# Patient Record
Sex: FEMALE | Race: WHITE | Employment: UNEMPLOYED | URBAN - METROPOLITAN AREA
[De-identification: names, ages, dates, MRNs, and addresses within clinical notes are randomized per-mention and may not be internally consistent; named-entity substitution may affect disease eponyms.]

---

## 2019-02-08 ENCOUNTER — HOSPITAL ENCOUNTER (EMERGENCY)
Age: 59
Discharge: HOME | End: 2019-02-08
Attending: EMERGENCY MEDICINE
Payer: COMMERCIAL

## 2019-02-08 ENCOUNTER — APPOINTMENT (OUTPATIENT)
Dept: GENERAL RADIOLOGY | Age: 59
End: 2019-02-08
Payer: COMMERCIAL

## 2019-02-08 VITALS
DIASTOLIC BLOOD PRESSURE: 75 MMHG | SYSTOLIC BLOOD PRESSURE: 130 MMHG | RESPIRATION RATE: 14 BRPM | OXYGEN SATURATION: 100 % | HEART RATE: 77 BPM | TEMPERATURE: 97.9 F

## 2019-02-08 DIAGNOSIS — S00.33XA CONTUSION OF NOSE, INITIAL ENCOUNTER: ICD-10-CM

## 2019-02-08 DIAGNOSIS — S01.81XA FACIAL LACERATION, INITIAL ENCOUNTER: Primary | ICD-10-CM

## 2019-02-08 PROCEDURE — 99283 EMERGENCY DEPT VISIT LOW MDM: CPT

## 2019-02-08 PROCEDURE — 4500000023 HC ED LEVEL 3 PROCEDURE

## 2019-02-08 PROCEDURE — 70160 X-RAY EXAM OF NASAL BONES: CPT

## 2019-02-08 ASSESSMENT — PAIN SCALES - GENERAL
PAINLEVEL_OUTOF10: 10

## 2022-03-16 ENCOUNTER — APPOINTMENT (OUTPATIENT)
Dept: GENERAL RADIOLOGY | Age: 62
DRG: 308 | End: 2022-03-16
Payer: COMMERCIAL

## 2022-03-16 ENCOUNTER — APPOINTMENT (OUTPATIENT)
Dept: CT IMAGING | Age: 62
DRG: 308 | End: 2022-03-16
Payer: COMMERCIAL

## 2022-03-16 ENCOUNTER — HOSPITAL ENCOUNTER (INPATIENT)
Age: 62
LOS: 6 days | Discharge: NURSING, REHAB OR LONG-TERM CARE | DRG: 308 | End: 2022-03-22
Attending: EMERGENCY MEDICINE | Admitting: STUDENT IN AN ORGANIZED HEALTH CARE EDUCATION/TRAINING PROGRAM
Payer: COMMERCIAL

## 2022-03-16 DIAGNOSIS — R26.2 DIFFICULTY IN WALKING: ICD-10-CM

## 2022-03-16 DIAGNOSIS — M84.452A: ICD-10-CM

## 2022-03-16 DIAGNOSIS — C79.9 METASTATIC MALIGNANT NEOPLASM, UNSPECIFIED SITE (HCC): ICD-10-CM

## 2022-03-16 DIAGNOSIS — M25.552 LEFT HIP PAIN: Primary | ICD-10-CM

## 2022-03-16 LAB
A/G RATIO: 1.1 (ref 1.1–2.2)
ALBUMIN SERPL-MCNC: 3.9 G/DL (ref 3.4–5)
ALP BLD-CCNC: 230 U/L (ref 40–129)
ALT SERPL-CCNC: 266 U/L (ref 10–40)
ANION GAP SERPL CALCULATED.3IONS-SCNC: 17 MMOL/L (ref 3–16)
ANION GAP SERPL CALCULATED.3IONS-SCNC: 20 MMOL/L (ref 3–16)
AST SERPL-CCNC: 132 U/L (ref 15–37)
BASOPHILS ABSOLUTE: 0 K/UL (ref 0–0.2)
BASOPHILS ABSOLUTE: 0 K/UL (ref 0–0.2)
BASOPHILS RELATIVE PERCENT: 0.5 %
BASOPHILS RELATIVE PERCENT: 0.7 %
BILIRUB SERPL-MCNC: 1.2 MG/DL (ref 0–1)
BUN BLDV-MCNC: 12 MG/DL (ref 7–20)
BUN BLDV-MCNC: 13 MG/DL (ref 7–20)
CALCIUM SERPL-MCNC: 11.4 MG/DL (ref 8.3–10.6)
CALCIUM SERPL-MCNC: 12.1 MG/DL (ref 8.3–10.6)
CHLORIDE BLD-SCNC: 100 MMOL/L (ref 99–110)
CHLORIDE BLD-SCNC: 101 MMOL/L (ref 99–110)
CO2: 21 MMOL/L (ref 21–32)
CO2: 22 MMOL/L (ref 21–32)
CREAT SERPL-MCNC: 0.8 MG/DL (ref 0.6–1.2)
CREAT SERPL-MCNC: 0.8 MG/DL (ref 0.6–1.2)
EOSINOPHILS ABSOLUTE: 0.1 K/UL (ref 0–0.6)
EOSINOPHILS ABSOLUTE: 0.1 K/UL (ref 0–0.6)
EOSINOPHILS RELATIVE PERCENT: 1.6 %
EOSINOPHILS RELATIVE PERCENT: 1.7 %
GAMMA GLUTAMYL TRANSFERASE: 33 U/L (ref 5–36)
GFR AFRICAN AMERICAN: >60
GFR AFRICAN AMERICAN: >60
GFR NON-AFRICAN AMERICAN: >60
GFR NON-AFRICAN AMERICAN: >60
GLUCOSE BLD-MCNC: 103 MG/DL (ref 70–99)
GLUCOSE BLD-MCNC: 125 MG/DL (ref 70–99)
GLUCOSE BLD-MCNC: 131 MG/DL (ref 70–99)
HCT VFR BLD CALC: 32.6 % (ref 36–48)
HCT VFR BLD CALC: 35.2 % (ref 36–48)
HEMOGLOBIN: 11.3 G/DL (ref 12–16)
HEMOGLOBIN: 11.8 G/DL (ref 12–16)
LYMPHOCYTES ABSOLUTE: 1.3 K/UL (ref 1–5.1)
LYMPHOCYTES ABSOLUTE: 1.3 K/UL (ref 1–5.1)
LYMPHOCYTES RELATIVE PERCENT: 30.5 %
LYMPHOCYTES RELATIVE PERCENT: 35.1 %
MAGNESIUM: 1.8 MG/DL (ref 1.8–2.4)
MCH RBC QN AUTO: 29.3 PG (ref 26–34)
MCH RBC QN AUTO: 30.1 PG (ref 26–34)
MCHC RBC AUTO-ENTMCNC: 33.5 G/DL (ref 31–36)
MCHC RBC AUTO-ENTMCNC: 34.7 G/DL (ref 31–36)
MCV RBC AUTO: 86.6 FL (ref 80–100)
MCV RBC AUTO: 87.5 FL (ref 80–100)
MONOCYTES ABSOLUTE: 0.3 K/UL (ref 0–1.3)
MONOCYTES ABSOLUTE: 0.3 K/UL (ref 0–1.3)
MONOCYTES RELATIVE PERCENT: 7.3 %
MONOCYTES RELATIVE PERCENT: 7.8 %
NEUTROPHILS ABSOLUTE: 2 K/UL (ref 1.7–7.7)
NEUTROPHILS ABSOLUTE: 2.4 K/UL (ref 1.7–7.7)
NEUTROPHILS RELATIVE PERCENT: 55.3 %
NEUTROPHILS RELATIVE PERCENT: 59.5 %
PARATHYROID HORMONE INTACT: 9.7 PG/ML (ref 14–72)
PDW BLD-RTO: 14.9 % (ref 12.4–15.4)
PDW BLD-RTO: 14.9 % (ref 12.4–15.4)
PERFORMED ON: ABNORMAL
PLATELET # BLD: 203 K/UL (ref 135–450)
PLATELET # BLD: 210 K/UL (ref 135–450)
PMV BLD AUTO: 7 FL (ref 5–10.5)
PMV BLD AUTO: 7.2 FL (ref 5–10.5)
POTASSIUM REFLEX MAGNESIUM: 3.7 MMOL/L (ref 3.5–5.1)
POTASSIUM SERPL-SCNC: 3.4 MMOL/L (ref 3.5–5.1)
RBC # BLD: 3.77 M/UL (ref 4–5.2)
RBC # BLD: 4.02 M/UL (ref 4–5.2)
REASON FOR REJECTION: NORMAL
REJECTED TEST: NORMAL
SODIUM BLD-SCNC: 139 MMOL/L (ref 136–145)
SODIUM BLD-SCNC: 142 MMOL/L (ref 136–145)
TOTAL PROTEIN: 7.5 G/DL (ref 6.4–8.2)
VITAMIN D 25-HYDROXY: 39.5 NG/ML
WBC # BLD: 3.6 K/UL (ref 4–11)
WBC # BLD: 4.1 K/UL (ref 4–11)

## 2022-03-16 PROCEDURE — 72128 CT CHEST SPINE W/O DYE: CPT

## 2022-03-16 PROCEDURE — 72131 CT LUMBAR SPINE W/O DYE: CPT

## 2022-03-16 PROCEDURE — 6360000002 HC RX W HCPCS: Performed by: STUDENT IN AN ORGANIZED HEALTH CARE EDUCATION/TRAINING PROGRAM

## 2022-03-16 PROCEDURE — 6370000000 HC RX 637 (ALT 250 FOR IP): Performed by: STUDENT IN AN ORGANIZED HEALTH CARE EDUCATION/TRAINING PROGRAM

## 2022-03-16 PROCEDURE — 83735 ASSAY OF MAGNESIUM: CPT

## 2022-03-16 PROCEDURE — 82306 VITAMIN D 25 HYDROXY: CPT

## 2022-03-16 PROCEDURE — 96374 THER/PROPH/DIAG INJ IV PUSH: CPT

## 2022-03-16 PROCEDURE — 6360000002 HC RX W HCPCS: Performed by: PHYSICIAN ASSISTANT

## 2022-03-16 PROCEDURE — 80053 COMPREHEN METABOLIC PANEL: CPT

## 2022-03-16 PROCEDURE — 1200000000 HC SEMI PRIVATE

## 2022-03-16 PROCEDURE — 85025 COMPLETE CBC W/AUTO DIFF WBC: CPT

## 2022-03-16 PROCEDURE — 73700 CT LOWER EXTREMITY W/O DYE: CPT

## 2022-03-16 PROCEDURE — 6370000000 HC RX 637 (ALT 250 FOR IP): Performed by: PHYSICIAN ASSISTANT

## 2022-03-16 PROCEDURE — 36415 COLL VENOUS BLD VENIPUNCTURE: CPT

## 2022-03-16 PROCEDURE — 74177 CT ABD & PELVIS W/CONTRAST: CPT

## 2022-03-16 PROCEDURE — 73502 X-RAY EXAM HIP UNI 2-3 VIEWS: CPT

## 2022-03-16 PROCEDURE — 73560 X-RAY EXAM OF KNEE 1 OR 2: CPT

## 2022-03-16 PROCEDURE — 6360000004 HC RX CONTRAST MEDICATION: Performed by: STUDENT IN AN ORGANIZED HEALTH CARE EDUCATION/TRAINING PROGRAM

## 2022-03-16 PROCEDURE — 96375 TX/PRO/DX INJ NEW DRUG ADDON: CPT

## 2022-03-16 PROCEDURE — 83970 ASSAY OF PARATHORMONE: CPT

## 2022-03-16 PROCEDURE — 2580000003 HC RX 258: Performed by: STUDENT IN AN ORGANIZED HEALTH CARE EDUCATION/TRAINING PROGRAM

## 2022-03-16 PROCEDURE — 82652 VIT D 1 25-DIHYDROXY: CPT

## 2022-03-16 PROCEDURE — 99284 EMERGENCY DEPT VISIT MOD MDM: CPT

## 2022-03-16 PROCEDURE — 72100 X-RAY EXAM L-S SPINE 2/3 VWS: CPT

## 2022-03-16 PROCEDURE — 82977 ASSAY OF GGT: CPT

## 2022-03-16 RX ORDER — ACETAMINOPHEN 650 MG/1
650 SUPPOSITORY RECTAL EVERY 6 HOURS PRN
Status: DISCONTINUED | OUTPATIENT
Start: 2022-03-16 | End: 2022-03-17

## 2022-03-16 RX ORDER — SODIUM CHLORIDE 9 MG/ML
25 INJECTION, SOLUTION INTRAVENOUS PRN
Status: DISCONTINUED | OUTPATIENT
Start: 2022-03-16 | End: 2022-03-19 | Stop reason: SDUPTHER

## 2022-03-16 RX ORDER — SODIUM CHLORIDE 0.9 % (FLUSH) 0.9 %
5-40 SYRINGE (ML) INJECTION EVERY 12 HOURS SCHEDULED
Status: DISCONTINUED | OUTPATIENT
Start: 2022-03-16 | End: 2022-03-19 | Stop reason: SDUPTHER

## 2022-03-16 RX ORDER — SODIUM CHLORIDE 0.9 % (FLUSH) 0.9 %
5-40 SYRINGE (ML) INJECTION PRN
Status: DISCONTINUED | OUTPATIENT
Start: 2022-03-16 | End: 2022-03-19 | Stop reason: SDUPTHER

## 2022-03-16 RX ORDER — ONDANSETRON 2 MG/ML
4 INJECTION INTRAMUSCULAR; INTRAVENOUS EVERY 6 HOURS PRN
Status: DISCONTINUED | OUTPATIENT
Start: 2022-03-16 | End: 2022-03-19 | Stop reason: SDUPTHER

## 2022-03-16 RX ORDER — MORPHINE SULFATE 4 MG/ML
4 INJECTION, SOLUTION INTRAMUSCULAR; INTRAVENOUS ONCE
Status: COMPLETED | OUTPATIENT
Start: 2022-03-16 | End: 2022-03-16

## 2022-03-16 RX ORDER — HYDROCODONE BITARTRATE AND ACETAMINOPHEN 5; 325 MG/1; MG/1
1 TABLET ORAL ONCE
Status: COMPLETED | OUTPATIENT
Start: 2022-03-16 | End: 2022-03-16

## 2022-03-16 RX ORDER — DEXTROSE MONOHYDRATE 50 MG/ML
100 INJECTION, SOLUTION INTRAVENOUS PRN
Status: DISCONTINUED | OUTPATIENT
Start: 2022-03-16 | End: 2022-03-22 | Stop reason: HOSPADM

## 2022-03-16 RX ORDER — DEXTROSE MONOHYDRATE 25 G/50ML
12.5 INJECTION, SOLUTION INTRAVENOUS PRN
Status: DISCONTINUED | OUTPATIENT
Start: 2022-03-16 | End: 2022-03-22 | Stop reason: HOSPADM

## 2022-03-16 RX ORDER — NICOTINE POLACRILEX 4 MG
15 LOZENGE BUCCAL PRN
Status: DISCONTINUED | OUTPATIENT
Start: 2022-03-16 | End: 2022-03-22 | Stop reason: HOSPADM

## 2022-03-16 RX ORDER — ONDANSETRON 2 MG/ML
4 INJECTION INTRAMUSCULAR; INTRAVENOUS ONCE
Status: COMPLETED | OUTPATIENT
Start: 2022-03-16 | End: 2022-03-16

## 2022-03-16 RX ORDER — SODIUM CHLORIDE 9 MG/ML
INJECTION, SOLUTION INTRAVENOUS CONTINUOUS
Status: ACTIVE | OUTPATIENT
Start: 2022-03-16 | End: 2022-03-17

## 2022-03-16 RX ORDER — ACETAMINOPHEN 325 MG/1
650 TABLET ORAL EVERY 6 HOURS PRN
Status: DISCONTINUED | OUTPATIENT
Start: 2022-03-16 | End: 2022-03-17

## 2022-03-16 RX ADMIN — IOPAMIDOL 75 ML: 755 INJECTION, SOLUTION INTRAVENOUS at 22:44

## 2022-03-16 RX ADMIN — MORPHINE SULFATE 4 MG: 4 INJECTION INTRAVENOUS at 19:49

## 2022-03-16 RX ADMIN — SODIUM CHLORIDE: 9 INJECTION, SOLUTION INTRAVENOUS at 23:10

## 2022-03-16 RX ADMIN — MORPHINE SULFATE 4 MG: 4 INJECTION INTRAVENOUS at 18:43

## 2022-03-16 RX ADMIN — ACETAMINOPHEN 650 MG: 325 TABLET ORAL at 21:04

## 2022-03-16 RX ADMIN — HYDROCODONE BITARTRATE AND ACETAMINOPHEN 1 TABLET: 5; 325 TABLET ORAL at 15:33

## 2022-03-16 RX ADMIN — ONDANSETRON 4 MG: 2 INJECTION INTRAMUSCULAR; INTRAVENOUS at 18:44

## 2022-03-16 RX ADMIN — SODIUM CHLORIDE, PRESERVATIVE FREE 10 ML: 5 INJECTION INTRAVENOUS at 21:15

## 2022-03-16 RX ADMIN — HYDROMORPHONE HYDROCHLORIDE 0.5 MG: 1 INJECTION, SOLUTION INTRAMUSCULAR; INTRAVENOUS; SUBCUTANEOUS at 21:57

## 2022-03-16 ASSESSMENT — PAIN SCALES - GENERAL
PAINLEVEL_OUTOF10: 0
PAINLEVEL_OUTOF10: 9
PAINLEVEL_OUTOF10: 7
PAINLEVEL_OUTOF10: 9
PAINLEVEL_OUTOF10: 5
PAINLEVEL_OUTOF10: 7
PAINLEVEL_OUTOF10: 6

## 2022-03-16 ASSESSMENT — PAIN DESCRIPTION - FREQUENCY
FREQUENCY: CONTINUOUS

## 2022-03-16 ASSESSMENT — PAIN DESCRIPTION - ONSET
ONSET: PROGRESSIVE
ONSET: PROGRESSIVE

## 2022-03-16 ASSESSMENT — PAIN DESCRIPTION - ORIENTATION
ORIENTATION: LEFT

## 2022-03-16 ASSESSMENT — ENCOUNTER SYMPTOMS
BACK PAIN: 1
COUGH: 0
NAUSEA: 0
ABDOMINAL PAIN: 0
VOMITING: 0
COLOR CHANGE: 0
SHORTNESS OF BREATH: 0

## 2022-03-16 ASSESSMENT — PAIN DESCRIPTION - PAIN TYPE
TYPE: CHRONIC PAIN

## 2022-03-16 ASSESSMENT — PAIN DESCRIPTION - LOCATION
LOCATION: HIP
LOCATION: HIP;HEAD
LOCATION: HIP

## 2022-03-16 ASSESSMENT — PAIN DESCRIPTION - PROGRESSION
CLINICAL_PROGRESSION: NOT CHANGED

## 2022-03-16 ASSESSMENT — PAIN DESCRIPTION - DESCRIPTORS
DESCRIPTORS: ACHING;CONSTANT

## 2022-03-16 ASSESSMENT — PAIN - FUNCTIONAL ASSESSMENT
PAIN_FUNCTIONAL_ASSESSMENT: PREVENTS OR INTERFERES SOME ACTIVE ACTIVITIES AND ADLS
PAIN_FUNCTIONAL_ASSESSMENT: PREVENTS OR INTERFERES SOME ACTIVE ACTIVITIES AND ADLS

## 2022-03-16 NOTE — ED PROVIDER NOTES
[address]        Pt Name: Renzo Toth  MRN: [id number]  [place] 1960  Date of evaluation: 3/16/2022  Provider: MADELINE Ayala  PCP: Chava Kelly MD  Note Started: 6:21 PM EDT        I have seen and evaluated this patient with my supervising physician Dr Palak Dewitt       Chief Complaint   Patient presents with    Leg Pain     pt woke with exacerbation of chronic L leg pain at 0700today. HISTORY OF PRESENT ILLNESS   (Location, Timing/Onset, Context/Setting, Quality, Duration, Modifying Factors, Severity, Associated Signs and Symptoms)  Note limiting factors. Chief Complaint: Left leg pain     Renzo Toth is a 64 y.o. female who presents to the emergency department today with complaints of left leg pain. The patient has had left leg pain since November. She denies any falls or injuries. She states it has steadily gotten worse. She states normally she did not require any assistive devices to walk, but in late January and early February she did transition to using a walker because the pain was so severe. She became more concerned today when she woke up this morning and could not even use the walker because the pain was so bad. 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 She has been in remission since then. She has been treated with a few rounds of steroids and muscle relaxers, which has helped her chronic low back pain, but has not helped her leg pain at all. She has no further complaints at this time. Nursing Notes were all reviewed and agreed with or any disagreements were addressed in the HPI.     REVIEW OF SYSTEMS    (2-9 systems for level 4, 10 or more for level 5)     Review of Systems   Constitutional: Negative for chills and fever.   Respiratory: Negative for cough and shortness of breath. Cardiovascular: Negative for chest pain and palpitations. Gastrointestinal: Negative for abdominal pain, nausea and vomiting. Musculoskeletal: Positive for arthralgias (left hip/leg), back pain (chronic, no change from baseline) and gait problem (due to left leg pain). Skin: Negative for color change and rash. Neurological: Negative for dizziness, syncope and weakness. Psychiatric/Behavioral: Negative for agitation and confusion. Positives and Pertinent negatives as per HPI. Except as noted above in the ROS, all other systems were reviewed and negative. PAST MEDICAL HISTORY     Past Medical History:   Diagnosis Date    Cancer ([facility] [address]     Depression     Hyperthyroidism     Nephrolithiasis          SURGICAL HISTORY     Past Surgical History:   Procedure Laterality Date    CHOLECYSTECTOMY      HYSTERECTOMY      MASTECTOMY           CURRENTMEDICATIONS       Current Discharge Medication List      CONTINUE these medications which have NOT CHANGED    Details   DULoxetine HCl (CYMBALTA PO) Take by mouth      atorvastatin (LIPITOR) 10 MG tablet Take 10 mg by mouth daily      gabapentin (NEURONTIN) 300 MG capsule TAKE 1 CAPSULE BY MOUTH DAILY. Refills: 3      metFORMIN (GLUCOPHAGE) 500 MG tablet TAKE 1 TABLET BY MOUTH 2 (TWO) TIMES DAILY WITH MEALS. Refills: 1      Breast Prosthesis MISC RIGHT MASTECTOMY BRA  RIGHT BREAST PROTHESIS  Qty: 4 each, Refills: 0      albuterol (PROVENTIL HFA;VENTOLIN HFA) 108 (90 BASE) MCG/ACT inhaler Inhale 2 puffs into the lungs every 4 hours as needed.                ALLERGIES     Cephalexin    FAMILYHISTORY       Family History   Problem Relation Age of Onset    Mental Illness Mother     High Blood Pressure Mother     High Blood Pressure Father     Diabetes Father           SOCIAL HISTORY       Social History     Tobacco Use    Smoking status: Former Smoker    Smokeless tobacco: Never Used Substance Use Topics    Alcohol use: Yes     Comment: rarely    Drug use: No       SCREENINGS    Gilda Coma Scale  Eye Opening: Spontaneous  Best Verbal Response: Oriented  Best Motor Response: Obeys commands  Gilda Coma Scale Score: 15        PHYSICAL EXAM    (up to 7 for level 4, 8 or more for level 5)     ED Triage Vitals [03/16/22 1442]   BP Temp Temp Source Pulse Resp SpO2 Height Weight   139/70 97 °F (36.1 °C) Oral 97 16 96 % 5' 5\" (1.651 m) 236 lb 15.9 oz (107.5 kg)       Physical Exam  Vitals and nursing note reviewed. Constitutional:       General: She is not in acute distress. Appearance: Normal appearance. She is well-developed. She is not ill-appearing, toxic-appearing or diaphoretic. HENT:      Head: Normocephalic and atraumatic. Eyes:      Conjunctiva/sclera: Conjunctivae normal.      Pupils: Pupils are equal, round, and reactive to light. Cardiovascular:      Rate and Rhythm: Normal rate and regular rhythm. Pulses:           Dorsalis pedis pulses are 2+ on the right side and 2+ on the left side. Pulmonary:      Effort: Pulmonary effort is normal. No respiratory distress. Breath sounds: Normal breath sounds. Musculoskeletal:      Cervical back: Normal.      Thoracic back: Normal.      Lumbar back: Normal.      Right hip: Normal.      Left hip: Tenderness present. Decreased range of motion. Right upper leg: Normal.      Left upper leg: Tenderness (no tenderness to posterior leg) present. Right lower leg: Normal. No edema. Left lower leg: Normal. No edema. Skin:     General: Skin is warm and dry. Neurological:      Mental Status: She is alert and oriented to person, place, and time. Psychiatric:         Behavior: Behavior normal. Behavior is cooperative. Thought Content:  Thought content normal.         DIAGNOSTIC RESULTS   LABS:    Labs Reviewed   CBC WITH AUTO DIFFERENTIAL - Abnormal; Notable for the following components:       Result Value metastatic   lesion do place the patient at a significant risk for pathologic fracture at   this site. Recommend orthopedic consultation. CT LUMBAR SPINE WO CONTRAST   Final Result   1. Diffuse bone metastasis. 2. Less than 50% compression fracture of L1 of uncertain chronicity. Subacute rib fractures. 3. Partially visualized questionable mass in the left kidney. CT abdomen   pelvis with contrast recommended. 4. Nonobstructive renal calculi. 5. Other findings as described. XR KNEE LEFT (1-2 VIEWS)   Final Result   Minimal degenerative changes of the left knee with no evidence of acute   osseous abnormality. XR HIP 2-3 VW W PELVIS LEFT   Preliminary Result   Mild compression deformity of the T12 vertebral body, age indeterminate. No   acute lumbar spine fracture. No comparison imaging available. No acute osseous abnormality of the pelvis or left hip. Indeterminate 2 cm   lucent lesion within the left femoral neck. If the patient has a history of   malignancy, finding is concerning for possible metastatic focus. Consider   follow-up MRI or bone scan as clinically indicated. XR LUMBAR SPINE (2-3 VIEWS)   Preliminary Result   Mild compression deformity of the T12 vertebral body, age indeterminate. No   acute lumbar spine fracture. No comparison imaging available. No acute osseous abnormality of the pelvis or left hip. Indeterminate 2 cm   lucent lesion within the left femoral neck. If the patient has a history of   malignancy, finding is concerning for possible metastatic focus. Consider   follow-up MRI or bone scan as clinically indicated.            XR LUMBAR SPINE (2-3 VIEWS)    Result Date: 3/16/2022  EXAMINATION: ONE XRAY VIEW OF THE PELVIS AND TWO XRAY VIEWS LEFT HIP; THREE XRAY VIEWS OF THE LUMBAR SPINE 3/16/2022 3:09 pm COMPARISON: None HISTORY: ORDERING SYSTEM PROVIDED HISTORY: pain TECHNOLOGIST PROVIDED HISTORY: Reason for exam:->pain Reason for acute osseous abnormality. CT THORACIC SPINE WO CONTRAST    Result Date: 3/16/2022  EXAMINATION: CT OF THE LUMBAR SPINE WITHOUT CONTRAST; CT OF THE THORACIC SPINE WITHOUT CONTRAST  3/16/2022 TECHNIQUE: CT of the lumbar spine was performed without the administration of intravenous contrast. Multiplanar reformatted images are provided for review. Adjustment of mA and/or kV according to patient size was utilized. Dose modulation, iterative reconstruction, and/or weight based adjustment of the mA/kV was utilized to reduce the radiation dose to as low as reasonably achievable.; CT of the thoracic spine was performed without the administration of intravenous contrast. Multiplanar reformatted images are provided for review. Dose modulation, iterative reconstruction, and/or weight based adjustment of the mA/kV was utilized to reduce the radiation dose to as low as reasonably achievable. COMPARISON: None HISTORY: ORDERING SYSTEM PROVIDED HISTORY: pain TECHNOLOGIST PROVIDED HISTORY: Reason for exam:->pain Decision Support Exception - unselect if not a suspected or confirmed emergency medical condition->Emergency Medical Condition (MA) Reason for Exam: Fall back in November, c/o mostly of left hip pain; ORDERING SYSTEM PROVIDED HISTORY: T12 fracture seen on XR TECHNOLOGIST PROVIDED HISTORY: Reason for exam:->T12 fracture seen on XR Reason for Exam: Fall back in November, c/o mostly of left hip pain FINDINGS: Thoracic spine: BONES/ALIGNMENT: Grade 1 retrolisthesis of T12 on L1. Chronic appearing less than 50% loss of height of T12. Vertebral body heights appear maintained aside from chronic appearing endplate changes. Mild and moderate loss of disc height. Posterior elements appear intact. Diffuse heterogeneous appearance of spine with lytic and sclerotic lesions. Subacute rib fractures noted. DEGENERATIVE CHANGES: Scattered degenerative changes noted in the visualized spine without spondylolisthesis.  SOFT TISSUES: Visualized paraspinal soft tissues appear unremarkable. Small hiatal hernia. Lumbar spine: BONES/ALIGNMENT: The inferior-most complete disc space represents L5-S1. Grade 1 retrolisthesis of L2 on L3 and L1 on L2. Diffuse heterogeneous appearance of spine with lytic and sclerotic lesions. Less than 50% compression fracture of L1 of uncertain chronicity. Vertebral body heights appear otherwise maintained aside from chronic endplate changes. Mild loss of disc height. Posterior elements appear intact. DEGENERATIVE CHANGES: Scattered degenerative changes noted in the visualized spine with spondylolistheses. SOFT TISSUES: Visualized paraspinal soft tissues appear unremarkable. Nonobstructive renal calculi. Partially visualized questionable mass in the left kidney. 1. Diffuse bone metastasis. 2. Less than 50% compression fracture of L1 of uncertain chronicity. Subacute rib fractures. 3. Partially visualized questionable mass in the left kidney. CT abdomen pelvis with contrast recommended. 4. Nonobstructive renal calculi. 5. Other findings as described. CT LUMBAR SPINE WO CONTRAST    Result Date: 3/16/2022  EXAMINATION: CT OF THE LUMBAR SPINE WITHOUT CONTRAST; CT OF THE THORACIC SPINE WITHOUT CONTRAST  3/16/2022 TECHNIQUE: CT of the lumbar spine was performed without the administration of intravenous contrast. Multiplanar reformatted images are provided for review. Adjustment of mA and/or kV according to patient size was utilized. Dose modulation, iterative reconstruction, and/or weight based adjustment of the mA/kV was utilized to reduce the radiation dose to as low as reasonably achievable.; CT of the thoracic spine was performed without the administration of intravenous contrast. Multiplanar reformatted images are provided for review. Dose modulation, iterative reconstruction, and/or weight based adjustment of the mA/kV was utilized to reduce the radiation dose to as low as reasonably achievable. COMPARISON: None HISTORY: ORDERING SYSTEM PROVIDED HISTORY: pain TECHNOLOGIST PROVIDED HISTORY: Reason for exam:->pain Decision Support Exception - unselect if not a suspected or confirmed emergency medical condition->Emergency Medical Condition (MA) Reason for Exam: Fall back in November, c/o mostly of left hip pain; ORDERING SYSTEM PROVIDED HISTORY: T12 fracture seen on XR TECHNOLOGIST PROVIDED HISTORY: Reason for exam:->T12 fracture seen on XR Reason for Exam: Fall back in November, c/o mostly of left hip pain FINDINGS: Thoracic spine: BONES/ALIGNMENT: Grade 1 retrolisthesis of T12 on L1. Chronic appearing less than 50% loss of height of T12. Vertebral body heights appear maintained aside from chronic appearing endplate changes. Mild and moderate loss of disc height. Posterior elements appear intact. Diffuse heterogeneous appearance of spine with lytic and sclerotic lesions. Subacute rib fractures noted. DEGENERATIVE CHANGES: Scattered degenerative changes noted in the visualized spine without spondylolisthesis. SOFT TISSUES: Visualized paraspinal soft tissues appear unremarkable. Small hiatal hernia. Lumbar spine: BONES/ALIGNMENT: The inferior-most complete disc space represents L5-S1. Grade 1 retrolisthesis of L2 on L3 and L1 on L2. Diffuse heterogeneous appearance of spine with lytic and sclerotic lesions. Less than 50% compression fracture of L1 of uncertain chronicity. Vertebral body heights appear otherwise maintained aside from chronic endplate changes. Mild loss of disc height. Posterior elements appear intact. DEGENERATIVE CHANGES: Scattered degenerative changes noted in the visualized spine with spondylolistheses. SOFT TISSUES: Visualized paraspinal soft tissues appear unremarkable. Nonobstructive renal calculi. Partially visualized questionable mass in the left kidney. 1. Diffuse bone metastasis. 2. Less than 50% compression fracture of L1 of uncertain chronicity. Subacute rib fractures.  3. Partially visualized questionable mass in the left kidney. CT abdomen pelvis with contrast recommended. 4. Nonobstructive renal calculi. 5. Other findings as described. CT HIP LEFT WO CONTRAST    Result Date: 3/16/2022  EXAMINATION: CT OF THE LEFT HIP WITHOUT CONTRAST 3/16/2022 4:21 pm TECHNIQUE: CT of the left hip was performed without the administration of intravenous contrast.  Multiplanar reformatted images are provided for review. Dose modulation, iterative reconstruction, and/or weight based adjustment of the mA/kV was utilized to reduce the radiation dose to as low as reasonably achievable. COMPARISON: None. HISTORY ORDERING SYSTEM PROVIDED HISTORY: abnormal finding on XR imaging, concerning for bone mets, eval for pathologic fracture TECHNOLOGIST PROVIDED HISTORY: Reason for exam:->abnormal finding on XR imaging, concerning for bone mets, eval for pathologic fracture Decision Support Exception - unselect if not a suspected or confirmed emergency medical condition->Emergency Medical Condition (MA) Reason for Exam: Fall back in November, c/o mostly of left hip pain FINDINGS: Bones: Destructive osseous lesion centered at the left metatarsal head and neck consistent with metastatic lesion. Partially imaged destructive lesion of the sacrum also present. There are several additional lytic and sclerotic foci of the left hemipelvis most compatible with metastatic disease. No definite fracture line identified at the left hip, however the location and destructive appearance does place the patient at significant risk for pathologic fracture at this site. Soft Tissue: Visualized intrapelvic structures demonstrate colonic diverticulosis. Imaged subcutaneous tissues appear grossly unremarkable. Joint: Anatomic alignment of the left hip with mild degenerative change of the left hip.      1. Osseous metastatic disease throughout the imaged left hemipelvis, left sacrum, and proximal left femur with large destructive lesions centered within the left femoral head and neck and involving the partially imaged left sacrum. 2. No definite pathologic fracture identified on the current exam, however, the location and severe destructive changes of the left femoral metastatic lesion do place the patient at a significant risk for pathologic fracture at this site. Recommend orthopedic consultation. XR HIP 2-3 VW W PELVIS LEFT    Result Date: 3/16/2022  EXAMINATION: ONE XRAY VIEW OF THE PELVIS AND TWO XRAY VIEWS LEFT HIP; THREE XRAY VIEWS OF THE LUMBAR SPINE 3/16/2022 3:09 pm COMPARISON: None HISTORY: ORDERING SYSTEM PROVIDED HISTORY: pain TECHNOLOGIST PROVIDED HISTORY: Reason for exam:->pain Reason for Exam: pt fell back in nov and pain has gotten worse over time, unable to put weight on left leg, lower back pain FINDINGS: Lumbar spine, three views: There is mild wedge-shaped deformity of the T12 vertebral body. There is no other evidence of acute spinal fracture. Vertebral alignment is maintained. Mild multilevel osteoarthritic spurring with lower lumbar facet arthropathy. Mild osteopenia. Post cholecystectomy clips. AP pelvis and left hip: No acute osseous abnormality of the pelvis or left hip. Mild osteopenia. Poorly marginated lucency in the left femoral neck measuring around 2 cm. The SI joints are maintained. Mild symmetric osteoarthritic changes of the hips. Multiple calcified pelvic phleboliths. Mild compression deformity of the T12 vertebral body, age indeterminate. No acute lumbar spine fracture. No comparison imaging available. No acute osseous abnormality of the pelvis or left hip. Indeterminate 2 cm lucent lesion within the left femoral neck. If the patient has a history of malignancy, finding is concerning for possible metastatic focus. Consider follow-up MRI or bone scan as clinically indicated.            PROCEDURES   Unless otherwise noted below, none     Procedures      CONSULTS:  IP CONSULT TO HOSPITALIST  IP CONSULT TO ORTHOPEDIC SURGERY  IP CONSULT TO ONCOLOGY      EMERGENCY DEPARTMENT COURSE and DIFFERENTIAL DIAGNOSIS/MDM:   Vitals:    Vitals:    03/16/22 1442 03/16/22 2023   BP: 139/70 107/73   Pulse: 97 78   Resp: 16 17   Temp: 97 °F (36.1 °C) 97.6 °F (36.4 °C)   TempSrc: Oral Oral   SpO2: 96% 96%   Weight: 236 lb 15.9 oz (107.5 kg)    Height: 5' 5\" (1.651 m)        Patient was given the following medications:  Medications   HYDROcodone-acetaminophen (NORCO) 5-325 MG per tablet 1 tablet (1 tablet Oral Given 3/16/22 1533)   morphine (PF) injection 4 mg (4 mg IntraVENous Given 3/16/22 1843)   ondansetron (ZOFRAN) injection 4 mg (4 mg IntraVENous Given 3/16/22 1844)   morphine (PF) injection 4 mg (4 mg IntraVENous Given 3/16/22 1949)           ED COURSE & MEDICAL DECISION MAKING    - The patient presented to the ER with complaints of left leg pain. Vital signs were reviewed. Exam as above. Peripheral IV placed. Labs, Imaging ordered. - Pertinent Labs & Imaging studies reviewed. (See chart for details)   -  Patient seen and evaluated in the emergency department. -  Triage and nursing notes reviewed and incorporated. -  Old chart records reviewed and incorporated. -   I have seen and evaluated this patient with my supervising physician Dr Jefferson Mac. -  Differential diagnosis includes: abrasion/laceration, contusion, fracture, sprain/strain, dislocation  -  Work-up included:  See above  -  ED treatment included:  Norco, morphine, zofran  - Consults: hospitalist for admission  -  Results discussed with patient and/or family. Labs show no leukocytosis, hemoglobin of 11.3, hematocrit of 32.6. Metabolic panel with calcium of 12.1. Imaging studies show no acute process on plain films of the left knee.   Plain films of the lumbar spine, left hip and pelvis show mild compression deformity of T12 vertebral body, no acute lumbar spine fracture, and indeterminate 2 cm lucent lesion within the left femoral neck, given her history of malignancy this is further evaluated with a CT scan. CT scan of the thoracic and lumbar spine show diffuse metastatic disease, less than 50% compression fracture of L1 of uncertain chronicity, subacute rib fractures, partially visualized questionable mass in the left kidney. CT scan of the left hip show osseous metastatic disease throughout the imaged left hemipelvis, left sacrum, and proximal left femur with large destructive lesion centered within the left femoral head and neck involving the partially imaged left sacrum, with no definite pathologic fracture identified on the exam, however given the location and severity of the destructive changes this does place the patient in significant risk for pathologic fracture. At this time, we recommend admission, as the patient has significant pain in her leg, she is unable to walk, even with her assistive devices that she has at home. She also has new findings of diffuse metastatic disease, would benefit from admission for further evaluation and management. The patient and/or family is agreeable with plan of care and disposition.  -  Disposition:  Admission  - Critical Care: The total critical care time I independently spent while evaluating and treating this patient was 18 minutes. This excludes time spent doing separately billable procedures. This includes time at the bedside, data interpretation, medication management, obtaining critical history from collateral sources if the patient is unable to provide it directly, and physician consultation. Specifics of interventions taken and potentially life-threatening diagnostic considerations are listed above in the medical decision making. If this was a shared visit with an physician, the time in this attestation is non-concurrent critical care time out of the total shared critical care time provided by the physician and myself. FINAL IMPRESSION      1. Left hip pain    2.  Metastatic malignant neoplasm, unspecified site ([facility] [address]    3. Difficulty in walking          DISPOSITION/PLAN   DISPOSITION Admitted 03/16/2022 07:30:59 PM      PATIENT REFERRED TO:  No follow-up provider specified.     DISCHARGE MEDICATIONS:  Current Discharge Medication List          DISCONTINUED MEDICATIONS:  Current Discharge Medication List                 (Please note that portions of this note were completed with a voice recognition program.  Efforts were made to edit the dictations but occasionally words are mis-transcribed.)    MADELINE Jesus (electronically signed)           Freddy Jesus  03/16/22 2043

## 2022-03-16 NOTE — ED PROVIDER NOTES
Attending Note:    The patient was seen and examined by the mid-level provider. I also completed a face-to-face examination. HPI: Dion Gomez is a 64 y.o. female who presents to the emergency department with a complaint of low back and hip pain. She states that she has been having low back pain since November 2021. She states that it is constant, it is there every day, all day long and never goes away. She denies any radicular pain, saddle anesthesia, focal weakness, or numbness. She denies any fall trauma or injury. She denies any associated chest pain or shortness of breath. Since January, for the last 2 months she is also been having left hip pain that radiates to the left anterior thigh and down to the left mid thigh. She denies any injury to the hip. She reports that since January she has had to use a walker because the pain is so bad. For the last couple of days she has not been able to bear any weight and today she was unable to walk at all. She denies any fever chills nausea vomiting diarrhea. No cough or cold symptoms. Medical history is significant for breast cancer. She was diagnosed in 2007. She reports that she was diagnosed with \"stage II\" and underwent mastectomy followed by 4 rounds of chemotherapy. She did not receive radiation. She has had no further problems since her original diagnosis. Physical Exam:     Constitutional: No apparent distress. Head: No visible evidence of trauma. Normocephalic. Eyes: Pupils equal and reactive. No photophobia. Conjunctiva normal.    HENT: Oral mucosa moist.  Airway patent. Neck:  Soft and supple. Nontender. Heart:  Regular rate and rhythm. No murmur. Lungs:  Clear to auscultation. No wheezes, rales, or ronchi. No conversational dyspnea or accessory muscle use. Abdomen:  Soft, non-distended. Nontender. No guarding rigidity or rebound. Musculoskeletal: Tenderness noted in the thoracic and lumbar spine throughout. Pain noted with range of motion. Pelvis stable. Tenderness in the left hip with palpation. Pain was noted with rotation and with range of motion in the left hip. Radial and dorsalis pedis pulses were equal laterally. Capillary refill less than 2 seconds in all digits. Neurological: Alert and oriented x 3. Speech clear. No acute focal motor or sensory deficits. GCS 15. No facial droop. No pronator drift. Able to slightly lift both legs off the bed without difficulty. However lifting the left leg elicits significant pain in the left hip. No radicular pain. No saddle anesthesia. Deep tendon reflexes were 2/4 in the patellar and Achilles tendon. She was able to plantarflex and dorsiflex bilaterally without difficulty against resistance. Skin: Skin is warm and dry. No rash. Psychiatric: Normal mood and affect. Behavior is normal.     DIAGNOSTIC RESULTS     EKG: All EKG's are interpreted by the Emergency Department Physician who either signs or Co-signs this chart in the absence of a cardiologist.        RADIOLOGY:   Non-plain film images such as CT, Ultrasound and MRI are read by the radiologist. Plain radiographic images are visualized and preliminarily interpreted by the emergency physician with the below findings:        Interpretation per the Radiologist below, if available at the time of this note:    CT THORACIC SPINE WO CONTRAST   Final Result   1. Diffuse bone metastasis. 2. Less than 50% compression fracture of L1 of uncertain chronicity. Subacute rib fractures. 3. Partially visualized questionable mass in the left kidney. CT abdomen   pelvis with contrast recommended. 4. Nonobstructive renal calculi. 5. Other findings as described. CT HIP LEFT WO CONTRAST   Final Result   1.  Osseous metastatic disease throughout the imaged left hemipelvis, left   sacrum, and proximal left femur with large destructive lesions centered   within the left femoral head and neck and involving the partially imaged left   sacrum. 2. No definite pathologic fracture identified on the current exam, however,   the location and severe destructive changes of the left femoral metastatic   lesion do place the patient at a significant risk for pathologic fracture at   this site. Recommend orthopedic consultation. CT LUMBAR SPINE WO CONTRAST   Final Result   1. Diffuse bone metastasis. 2. Less than 50% compression fracture of L1 of uncertain chronicity. Subacute rib fractures. 3. Partially visualized questionable mass in the left kidney. CT abdomen   pelvis with contrast recommended. 4. Nonobstructive renal calculi. 5. Other findings as described. XR KNEE LEFT (1-2 VIEWS)   Final Result   Minimal degenerative changes of the left knee with no evidence of acute   osseous abnormality. XR HIP 2-3 VW W PELVIS LEFT   Preliminary Result   Mild compression deformity of the T12 vertebral body, age indeterminate. No   acute lumbar spine fracture. No comparison imaging available. No acute osseous abnormality of the pelvis or left hip. Indeterminate 2 cm   lucent lesion within the left femoral neck. If the patient has a history of   malignancy, finding is concerning for possible metastatic focus. Consider   follow-up MRI or bone scan as clinically indicated. XR LUMBAR SPINE (2-3 VIEWS)   Preliminary Result   Mild compression deformity of the T12 vertebral body, age indeterminate. No   acute lumbar spine fracture. No comparison imaging available. No acute osseous abnormality of the pelvis or left hip. Indeterminate 2 cm   lucent lesion within the left femoral neck. If the patient has a history of   malignancy, finding is concerning for possible metastatic focus. Consider   follow-up MRI or bone scan as clinically indicated.                ED BEDSIDE ULTRASOUND:   Performed by ED Physician - none    LABS:  Labs Reviewed   CBC WITH AUTO DIFFERENTIAL   COMPREHENSIVE METABOLIC PANEL W/ REFLEX TO MG FOR LOW K       All other labs were within normal range or not returned as of this dictation. EMERGENCY DEPARTMENT COURSE and DIFFERENTIAL DIAGNOSIS/MDM:   Vitals:    Vitals:    03/16/22 1442   BP: 139/70   Pulse: 97   Resp: 16   Temp: 97 °F (36.1 °C)   TempSrc: Oral   SpO2: 96%   Weight: 236 lb 15.9 oz (107.5 kg)   Height: 5' 5\" (1.651 m)       I personally saw the patient and was involved in the medical decision making. Patient presents with back and hip pain as noted above. X-rays revealed findings suspicious for metastatic disease. CT imaging was added. No definite pathologic fracture but she is unable to bear weight on the left hip and her pain is uncontrolled. She will be admitted for pain control, orthopedic evaluation of the left hip, and further work-up and management. A call was placed to the hospitalist on-call at [facility] by the physician assistant for admission. She will be transferred by S ambulance. MDM      CRITICAL CARE TIME     I personally saw the patient and independently provided 0 minutes of non-concurrent critical care out of the total shared critical care time provided. This excludes separately reportable procedures. There was a high probability of clinically significant/life threatening deterioration in the patient's condition which required my urgent intervention. CONSULTS:  IP CONSULT TO HOSPITALIST    PROCEDURES:  Unless otherwise noted below, none     Procedures        FINAL IMPRESSION      1. Left hip pain    2. Metastatic malignant neoplasm, unspecified site ([facility] [address]    3. Difficulty in walking          DISPOSITION/PLAN   DISPOSITION Decision To Admit 03/16/2022 05:33:59 PM      PATIENT REFERRED TO:  No follow-up provider specified. DISCHARGE MEDICATIONS:  New Prescriptions    No medications on file     Controlled Substances Monitoring:     No flowsheet data found.     (Please note that portions of this note were completed with a voice recognition program.  Efforts were made to edit the dictations but occasionally words are mis-transcribed. )    1859 Reji Fitzpatrick DO (electronically signed)  Attending Emergency Physician      Jeni Guevara DO  03/16/22 0933

## 2022-03-16 NOTE — ED NOTES
Gave Pt her pain medication and she stated \"that's nothing but candy\".      Verito Thurman RN  03/16/22 2606

## 2022-03-16 NOTE — H&P
Hospital Medicine History & Physical      PCP: Johnson Schuler MD    Date of Admission: 3/16/2022    Date of Service: Pt seen/examined on 3/16/2022 admitted to    Chief Complaint: Worsening left hip pain and inability to ambulate on hip      History Of Present Illness: The patient is a 64 y.o. 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 She does also have some right leg pain but she notes that this is likely more so because she is compensating for being unable to put any weight or pressure on her left hip. She is not had any noted trauma to this left hip. She has also had some persistent occurrences of lower lumbar back pain but has never been associate with left hip pain and it had started much earlier than this sometime back in last November 2021. She notes the left hip pain has been more concerning to her at this time since it has persisted for the last 2 months and seems to be progressively getting worse. The lumbar pain seems to be stable. She only notes the previous history of breast cancer and has not had any other previous history of bone cancer.   She denies any other recent symptoms of fever, chills, dizziness, syncope, random other joint or bone never used smokeless tobacco.  ETOH:   reports current alcohol use. Family History:  Reviewed in detail and negative for DM, Early CAD, Cancer, CVA. Positive as follows:        Problem Relation Age of Onset    Mental Illness Mother     High Blood Pressure Mother     High Blood Pressure Father     Diabetes Father        REVIEW OF SYSTEMS:    as noted in the HPI. All other systems reviewed and negative. PHYSICAL EXAM:    BP (!) 146/92   Pulse 83   Temp 97.8 °F (36.6 °C) (Oral)   Resp 15   Ht 5' 5\" (1.651 m)   Wt 238 lb 8.6 oz (108.2 kg)   SpO2 97%   BMI 39.69 kg/m²     General appearance: Alert and oriented x4, no acute respiratory distress, pleasant and conversational, still having some slight left hip pain  HEENT Normal cephalic, atraumatic without obvious deformity. Pupils equal, round, and reactive to light. Extra ocular muscles intact. Conjunctivae/corneas clear. Dry mucous membrane  Neck: Supple, no JVD  Lungs: Clear to auscultation, bilaterally without Rales/Wheezes/Rhonchi with good respiratory effort. Heart: Regular rate and rhythm with Normal S1/S2 without murmurs, rubs or gallops, point of maximum impulse non-displaced  Abdomen: Soft, non-tender or non-distended without rigidity or guarding and positive bowel sounds all four quadrants. Extremities: No edema noted bilaterally to lower extremities  Musculoskeletal: Noted pain to left hip on palpation but not extreme, does have some slight pain to the lumbar region on palpation as well  Skin: No rashes  Neurologic: Grossly intact neurologically  Mental status: Alert, oriented, thought content appropriate. Capillary Refill: Acceptable  < 3 seconds  Peripheral Pulses: +3 Easily felt, not easily obliterated with pressure      CT left hip without contrast:  1.  Osseous metastatic disease throughout the imaged left hemipelvis, left   sacrum, and proximal left femur with large destructive lesions centered   within the left femoral head and neck and involving the partially imaged left   sacrum. 2. No definite pathologic fracture identified on the current exam, however,   the location and severe destructive changes of the left femoral metastatic   lesion do place the patient at a significant risk for pathologic fracture at   this site. [facility] orthopedic consultation. CT lumbar spine without contrast:  1. Diffuse bone metastasis. 2. Less than 50% compression fracture of L1 of uncertain chronicity. Subacute rib fractures. 3. Partially visualized questionable mass in the left kidney.  CT abdomen   pelvis with contrast recommended. 4. Nonobstructive renal calculi. 5. Other findings as described. CT thoracic spine without contrast:   1. Diffuse bone metastasis. 2. Less than 50% compression fracture of L1 of uncertain chronicity. Subacute rib fractures. 3. Partially visualized questionable mass in the left kidney.  CT abdomen   pelvis with contrast recommended. 4. Nonobstructive renal calculi. 5. Other findings as described. CT abdomen pelvis IV contrast:  Lytic and sclerotic lesions are seen diffusely through the visualized osseous   structures in the pelvis and lumbar spine consistent with osseous metastatic   disease.       No CT evidence of renal mass. CBC   Recent Labs     03/16/22  1830 03/16/22 2115 03/17/22  0630   WBC 4.1 3.6* 3.2*   HGB 11.3* 11.8* 11.1*   HCT 32.6* 35.2* 32.0*    203 191      RENAL  Recent Labs     03/16/22  1830 03/16/22  2225 03/17/22  0001    139 136   K 3.7 3.4* 3.7    101 98*   CO2 22 21 21   BUN 13 12 13   CREATININE 0.8 0.8 0.8     LFT'S  Recent Labs     03/16/22  1830 03/17/22  0001   * 135*   * 245*   BILITOT 1.2* 1.4*   ALKPHOS 230* 212*     COAG  No results for input(s): INR in the last 72 hours. CARDIAC ENZYMES  No results for input(s): CKTOTAL, CKMB, CKMBINDEX, TROPONINI in the last 72 hours.     U/A:    Lab Results   Component Value Date COLORU YELLOW 03/17/2022    CLARITYU Clear 03/17/2022    SPECGRAV 1.016 03/17/2022    LEUKOCYTESUR Negative 03/17/2022    BLOODU Negative 03/17/2022    GLUCOSEU Negative 03/17/2022       ABG  No results found for: LMW2JKH, BEART, D8QSWSYU, PHART, THGBART, ABE8IQT, PO2ART, ZRC5FIH        Active Hospital Problems    Diagnosis Date Noted    Malignancy ([facility] [address] [C80.1] 03/17/2022    Hypercalcemia [E83.52] 03/17/2022    Left hip pain [M25.552] 03/16/2022    Pathological fracture of left hip, initial encounter [facility]) [U39.186I] 03/16/2022   · Elevate LFTs      PHYSICIANS CERTIFICATION:    I certify that Angela Kraus is expected to be hospitalized for greater than 2 midnights based on the following assessment and plan:      ASSESSMENT/PLAN:  · Hypercalcemia  · Left hip pain  · Malignancy  · Possible pathologic fracture of left hip  · Elevate LFTs    Plan:  · Initially pending CT scan of the abdomen with IV contrast to evaluate possible renal mass, came back negative but still showing lytic and sclerotic lesions of multiple areas throughout patient's thoracic and abdomen, main concern is of the left hip that is possibly suggestive of significant sclerotic and lytic lesions.   Uncertain etiology of the above lesions but could be possible recurrence of previous breast malignancy versus possibly multiple myeloma or other cancer  · Ordered vitamin D 25 level, vitamin 1, 25, PTH, protein creatinine ratio with urinalysis, GGT to evaluate for liver etiology of alk phos  · Keeping patient on 1 to 5/h of normal saline for IV fluid hydration for hypercalcemia  · Avoid any hepatotoxic agents such as Tylenol due to elevated LFTs  · Dilaudid every 3 hours as needed on the pain scale  · Repeat CBC/CMP/magnesium daily, repeating metabolic panel overnight to monitor LFTs as well as hypercalcemia  · Oncology consult for hypercalcemia and bone lesions of uncertain etiology  · Orthopedic surgery consult for malignant lesions and left hip pain with some concern for pathologic fracture  · Patient was made n.p.o. at midnight in case procedure might be needed in the morning  · Sliding-scale insulin, and mealtime Accu-Cheks, consider restarting if patient has a meal ordered in the morning    DVT Prophylaxis: Lovenox  Diet: Diet NPO  Code Status: Full Code  PT/OT Eval Status: Ambulatory    Dispo -pending clinical course       Grayson Barrientos, DO    Thank you Roselia Thrasher MD for the opportunity to be involved in this patient's care. If you have any questions or concerns please feel free to contact me at [phone].

## 2022-03-16 NOTE — ED NOTES
Pt transferred to [facility] room [phone], report called to himanshu ROMERO.  Pt current pain 6/10     Mu Mandel RN  03/16/22 1306

## 2022-03-17 PROBLEM — C80.1 MALIGNANCY (HCC): Status: ACTIVE | Noted: 2022-03-17

## 2022-03-17 PROBLEM — E83.52 HYPERCALCEMIA: Status: ACTIVE | Noted: 2022-03-17

## 2022-03-17 LAB
A/G RATIO: 0.9 (ref 1.1–2.2)
A/G RATIO: 1.1 (ref 1.1–2.2)
ALBUMIN SERPL-MCNC: 3.5 G/DL (ref 3.4–5)
ALBUMIN SERPL-MCNC: 3.6 G/DL (ref 3.4–5)
ALP BLD-CCNC: 199 U/L (ref 40–129)
ALP BLD-CCNC: 212 U/L (ref 40–129)
ALT SERPL-CCNC: 245 U/L (ref 10–40)
ALT SERPL-CCNC: 260 U/L (ref 10–40)
ANION GAP SERPL CALCULATED.3IONS-SCNC: 12 MMOL/L (ref 3–16)
ANION GAP SERPL CALCULATED.3IONS-SCNC: 17 MMOL/L (ref 3–16)
AST SERPL-CCNC: 135 U/L (ref 15–37)
AST SERPL-CCNC: 142 U/L (ref 15–37)
BASOPHILS ABSOLUTE: 0 K/UL (ref 0–0.2)
BASOPHILS RELATIVE PERCENT: 0.9 %
BILIRUB SERPL-MCNC: 1.2 MG/DL (ref 0–1)
BILIRUB SERPL-MCNC: 1.4 MG/DL (ref 0–1)
BILIRUBIN URINE: NEGATIVE
BLOOD, URINE: NEGATIVE
BUN BLDV-MCNC: 11 MG/DL (ref 7–20)
BUN BLDV-MCNC: 13 MG/DL (ref 7–20)
CALCIUM SERPL-MCNC: 10.9 MG/DL (ref 8.3–10.6)
CALCIUM SERPL-MCNC: 11.4 MG/DL (ref 8.3–10.6)
CHLORIDE BLD-SCNC: 100 MMOL/L (ref 99–110)
CHLORIDE BLD-SCNC: 98 MMOL/L (ref 99–110)
CLARITY: CLEAR
CO2: 21 MMOL/L (ref 21–32)
CO2: 24 MMOL/L (ref 21–32)
COLOR: YELLOW
CREAT SERPL-MCNC: 0.8 MG/DL (ref 0.6–1.2)
CREAT SERPL-MCNC: 0.8 MG/DL (ref 0.6–1.2)
CREATININE URINE: 112.2 MG/DL (ref 28–259)
EOSINOPHILS ABSOLUTE: 0.1 K/UL (ref 0–0.6)
EOSINOPHILS RELATIVE PERCENT: 2.1 %
GFR AFRICAN AMERICAN: >60
GFR AFRICAN AMERICAN: >60
GFR NON-AFRICAN AMERICAN: >60
GFR NON-AFRICAN AMERICAN: >60
GLUCOSE BLD-MCNC: 104 MG/DL (ref 70–99)
GLUCOSE BLD-MCNC: 108 MG/DL (ref 70–99)
GLUCOSE BLD-MCNC: 112 MG/DL (ref 70–99)
GLUCOSE BLD-MCNC: 112 MG/DL (ref 70–99)
GLUCOSE BLD-MCNC: 113 MG/DL (ref 70–99)
GLUCOSE BLD-MCNC: 118 MG/DL (ref 70–99)
GLUCOSE URINE: NEGATIVE MG/DL
HCT VFR BLD CALC: 32 % (ref 36–48)
HEMOGLOBIN: 11.1 G/DL (ref 12–16)
KETONES, URINE: ABNORMAL MG/DL
LEUKOCYTE ESTERASE, URINE: NEGATIVE
LYMPHOCYTES ABSOLUTE: 1.3 K/UL (ref 1–5.1)
LYMPHOCYTES RELATIVE PERCENT: 40 %
MAGNESIUM: 2 MG/DL (ref 1.8–2.4)
MCH RBC QN AUTO: 29.8 PG (ref 26–34)
MCHC RBC AUTO-ENTMCNC: 34.6 G/DL (ref 31–36)
MCV RBC AUTO: 86 FL (ref 80–100)
MICROSCOPIC EXAMINATION: ABNORMAL
MONOCYTES ABSOLUTE: 0.2 K/UL (ref 0–1.3)
MONOCYTES RELATIVE PERCENT: 7.6 %
NEUTROPHILS ABSOLUTE: 1.6 K/UL (ref 1.7–7.7)
NEUTROPHILS RELATIVE PERCENT: 49.4 %
NITRITE, URINE: NEGATIVE
PDW BLD-RTO: 14.7 % (ref 12.4–15.4)
PERFORMED ON: ABNORMAL
PH UA: 6 (ref 5–8)
PLATELET # BLD: 191 K/UL (ref 135–450)
PMV BLD AUTO: 7 FL (ref 5–10.5)
POTASSIUM REFLEX MAGNESIUM: 3.7 MMOL/L (ref 3.5–5.1)
POTASSIUM SERPL-SCNC: 3.4 MMOL/L (ref 3.5–5.1)
PROTEIN PROTEIN: 15 MG/DL
PROTEIN UA: NEGATIVE MG/DL
PROTEIN/CREAT RATIO: 0.1 MG/DL
RBC # BLD: 3.72 M/UL (ref 4–5.2)
SODIUM BLD-SCNC: 136 MMOL/L (ref 136–145)
SODIUM BLD-SCNC: 136 MMOL/L (ref 136–145)
SPECIFIC GRAVITY UA: 1.02 (ref 1–1.03)
TOTAL PROTEIN: 7 G/DL (ref 6.4–8.2)
TOTAL PROTEIN: 7.2 G/DL (ref 6.4–8.2)
URINE TYPE: ABNORMAL
UROBILINOGEN, URINE: 1 E.U./DL
WBC # BLD: 3.2 K/UL (ref 4–11)

## 2022-03-17 PROCEDURE — 85025 COMPLETE CBC W/AUTO DIFF WBC: CPT

## 2022-03-17 PROCEDURE — 51701 INSERT BLADDER CATHETER: CPT

## 2022-03-17 PROCEDURE — 2580000003 HC RX 258: Performed by: INTERNAL MEDICINE

## 2022-03-17 PROCEDURE — 87635 SARS-COV-2 COVID-19 AMP PRB: CPT

## 2022-03-17 PROCEDURE — 6360000002 HC RX W HCPCS: Performed by: INTERNAL MEDICINE

## 2022-03-17 PROCEDURE — 36415 COLL VENOUS BLD VENIPUNCTURE: CPT

## 2022-03-17 PROCEDURE — 6360000002 HC RX W HCPCS: Performed by: NURSE PRACTITIONER

## 2022-03-17 PROCEDURE — 1200000000 HC SEMI PRIVATE

## 2022-03-17 PROCEDURE — 6360000002 HC RX W HCPCS: Performed by: STUDENT IN AN ORGANIZED HEALTH CARE EDUCATION/TRAINING PROGRAM

## 2022-03-17 PROCEDURE — 82570 ASSAY OF URINE CREATININE: CPT

## 2022-03-17 PROCEDURE — 2580000003 HC RX 258: Performed by: STUDENT IN AN ORGANIZED HEALTH CARE EDUCATION/TRAINING PROGRAM

## 2022-03-17 PROCEDURE — 83735 ASSAY OF MAGNESIUM: CPT

## 2022-03-17 PROCEDURE — 51798 US URINE CAPACITY MEASURE: CPT

## 2022-03-17 PROCEDURE — 80053 COMPREHEN METABOLIC PANEL: CPT

## 2022-03-17 PROCEDURE — 84156 ASSAY OF PROTEIN URINE: CPT

## 2022-03-17 PROCEDURE — 6360000002 HC RX W HCPCS: Performed by: HOSPITALIST

## 2022-03-17 PROCEDURE — 99221 1ST HOSP IP/OBS SF/LOW 40: CPT | Performed by: NURSE PRACTITIONER

## 2022-03-17 PROCEDURE — 81003 URINALYSIS AUTO W/O SCOPE: CPT

## 2022-03-17 RX ORDER — CLINDAMYCIN PHOSPHATE 600 MG/50ML
600 INJECTION INTRAVENOUS
Status: DISCONTINUED | OUTPATIENT
Start: 2022-03-18 | End: 2022-03-17

## 2022-03-17 RX ORDER — PROCHLORPERAZINE EDISYLATE 5 MG/ML
5 INJECTION INTRAMUSCULAR; INTRAVENOUS EVERY 4 HOURS PRN
Status: DISCONTINUED | OUTPATIENT
Start: 2022-03-17 | End: 2022-03-22 | Stop reason: HOSPADM

## 2022-03-17 RX ORDER — CYCLOBENZAPRINE HCL 5 MG
5-10 TABLET ORAL 3 TIMES DAILY PRN
Status: ON HOLD | COMMUNITY
End: 2022-03-22 | Stop reason: HOSPADM

## 2022-03-17 RX ADMIN — Medication 12.5 MG: at 11:56

## 2022-03-17 RX ADMIN — HYDROMORPHONE HYDROCHLORIDE 0.5 MG: 1 INJECTION, SOLUTION INTRAMUSCULAR; INTRAVENOUS; SUBCUTANEOUS at 05:47

## 2022-03-17 RX ADMIN — ONDANSETRON 4 MG: 2 INJECTION INTRAMUSCULAR; INTRAVENOUS at 08:54

## 2022-03-17 RX ADMIN — ZOLEDRONIC ACID 4 MG: 4 INJECTION, SOLUTION, CONCENTRATE INTRAVENOUS at 13:33

## 2022-03-17 RX ADMIN — SODIUM CHLORIDE, PRESERVATIVE FREE 10 ML: 5 INJECTION INTRAVENOUS at 20:51

## 2022-03-17 RX ADMIN — HYDROMORPHONE HYDROCHLORIDE 0.5 MG: 1 INJECTION, SOLUTION INTRAMUSCULAR; INTRAVENOUS; SUBCUTANEOUS at 08:54

## 2022-03-17 RX ADMIN — PROCHLORPERAZINE EDISYLATE 5 MG: 5 INJECTION INTRAMUSCULAR; INTRAVENOUS at 15:37

## 2022-03-17 RX ADMIN — ENOXAPARIN SODIUM 40 MG: 100 INJECTION SUBCUTANEOUS at 11:54

## 2022-03-17 RX ADMIN — SODIUM CHLORIDE: 9 INJECTION, SOLUTION INTRAVENOUS at 06:32

## 2022-03-17 ASSESSMENT — PAIN SCALES - GENERAL
PAINLEVEL_OUTOF10: 0
PAINLEVEL_OUTOF10: 5
PAINLEVEL_OUTOF10: 5
PAINLEVEL_OUTOF10: 3
PAINLEVEL_OUTOF10: 8

## 2022-03-17 ASSESSMENT — PAIN DESCRIPTION - DESCRIPTORS
DESCRIPTORS: ACHING;CONSTANT

## 2022-03-17 ASSESSMENT — PAIN DESCRIPTION - PROGRESSION
CLINICAL_PROGRESSION: NOT CHANGED

## 2022-03-17 ASSESSMENT — PAIN - FUNCTIONAL ASSESSMENT
PAIN_FUNCTIONAL_ASSESSMENT: PREVENTS OR INTERFERES SOME ACTIVE ACTIVITIES AND ADLS

## 2022-03-17 ASSESSMENT — PAIN DESCRIPTION - ORIENTATION
ORIENTATION: LEFT
ORIENTATION: LEFT
ORIENTATION: LOWER;LEFT
ORIENTATION: LEFT;LOWER

## 2022-03-17 ASSESSMENT — PAIN DESCRIPTION - PAIN TYPE
TYPE: CHRONIC PAIN

## 2022-03-17 ASSESSMENT — PAIN DESCRIPTION - LOCATION
LOCATION: ABDOMEN;HIP
LOCATION: HIP;ABDOMEN
LOCATION: HIP
LOCATION: HIP;ABDOMEN

## 2022-03-17 ASSESSMENT — PAIN DESCRIPTION - ONSET
ONSET: ON-GOING

## 2022-03-17 ASSESSMENT — PAIN DESCRIPTION - FREQUENCY
FREQUENCY: CONTINUOUS

## 2022-03-17 NOTE — PROGRESS NOTES
Patient was initially rating pain lower on 0-10 pain scale and would only require 0.25 mg Dilaudid based on rating. Patient was informed of CT scan ordered and then rated pain higher d/t needing to move around more. Medication administration was edited in [facility] by primary RN, Lori Ramos. Patient received 0.5 mg Dilaudid and there was no need to waste medication.      Electronically signed by Atif Berry RN on 3/16/2022 at 10:06 PM  Electronically signed by Pari Voss RN on 3/16/2022 at 10:12 PM

## 2022-03-17 NOTE — CONSULTS
[address]                                  CONSULTATION    PATIENT NAME: Yadiel Ivan                     :        1960  MED REC NO:   [id number]                          ROOM:       3124  ACCOUNT NO:   [de-identified]                           ADMIT DATE: 2022  PROVIDER:     Antoine Cartwright MD    ONCOLOGY CONSULTATION    CONSULT DATE:  2022    REASON FOR CONSULTATION:  Widespread bone metastasis. CONSULTING PROVIDER:  Raina Burciaga MD    HISTORY OF PRESENT ILLNESS:  The patient is a pleasant 58-year-old  female who had right-sided breast cancer in  treated by Dr. Caroline Olmos at  [facility], who presented to the hospital with severe left hip  pain. This has been going on but getting worse over the previous  several weeks. A CT of the left hip showed metastatic disease  throughout the left hemipelvis and proximal left femur with large  destructive lesions within the left femoral head. She had a CT of the  thoracic spine which showed diffuse bony metastasis and an L1 partial  compression fracture. A CT of the abdomen and pelvis was done that  again showed multiple lytic and sclerotic bone lesions. Oncology was  consulted for further workup and management. She was also hypercalcemic  on admission. PAST MEDICAL HISTORY:  1. Right-sided breast cancer. She underwent a right mastectomy in  , followed by four cycles of adjuvant chemotherapy followed by five  years of antiestrogen treatment. She was lost to followup with Dr. Caroline Olmos. 2.  Depression. 3.  Hyperthyroidism. 4.  Kidney stones. 5.  Diabetes. PAST SURGICAL HISTORY:  1.  Status post cholecystectomy. 2.  Status post hysterectomy. 3.  Mastectomy as above. ALLERGIES:  She is allergic to Tin Cordon which causes an unknown reaction.     HOME MEDICINES:  Flexeril 5 mg p.o. t.i.d., Cymbalta 10 mg p.o. daily,  Lipitor 10 mg p.o. daily, Neurontin 300 mg p.o. daily, Glucophage 500 mg  p.o. b.i.d. SOCIAL HISTORY:  She is . She does not smoke or drink. FAMILY HISTORY:  Her brother had prostate cancer. REVIEW OF SYSTEMS:  She denies any recent fever, chills, sweats, nausea,  vomiting, chest pain, headaches, dysphagia, odynophagia, diarrhea,  constipation, hemoptysis, hematemesis, change in  vision/hearing/smell/taste, weakness, neuropathy, skin rashes,  productive cough, urinary or bowel prolapse or incontinence, petechiae,  purpura, skin rashes, any new bone aches. She has mild to moderate  fatigue and diffuse body aches. Her 10-system review of systems is  otherwise negative. PHYSICAL EXAMINATION:  VITAL SIGNS:  She is afebrile with normal vital signs. GENERAL:  She is in no acute distress. HEENT:  Her pupils are round and reactive to light and accommodation. Extraocular muscles are intact. NECK:  She has no jugular venous distention. No thyromegaly. Oropharynx is clear. She has no carotid bruits. She has no palpable  lymphadenopathy. HEART:  Regular rate and rhythm. LUNGS:  Clear to auscultation bilaterally. ABDOMEN:  Nondistended, nontender with bowel sounds x4. No  hepatosplenomegaly. EXTREMITIES:  She has no peripheral clubbing, cyanosis, or edema. NEUROLOGIC:  Nonfocal.    LABORATORY DATA:  White blood cell count is 3.2, hemoglobin 11.1,  platelets of 714. ASSESSMENT AND PLAN:  1.  Multiple cancerous bone lesions. I had a very long discussion with  the patient. I explained that this is most likely a late recurrence of  her breast cancer but there are multiple other possibilities. I will  check labs for myeloma. Orthopedics is planning on doing a pinning  procedure to her left femur tomorrow and they are doing a bone biopsy at  the same time. Once I have this back, I can give her a better idea on  the prognosis and treatment plan.   If this is metastasis from her  original ER-positive breast cancer, I would likely do an aromatase  inhibitor plus a CDK inhibitor. 2.  Hypercalcemia. I will give her a dose of Zometa. Thank you for the consultation. I will follow closely.         Acosta Potts MD    D: 03/17/2022 12:27:38       T: 03/17/2022 16:16:53     [email or web address]  Job#: [id number]     Doc#: [id number]    CC:  Orlando Clark

## 2022-03-17 NOTE — CONSULTS
Units, 0-6 Units, SubCUTAneous, TID WC  insulin lispro (HUMALOG) injection vial 0-3 Units, 0-3 Units, SubCUTAneous, Nightly  glucose (GLUTOSE) 40 % oral gel 15 g, 15 g, Oral, PRN  dextrose 50 % IV solution, 12.5 g, IntraVENous, PRN  glucagon (rDNA) injection 1 mg, 1 mg, IntraMUSCular, PRN  dextrose 5 % solution, 100 mL/hr, IntraVENous, PRN  ondansetron (ZOFRAN) injection 4 mg, 4 mg, IntraVENous, Q6H PRN  HYDROmorphone (DILAUDID) injection 0.25 mg, 0.25 mg, IntraVENous, Q3H PRN **OR** HYDROmorphone (DILAUDID) injection 0.5 mg, 0.5 mg, IntraVENous, Q3H PRN  Allergies:  @    REVIEW OF SYSTEMS:    CONSTITUTIONAL:  negative for  fevers, chills and malaise  MUSCULOSKELETAL:  positive for  myalgias, arthralgias and pain  All other ROS reviewed in chart or with patient or family and are grossly negative. PHYSICAL EXAM:    VITALS:  BP (!) 146/92   Pulse 83   Temp 97.8 °F (36.6 °C) (Oral)   Resp 15   Ht 5' 5\" (1.651 m)   Wt 238 lb 8.6 oz (108.2 kg)   SpO2 97%   BMI 39.69 kg/m²     MUSCULOSKELETAL:  left foot NVI. Wiggles toes to command. Able to plantarflex and dorsiflex ankle Pedal pulses are palpable. No leg deformity is noted. Tender left groin. NOntender left knee or ankle.    NEUROLOGIC:   Sensory:    Touch:                                       Left Lower Extremity:  normal  Skin warm and dry  Resp deep and easy  Abdomen soft and obese  Pulse is with regular rate and rhythm    DATA:    CBC:   Lab Results   Component Value Date    WBC 3.2 03/17/2022    RBC 3.72 03/17/2022    HGB 11.1 03/17/2022    HCT 32.0 03/17/2022    MCV 86.0 03/17/2022    MCH 29.8 03/17/2022    MCHC 34.6 03/17/2022    RDW 14.7 03/17/2022     03/17/2022    MPV 7.0 03/17/2022     WBC:    Lab Results   Component Value Date    WBC 3.2 03/17/2022     PT/INR:  No results found for: PROTIME, INR  PTT:  No results found for: APTT[APTT  Radiology Review:    Narrative   EXAMINATION:   ONE XRAY VIEW OF THE PELVIS AND TWO XRAY VIEWS LEFT HIP; THREE XRAY VIEWS OF   THE LUMBAR SPINE       3/16/2022 3:09 pm       COMPARISON:   None       HISTORY:   ORDERING SYSTEM PROVIDED HISTORY: pain   TECHNOLOGIST PROVIDED HISTORY:   Reason for exam:->pain   Reason for Exam: pt fell back in nov and pain has gotten worse over time,   unable to put weight on left leg, lower back pain       FINDINGS:   Lumbar spine, three views:       There is mild wedge-shaped deformity of the T12 vertebral body.  There is no   other evidence of acute spinal fracture.  Vertebral alignment is maintained. Mild multilevel osteoarthritic spurring with lower lumbar facet arthropathy. Mild osteopenia.  Post cholecystectomy clips.       AP pelvis and left hip:       No acute osseous abnormality of the pelvis or left hip.  Mild osteopenia. Poorly marginated lucency in the left femoral neck measuring around 2 cm. The SI joints are maintained.  Mild symmetric osteoarthritic changes of the   hips.  Multiple calcified pelvic phleboliths.           Impression   Mild compression deformity of the T12 vertebral body, age indeterminate.  No   acute lumbar spine fracture.  No comparison imaging available.       No acute osseous abnormality of the pelvis or left hip.  Indeterminate 2 cm   lucent lesion within the left femoral neck.  If the patient has a history of   malignancy, finding is concerning for possible metastatic focus.  Consider   follow-up MRI or bone scan as clinically indicated.             Narrative   EXAMINATION:   CT OF THE LEFT HIP WITHOUT CONTRAST 3/16/2022 4:21 pm       TECHNIQUE:   CT of the left hip was performed without the administration of intravenous   contrast.  Multiplanar reformatted images are provided for review.  Dose   modulation, iterative reconstruction, and/or weight based adjustment of the   mA/kV was utilized to reduce the radiation dose to as low as reasonably   achievable.       COMPARISON:   None.       HISTORY   ORDERING SYSTEM PROVIDED HISTORY: abnormal finding on XR imaging, concerning   for bone mets, eval for pathologic fracture   TECHNOLOGIST PROVIDED HISTORY:   Reason for exam:->abnormal finding on XR imaging, concerning for bone mets,   eval for pathologic fracture   Decision Support Exception - unselect if not a suspected or confirmed   emergency medical condition->Emergency Medical Condition (MA)   Reason for Exam: Fall back in November, c/o mostly of left hip pain       FINDINGS:   Bones: Destructive osseous lesion centered at the left metatarsal head and   neck consistent with metastatic lesion.  Partially imaged destructive lesion   of the sacrum also present.  There are several additional lytic and sclerotic   foci of the left hemipelvis most compatible with metastatic disease.  No   definite fracture line identified at the left hip, however the location and   destructive appearance does place the patient at significant risk for   pathologic fracture at this site.       Soft Tissue: Visualized intrapelvic structures demonstrate colonic   diverticulosis.  Imaged subcutaneous tissues appear grossly unremarkable.       Joint: Anatomic alignment of the left hip with mild degenerative change of   the left hip.           Impression   1. Osseous metastatic disease throughout the imaged left hemipelvis, left   sacrum, and proximal left femur with large destructive lesions centered   within the left femoral head and neck and involving the partially imaged left   sacrum.    2. No definite pathologic fracture identified on the current exam, however,   the location and severe destructive changes of the left femoral metastatic   lesion do place the patient at a significant risk for pathologic fracture at   this site. [facility] orthopedic consultation.               IMPRESSION/RECOMMENDATIONS:    Hx breast cancer 2007, in remission  Left hip pain since NOv 2021  Unable to walk due to pain since Jan 2022  Left hip lytic lesion, likely metastatic  Discussed with  Jameson by phone  Plan NPO after MN for left hip IM kelvin tomorrow, intraop bone bx. Discussed plan with pt and she is agreeable  Pain med as ordered.    Lovenox today, hold for surgery tomorrow  50% WB left leg prior to surgery    Petr Kenny, GRACIA - CNP  3/17/2022  10:23 AM

## 2022-03-17 NOTE — PROGRESS NOTES
Comprehensive Nutrition Assessment    Type and Reason for Visit:  Positive Nutrition Screen    Nutrition Recommendations/Plan:   -Continue NPO  -Will monitor intakes once diet resumes/upgrades and monitor for need of ONS. Nutrition Assessment:  + Screen. Pt admitted for left hip pain. PMHx of T2DM and prev treated breast cancer. Current wt is 238# w/ no wt hx per EMR. Pt states she her UBW is 255# and has lost ~20# over the last few weeks d/t dec appetite and worsening hip pain. Currently NPO awaiting CT scan. Will continue to monitor intakes and will order ONS when PO resumes. .    Malnutrition Assessment:  Malnutrition Status: At risk for malnutrition (Comment)    Context:  Acute Illness     Findings of the 6 clinical characteristics of malnutrition:  Energy Intake:  Mild decrease in energy intake (Comment) (Poor intake prior to admission)  Weight Loss:  Unable to assess (no wt hx per EMR. Pt states she has lost 20# in 2 weeks)     Body Fat Loss:  No significant body fat loss     Muscle Mass Loss:  No significant muscle mass loss    Fluid Accumulation:  1 - Mild Extremities   Strength:  Not Performed    Estimated Daily Nutrient Needs:  Energy (kcal):  1161-1026 kcal (25-30 kcal/kg); Weight Used for Energy Requirements:  Ideal     Protein (g):  57-68 gm (1.0-1.2 g/kg); Weight Used for Protein Requirements:  Ideal         Method Used for Fluid Requirements:  1 ml/kcal      Nutrition Related Findings:  Reviewed labs. +BM 3/15      Wounds:  None       Current Nutrition Therapies:    ADULT DIET;  Regular  Diet NPO Exceptions are: Sips of Water with Meds    Anthropometric Measures:  · Height: 5' 5\" (165.1 cm)  · Current Body Weight: 238 lb (108 kg)   · Admission Body Weight: 236 lb (107 kg)    · Usual Body Weight: 255 lb (115.7 kg)     · Ideal Body Weight: 125 lbs; % Ideal Body Weight 190.4 %   · BMI: 39.6  · Adjusted Body Weight:  ; No Adjustment   · BMI Categories: Obese Class 2 (BMI 35.0 -39.9) Nutrition Diagnosis:   · Unintended weight loss related to inadequate protein-energy intake,pain as evidenced by weight loss,poor intake prior to admission,NPO or clear liquid status due to medical condition      Nutrition Interventions:   Food and/or Nutrient Delivery:  Continue NPO  Nutrition Education/Counseling:  No recommendation at this time   Coordination of Nutrition Care:  Continue to monitor while inpatient    Goals:  PO intake >50%       Nutrition Monitoring and Evaluation:   Behavioral-Environmental Outcomes:  None Identified   Food/Nutrient Intake Outcomes:  Food and Nutrient Intake,Diet Advancement/Tolerance,Supplement Intake  Physical Signs/Symptoms Outcomes:  Biochemical Data,Weight,Nutrition Focused Physical Findings     Discharge Planning:     Too soon to determine     Electronically signed by Elda Luke on 3/17/22 at 10:00 AM EDT    Contact: [phone]

## 2022-03-17 NOTE — PROGRESS NOTES
Patient in bed, awake, a&ox4. Patient took morning medications whole, no issues. Assessment completed. Patient having pain in abdomen and hip. Gave patient PRN pain medication. Patient also having emesis. Gave Zofran and patient had another emesis, message MD, phenergan ordered and given. Patient IV fluids infusing as ordered. Patient has not ate d/t upset stomach. Patient able to make needs known. Denies any needs at this time. Call light and bedside table within reach. Will continue to monitor and reassess.

## 2022-03-17 NOTE — PLAN OF CARE
Problem: Pain:  Goal: Pain level will decrease  Description: Pain level will decrease  3/17/2022 0750 by Claude Randy, RN  Outcome: Ongoing     Problem: Pain:  Goal: Control of acute pain  Description: Control of acute pain  3/17/2022 0750 by Claude Randy, RN  Outcome: Ongoing     Problem: Pain:  Goal: Control of chronic pain  Description: Control of chronic pain  3/17/2022 0750 by Claude Randy, RN  Outcome: Ongoing     Problem: Skin Integrity:  Goal: Will show no infection signs and symptoms  Description: Will show no infection signs and symptoms  3/17/2022 0750 by Claude Randy, RN  Outcome: Ongoing     Problem: Skin Integrity:  Goal: Absence of new skin breakdown  Description: Absence of new skin breakdown  3/17/2022 0750 by Claude Randy, RN  Outcome: Ongoing     Problem: Falls - Risk of:  Goal: Will remain free from falls  Description: Will remain free from falls  3/17/2022 0750 by Claude Randy, RN  Outcome: Ongoing     Problem: Falls - Risk of:  Goal: Absence of physical injury  Description: Absence of physical injury  3/17/2022 0750 by Claude Randy, RN  Outcome: Ongoing

## 2022-03-17 NOTE — PROGRESS NOTES
Patient admitted to room 3124. Patient oriented to room mechanics. Siderails up x 3. Call light in reach .  Fall precautions in place Electronically signed by Bri Grossman RN on 3/17/2022 at 3:04 AM

## 2022-03-17 NOTE — PLAN OF CARE
Problem: Pain:  Goal: Pain level will decrease  Outcome: Ongoing  Goal: Control of acute pain  Outcome: Ongoing  Pain will be managed with pharmacological and non-pharmacological interventions . Nursing will continue to monitor.   Goal: Control of chronic pain  Outcome: Ongoing     Problem: Skin Integrity:  Goal: Will show no infection signs and symptoms  Outcome: Ongoing  Goal: Absence of new skin breakdown  Outcome: Ongoing     Problem: Falls - Risk of:  Goal: Will remain free from falls  Outcome: Ongoing  Goal: Absence of physical injury  Outcome: Ongoing

## 2022-03-17 NOTE — PROGRESS NOTES
[facility]    Consultation request received  Chart Review/Care Everywhere indicates that the patient  has seen Dr. Kaylee Herrera previously, now with [facility]  Please notify Dr. Mary Mendosa office of the consultation request  Thank you

## 2022-03-17 NOTE — PROGRESS NOTES
4 Eyes Admission Assessment     I agree as the admission nurse that 2 RN's have performed a thorough Head to Toe Skin Assessment on the patient. ALL assessment sites listed below have been assessed on admission. Areas assessed by both nurses: Yes  [x]   Head, Face, and Ears   [x]   Shoulders, Back, and Chest  [x]   Arms, Elbows, and Hands   [x]   Coccyx, Sacrum, and Ischum  [x]   Legs, Feet, and Heels        Does the Patient have Skin Breakdown?   No         Cristhian Prevention initiated:  Yes   Wound Care Orders initiated:  NA      [facility] nurse consulted for Pressure Injury (Stage 3,4, Unstageable, DTI, NWPT, and Complex wounds):  NA      Nurse 1 eSignature: Electronically signed by Grabiel Dumont RN on 3/17/22 at 3:29 AM EDT    **SHARE this note so that the co-signing nurse is able to place an eSignature**    Nurse 2 eSignature: Electronically signed by Kelley Quick RN on 3/17/22 at 6:59 AM EDT

## 2022-03-17 NOTE — CARE COORDINATION
INITIAL CASE MANAGEMENT ASSESSMENT    Reviewed chart, met with patient and daughter, Uma Feliz, present in room to assess possible discharge needs. Explained Case Management role/services. Living Situation: Patient lives in an one in a half story home with two grandsons; patient reported they are being cared for while she is in the hospital.    ADLs: managed her own personal care, light cooking. Grandson assists with housework and as needed     DME: [employer], cane    PT/OT Recs: pending     Active Services: none     Transportation: has a license is not driving at the present; daughter takes to appointments     Medications: takes on her own    PCP: Brian Wells MD    PLAN/COMMENTS: Patient is aware she will need ongoing therapy at discharge. Provided patient with snf list. She may be interested in acute rehab at [facility] if she qualifies. Patient to have surgery 3/18/22. The Plan for Transition of Care is related to the following treatment goals: skilled    The Patient and/or patient representative was provided with a choice of provider and agrees   with the discharge plan. [x] Yes [] No    Freedom of choice list was provided with basic dialogue that supports the patient's individualized plan of care/goals, treatment preferences and shares the quality data associated with the providers. [x] Yes [] No    SW/CM provided contact information for patient or family to call with any questions. SW/CM will follow and assist as needed.     Electronically signed by LINCOLN Yi, DUNG, Case Management on 3/17/2022 at 3:34 PM  [place] [phone]

## 2022-03-17 NOTE — PROGRESS NOTES
Pharmacy Medication Reconciliation Note     List of medications patient is currently taking is complete. Source of information:   1. Rx disp history  2. Patient interview    Notes regarding home medications:   1. Removed Metformin & Atorvastatin-- patient stopped taking on her own  2. Removed Gabapentin -- out of refills  3. Removed Duloxetine  4.  Added Cyclobenzaprine    Denies taking any other OTC or herbal medications    Juan Cartwright, [facility], [facility] 3/17/2022 11:30 AM

## 2022-03-17 NOTE — PROGRESS NOTES
Hospitalist Progress Note  3/17/2022 9:12 AM    PCP: Nhung Asher MD    [id number]     Date of Admission: 3/16/2022                                                                                                                     HOSPITAL COURSE    Patient demographics:  The patient  Gaby Salmon is a 64 y.o. female     Significant past medical history:   Patient Active Problem List   Diagnosis    Chronic abdominal pain    Personal history of malignant neoplasm of breast    Asthma    Migraine headache    Personal history of breast cancer    Encounter for follow-up surveillance of breast cancer    History of mastectomy    Left hip pain    Pathological fracture of left hip, initial encounter ([facility]    [facility]    Hypercalcemia         Presenting symptoms:  Worsening left hip pain and inability to ambulate on hip    Diagnostic workup:      CONSULTS DURING ADMISSION :   IP CONSULT TO HOSPITALIST  IP CONSULT TO [address]  IP CONSULT TO ONCOLOGY      Patient was diagnosed with:        Treatment while inpatient:                                                                                         ----------------------------------------------------------      SUBJECTIVE COMPLAINTS- follow up for left hip pain    Diet: Diet NPO      OBJECTIVE:   Patient Active Problem List   Diagnosis    Chronic abdominal pain    Personal history of malignant neoplasm of breast    Asthma    Migraine headache    Personal history of breast cancer    Encounter for follow-up surveillance of breast cancer    History of mastectomy    Left hip pain    Pathological fracture of left hip, initial encounter ([facility]    [facility]    Hypercalcemia       Allergies  Cephalexin    Medications    Scheduled Meds:   sodium chloride flush  5-40 mL IntraVENous 2 times per day    [Held by provider] enoxaparin  40 mg SubCUTAneous Daily    insulin lispro  0-6 Units SubCUTAneous TID WC    insulin lispro 0-3 Units SubCUTAneous Nightly     Continuous Infusions:   sodium chloride 125 mL/hr at 03/17/22 4923    sodium chloride      dextrose       PRN Meds:  sodium chloride flush, sodium chloride, glucose, dextrose, glucagon (rDNA), dextrose, ondansetron, HYDROmorphone **OR** HYDROmorphone    Vitals   Vitals /wt   Patient Vitals for the past 8 hrs:   BP Temp Temp src Pulse Resp SpO2 Weight   03/17/22 0737 (!) 146/92 97.8 °F (36.6 °C) Oral 83 15 97 % --   03/17/22 0344 135/79 97.8 °F (36.6 °C) Oral 79 17 96 % 238 lb 8.6 oz (108.2 kg)        72HR INTAKE/OUTPUT:      Intake/Output Summary (Last 24 hours) at 3/17/2022 0912  Last data filed at 3/17/2022 0630  Gross per 24 hour   Intake 1845 ml   Output 465 ml   Net 1380 ml       Exam:    Gen:   Alert and oriented ×3   Eyes: PERRL. No sclera icterus. No conjunctival injection. ENT: No discharge. Pharynx clear. External appearance of ears and nose normal.  Neck: Trachea midline. No obvious mass. Resp: No accessory muscle use. No crackles. No wheezes. No rhonchi. CV: Regular rate. Regular rhythm. No murmur or rub. No edema. GI: Non-tender. Non-distended. No hernia. Skin: Warm, dry, normal texture and turgor. Lymph: No cervical LAD. No supraclavicular LAD. M/S: / Ext. No cyanosis. No clubbing. No joint deformity. Neuro: CN 2-12 are intact,  no neurologic deficits noted. PT/INR: No results for input(s): PROTIME, INR in the last 72 hours. APTT: No results for input(s): APTT in the last 72 hours.     CBC:   Recent Labs     03/16/22  1830 03/16/22  2115 03/17/22  0630   WBC 4.1 3.6* 3.2*   HGB 11.3* 11.8* 11.1*   HCT 32.6* 35.2* 32.0*   MCV 86.6 87.5 86.0    203 191       BMP:   Recent Labs     03/16/22  1830 03/16/22  2225 03/17/22  0001 03/17/22  0630    139 136 136   K 3.7 3.4* 3.7 3.4*    101 98* 100   CO2 22 21 21 24   BUN 13 12 13 11   CREATININE 0.8 0.8 0.8 0.8       LIVER PROFILE:   Recent Labs     03/16/22  1830 03/17/22  0001 03/17/22  0630   ALKPHOS 230* 212* 199*   * 135* 142*   * 245* 260*   BILITOT 1.2* 1.4* 1.2*     No results for input(s): AMYLASE in the last 72 hours. No results for input(s): LIPASE in the last 72 hours. UA:No results for input(s): NITRITE, LABCAST, WBCUA, RBCUA, MUCUS in the last 72 hours. TROPONIN: No results for input(s): [place] in the last 72 hours. No results found for: URRFLXCULT    No results for input(s): TSHREFLEX in the last 72 hours. No components found for: OPE2750  POC GLUCOSE:    Recent Labs     03/16/22 2221 03/17/22  0630   POCGLU 125* 118*     No results for input(s): LABA1C in the last 72 hours. No results found for: LABA1C      ASSESSMENT AND PLAN    Hypercalcemia  ivf    Left hip pain  Possible pathologic fracture of left hip  Orthopedic surgery is consulted. Malignancy   lytic and sclerotic lesions of multiple areas throughout patient's thoracic and abdomen, main concern is of the left hip that is possibly suggestive of significant sclerotic and lytic lesions. Uncertain etiology of the above lesions but could be possible recurrence of previous breast malignancy versus possibly multiple myeloma or other cancer  Hematology oncology is following        Elevate LFTs  GI consult          Code Status: Full Code        Dispo -cc        The patient and / or the family were informed of the results of any tests, a time was given to answer questions, a plan was proposed and they agreed with plan. Krishna Womack MD    This note was transcribed using [id number] [employer]. Please disregard any translational errors.

## 2022-03-17 NOTE — PLAN OF CARE
Problem: Nutrition  Goal: Optimal nutrition therapy  Outcome: Ongoing   Nutrition Problem #1: Unintended weight loss  Intervention: Food and/or Nutrient Delivery: Continue NPO  Nutritional Goals: PO intake >50%

## 2022-03-18 ENCOUNTER — APPOINTMENT (OUTPATIENT)
Dept: CT IMAGING | Age: 62
DRG: 308 | End: 2022-03-18
Payer: COMMERCIAL

## 2022-03-18 LAB
A/G RATIO: 1 (ref 1.1–2.2)
ALBUMIN SERPL-MCNC: 3.2 G/DL (ref 3.1–4.9)
ALBUMIN SERPL-MCNC: 3.6 G/DL (ref 3.4–5)
ALP BLD-CCNC: 225 U/L (ref 40–129)
ALPHA-1-GLOBULIN: 0.4 G/DL (ref 0.2–0.4)
ALPHA-2-GLOBULIN: 0.9 G/DL (ref 0.4–1.1)
ALT SERPL-CCNC: 283 U/L (ref 10–40)
ANION GAP SERPL CALCULATED.3IONS-SCNC: 16 MMOL/L (ref 3–16)
AST SERPL-CCNC: 139 U/L (ref 15–37)
BASOPHILS ABSOLUTE: 0 K/UL (ref 0–0.2)
BASOPHILS RELATIVE PERCENT: 0.5 %
BETA GLOBULIN: 1.2 G/DL (ref 0.9–1.6)
BILIRUB SERPL-MCNC: 1.1 MG/DL (ref 0–1)
BUN BLDV-MCNC: 8 MG/DL (ref 7–20)
CALCIUM SERPL-MCNC: 9.7 MG/DL (ref 8.3–10.6)
CHLORIDE BLD-SCNC: 100 MMOL/L (ref 99–110)
CO2: 24 MMOL/L (ref 21–32)
CREAT SERPL-MCNC: 0.8 MG/DL (ref 0.6–1.2)
EOSINOPHILS ABSOLUTE: 0 K/UL (ref 0–0.6)
EOSINOPHILS RELATIVE PERCENT: 1.4 %
GAMMA GLOBULIN: 1.5 G/DL (ref 0.6–1.8)
GFR AFRICAN AMERICAN: >60
GFR NON-AFRICAN AMERICAN: >60
GLUCOSE BLD-MCNC: 106 MG/DL (ref 70–99)
GLUCOSE BLD-MCNC: 117 MG/DL (ref 70–99)
GLUCOSE BLD-MCNC: 92 MG/DL (ref 70–99)
GLUCOSE BLD-MCNC: 98 MG/DL (ref 70–99)
GLUCOSE BLD-MCNC: 99 MG/DL (ref 70–99)
HCT VFR BLD CALC: 33.9 % (ref 36–48)
HEMOGLOBIN: 11.5 G/DL (ref 12–16)
IGA: 113 MG/DL (ref 70–400)
IGG: 1438 MG/DL (ref 700–1600)
IGM: 125 MG/DL (ref 40–230)
KAPPA, FREE LIGHT CHAINS, SERUM: 12.04 MG/L (ref 3.3–19.4)
KAPPA/LAMBDA RATIO: 0.75 (ref 0.26–1.65)
KAPPA/LAMBDA TEST COMMENT: NORMAL
LAMBDA, FREE LIGHT CHAINS, SERUM: 16 MG/L (ref 5.71–26.3)
LYMPHOCYTES ABSOLUTE: 0.4 K/UL (ref 1–5.1)
LYMPHOCYTES RELATIVE PERCENT: 13.6 %
M SPIKE 1 SERUM PROTEIN ELEC: 1 G/DL
MAGNESIUM: 1.7 MG/DL (ref 1.8–2.4)
MCH RBC QN AUTO: 29.5 PG (ref 26–34)
MCHC RBC AUTO-ENTMCNC: 34.1 G/DL (ref 31–36)
MCV RBC AUTO: 86.6 FL (ref 80–100)
MONOCYTES ABSOLUTE: 0.1 K/UL (ref 0–1.3)
MONOCYTES RELATIVE PERCENT: 2.7 %
NEUTROPHILS ABSOLUTE: 2.6 K/UL (ref 1.7–7.7)
NEUTROPHILS RELATIVE PERCENT: 81.8 %
PDW BLD-RTO: 15.2 % (ref 12.4–15.4)
PERFORMED ON: ABNORMAL
PERFORMED ON: ABNORMAL
PERFORMED ON: NORMAL
PERFORMED ON: NORMAL
PLATELET # BLD: 189 K/UL (ref 135–450)
PMV BLD AUTO: 7 FL (ref 5–10.5)
POTASSIUM SERPL-SCNC: 3.4 MMOL/L (ref 3.5–5.1)
RBC # BLD: 3.91 M/UL (ref 4–5.2)
SARS-COV-2, NAAT: NOT DETECTED
SODIUM BLD-SCNC: 140 MMOL/L (ref 136–145)
SPE/IFE INTERPRETATION: NORMAL
TOTAL PROTEIN: 7.2 G/DL (ref 6.4–8.2)
VITAMIN D 1,25-DIHYDROXY: 8.9 PG/ML (ref 19.9–79.3)
WBC # BLD: 3.1 K/UL (ref 4–11)

## 2022-03-18 PROCEDURE — 83735 ASSAY OF MAGNESIUM: CPT

## 2022-03-18 PROCEDURE — 1200000000 HC SEMI PRIVATE

## 2022-03-18 PROCEDURE — 85025 COMPLETE CBC W/AUTO DIFF WBC: CPT

## 2022-03-18 PROCEDURE — 86334 IMMUNOFIX E-PHORESIS SERUM: CPT

## 2022-03-18 PROCEDURE — 2580000003 HC RX 258: Performed by: ANESTHESIOLOGY

## 2022-03-18 PROCEDURE — 84165 PROTEIN E-PHORESIS SERUM: CPT

## 2022-03-18 PROCEDURE — 71250 CT THORAX DX C-: CPT

## 2022-03-18 PROCEDURE — 83883 ASSAY NEPHELOMETRY NOT SPEC: CPT

## 2022-03-18 PROCEDURE — 80053 COMPREHEN METABOLIC PANEL: CPT

## 2022-03-18 PROCEDURE — 82784 ASSAY IGA/IGD/IGG/IGM EACH: CPT

## 2022-03-18 PROCEDURE — 6360000002 HC RX W HCPCS: Performed by: STUDENT IN AN ORGANIZED HEALTH CARE EDUCATION/TRAINING PROGRAM

## 2022-03-18 PROCEDURE — 84155 ASSAY OF PROTEIN SERUM: CPT

## 2022-03-18 PROCEDURE — 2580000003 HC RX 258: Performed by: STUDENT IN AN ORGANIZED HEALTH CARE EDUCATION/TRAINING PROGRAM

## 2022-03-18 RX ORDER — SODIUM CHLORIDE 0.9 % (FLUSH) 0.9 %
10 SYRINGE (ML) INJECTION EVERY 12 HOURS SCHEDULED
Status: DISCONTINUED | OUTPATIENT
Start: 2022-03-18 | End: 2022-03-18 | Stop reason: HOSPADM

## 2022-03-18 RX ORDER — SODIUM CHLORIDE 0.9 % (FLUSH) 0.9 %
10 SYRINGE (ML) INJECTION PRN
Status: DISCONTINUED | OUTPATIENT
Start: 2022-03-18 | End: 2022-03-18 | Stop reason: HOSPADM

## 2022-03-18 RX ORDER — SODIUM CHLORIDE 9 MG/ML
INJECTION, SOLUTION INTRAVENOUS CONTINUOUS
Status: DISCONTINUED | OUTPATIENT
Start: 2022-03-18 | End: 2022-03-19

## 2022-03-18 RX ORDER — CLINDAMYCIN PHOSPHATE 900 MG/50ML
900 INJECTION INTRAVENOUS
Status: ACTIVE | OUTPATIENT
Start: 2022-03-18 | End: 2022-03-19

## 2022-03-18 RX ORDER — SODIUM CHLORIDE 9 MG/ML
25 INJECTION, SOLUTION INTRAVENOUS PRN
Status: DISCONTINUED | OUTPATIENT
Start: 2022-03-18 | End: 2022-03-18 | Stop reason: HOSPADM

## 2022-03-18 RX ADMIN — SODIUM CHLORIDE, PRESERVATIVE FREE 10 ML: 5 INJECTION INTRAVENOUS at 09:24

## 2022-03-18 RX ADMIN — HYDROMORPHONE HYDROCHLORIDE 0.5 MG: 1 INJECTION, SOLUTION INTRAMUSCULAR; INTRAVENOUS; SUBCUTANEOUS at 21:28

## 2022-03-18 RX ADMIN — HYDROMORPHONE HYDROCHLORIDE 0.5 MG: 1 INJECTION, SOLUTION INTRAMUSCULAR; INTRAVENOUS; SUBCUTANEOUS at 11:22

## 2022-03-18 RX ADMIN — SODIUM CHLORIDE: 9 INJECTION, SOLUTION INTRAVENOUS at 17:47

## 2022-03-18 RX ADMIN — SODIUM CHLORIDE, PRESERVATIVE FREE 10 ML: 5 INJECTION INTRAVENOUS at 21:30

## 2022-03-18 RX ADMIN — HYDROMORPHONE HYDROCHLORIDE 0.5 MG: 1 INJECTION, SOLUTION INTRAMUSCULAR; INTRAVENOUS; SUBCUTANEOUS at 02:57

## 2022-03-18 ASSESSMENT — PAIN DESCRIPTION - PROGRESSION
CLINICAL_PROGRESSION: NOT CHANGED
CLINICAL_PROGRESSION: GRADUALLY WORSENING
CLINICAL_PROGRESSION: NOT CHANGED
CLINICAL_PROGRESSION: GRADUALLY IMPROVING

## 2022-03-18 ASSESSMENT — PAIN DESCRIPTION - PAIN TYPE
TYPE: CHRONIC PAIN
TYPE: ACUTE PAIN
TYPE: CHRONIC PAIN
TYPE: CHRONIC PAIN

## 2022-03-18 ASSESSMENT — PAIN DESCRIPTION - DESCRIPTORS
DESCRIPTORS: ACHING;DISCOMFORT
DESCRIPTORS: ACHING;CONSTANT
DESCRIPTORS: ACHING;CONSTANT
DESCRIPTORS: ACHING
DESCRIPTORS: ACHING;CONSTANT

## 2022-03-18 ASSESSMENT — PAIN DESCRIPTION - ONSET
ONSET: ON-GOING

## 2022-03-18 ASSESSMENT — PAIN DESCRIPTION - FREQUENCY
FREQUENCY: CONTINUOUS

## 2022-03-18 ASSESSMENT — PAIN SCALES - GENERAL
PAINLEVEL_OUTOF10: 8
PAINLEVEL_OUTOF10: 7
PAINLEVEL_OUTOF10: 8
PAINLEVEL_OUTOF10: 10
PAINLEVEL_OUTOF10: 9

## 2022-03-18 ASSESSMENT — PAIN DESCRIPTION - ORIENTATION
ORIENTATION: LEFT;LOWER

## 2022-03-18 ASSESSMENT — PAIN - FUNCTIONAL ASSESSMENT
PAIN_FUNCTIONAL_ASSESSMENT: PREVENTS OR INTERFERES SOME ACTIVE ACTIVITIES AND ADLS
PAIN_FUNCTIONAL_ASSESSMENT: 0-10

## 2022-03-18 ASSESSMENT — PAIN DESCRIPTION - LOCATION
LOCATION: BACK;HIP

## 2022-03-18 NOTE — PROGRESS NOTES
Hematology Oncology Daily Progress Note    Admit Date: 3/16/2022  Hospital day several    Subjective:     Patient has complaints of ongoing left hip pain--denies sob/cp. Medication side effects: none    Scheduled Meds:   enoxaparin  40 mg SubCUTAneous Daily    sodium chloride flush  5-40 mL IntraVENous 2 times per day    insulin lispro  0-6 Units SubCUTAneous TID WC    insulin lispro  0-3 Units SubCUTAneous Nightly     Continuous Infusions:   sodium chloride      dextrose       PRN Meds:prochlorperazine, sodium chloride flush, sodium chloride, glucose, dextrose, glucagon (rDNA), dextrose, ondansetron, HYDROmorphone **OR** HYDROmorphone    Review of Systems  Pertinent items are noted in HPI. REVIEW OF SYSTEMS:         · Constitutional: Denies fever, sweats, weight loss     · Eyes: No visual changes or diplopia. No scleral icterus. · ENT: No Headaches, hearing loss or vertigo. No mouth sores or sore throat. · Cardiovascular: No chest pain, dyspnea on exertion, palpitations or loss of consciousness. · Respiratory: No cough or wheezing, no sputum production. No hemoptysis. .    · Gastrointestinal: No abdominal pain, appetite loss, blood in stools. No change in bowel habits. · Genitourinary: No dysuria, trouble voiding, or hematuria. · Musculoskeletal:  Generalized weakness. No joint complaints. · Integumentary: No rash or pruritis. · Neurological: No headache, diplopia. No change in gait, balance, or coordination. No paresthesias. · Endocrine: No temperature intolerance. No excessive thirst, fluid intake, or urination. · Hematologic/Lymphatic: No abnormal bruising or ecchymoses, blood clots or swollen lymph nodes. · Allergic/Immunologic: No nasal congestion or hives.    ·     Objective:     Patient Vitals for the past 8 hrs:   BP Temp Temp src Pulse Resp SpO2 Weight   03/18/22 0854 134/79 99.7 °F (37.6 °C) Oral 109 16 96 % --   03/18/22 0539 -- -- -- -- -- -- 239 lb 3.2 oz (108.5 kg)   03/18/22 7900 (!) 128/90 99.4 °F (37.4 °C) Oral 96 17 92 % --     I/O last 3 completed shifts: In: 2325 [P.O.:1200; I.V.:1125]  Out: 1580 [Urine:1580]  No intake/output data recorded. /79   Pulse 109   Temp 99.7 °F (37.6 °C) (Oral)   Resp 16   Ht 5' 5\" (1.651 m)   Wt 239 lb 3.2 oz (108.5 kg)   SpO2 96%   BMI 39.80 kg/m²     General Appearance:    Alert, cooperative, no distress, appears stated age   Head:    Normocephalic, without obvious abnormality, atraumatic   Eyes:    PERRL, conjunctiva/corneas clear, EOM's intact, fundi     benign, both eyes        Ears:    Normal TM's and external ear canals, both ears   Nose:   Nares normal, septum midline, mucosa normal, no drainage    or sinus tenderness   Throat:   Lips, mucosa, and tongue normal; teeth and gums normal   Neck:   Supple, symmetrical, trachea midline, no adenopathy;        thyroid:  No enlargement/tenderness/nodules; no carotid    bruit or JVD   Back:     Symmetric, no curvature, ROM normal, no CVA tenderness   Lungs:     Clear to auscultation bilaterally, respirations unlabored   Chest wall:    No tenderness or deformity   Heart:    Regular rate and rhythm, S1 and S2 normal, no murmur, rub   or gallop   Abdomen:     Soft, non-tender, bowel sounds active all four quadrants,     no masses, no organomegaly           Extremities:   Extremities normal, atraumatic, no cyanosis or edema   Pulses:   2+ and symmetric all extremities   Skin:   Skin color, texture, turgor normal, no rashes or lesions   Lymph nodes:   Cervical, supraclavicular, and axillary nodes normal   Neurologic:   Stable       Data Review  CBC:   Lab Results   Component Value Date    WBC 3.1 03/18/2022    RBC 3.91 03/18/2022       Assessment:     Principal Problem:    Left hip pain  Active Problems:    Pathological fracture of left hip, initial encounter (HCC)    Malignancy (HCC)    Hypercalcemia  Resolved Problems:    * No resolved hospital problems. *      Plan:     1.  Mets cancer/unknown primary--she is going to OR for pinning of left femur. Bone biopsy will be done at same time. Will get chest CT for staging. 2. Increased LFTs--unclear etiology. Consult GI.    3. Hypercalcemia--resolved with zometa.         Electronically signed by Azucena Suazo MD on 3/18/2022 at 11:09 AM

## 2022-03-18 NOTE — PROGRESS NOTES
Hospitalist Progress Note  3/18/2022 11:12 AM    PCP: Chava Kelly MD    [phone]     Date of Admission: 3/16/2022                                                                                                                     HOSPITAL COURSE    Patient demographics:  The patient  Renzo Toth is a 64 y.o. female     Significant past medical history:   Patient Active Problem List   Diagnosis    Chronic abdominal pain    Personal history of malignant neoplasm of breast    Asthma    Migraine headache    Personal history of breast cancer    Encounter for follow-up surveillance of breast cancer    History of mastectomy    Left hip pain    [facility] fracture of left hip, initial encounter ([facility]    Malignancy ([facility]    Hypercalcemia         Presenting symptoms:  Worsening left hip pain and inability to ambulate on hip    Diagnostic workup:      CONSULTS DURING ADMISSION :   IP CONSULT TO HOSPITALIST  IP CONSULT TO ONCOLOGY  IP CONSULT TO GI      Patient was diagnosed with:        Treatment while inpatient:                                                                                         ----------------------------------------------------------      SUBJECTIVE COMPLAINTS- follow up for left hip pain    Diet: Diet NPO Exceptions are: Sips of Water with Meds      OBJECTIVE:   Patient Active Problem List   Diagnosis    Chronic abdominal pain    Personal history of malignant neoplasm of breast    Asthma    Migraine headache    Personal history of breast cancer    Encounter for follow-up surveillance of breast cancer    History of mastectomy    Left hip pain    Pathological fracture of left hip, initial encounter ([facility]    Malignancy ([facility]    Hypercalcemia       Allergies  Cephalexin    Medications    Scheduled Meds:   enoxaparin  40 mg SubCUTAneous Daily    sodium chloride flush  5-40 mL IntraVENous 2 times per day    insulin lispro  0-6 Units SubCUTAneous TID WC    insulin lispro  0-3 Units SubCUTAneous Nightly     Continuous Infusions:   sodium chloride      dextrose       PRN Meds:  prochlorperazine, sodium chloride flush, sodium chloride, glucose, dextrose, glucagon (rDNA), dextrose, ondansetron, HYDROmorphone **OR** HYDROmorphone    Vitals   Vitals /wt   Patient Vitals for the past 8 hrs:   BP Temp Temp src Pulse Resp SpO2 Weight   03/18/22 0854 134/79 99.7 °F (37.6 °C) Oral 109 16 96 % --   03/18/22 0539 -- -- -- -- -- -- 239 lb 3.2 oz (108.5 kg)   03/18/22 0336 (!) 128/90 99.4 °F (37.4 °C) Oral 96 17 92 % --        72HR INTAKE/OUTPUT:      Intake/Output Summary (Last 24 hours) at 3/18/2022 1112  Last data filed at 3/18/2022 0908  Gross per 24 hour   Intake 360 ml   Output 1115 ml   Net -755 ml       Exam:    Gen:   Alert and oriented ×3   Eyes: PERRL. No sclera icterus. No conjunctival injection. ENT: No discharge. Pharynx clear. External appearance of ears and nose normal.  Neck: Trachea midline. No obvious mass. Resp: No accessory muscle use. No crackles. No wheezes. No rhonchi. CV: Regular rate. Regular rhythm. No murmur or rub. No edema. GI: Non-tender. Non-distended. No hernia. Skin: Warm, dry, normal texture and turgor. Lymph: No cervical LAD. No supraclavicular LAD. M/S: / Ext. No cyanosis. No clubbing. No joint deformity. Neuro: CN 2-12 are intact,  no neurologic deficits noted. PT/INR: No results for input(s): PROTIME, INR in the last 72 hours. APTT: No results for input(s): APTT in the last 72 hours.     CBC:   Recent Labs     03/16/22  1830 03/16/22  2115 03/17/22  0630 03/18/22  0650   WBC 4.1 3.6* 3.2* 3.1*   HGB 11.3* 11.8* 11.1* 11.5*   HCT 32.6* 35.2* 32.0* 33.9*   MCV 86.6 87.5 86.0 86.6    203 191 189       BMP:   Recent Labs     03/16/22  2225 03/17/22  0001 03/17/22  0630 03/18/22  0650    136 136 140   K 3.4* 3.7 3.4* 3.4*    98* 100 100   CO2 21 21 24 24   BUN 12 13 11 8   CREATININE 0.8 0.8 0.8 0.8       LIVER PROFILE:   Recent Labs     03/16/22  1830 03/17/22  0001 03/17/22  0630 03/18/22  0650   ALKPHOS 230* 212* 199* 225*   * 135* 142* 139*   * 245* 260* 283*   BILITOT 1.2* 1.4* 1.2* 1.1*     No results for input(s): AMYLASE in the last 72 hours. No results for input(s): LIPASE in the last 72 hours. UA:No results for input(s): NITRITE, LABCAST, WBCUA, RBCUA, MUCUS in the last 72 hours. TROPONIN: No results for input(s): Neal Breeze in the last 72 hours. No results found for: URRFLXCULT    No results for input(s): TSHREFLEX in the last 72 hours. No components found for: [id number]  POC GLUCOSE:    Recent Labs     03/17/22  0630 03/17/22  1210 03/17/22  1750 03/17/22  2047 03/18/22  0628   POCGLU 118* 112* 112* 104* 99     No results for input(s): LABA1C in the last 72 hours. No results found for: LABA1C      ASSESSMENT AND PLAN        Left hip pain  Left hip lytic lesion is likely metastatic  Possible pathologic fracture of left hip  Plan for intramedullary nail placement on 3/19      History of breast cancer   lytic and sclerotic lesions of multiple areas throughout patient's thoracic and abdomen,   Primary is unknown  Hematology oncology is following     Hypercalcemia  Resolved with Zometa        Elevate LFTs  GI consult          Code Status: Full Code        Dispo -cc        The patient and / or the family were informed of the results of any tests, a time was given to answer questions, a plan was proposed and they agreed with plan. Satya Fritz MD    This note was transcribed using [id number] [employer]. Please disregard any translational errors.

## 2022-03-18 NOTE — PROGRESS NOTES
Pt is alert and oriented x4, resting quietly in bed. Nightly medications given - pt will be NPO at midnight. No complaints of nausea, vomiting, or pain at this time. Tavares remains in place. Call light within reach. No other needs made known at this time. Fall precautions in place. Will continue to monitor.     Electronically signed by Royce Celaya RN on 3/18/2022 at 1:31 AM

## 2022-03-18 NOTE — PROGRESS NOTES
[facility] Orthopedic Surgery   Progress Note      S/P :  SUBJECTIVE  In bed. Alert and oriented. . Pain is   described in left hip and with the intensity of moderate. Pain is described as aching. OBJECTIVE              Physical                      VITALS:  /79   Pulse 109   Temp 99.7 °F (37.6 °C) (Oral)   Resp 16   Ht 5' 5\" (1.651 m)   Wt 239 lb 3.2 oz (108.5 kg)   SpO2 96%   BMI 39.80 kg/m²                     MUSCULOSKELETAL:  left foot NVI. Wiggles toes to command. Able to plantarflex and dorsiflex ankle Pedal pulses are palpable. No left leg deformity noted.                     NEUROLOGIC:                                  Sensory:  Touch:  Left Lower Extremity:  normal                                                     Data       CBC:   Lab Results   Component Value Date    WBC 3.2 03/17/2022    RBC 3.72 03/17/2022    HGB 11.1 03/17/2022    HCT 32.0 03/17/2022    MCV 86.0 03/17/2022    MCH 29.8 03/17/2022    MCHC 34.6 03/17/2022    RDW 14.7 03/17/2022     03/17/2022    MPV 7.0 03/17/2022        WBC:    Lab Results   Component Value Date    WBC 3.2 03/17/2022        Hemoglobin/Hematocrit:    Lab Results   Component Value Date    HGB 11.1 03/17/2022    HCT 32.0 03/17/2022        PT/INR:  No results found for: PROTIME, INR           Current Inpatient Medications             Current Facility-Administered Medications: enoxaparin (LOVENOX) injection 40 mg, 40 mg, SubCUTAneous, Daily  prochlorperazine (COMPAZINE) injection 5 mg, 5 mg, IntraVENous, Q4H PRN  sodium chloride flush 0.9 % injection 5-40 mL, 5-40 mL, IntraVENous, 2 times per day  sodium chloride flush 0.9 % injection 5-40 mL, 5-40 mL, IntraVENous, PRN  0.9 % sodium chloride infusion, 25 mL, IntraVENous, PRN  insulin lispro (HUMALOG) injection vial 0-6 Units, 0-6 Units, SubCUTAneous, TID WC  insulin lispro (HUMALOG) injection vial 0-3 Units, 0-3 Units, SubCUTAneous, Nightly  glucose (GLUTOSE) 40 % oral gel 15 g, 15 g, Oral, PRN  dextrose 50 % IV solution, 12.5 g, IntraVENous, PRN  glucagon (rDNA) injection 1 mg, 1 mg, IntraMUSCular, PRN  dextrose 5 % solution, 100 mL/hr, IntraVENous, PRN  ondansetron (ZOFRAN) injection 4 mg, 4 mg, IntraVENous, Q6H PRN  HYDROmorphone (DILAUDID) injection 0.25 mg, 0.25 mg, IntraVENous, Q3H PRN **OR** HYDROmorphone (DILAUDID) injection 0.5 mg, 0.5 mg, IntraVENous, Q3H PRN    ASSESSMENT AND PLAN    Hx breast cancer 2007, in remission  Left hip pain since NOv 2021  Unable to walk due to pain since Jan 2022  Left hip lytic lesion, likely metastatic  Discussed with Dr Miroslava Lynch by phone  Plan NPO after MN for left hip IM kelvin today 330pm, intraop bone bx. Discussed plan with pt and she is agreeable  Pain med as ordered.    Lovenox today, hold for surgery today, resume tomorrow  50% WB left leg prior to surgery      Nguyen Pozo, APRN - CNP  3/18/2022  9:25 AM

## 2022-03-18 NOTE — PLAN OF CARE
Problem: Pain:  Goal: Pain level will decrease  Description: Pain level will decrease  Outcome: Ongoing  Note: Assessing and monitoring pt's pain. PRN pain medication being given if ordered. Educating pt on nonpharmacologic interventions for pain control. Will continue to monitor and reassess. Goal: Control of acute pain  Description: Control of acute pain  Outcome: Ongoing  Goal: Control of chronic pain  Description: Control of chronic pain  Outcome: Ongoing     Problem: Skin Integrity:  Goal: Will show no infection signs and symptoms  Description: Will show no infection signs and symptoms  Outcome: Ongoing  Note: Pt assessed for skin break down. Skin was warm and dry to touch. Will continue to monitor and assess. Goal: Absence of new skin breakdown  Description: Absence of new skin breakdown  Outcome: Ongoing     Problem: Falls - Risk of:  Goal: Will remain free from falls  Description: Will remain free from falls  Outcome: Ongoing  Note: Fall risk assessment completed. Fall precautions in place. Bed in lowest position, wheels locked, bed/chair exit alarm in place, call light within reach, and non skid footwear on. Walkway free of clutter. Pt alert and oriented and able to make needs known. Pt educated to use call light when needing to get up, and pt utilizes call light to make needs known. Will continue to monitor. Goal: Absence of physical injury  Description: Absence of physical injury  Outcome: Ongoing     Problem: Nutrition  Goal: Optimal nutrition therapy  Outcome: Ongoing  Note: Pt encouraged to drink more prior to midnight - pt is NPO at 0000.

## 2022-03-18 NOTE — PROGRESS NOTES
Patient returned back to floor, pre op nurse called to state patient surgery had to be cancel, Dr. Pineda Jang had something come up, and he'll try again tomorrow. Patient VSS. IV fluids infusing. Diet order changed. Patient having back pain, rate 9/10, PRN pain mediation given as ordered. Patient able to make needs known, no needs voiced at this time. Will continue to monitor and reassess.

## 2022-03-19 ENCOUNTER — APPOINTMENT (OUTPATIENT)
Dept: GENERAL RADIOLOGY | Age: 62
DRG: 308 | End: 2022-03-19
Payer: COMMERCIAL

## 2022-03-19 ENCOUNTER — ANESTHESIA EVENT (OUTPATIENT)
Dept: OPERATING ROOM | Age: 62
DRG: 308 | End: 2022-03-19
Payer: COMMERCIAL

## 2022-03-19 ENCOUNTER — ANESTHESIA (OUTPATIENT)
Dept: OPERATING ROOM | Age: 62
DRG: 308 | End: 2022-03-19
Payer: COMMERCIAL

## 2022-03-19 VITALS
OXYGEN SATURATION: 99 % | DIASTOLIC BLOOD PRESSURE: 50 MMHG | TEMPERATURE: 95.9 F | SYSTOLIC BLOOD PRESSURE: 93 MMHG | RESPIRATION RATE: 10 BRPM

## 2022-03-19 LAB
A/G RATIO: 1.1 (ref 1.1–2.2)
ALBUMIN SERPL-MCNC: 3.3 G/DL (ref 3.4–5)
ALP BLD-CCNC: 206 U/L (ref 40–129)
ALT SERPL-CCNC: 302 U/L (ref 10–40)
ANION GAP SERPL CALCULATED.3IONS-SCNC: 14 MMOL/L (ref 3–16)
AST SERPL-CCNC: 144 U/L (ref 15–37)
BASOPHILS ABSOLUTE: 0 K/UL (ref 0–0.2)
BASOPHILS RELATIVE PERCENT: 1.1 %
BILIRUB SERPL-MCNC: 1.2 MG/DL (ref 0–1)
BUN BLDV-MCNC: 9 MG/DL (ref 7–20)
CALCIUM SERPL-MCNC: 8.1 MG/DL (ref 8.3–10.6)
CHLORIDE BLD-SCNC: 96 MMOL/L (ref 99–110)
CO2: 24 MMOL/L (ref 21–32)
CREAT SERPL-MCNC: 0.6 MG/DL (ref 0.6–1.2)
EOSINOPHILS ABSOLUTE: 0 K/UL (ref 0–0.6)
EOSINOPHILS RELATIVE PERCENT: 1.6 %
GFR AFRICAN AMERICAN: >60
GFR NON-AFRICAN AMERICAN: >60
GLUCOSE BLD-MCNC: 104 MG/DL (ref 70–99)
GLUCOSE BLD-MCNC: 108 MG/DL (ref 70–99)
GLUCOSE BLD-MCNC: 174 MG/DL (ref 70–99)
GLUCOSE BLD-MCNC: 94 MG/DL (ref 70–99)
HCT VFR BLD CALC: 33.1 % (ref 36–48)
HEMOGLOBIN: 11.3 G/DL (ref 12–16)
IRON SATURATION: 19 % (ref 15–50)
IRON: 49 UG/DL (ref 37–145)
LYMPHOCYTES ABSOLUTE: 0.7 K/UL (ref 1–5.1)
LYMPHOCYTES RELATIVE PERCENT: 23.3 %
MAGNESIUM: 1.8 MG/DL (ref 1.8–2.4)
MCH RBC QN AUTO: 29.3 PG (ref 26–34)
MCHC RBC AUTO-ENTMCNC: 34.2 G/DL (ref 31–36)
MCV RBC AUTO: 85.7 FL (ref 80–100)
MONOCYTES ABSOLUTE: 0.1 K/UL (ref 0–1.3)
MONOCYTES RELATIVE PERCENT: 4.9 %
NEUTROPHILS ABSOLUTE: 2 K/UL (ref 1.7–7.7)
NEUTROPHILS RELATIVE PERCENT: 69.1 %
PDW BLD-RTO: 15 % (ref 12.4–15.4)
PERFORMED ON: ABNORMAL
PLATELET # BLD: 147 K/UL (ref 135–450)
PMV BLD AUTO: 6.8 FL (ref 5–10.5)
POTASSIUM SERPL-SCNC: 3.1 MMOL/L (ref 3.5–5.1)
RBC # BLD: 3.86 M/UL (ref 4–5.2)
SODIUM BLD-SCNC: 134 MMOL/L (ref 136–145)
TOTAL IRON BINDING CAPACITY: 259 UG/DL (ref 260–445)
TOTAL PROTEIN: 6.2 G/DL (ref 6.4–8.2)
WBC # BLD: 2.9 K/UL (ref 4–11)

## 2022-03-19 PROCEDURE — 2500000003 HC RX 250 WO HCPCS: Performed by: ORTHOPAEDIC SURGERY

## 2022-03-19 PROCEDURE — 85025 COMPLETE CBC W/AUTO DIFF WBC: CPT

## 2022-03-19 PROCEDURE — C1713 ANCHOR/SCREW BN/BN,TIS/BN: HCPCS | Performed by: ORTHOPAEDIC SURGERY

## 2022-03-19 PROCEDURE — 7100000001 HC PACU RECOVERY - ADDTL 15 MIN: Performed by: ORTHOPAEDIC SURGERY

## 2022-03-19 PROCEDURE — 3600000014 HC SURGERY LEVEL 4 ADDTL 15MIN: Performed by: ORTHOPAEDIC SURGERY

## 2022-03-19 PROCEDURE — 2580000003 HC RX 258: Performed by: ANESTHESIOLOGY

## 2022-03-19 PROCEDURE — 2580000003 HC RX 258: Performed by: ORTHOPAEDIC SURGERY

## 2022-03-19 PROCEDURE — 2709999900 HC NON-CHARGEABLE SUPPLY: Performed by: ORTHOPAEDIC SURGERY

## 2022-03-19 PROCEDURE — 3700000000 HC ANESTHESIA ATTENDED CARE: Performed by: ORTHOPAEDIC SURGERY

## 2022-03-19 PROCEDURE — 0QS706Z REPOSITION LEFT UPPER FEMUR WITH INTRAMEDULLARY INTERNAL FIXATION DEVICE, OPEN APPROACH: ICD-10-PCS | Performed by: ORTHOPAEDIC SURGERY

## 2022-03-19 PROCEDURE — 3209999900 FLUORO FOR SURGICAL PROCEDURES

## 2022-03-19 PROCEDURE — 2060000000 HC ICU INTERMEDIATE R&B

## 2022-03-19 PROCEDURE — 2580000003 HC RX 258: Performed by: STUDENT IN AN ORGANIZED HEALTH CARE EDUCATION/TRAINING PROGRAM

## 2022-03-19 PROCEDURE — 2720000010 HC SURG SUPPLY STERILE: Performed by: ORTHOPAEDIC SURGERY

## 2022-03-19 PROCEDURE — 80053 COMPREHEN METABOLIC PANEL: CPT

## 2022-03-19 PROCEDURE — 2500000003 HC RX 250 WO HCPCS: Performed by: ANESTHESIOLOGY

## 2022-03-19 PROCEDURE — 88342 IMHCHEM/IMCYTCHM 1ST ANTB: CPT

## 2022-03-19 PROCEDURE — 3600000004 HC SURGERY LEVEL 4 BASE: Performed by: ORTHOPAEDIC SURGERY

## 2022-03-19 PROCEDURE — C1769 GUIDE WIRE: HCPCS | Performed by: ORTHOPAEDIC SURGERY

## 2022-03-19 PROCEDURE — 83550 IRON BINDING TEST: CPT

## 2022-03-19 PROCEDURE — 6360000002 HC RX W HCPCS: Performed by: ANESTHESIOLOGY

## 2022-03-19 PROCEDURE — A4217 STERILE WATER/SALINE, 500 ML: HCPCS | Performed by: ORTHOPAEDIC SURGERY

## 2022-03-19 PROCEDURE — 6370000000 HC RX 637 (ALT 250 FOR IP): Performed by: STUDENT IN AN ORGANIZED HEALTH CARE EDUCATION/TRAINING PROGRAM

## 2022-03-19 PROCEDURE — 6370000000 HC RX 637 (ALT 250 FOR IP): Performed by: ORTHOPAEDIC SURGERY

## 2022-03-19 PROCEDURE — 3700000001 HC ADD 15 MINUTES (ANESTHESIA): Performed by: ORTHOPAEDIC SURGERY

## 2022-03-19 PROCEDURE — 6360000002 HC RX W HCPCS: Performed by: STUDENT IN AN ORGANIZED HEALTH CARE EDUCATION/TRAINING PROGRAM

## 2022-03-19 PROCEDURE — 88360 TUMOR IMMUNOHISTOCHEM/MANUAL: CPT

## 2022-03-19 PROCEDURE — 7100000000 HC PACU RECOVERY - FIRST 15 MIN: Performed by: ORTHOPAEDIC SURGERY

## 2022-03-19 PROCEDURE — 6360000002 HC RX W HCPCS: Performed by: ORTHOPAEDIC SURGERY

## 2022-03-19 PROCEDURE — 83540 ASSAY OF IRON: CPT

## 2022-03-19 PROCEDURE — 83735 ASSAY OF MAGNESIUM: CPT

## 2022-03-19 PROCEDURE — 73502 X-RAY EXAM HIP UNI 2-3 VIEWS: CPT

## 2022-03-19 PROCEDURE — 36415 COLL VENOUS BLD VENIPUNCTURE: CPT

## 2022-03-19 PROCEDURE — 88307 TISSUE EXAM BY PATHOLOGIST: CPT

## 2022-03-19 DEVICE — NAIL IM L400MM DIA10MM 130DEG LNG L PROX FEM GRN TI CANN: Type: IMPLANTABLE DEVICE | Site: HIP | Status: FUNCTIONAL

## 2022-03-19 DEVICE — SCREW BNE L90MM DIA10.35MM PROX FEM G TI CANN FOR TFN ADV: Type: IMPLANTABLE DEVICE | Site: HIP | Status: FUNCTIONAL

## 2022-03-19 RX ORDER — OXYCODONE HYDROCHLORIDE 10 MG/1
10 TABLET ORAL EVERY 4 HOURS PRN
Status: DISCONTINUED | OUTPATIENT
Start: 2022-03-19 | End: 2022-03-22 | Stop reason: HOSPADM

## 2022-03-19 RX ORDER — FENTANYL CITRATE 50 UG/ML
50 INJECTION, SOLUTION INTRAMUSCULAR; INTRAVENOUS EVERY 5 MIN PRN
Status: DISCONTINUED | OUTPATIENT
Start: 2022-03-19 | End: 2022-03-19 | Stop reason: HOSPADM

## 2022-03-19 RX ORDER — GLYCOPYRROLATE 0.2 MG/ML
INJECTION INTRAMUSCULAR; INTRAVENOUS PRN
Status: DISCONTINUED | OUTPATIENT
Start: 2022-03-19 | End: 2022-03-19 | Stop reason: SDUPTHER

## 2022-03-19 RX ORDER — SODIUM CHLORIDE 9 MG/ML
25 INJECTION, SOLUTION INTRAVENOUS PRN
Status: DISCONTINUED | OUTPATIENT
Start: 2022-03-19 | End: 2022-03-22 | Stop reason: HOSPADM

## 2022-03-19 RX ORDER — FENTANYL CITRATE 50 UG/ML
25 INJECTION, SOLUTION INTRAMUSCULAR; INTRAVENOUS EVERY 5 MIN PRN
Status: DISCONTINUED | OUTPATIENT
Start: 2022-03-19 | End: 2022-03-19 | Stop reason: HOSPADM

## 2022-03-19 RX ORDER — SODIUM CHLORIDE 9 MG/ML
25 INJECTION, SOLUTION INTRAVENOUS PRN
Status: DISCONTINUED | OUTPATIENT
Start: 2022-03-19 | End: 2022-03-19 | Stop reason: HOSPADM

## 2022-03-19 RX ORDER — SODIUM CHLORIDE 0.9 % (FLUSH) 0.9 %
5-40 SYRINGE (ML) INJECTION EVERY 12 HOURS SCHEDULED
Status: DISCONTINUED | OUTPATIENT
Start: 2022-03-19 | End: 2022-03-19 | Stop reason: HOSPADM

## 2022-03-19 RX ORDER — ONDANSETRON 2 MG/ML
INJECTION INTRAMUSCULAR; INTRAVENOUS PRN
Status: DISCONTINUED | OUTPATIENT
Start: 2022-03-19 | End: 2022-03-19 | Stop reason: SDUPTHER

## 2022-03-19 RX ORDER — SENNA AND DOCUSATE SODIUM 50; 8.6 MG/1; MG/1
1 TABLET, FILM COATED ORAL 2 TIMES DAILY
Status: DISCONTINUED | OUTPATIENT
Start: 2022-03-19 | End: 2022-03-22 | Stop reason: HOSPADM

## 2022-03-19 RX ORDER — ONDANSETRON 2 MG/ML
4 INJECTION INTRAMUSCULAR; INTRAVENOUS EVERY 6 HOURS PRN
Status: DISCONTINUED | OUTPATIENT
Start: 2022-03-19 | End: 2022-03-22 | Stop reason: HOSPADM

## 2022-03-19 RX ORDER — ROCURONIUM BROMIDE 10 MG/ML
INJECTION, SOLUTION INTRAVENOUS PRN
Status: DISCONTINUED | OUTPATIENT
Start: 2022-03-19 | End: 2022-03-19 | Stop reason: SDUPTHER

## 2022-03-19 RX ORDER — NEOSTIGMINE METHYLSULFATE 1 MG/ML
INJECTION, SOLUTION INTRAVENOUS PRN
Status: DISCONTINUED | OUTPATIENT
Start: 2022-03-19 | End: 2022-03-19 | Stop reason: SDUPTHER

## 2022-03-19 RX ORDER — MAGNESIUM HYDROXIDE 1200 MG/15ML
LIQUID ORAL CONTINUOUS PRN
Status: COMPLETED | OUTPATIENT
Start: 2022-03-19 | End: 2022-03-19

## 2022-03-19 RX ORDER — ONDANSETRON 2 MG/ML
4 INJECTION INTRAMUSCULAR; INTRAVENOUS
Status: DISCONTINUED | OUTPATIENT
Start: 2022-03-19 | End: 2022-03-19 | Stop reason: HOSPADM

## 2022-03-19 RX ORDER — SODIUM CHLORIDE 9 MG/ML
INJECTION, SOLUTION INTRAVENOUS CONTINUOUS PRN
Status: DISCONTINUED | OUTPATIENT
Start: 2022-03-19 | End: 2022-03-19 | Stop reason: SDUPTHER

## 2022-03-19 RX ORDER — MEPERIDINE HYDROCHLORIDE 25 MG/ML
12.5 INJECTION INTRAMUSCULAR; INTRAVENOUS; SUBCUTANEOUS
Status: DISCONTINUED | OUTPATIENT
Start: 2022-03-19 | End: 2022-03-19 | Stop reason: HOSPADM

## 2022-03-19 RX ORDER — CLINDAMYCIN PHOSPHATE 900 MG/50ML
900 INJECTION INTRAVENOUS ONCE
Status: DISCONTINUED | OUTPATIENT
Start: 2022-03-19 | End: 2022-03-19 | Stop reason: HOSPADM

## 2022-03-19 RX ORDER — SODIUM CHLORIDE 0.9 % (FLUSH) 0.9 %
5-40 SYRINGE (ML) INJECTION PRN
Status: DISCONTINUED | OUTPATIENT
Start: 2022-03-19 | End: 2022-03-22 | Stop reason: HOSPADM

## 2022-03-19 RX ORDER — SODIUM CHLORIDE 0.9 % (FLUSH) 0.9 %
5-40 SYRINGE (ML) INJECTION EVERY 12 HOURS SCHEDULED
Status: DISCONTINUED | OUTPATIENT
Start: 2022-03-19 | End: 2022-03-22 | Stop reason: HOSPADM

## 2022-03-19 RX ORDER — PROPOFOL 10 MG/ML
INJECTION, EMULSION INTRAVENOUS PRN
Status: DISCONTINUED | OUTPATIENT
Start: 2022-03-19 | End: 2022-03-19 | Stop reason: SDUPTHER

## 2022-03-19 RX ORDER — SODIUM CHLORIDE 0.9 % (FLUSH) 0.9 %
5-40 SYRINGE (ML) INJECTION PRN
Status: DISCONTINUED | OUTPATIENT
Start: 2022-03-19 | End: 2022-03-19 | Stop reason: HOSPADM

## 2022-03-19 RX ORDER — FENTANYL CITRATE 50 UG/ML
INJECTION, SOLUTION INTRAMUSCULAR; INTRAVENOUS PRN
Status: DISCONTINUED | OUTPATIENT
Start: 2022-03-19 | End: 2022-03-19 | Stop reason: SDUPTHER

## 2022-03-19 RX ORDER — DEXAMETHASONE SODIUM PHOSPHATE 4 MG/ML
INJECTION, SOLUTION INTRA-ARTICULAR; INTRALESIONAL; INTRAMUSCULAR; INTRAVENOUS; SOFT TISSUE PRN
Status: DISCONTINUED | OUTPATIENT
Start: 2022-03-19 | End: 2022-03-19 | Stop reason: SDUPTHER

## 2022-03-19 RX ADMIN — ONDANSETRON 4 MG: 2 INJECTION INTRAMUSCULAR; INTRAVENOUS at 18:07

## 2022-03-19 RX ADMIN — HYDROMORPHONE HYDROCHLORIDE 0.5 MG: 1 INJECTION, SOLUTION INTRAMUSCULAR; INTRAVENOUS; SUBCUTANEOUS at 08:48

## 2022-03-19 RX ADMIN — VANCOMYCIN HYDROCHLORIDE 1500 MG: 5 INJECTION, POWDER, LYOPHILIZED, FOR SOLUTION INTRAVENOUS at 18:06

## 2022-03-19 RX ADMIN — HYDROMORPHONE HYDROCHLORIDE 0.5 MG: 1 INJECTION, SOLUTION INTRAMUSCULAR; INTRAVENOUS; SUBCUTANEOUS at 04:35

## 2022-03-19 RX ADMIN — INSULIN LISPRO 1 UNITS: 100 INJECTION, SOLUTION INTRAVENOUS; SUBCUTANEOUS at 22:50

## 2022-03-19 RX ADMIN — SODIUM CHLORIDE, PRESERVATIVE FREE 10 ML: 5 INJECTION INTRAVENOUS at 22:51

## 2022-03-19 RX ADMIN — CLINDAMYCIN PHOSPHATE 900 MG: 18 INJECTION, SOLUTION INTRAMUSCULAR; INTRAVENOUS at 12:15

## 2022-03-19 RX ADMIN — ONDANSETRON 4 MG: 2 INJECTION INTRAMUSCULAR; INTRAVENOUS at 14:46

## 2022-03-19 RX ADMIN — ONDANSETRON 4 MG: 2 INJECTION INTRAMUSCULAR; INTRAVENOUS at 04:34

## 2022-03-19 RX ADMIN — SENNOSIDES AND DOCUSATE SODIUM 1 TABLET: 50; 8.6 TABLET ORAL at 22:49

## 2022-03-19 RX ADMIN — SODIUM CHLORIDE: 9 INJECTION, SOLUTION INTRAVENOUS at 13:20

## 2022-03-19 RX ADMIN — DEXAMETHASONE SODIUM PHOSPHATE 12 MG: 4 INJECTION, SOLUTION INTRAMUSCULAR; INTRAVENOUS at 14:19

## 2022-03-19 RX ADMIN — GLYCOPYRROLATE 0.8 MG: 0.2 INJECTION, SOLUTION INTRAMUSCULAR; INTRAVENOUS at 14:46

## 2022-03-19 RX ADMIN — SODIUM CHLORIDE, PRESERVATIVE FREE 10 ML: 5 INJECTION INTRAVENOUS at 08:48

## 2022-03-19 RX ADMIN — ROCURONIUM BROMIDE 50 MG: 10 SOLUTION INTRAVENOUS at 13:26

## 2022-03-19 RX ADMIN — FENTANYL CITRATE 50 MCG: 50 INJECTION, SOLUTION INTRAMUSCULAR; INTRAVENOUS at 15:49

## 2022-03-19 RX ADMIN — FENTANYL CITRATE 50 MCG: 50 INJECTION, SOLUTION INTRAMUSCULAR; INTRAVENOUS at 15:25

## 2022-03-19 RX ADMIN — HYDROMORPHONE HYDROCHLORIDE 0.5 MG: 1 INJECTION, SOLUTION INTRAMUSCULAR; INTRAVENOUS; SUBCUTANEOUS at 18:00

## 2022-03-19 RX ADMIN — SODIUM CHLORIDE: 9 INJECTION, SOLUTION INTRAVENOUS at 08:54

## 2022-03-19 RX ADMIN — FENTANYL CITRATE 50 MCG: 50 INJECTION, SOLUTION INTRAMUSCULAR; INTRAVENOUS at 15:39

## 2022-03-19 RX ADMIN — OXYCODONE HYDROCHLORIDE 10 MG: 10 TABLET ORAL at 22:49

## 2022-03-19 RX ADMIN — PROPOFOL 200 MG: 10 INJECTION, EMULSION INTRAVENOUS at 13:25

## 2022-03-19 RX ADMIN — FENTANYL CITRATE 100 MCG: 50 INJECTION INTRAMUSCULAR; INTRAVENOUS at 13:25

## 2022-03-19 RX ADMIN — SODIUM CHLORIDE 25 ML: 9 INJECTION, SOLUTION INTRAVENOUS at 18:04

## 2022-03-19 RX ADMIN — OXYCODONE HYDROCHLORIDE 10 MG: 10 TABLET ORAL at 16:38

## 2022-03-19 RX ADMIN — NEOSTIGMINE METHYLSULFATE 4 MG: 1 INJECTION, SOLUTION INTRAVENOUS at 14:46

## 2022-03-19 ASSESSMENT — PAIN DESCRIPTION - FREQUENCY
FREQUENCY: CONTINUOUS

## 2022-03-19 ASSESSMENT — PULMONARY FUNCTION TESTS
PIF_VALUE: 22
PIF_VALUE: 2
PIF_VALUE: 19
PIF_VALUE: 22
PIF_VALUE: 22
PIF_VALUE: 18
PIF_VALUE: 22
PIF_VALUE: 22
PIF_VALUE: 3
PIF_VALUE: 20
PIF_VALUE: 2
PIF_VALUE: 19
PIF_VALUE: 15
PIF_VALUE: 22
PIF_VALUE: 22
PIF_VALUE: 3
PIF_VALUE: 22
PIF_VALUE: 19
PIF_VALUE: 35
PIF_VALUE: 22
PIF_VALUE: 22
PIF_VALUE: 17
PIF_VALUE: 22
PIF_VALUE: 23
PIF_VALUE: 22
PIF_VALUE: 0
PIF_VALUE: 22
PIF_VALUE: 34
PIF_VALUE: 2
PIF_VALUE: 2
PIF_VALUE: 3
PIF_VALUE: 1
PIF_VALUE: 13
PIF_VALUE: 22
PIF_VALUE: 0
PIF_VALUE: 22
PIF_VALUE: 4
PIF_VALUE: 22
PIF_VALUE: 22
PIF_VALUE: 4
PIF_VALUE: 22
PIF_VALUE: 22
PIF_VALUE: 6
PIF_VALUE: 3
PIF_VALUE: 22
PIF_VALUE: 4
PIF_VALUE: 22
PIF_VALUE: 22
PIF_VALUE: 4
PIF_VALUE: 22
PIF_VALUE: 4
PIF_VALUE: 22
PIF_VALUE: 2
PIF_VALUE: 23
PIF_VALUE: 17
PIF_VALUE: 22
PIF_VALUE: 2
PIF_VALUE: 22
PIF_VALUE: 23
PIF_VALUE: 2
PIF_VALUE: 5
PIF_VALUE: 22
PIF_VALUE: 5
PIF_VALUE: 2
PIF_VALUE: 22
PIF_VALUE: 3
PIF_VALUE: 4
PIF_VALUE: 22
PIF_VALUE: 2
PIF_VALUE: 22
PIF_VALUE: 17

## 2022-03-19 ASSESSMENT — PAIN DESCRIPTION - ORIENTATION
ORIENTATION: LEFT
ORIENTATION: LEFT;LOWER
ORIENTATION: LEFT

## 2022-03-19 ASSESSMENT — PAIN DESCRIPTION - ONSET
ONSET: GRADUAL
ONSET: ON-GOING

## 2022-03-19 ASSESSMENT — PAIN DESCRIPTION - LOCATION
LOCATION: HIP;KNEE
LOCATION: HIP;KNEE
LOCATION: HIP;LEG
LOCATION: HIP;BACK
LOCATION: HIP;KNEE
LOCATION: HIP;KNEE
LOCATION: HIP
LOCATION: HIP;KNEE
LOCATION: HIP

## 2022-03-19 ASSESSMENT — PAIN DESCRIPTION - PROGRESSION
CLINICAL_PROGRESSION: NOT CHANGED
CLINICAL_PROGRESSION: GRADUALLY WORSENING
CLINICAL_PROGRESSION: GRADUALLY IMPROVING
CLINICAL_PROGRESSION: GRADUALLY WORSENING
CLINICAL_PROGRESSION: GRADUALLY WORSENING
CLINICAL_PROGRESSION: GRADUALLY IMPROVING
CLINICAL_PROGRESSION: GRADUALLY WORSENING
CLINICAL_PROGRESSION: GRADUALLY IMPROVING
CLINICAL_PROGRESSION: GRADUALLY IMPROVING

## 2022-03-19 ASSESSMENT — PAIN DESCRIPTION - DESCRIPTORS
DESCRIPTORS: ACHING
DESCRIPTORS: ACHING;DULL
DESCRIPTORS: ACHING;DULL
DESCRIPTORS: ACHING;SORE
DESCRIPTORS: ACHING;CONSTANT
DESCRIPTORS: ACHING;SORE
DESCRIPTORS: ACHING

## 2022-03-19 ASSESSMENT — LIFESTYLE VARIABLES: SMOKING_STATUS: 0

## 2022-03-19 ASSESSMENT — PAIN - FUNCTIONAL ASSESSMENT
PAIN_FUNCTIONAL_ASSESSMENT: PREVENTS OR INTERFERES SOME ACTIVE ACTIVITIES AND ADLS
PAIN_FUNCTIONAL_ASSESSMENT: 0-10
PAIN_FUNCTIONAL_ASSESSMENT: PREVENTS OR INTERFERES SOME ACTIVE ACTIVITIES AND ADLS

## 2022-03-19 ASSESSMENT — PAIN SCALES - GENERAL
PAINLEVEL_OUTOF10: 8
PAINLEVEL_OUTOF10: 4
PAINLEVEL_OUTOF10: 5
PAINLEVEL_OUTOF10: 7
PAINLEVEL_OUTOF10: 8
PAINLEVEL_OUTOF10: 9
PAINLEVEL_OUTOF10: 10
PAINLEVEL_OUTOF10: 9
PAINLEVEL_OUTOF10: 9
PAINLEVEL_OUTOF10: 10
PAINLEVEL_OUTOF10: 8
PAINLEVEL_OUTOF10: 0

## 2022-03-19 ASSESSMENT — PAIN DESCRIPTION - PAIN TYPE
TYPE: SURGICAL PAIN
TYPE: ACUTE PAIN
TYPE: SURGICAL PAIN
TYPE: SURGICAL PAIN
TYPE: ACUTE PAIN
TYPE: SURGICAL PAIN
TYPE: SURGICAL PAIN
TYPE: ACUTE PAIN
TYPE: SURGICAL PAIN

## 2022-03-19 NOTE — PROGRESS NOTES
Pt resting in bed in afternoon. She c/o L hip and knee pain rated 9/10. PRN Oxy and Dilaudid administered. She also c/o nausea, PRN Zofran administered. Pulses palpable, skin warm to touch. Dressing remains CDI. She is SR on telemetry. Pt stated she is not able to go to rehab at discharge as she needs to go home where she cares for her 55-year-old grandson who is in a wheelchair. Pt informed that PT/OT will work with her tomorrow to see what her abilities are and will make recommendations for her continued therapy. Fall precautions in place, belongings within reach. Will continue to monitor and assess.

## 2022-03-19 NOTE — PROGRESS NOTES
Hematology Oncology Daily Progress Note    Admit Date: 3/16/2022      Subjective:     Patient has complaints of modest ongoing left hip pain--denies sob/cp. Had anticipated surgery yesterday but it was pushed off till today. She plans to go to the operating room somewhere near 10:30 AM.      Scheduled Meds:   sodium chloride flush  5-40 mL IntraVENous 2 times per day    clindamycin (CLEOCIN) IV  900 mg IntraVENous Once    enoxaparin  40 mg SubCUTAneous Daily    sodium chloride flush  5-40 mL IntraVENous 2 times per day    insulin lispro  0-6 Units SubCUTAneous TID WC    insulin lispro  0-3 Units SubCUTAneous Nightly     Continuous Infusions:   sodium chloride      sodium chloride 125 mL/hr at 03/19/22 0854    sodium chloride      dextrose       PRN Meds:sodium chloride flush, sodium chloride, meperidine, fentanNYL, fentanNYL, ondansetron, prochlorperazine, sodium chloride flush, sodium chloride, glucose, dextrose, glucagon (rDNA), dextrose, ondansetron, HYDROmorphone **OR** HYDROmorphone    REVIEW OF SYSTEMS:         · Constitutional: Denies fever, sweats, weight loss     · Eyes: No visual changes or diplopia. No scleral icterus. · ENT: No Headaches, hearing loss or vertigo. No mouth sores or sore throat. · Cardiovascular: No chest pain, dyspnea on exertion, palpitations or loss of consciousness. · Respiratory: No cough or wheezing, no sputum production. No hemoptysis. .    · Gastrointestinal: No abdominal pain, appetite loss, blood in stools. No change in bowel habits. · Genitourinary: No dysuria, trouble voiding, or hematuria. · Musculoskeletal:  Generalized weakness. Modest hip pain  · Integumentary: No rash or pruritis. · Neurological: No headache, diplopia. No change in gait, balance, or coordination. No paresthesias. · Endocrine: No temperature intolerance. No excessive thirst, fluid intake, or urination.    · Hematologic/Lymphatic: No abnormal bruising or ecchymoses, blood clots or swollen lymph nodes. · Allergic/Immunologic: No nasal congestion or hives. ·     Objective:     Patient Vitals for the past 8 hrs:   BP Temp Temp src Pulse Resp SpO2 Weight   03/19/22 1101 137/75 97.3 °F (36.3 °C) Temporal 76 18 96 % --   03/19/22 0726 (!) 144/78 98.7 °F (37.1 °C) Oral 81 17 94 % --   03/19/22 0658 -- -- -- -- -- -- 233 lb 4 oz (105.8 kg)   03/19/22 0449 (!) 142/83 98.3 °F (36.8 °C) Oral 81 16 90 % --     I/O last 3 completed shifts: In: 855 [P.O.:480; I.V.:375]  Out: 1800 [Urine:1800]  No intake/output data recorded. /75   Pulse 76   Temp 97.3 °F (36.3 °C) (Temporal)   Resp 18   Ht 5' 5\" (1.651 m)   Wt 233 lb 4 oz (105.8 kg)   SpO2 96%   BMI 38.81 kg/m²     CONSTITUTIONAL: Well-appearing, no acute distress. Pale. EYES: Sclera clear, conjunctiva normal  ENT: Oral pharynx unremarkable with moist mucus membranes  LUNGS: Clear to auscultation. No increased labor with breathing. CARDIOVASCULAR: Regular rate and rhythm, normal S1 and S2, no S3 or S4, and no murmur noted. ABDOMEN: Soft, non-distended, non-tender  MUSCULOSKELETAL: There is no redness, warmth, or swelling of the joints. NEUROLOGIC: Awake, alert. Grossly non-focal.  SKIN: No bruising or bleeding. PSYCH: Normal affect. Data Review  CBC:   Lab Results   Component Value Date    WBC 2.9 03/19/2022    RBC 3.86 03/19/2022       Assessment:     Principal Problem:    Left hip pain  Active Problems:    Pathological fracture of left hip, initial encounter ([facility]    Malignancy ([facility]    Hypercalcemia  Resolved Problems:    * No resolved hospital problems. *      Plan:     1. Mets cancer/unknown primary--she is going to OR for pinning of left femur. Bone biopsy will be done at same time. Will likely require radiation therapy following. Chest CT for staging pend. 2. Increased LFTs--unclear etiology. GI to see patient later today    3. Hypercalcemia--resolved with zometa.         Electronically signed by Andrea Goel MD on 3/19/2022 at 12:22 PM

## 2022-03-19 NOTE — OP NOTE
Operative Note      Patient: Patric Jimenez  YOB: 1960  MRN: [id number]    Date of Procedure: 3/19/2022    Pre-Op Diagnosis: METASTIC BONE LESION OF LEFT HIP    Post-Op Diagnosis: Same       Procedure(s):  OPEN REDUCTION INTERNAL FIXATION LEFT HIP WITH INTRAMEDULLARY JAMISON; LEFT FEMORAL HEAD BONE BIOPSY INTRAOPERATIVELY    Surgeon(s):  Telma Guo MD    Assistant:   Surgical Assistant: Feliz Berger    Anesthesia: General    Estimated Blood Loss (mL): Minimal    Complications: None    Specimens:   ID Type Source Tests Collected by Time Destination   A : a) left femur metastatic lesion #1 Bone Hip SURGICAL PATHOLOGY Telma Guo MD 3/19/2022 1424    B : b) left femur metastatic lesion #2 Bone Hip SURGICAL PATHOLOGY Telma Guo MD 3/19/2022 1424        Implants:  Implant Name Type Inv. Item Serial No.  Lot No. LRB No. Used Action   NAIL IM L400MM [id number] 130DEG LNG L PROX FEM GRN TI INOCENCIO - [id number]  NAIL IM L400MM [id number] 130DEG LNG L PROX FEM GRN TI INOCENCIO  DEPU[employer] [id number] Left 1 Implanted   SCREW BNE L90MM DIA10.35MM PROX FEM G TI INOCENCIO FOR TFN ADV - [id number]  SCREW BNE L90MM DIA10.35MM PROX FEM G TI INOCENCIO FOR TFN ADV  DEPU[employer] [id number] Left 1 Implanted   SCREW BNE L48MM DIA5MM NONSTERILE TIB LT GRN TI ST INOCENCIO CHASE - [id number]  SCREW BNE L48MM DIA5MM NONSTERILE TIB LT GRN TI ST INOCENCIO CHASE  DEPUY [employer] [id number] Left 1 Implanted         Drains:   Urethral Catheter Non-latex 16 fr (Active)   $ Urethral catheter insertion Inserted for procedure 03/17/22 2054   Catheter Indications Perioperative use for selected surgical procedures 03/19/22 0850   Securement Device Date Changed 03/17/22 03/19/22 0850   Site Assessment No urethral drainage 03/19/22 0850   Urine Color Yellow 03/19/22 0850   Urine Appearance Clear 03/19/22 0850   Output (mL) 450 mL 03/19/22 0449       Findings: per dictation    Detailed Description of Procedure:    The patient is a 51-year-old female with a history of breast cancer diagnosed back in 2007 who presented to the hospital with left hip pain. X-rays of the left hip demonstrated a lytic mass within the femoral neck region suggestive of metastatic disease. Further imaging of the abdomen pelvis did demonstrate additional metastatic lesions throughout the pelvis and lumbar spine. Given the patient has a large lytic lesion within the femoral neck and pain I would recommend prophylactic intramedullary nailing to provide protection of the femoral neck, head, and remainder of the femur against possible pathologic fracture. I would also recommend sending the reamings shavings of the neck region to pathology for further analysis in order to hopefully determine the exact primary to the metastatic disease. After discussion of risks and benefits, the patient agreed to proceed with surgery.     The patient was seen and evaluated in the preoperative area.  Informed consent was obtained.  The operative site was marked.  She was taken back to the operative room. Maggie Gordon was performed. She was transferred to the [facility] table.  The Leftt lower extremity was placed in the traction boot and was but well padded.  The well leg was placed in the hemilithotomy position and was well padded.  SCDs were placed in the bilateral lower extremities.       Fluoroscopic x-rays were performed in AP lateral view demonstrating the lytic lesion of the neck and no fracture.   At that point the operative site was prepped and draped.  A formal timeout was performed was correct patient surgical site and procedure was confirmed. Initial incision was made 3 to 5 cm proximal to the tip the greater trochanter.    We placed a guidewire in the desired position within the greater trochanter based on fluoroscopic evaluation.  It was placed within the metaphyseal region.  This was followed by the entry reamer.  We then called for implant.  This was the [employer]  degree angle short nail which was 400 mm x 10 mm.  This was successfully placed.     We elected to proceed with a lag screw measuring 10 mm x 90 mm.  Drilling was performed.  Following the drilling we did obtain the bone shavings and sent this to pathology for analysis. We also sent the bone shavings from the initial entry reamer as well. The screw was successfully placed.  Proximal set screw was locked.  We will place her guide handle for our distal interlock screw.  Drilling was performed.  We then focused to the distal interlock screw via perfect [place] technique.   Distal interlock screws measured 5 x 48 mm.  Fluoroscopic views of the knee as well as the hip and the shaft demonstrated appropriate placement of the hardware.  Tip apex distance was less than 25 mm.  Following this the wounds were thoroughly irrigated with sterile saline solution.  Fascia was closed using #1 Vicryl.  Subtenons layer was closed using 2-0 Vicryl in buried fashion.  Skin was closed using staples.  Dressing was applied in the form of Xeroform, 4 x 4's, ABD, and foam tape.  She was successfully extubated taken recovery room in excellent condition.  In the procedure all counts were correct and I was present for the entire case.  Postoperatively the patient will be weight-bear as tolerated.      Electronically signed by Melodie Haskins MD on 3/19/2022 at 3:15 PM

## 2022-03-19 NOTE — CONSULTS
GASTROENTEROLOGY INPATIENT CONSULTATION:        IDENTIFYING DATA/REASON FOR CONSULTATION   PATIENT:  Zuleima Vogel  MRN:  [id number]  ADMIT DATE: 3/16/2022  TIME OF EVALUATION: 3/19/2022 11:20 AM  HOSPITAL STAY:   LOS: 3 days     REASON FOR CONSULTATION: Acute liver injury      HISTORY OF PRESENT ILLNESS   Zuleima Vogel is a 64 y.o. female breast cancer in 2007 which is in remission who presented with left hip pain since November 2021. She is being seen by multiple other specialties, and has been found to have a left hip lytic lesion which is possibly metastatic. There is an unknown primary. CT of the abdomen pelvis was also done showing multiple lytic and sclerotic bone lesions. She is currently in the OR, so I am not able to complete a full history and physical at this time. GI is consulted for elevated liver chemistries. On CT of the abdomen, liver is normal in size. There is no splenomegaly or hepatomegaly. There is no suggestion of portal hypertension.     PAST MEDICAL, SURGICAL, FAMILY, and SOCIAL HISTORY     Past Medical History:   Diagnosis Date    Cancer ([address]     Depression     Hyperthyroidism     Nephrolithiasis      Past Surgical History:   Procedure Laterality Date    CHOLECYSTECTOMY      HYSTERECTOMY      MASTECTOMY       Family History   Problem Relation Age of Onset    Mental Illness Mother     High Blood Pressure Mother     High Blood Pressure Father     Diabetes Father      Social History     Socioeconomic History    Marital status: Single     Spouse name: None    Number of children: None    Years of education: None    Highest education level: None   Occupational History    None   Tobacco Use    Smoking status: Former Smoker    Smokeless tobacco: Never Used   Substance and Sexual Activity    Alcohol use: Yes     Comment: rarely    Drug use: No    Sexual activity: None   Other Topics Concern    None   Social History Narrative    None     Social Determinants of Health     Financial Resource Strain:     Difficulty of Paying Living Expenses: Not on file   Food Insecurity:     Worried About Running Out of Food in the Last Year: Not on file    [place] of Food in the Last Year: Not on file   Transportation Needs:     Lack of Transportation (Medical): Not on file    Lack of Transportation (Non-Medical):  Not on file   Physical Activity:     Days of Exercise per Week: Not on file    Minutes of Exercise per Session: Not on file   Stress:     Feeling of Stress : Not on file   Social Connections:     Frequency of Communication with Friends and Family: Not on file    Frequency of Social Gatherings with Friends and Family: Not on file    Attends [employer] Services: Not on file    Active Member of [address] [place] [address] [employer] or Organizations: Not on file    Attends Club or Organization Meetings: Not on file    Marital Status: Not on file   Intimate Partner Violence:     Fear of Current or Ex-Partner: Not on file    Emotionally Abused: Not on file    Physically Abused: Not on file    Sexually Abused: Not on file   Housing Stability:     Unable to Pay for Housing in the Last Year: Not on file    Number of [place] in the Last Year: Not on file    Unstable Housing in the Last Year: Not on file       MEDICATIONS   SCHEDULED:  enoxaparin, 40 mg, Daily  sodium chloride flush, 5-40 mL, 2 times per day  insulin lispro, 0-6 Units, TID WC  insulin lispro, 0-3 Units, Nightly      FLUIDS/DRIPS:     sodium chloride 125 mL/hr at 03/19/22 0854    sodium chloride      dextrose       PRNs: prochlorperazine, 5 mg, Q4H PRN  sodium chloride flush, 5-40 mL, PRN  sodium chloride, 25 mL, PRN  glucose, 15 g, PRN  dextrose, 12.5 g, PRN  glucagon (rDNA), 1 mg, PRN  dextrose, 100 mL/hr, PRN  ondansetron, 4 mg, Q6H PRN  HYDROmorphone, 0.25 mg, Q3H PRN   Or  HYDROmorphone, 0.5 mg, Q3H PRN      ALLERGIES:    Allergies   Allergen Reactions    Cephalexin          REVIEW OF SYSTEMS   Unable to complete as the patient is not in her room. PHYSICAL EXAM     Wt Readings from Last 3 Encounters:   03/19/22 233 lb 4 oz (105.8 kg)   09/02/16 233 lb (105.7 kg)   09/23/14 239 lb (108.4 kg)     Temp Readings from Last 3 Encounters:   03/19/22 97.3 °F (36.3 °C) (Temporal)   02/08/19 97.9 °F (36.6 °C) (Oral)     BP Readings from Last 3 Encounters:   03/19/22 137/75   02/08/19 130/75   09/02/16 117/65     Pulse Readings from Last 3 Encounters:   03/19/22 76   02/08/19 77   09/02/16 63      I/O last 3 completed shifts: In: 0 [P.O.:480; I.V.:375]  Out: 1800 [Urine:1800]    Physical Exam:  Unable to complete physical exam as the patient is currently in the OR. That being said, all relevant records and diagnostic tests were reviewed, including laboratory results and imaging. Please reference any relevant documentation elsewhere. Care will be coordinated with the primary service.      LABS AND IMAGING     Recent Results (from the past 24 hour(s))   POCT Glucose    Collection Time: 03/18/22  5:19 PM   Result Value Ref Range    POC Glucose 92 70 - 99 mg/dl    Performed on ACCU-CHEK    POCT Glucose    Collection Time: 03/18/22  8:16 PM   Result Value Ref Range    POC Glucose 117 (H) 70 - 99 mg/dl    Performed on ACCU-CHEK    CBC with Auto Differential    Collection Time: 03/19/22  6:05 AM   Result Value Ref Range    WBC 2.9 (L) 4.0 - 11.0 K/uL    RBC 3.86 (L) 4.00 - 5.20 M/uL    Hemoglobin 11.3 (L) 12.0 - 16.0 g/dL    Hematocrit 33.1 (L) 36.0 - 48.0 %    MCV 85.7 80.0 - 100.0 fL    MCH 29.3 26.0 - 34.0 pg    MCHC 34.2 31.0 - 36.0 g/dL    RDW 15.0 12.4 - 15.4 %    Platelets 273 236 - 445 K/uL    MPV 6.8 5.0 - 10.5 fL    Neutrophils % 69.1 %    Lymphocytes % 23.3 %    Monocytes % 4.9 %    Eosinophils % 1.6 %    Basophils % 1.1 %    Neutrophils Absolute 2.0 1.7 - 7.7 K/uL    Lymphocytes Absolute 0.7 (L) 1.0 - 5.1 K/uL    Monocytes Absolute 0.1 0.0 - 1.3 K/uL    Eosinophils Absolute 0.0 0.0 - 0.6 K/uL    Basophils Absolute 0.0 0.0 - 0.2 K/uL   Comprehensive Metabolic Panel    Collection Time: 03/19/22  6:05 AM   Result Value Ref Range    Sodium 134 (L) 136 - 145 mmol/L    Potassium 3.1 (L) 3.5 - 5.1 mmol/L    Chloride 96 (L) 99 - 110 mmol/L    CO2 24 21 - 32 mmol/L    Anion Gap 14 3 - 16    Glucose 94 70 - 99 mg/dL    BUN 9 7 - 20 mg/dL    CREATININE 0.6 0.6 - 1.2 mg/dL    GFR Non-African American >60 >60    GFR African American >60 >60    Calcium 8.1 (L) 8.3 - 10.6 mg/dL    Total Protein 6.2 (L) 6.4 - 8.2 g/dL    Albumin 3.3 (L) 3.4 - 5.0 g/dL    Albumin/Globulin Ratio 1.1 1.1 - 2.2    Total Bilirubin 1.2 (H) 0.0 - 1.0 mg/dL    Alkaline Phosphatase 206 (H) 40 - 129 U/L     (H) 10 - 40 U/L     (H) 15 - 37 U/L   Magnesium    Collection Time: 03/19/22  6:05 AM   Result Value Ref Range    Magnesium 1.80 1.80 - 2.40 mg/dL   POCT Glucose    Collection Time: 03/19/22  6:06 AM   Result Value Ref Range    POC Glucose 108 (H) 70 - 99 mg/dl    Performed on ACCU-CHEK      Other Labs      Imaging  CT CHEST WO CONTRAST   Final Result   1. Moderate subcutaneous inflammatory stranding within the right axilla   likely due to edema. 2. Right axillary adenopathy either due to sandeep metastasis or reactive   disease. 3. Diffuse osseous metastatic disease likely causing multiple   age-indeterminate pathologic fractures. If there is point tenderness   throughout the thoracolumbar spine, recommend nonemergent MRI of the   thoracolumbar spine with without contrast.         CT ABDOMEN PELVIS W IV CONTRAST Additional Contrast? None   Final Result   Lytic and sclerotic lesions are seen diffusely through the visualized osseous   structures in the pelvis and lumbar spine consistent with osseous metastatic   disease. No CT evidence of renal mass. CT THORACIC SPINE WO CONTRAST   Final Result   1. Diffuse bone metastasis. 2. Less than 50% compression fracture of L1 of uncertain chronicity. Subacute rib fractures.    3. Partially visualized questionable mass in the left kidney. CT abdomen   pelvis with contrast recommended. 4. Nonobstructive renal calculi. 5. Other findings as described. CT HIP LEFT WO CONTRAST   Final Result   1. Osseous metastatic disease throughout the imaged left hemipelvis, left   sacrum, and proximal left femur with large destructive lesions centered   within the left femoral head and neck and involving the partially imaged left   sacrum. 2. No definite pathologic fracture identified on the current exam, however,   the location and severe destructive changes of the left femoral metastatic   lesion do place the patient at a significant risk for pathologic fracture at   this site. Recommend orthopedic consultation. CT LUMBAR SPINE WO CONTRAST   Final Result   1. Diffuse bone metastasis. 2. Less than 50% compression fracture of L1 of uncertain chronicity. Subacute rib fractures. 3. Partially visualized questionable mass in the left kidney. CT abdomen   pelvis with contrast recommended. 4. Nonobstructive renal calculi. 5. Other findings as described. XR KNEE LEFT (1-2 VIEWS)   Final Result   Minimal degenerative changes of the left knee with no evidence of acute   osseous abnormality. XR HIP 2-3 VW W PELVIS LEFT   Final Result   Mild compression deformity of the T12 vertebral body, age indeterminate. No   acute lumbar spine fracture. No comparison imaging available. No acute osseous abnormality of the pelvis or left hip. Indeterminate 2 cm   lucent lesion within the left femoral neck. If the patient has a history of   malignancy, finding is concerning for possible metastatic focus. Consider   follow-up MRI or bone scan as clinically indicated. XR LUMBAR SPINE (2-3 VIEWS)   Final Result   Mild compression deformity of the T12 vertebral body, age indeterminate. No   acute lumbar spine fracture. No comparison imaging available.       No acute osseous abnormality of the pelvis or left hip. Indeterminate 2 cm   lucent lesion within the left femoral neck. If the patient has a history of   malignancy, finding is concerning for possible metastatic focus. Consider   follow-up MRI or bone scan as clinically indicated. ASSESSMENT AND RECOMMENDATIONS   Denys Francois is a 64 y.o. female breast cancer in 2007 which is in remission who presented with left hip pain since November 2021. She is being seen by multiple other specialties, and has been found to have a left hip lytic lesion which is possibly metastatic. There is an unknown primary. CT of the abdomen pelvis was also done showing multiple lytic and sclerotic bone lesions. She is currently in the OR, so I am not able to complete a full history and physical at this time. GI is consulted for elevated liver chemistries. IMPRESSION:    1. Acute liver injury. Primarily hepatocellular pattern. Admission , , alk phos 230, total bilirubin 1.2. Liver chemistries have stayed about stable. On CT of the abdomen, liver is normal in size. There is no splenomegaly or hepatomegaly. There is no suggestion of portal hypertension. There is no evidence of portal vein, hepatic vein, hepatic artery thrombosis. Immunoglobulins have been checked and within normal limits. This could be any number of things at this time and differential is very broad. She is not currently on any culprit medications. Prior to admission medication list notes duloxetine, which can cause transaminase elevation up to 3 times the upper limit of normal and about 1% of patients, Metformin which can rarely cause mild elevation in liver chemistries, and atorvastatin which may cause an idiosyncratic liver injury but typically should not result in liver chemistry elevation to this degree. Cyclobenzaprine and gabapentin are less likely causes of liver injury.   Would note that inhaled anesthetic agents as she may receive today can be a cause of clinically apparent liver injury. 2. Metastatic malignancy, unknown primary  3. History of breast cancer 2007    RECOMMENDATIONS:    -We will check serologic work-up for liver disease, including viral hepatitis serologies, autoimmune serologies, iron studies and ferritin.   -Continue to monitor liver chemistries, avoiding hepatotoxic agents.  -Check INR  -Monitor mental status    If you have any questions or need any further information, please feel free to contact our consult team.  Thank you for allowing us to participate in the care of Juliane Tsang.     Jason Clark MD  [id number] [facility]

## 2022-03-19 NOTE — PROGRESS NOTES
Pt resting in bed at beginning of shift. She c/o ongoing pain in L hip, but denied having any nausea, numbness, or tingling. Pulses palpable, skin warm to touch. She has weak flexion in LLE and moderate flexion in RLE. Pt is SR on telemetry. Tavares in place draining clear yellow urine. She has been NPO in preparation for surgery this morning. Fall precautions in place, belongings within reach. Will continue to monitor and assess.

## 2022-03-19 NOTE — PLAN OF CARE
Problem: Pain:  Goal: Pain level will decrease  Outcome: Ongoing  Goal: Control of acute pain  Outcome: Ongoing  Goal: Control of chronic pain  Outcome: Ongoing     Problem: Skin Integrity:  Goal: Will show no infection signs and symptoms  Outcome: Ongoing  Goal: Absence of new skin breakdown  Outcome: Ongoing     Problem: Falls - Risk of:  Goal: Will remain free from falls  Outcome: Ongoing  Goal: Absence of physical injury  Outcome: Ongoing     Problem: Nutrition  Goal: Optimal nutrition therapy  Outcome: Ongoing  Patient will maintain adequate nutrition . Patient will be encouraged to consume adequate calorie intake . Nursing will continue to monitor .

## 2022-03-19 NOTE — PROGRESS NOTES
Patient resting and sleeping at intervals. Siderails up x 3. Call light in reach . Fall precautions in place.  Electronically signed by Vero Amador RN on 3/19/2022 at 7:53 AM

## 2022-03-19 NOTE — PLAN OF CARE
Problem: Pain:  Goal: Pain level will decrease  Description: Pain level will decrease  3/19/2022 1126 by Eileen Treadwell RN  Outcome: Ongoing  Note: Pt c/o L hip pain. PRN pain medication administered with good results. Will monitor and medicate as needed. 3/19/2022 0749 by Alonso Arce RN  Outcome: Ongoing  Goal: Control of acute pain  Description: Control of acute pain  3/19/2022 1126 by Eileen Treadwell RN  Outcome: Ongoing  Note: Pt c/o L hip pain. PRN pain medication administered with good results. Will monitor and medicate as needed. 3/19/2022 0749 by Alonso Arce RN  Outcome: Ongoing  Goal: Control of chronic pain  Description: Control of chronic pain  3/19/2022 1126 by Eileen Treadwell RN  Outcome: Ongoing  Note: Pt has had no c/o chronic pain issues. 3/19/2022 0749 by Alonso Arce RN  Outcome: Ongoing     Problem: Skin Integrity:  Goal: Will show no infection signs and symptoms  Description: Will show no infection signs and symptoms  3/19/2022 1126 by Eileen Treadwell RN  Outcome: Ongoing  Note: Pt has been afebrile with no s/s of infection noted. Will monitor for changes. 3/19/2022 0749 by Alonso Arce RN  Outcome: Ongoing  Goal: Absence of new skin breakdown  Description: Absence of new skin breakdown  3/19/2022 1126 by Eileen Treadwell RN  Outcome: Ongoing  Note: No new areas of skin breakdown noted. Will monitor for changes. 3/19/2022 0749 by Alonso Arce RN  Outcome: Ongoing     Problem: Falls - Risk of:  Goal: Will remain free from falls  Description: Will remain free from falls  3/19/2022 1126 by Eileen Treadwell RN  Outcome: Ongoing  Note: Pt free from injury or falls at this time, fall precautions in place, bed in low position, side rail up x2, [place] Fall Risk: High (45 and higher), bed alarm on, reoriented to room and call light, reminded not to get up without assistance, call light in reach, will continue to monitor. Pt verbalizes understanding of fall risk procedures.     3/19/2022 0749 by Elham Coates Radha Sanches RN  Outcome: Ongoing  Goal: Absence of physical injury  Description: Absence of physical injury  3/19/2022 1126 by Alyssa Tenorio RN  Outcome: Ongoing  Note: Pt has not incurred any type of physical injury this shift. 3/19/2022 0749 by Selina Ashford RN  Outcome: Ongoing     Problem: Nutrition  Goal: Optimal nutrition therapy  3/19/2022 1126 by Alyssa Tenorio RN  Outcome: Ongoing  Note: Pt has been NPO in preparation for surgery.   3/19/2022 0749 by Selina Ashford RN  Outcome: Ongoing

## 2022-03-19 NOTE — PROGRESS NOTES
Hospitalist Progress Note      PCP: Sara Callejas MD    Date of Admission: 3/16/2022        Subjective:     Having hip surgery today. Pain is controlled at the moment. No fever chills      Medications:  Reviewed    Infusion Medications    sodium chloride 125 mL/hr at 03/19/22 0854    sodium chloride      dextrose       Scheduled Medications    enoxaparin  40 mg SubCUTAneous Daily    sodium chloride flush  5-40 mL IntraVENous 2 times per day    insulin lispro  0-6 Units SubCUTAneous TID WC    insulin lispro  0-3 Units SubCUTAneous Nightly     PRN Meds: prochlorperazine, sodium chloride flush, sodium chloride, glucose, dextrose, glucagon (rDNA), dextrose, ondansetron, HYDROmorphone **OR** HYDROmorphone      Intake/Output Summary (Last 24 hours) at 3/19/2022 1022  Last data filed at 3/19/2022 0606  Gross per 24 hour   Intake 735 ml   Output 1250 ml   Net -515 ml       Physical Exam Performed:    BP (!) 144/78   Pulse 81   Temp 98.7 °F (37.1 °C) (Oral)   Resp 17   Ht 5' 5\" (1.651 m)   Wt 233 lb 4 oz (105.8 kg)   SpO2 94%   BMI 38.81 kg/m²     General appearance: No apparent distress, appears stated age and cooperative. HEENT: Pupils equal, round, and reactive to light. Conjunctivae/corneas clear. Neck: Supple, with full range of motion. No jugular venous distention. Trachea midline. Respiratory:  Normal respiratory effort. Clear to auscultation, bilaterally without Rales/Wheezes/Rhonchi. Cardiovascular: Regular rate and rhythm with normal S1/S2 without murmurs, rubs or gallops. Abdomen: Soft, non-tender, non-distended with normal bowel sounds. Musculoskeletal: No clubbing, cyanosis or edema bilaterally. Full range of motion without deformity. Skin: Skin color, texture, turgor normal.  No rashes or lesions. Neurologic:  Neurovascularly intact without any focal sensory/motor deficits.  Cranial nerves: II-XII intact, grossly non-focal.  Psychiatric: Alert and oriented, thought content appropriate, normal insight  Capillary Refill: Brisk,3 seconds, normal   Peripheral Pulses: +2 palpable, equal bilaterally       Labs:   Recent Labs     03/17/22  0630 03/18/22  0650 03/19/22  0605   WBC 3.2* 3.1* 2.9*   HGB 11.1* 11.5* 11.3*   HCT 32.0* 33.9* 33.1*    189 147     Recent Labs     03/17/22  0630 03/18/22  0650 03/19/22  0605    140 134*   K 3.4* 3.4* 3.1*    100 96*   CO2 24 24 24   BUN 11 8 9   CREATININE 0.8 0.8 0.6   CALCIUM 10.9* 9.7 8.1*     Recent Labs     03/17/22 0630 03/18/22  0650 03/19/22  0605   * 139* 144*   * 283* 302*   BILITOT 1.2* 1.1* 1.2*   ALKPHOS 199* 225* 206*     No results for input(s): INR in the last 72 hours. No results for input(s): Joyice [place] in the last 72 hours. Urinalysis:      Lab Results   Component Value Date    NITRU Negative 03/17/2022    BLOODU Negative 03/17/2022    SPECGRAV 1.016 03/17/2022    GLUCOSEU Negative 03/17/2022       Radiology:  CT CHEST WO CONTRAST   Final Result   1. Moderate subcutaneous inflammatory stranding within the right axilla   likely due to edema. 2. Right axillary adenopathy either due to sandeep metastasis or reactive   disease. 3. Diffuse osseous metastatic disease likely causing multiple   age-indeterminate pathologic fractures. If there is point tenderness   throughout the thoracolumbar spine, recommend nonemergent MRI of the   thoracolumbar spine with without contrast.         CT ABDOMEN PELVIS W IV CONTRAST Additional Contrast? None   Final Result   Lytic and sclerotic lesions are seen diffusely through the visualized osseous   structures in the pelvis and lumbar spine consistent with osseous metastatic   disease. No CT evidence of renal mass. CT THORACIC SPINE WO CONTRAST   Final Result   1. Diffuse bone metastasis. 2. Less than 50% compression fracture of L1 of uncertain chronicity. Subacute rib fractures.    3. Partially visualized questionable mass in the left kidney. CT abdomen   pelvis with contrast recommended. 4. Nonobstructive renal calculi. 5. Other findings as described. CT HIP LEFT WO CONTRAST   Final Result   1. Osseous metastatic disease throughout the imaged left hemipelvis, left   sacrum, and proximal left femur with large destructive lesions centered   within the left femoral head and neck and involving the partially imaged left   sacrum. 2. No definite pathologic fracture identified on the current exam, however,   the location and severe destructive changes of the left femoral metastatic   lesion do place the patient at a significant risk for pathologic fracture at   this site. Recommend orthopedic consultation. CT LUMBAR SPINE WO CONTRAST   Final Result   1. Diffuse bone metastasis. 2. Less than 50% compression fracture of L1 of uncertain chronicity. Subacute rib fractures. 3. Partially visualized questionable mass in the left kidney. CT abdomen   pelvis with contrast recommended. 4. Nonobstructive renal calculi. 5. Other findings as described. XR KNEE LEFT (1-2 VIEWS)   Final Result   Minimal degenerative changes of the left knee with no evidence of acute   osseous abnormality. XR HIP 2-3 VW W PELVIS LEFT   Final Result   Mild compression deformity of the T12 vertebral body, age indeterminate. No   acute lumbar spine fracture. No comparison imaging available. No acute osseous abnormality of the pelvis or left hip. Indeterminate 2 cm   lucent lesion within the left femoral neck. If the patient has a history of   malignancy, finding is concerning for possible metastatic focus. Consider   follow-up MRI or bone scan as clinically indicated. XR LUMBAR SPINE (2-3 VIEWS)   Final Result   Mild compression deformity of the T12 vertebral body, age indeterminate. No   acute lumbar spine fracture. No comparison imaging available. No acute osseous abnormality of the pelvis or left hip. Indeterminate 2 cm   lucent lesion within the left femoral neck. If the patient has a history of   malignancy, finding is concerning for possible metastatic focus. Consider   follow-up MRI or bone scan as clinically indicated. Assessment/Plan:    -Left hip pain due to lytic lesion with femoral neck, likely metastatic--scheduled for IM nail today to prevent a potential pathological fracture. . Continue current analgesics    -Diffuse osseous metastatic disease, age-indeterminate pathological fractures involving T8-L1, right axillary adenopathy--- metastatic disease of unknown primary--will have a bone biopsy at surgery today--oncology planning on radiation next week    -Hypercalcemia-due to malignancy--resolved with Zometa and IV fluids    -Transaminitis--liver appears normal on imaging--GI consulted    -History of right breast cancer status post mastectomy and chemotherapy in 2014 and had been in remission for several years    -DM type II--diet controlled-continue sliding scale insulin    -Hypokalemia-replete potassium    -Anemia and leukopenia likely due to malignancy--continue to monitor    DVT Prophylaxis: Lovenox  Diet: ADULT DIET;  Regular; 4 carb choices (60 gm/meal)  Code Status: Full Code        Soraya Weber MD

## 2022-03-19 NOTE — PROGRESS NOTES
[facility] Orthopedic Surgery  Consult Note    This patient is seen in consultation at the request of Dr Carla Cameron    Reason for Consult:  Left hip pain    CHIEF COMPLAINT:  Left hip pain    History Obtained From:  patient, electronic medical record    HISTORY OF PRESENT ILLNESS:    The patient is a 64 y.o. female who presents with left hip pain since Nov 2021. She denies fall or injury. She states she is in remission from breast cancer in 2007 and is doing well. In Jan 2022 her left hip pain worsened and she bought a walker, but not moving in her home unless she had to I.e.kitchen or bathroom. Pain is described in left hip and groin and radiates to her thigh at times and with the intensity of moderate to severe. Pain is described as aching, shooting. Discomfort is intermittent. She says for now she prefers to stay in bed due to pain. She is alert and oriented. No other complaints,     REVIEW OF SYSTEMS:    CONSTITUTIONAL:  negative for  fevers, chills and malaise  MUSCULOSKELETAL:  positive for  myalgias, arthralgias and pain  All other ROS reviewed in chart or with patient or family and are grossly negative. PHYSICAL EXAM:    VITALS:  /75   Pulse 76   Temp 97.3 °F (36.3 °C) (Temporal)   Resp 18   Ht 5' 5\" (1.651 m)   Wt 233 lb 4 oz (105.8 kg)   SpO2 96%   BMI 38.81 kg/m²     MUSCULOSKELETAL:  left foot NVI. Wiggles toes to command. Able to plantarflex and dorsiflex ankle Pedal pulses are palpable. No leg deformity is noted. Tender left groin. NOntender left knee or ankle.    NEUROLOGIC:   Sensory:    Touch:                                       Left Lower Extremity:  normal  Skin warm and dry  Resp deep and easy  Abdomen soft and obese  Pulse is with regular rate and rhythm    DATA:    CBC:   Lab Results   Component Value Date    WBC 2.9 03/19/2022    RBC 3.86 03/19/2022    HGB 11.3 03/19/2022    HCT 33.1 03/19/2022    MCV 85.7 03/19/2022    MCH 29.3 03/19/2022    MCHC 34.2 03/19/2022    RDW 15.0 03/19/2022     03/19/2022    MPV 6.8 03/19/2022     WBC:    Lab Results   Component Value Date    WBC 2.9 03/19/2022     PT/INR:  No results found for: PROTIME, INR  PTT:  No results found for: APTT[APTT  Radiology Review:    Narrative   EXAMINATION:   ONE XRAY VIEW OF THE PELVIS AND TWO XRAY VIEWS LEFT HIP; THREE XRAY VIEWS OF   THE LUMBAR SPINE       3/16/2022 3:09 pm       COMPARISON:   None       HISTORY:   ORDERING SYSTEM PROVIDED HISTORY: pain   TECHNOLOGIST PROVIDED HISTORY:   Reason for exam:->pain   Reason for Exam: pt fell back in nov and pain has gotten worse over time,   unable to put weight on left leg, lower back pain       FINDINGS:   Lumbar spine, three views:       There is mild wedge-shaped deformity of the T12 vertebral body.  There is no   other evidence of acute spinal fracture.  Vertebral alignment is maintained. Mild multilevel osteoarthritic spurring with lower lumbar facet arthropathy. Mild osteopenia.  Post cholecystectomy clips.       AP pelvis and left hip:       No acute osseous abnormality of the pelvis or left hip.  Mild osteopenia. Poorly marginated lucency in the left femoral neck measuring around 2 cm.    The SI joints are maintained.  Mild symmetric osteoarthritic changes of the   hips.  Multiple calcified pelvic phleboliths.           Impression   Mild compression deformity of the T12 vertebral body, age indeterminate.  No   acute lumbar spine fracture.  No comparison imaging available.       No acute osseous abnormality of the pelvis or left hip.  Indeterminate 2 cm   lucent lesion within the left femoral neck.  If the patient has a history of   malignancy, finding is concerning for possible metastatic focus.  Consider   follow-up MRI or bone scan as clinically indicated.             Narrative   EXAMINATION:   CT OF THE LEFT HIP WITHOUT CONTRAST 3/16/2022 4:21 pm       TECHNIQUE:   CT of the left hip was performed without the administration of intravenous   contrast.  Multiplanar reformatted images are provided for review. Dose   modulation, iterative reconstruction, and/or weight based adjustment of the   mA/kV was utilized to reduce the radiation dose to as low as reasonably   achievable.       COMPARISON:   None.       HISTORY   ORDERING SYSTEM PROVIDED HISTORY: abnormal finding on XR imaging, concerning   for bone mets, eval for pathologic fracture   TECHNOLOGIST PROVIDED HISTORY:   Reason for exam:->abnormal finding on XR imaging, concerning for bone mets,   eval for pathologic fracture   Decision Support Exception - unselect if not a suspected or confirmed   emergency medical condition->Emergency Medical Condition (MA)   Reason for Exam: Fall back in November, c/o mostly of left hip pain       FINDINGS:   Bones: Destructive osseous lesion centered at the left metatarsal head and   neck consistent with metastatic lesion.  Partially imaged destructive lesion   of the sacrum also present.  There are several additional lytic and sclerotic   foci of the left hemipelvis most compatible with metastatic disease.  No   definite fracture line identified at the left hip, however the location and   destructive appearance does place the patient at significant risk for   pathologic fracture at this site.       Soft Tissue: Visualized intrapelvic structures demonstrate colonic   diverticulosis.  Imaged subcutaneous tissues appear grossly unremarkable.       Joint: Anatomic alignment of the left hip with mild degenerative change of   the left hip.           Impression   1. Osseous metastatic disease throughout the imaged left hemipelvis, left   sacrum, and proximal left femur with large destructive lesions centered   within the left femoral head and neck and involving the partially imaged left   sacrum.    2. No definite pathologic fracture identified on the current exam, however,   the location and severe destructive changes of the left femoral metastatic   lesion do place the patient at a significant risk for pathologic fracture at   this site. [facility] orthopedic consultation.               IMPRESSION/RECOMMENDATIONS:    Hx breast cancer 2007, in remission  Left hip pain since NOv 2021  Unable to walk due to pain since Jan 2022  Left hip lytic lesion, likely metastatic      I had a long discussion the patient. I discussed her the nature of the condition. She does have suspected metastatic disease based on her recent imaging of her hip as well as her abdomen pelvis. She does have a large lytic lesion of the left femoral neck and does have pain with ambulation. As result she is at risk for pathologic fracture. Therefore would recommend surgical intervention in the form of prophylactic intramedullary nailing in order to protect the femoral head, neck, and remainder of the femur from potential pathologic fracture. We would also obtain bone shavings at the time of surgery to be sent for pathology. The risks were defined as well limited to infection, bleeding, damage to blood vessels or nerves, need for additional surgery, medical complications. She does understand elects proceed.     Efe Yao MD

## 2022-03-19 NOTE — ANESTHESIA PRE PROCEDURE
[facility] Department of Anesthesiology  Pre-Anesthesia Evaluation/Consultation       Name:  Hawa Santillan  : 1960  Age:  64 y.o. MRN:  [id number]  Date: 3/19/2022           Surgeon: Surgeon(s):  Jaron Casiano MD    Procedure: Procedure(s):  LEFT HIP INTRAMEDULLARY NAILING     Allergies   Allergen Reactions    Cephalexin      Patient Active Problem List   Diagnosis    Chronic abdominal pain    Personal history of malignant neoplasm of breast    Asthma    Migraine headache    Personal history of breast cancer    Encounter for follow-up surveillance of breast cancer    History of mastectomy    Left hip pain    Pathological fracture of left hip, initial encounter ([facility] [address]    Malignancy ([facility] [address]    Hypercalcemia     Past Medical History:   Diagnosis Date    [facility]     Depression     Hyperthyroidism     Nephrolithiasis      Past Surgical History:   Procedure Laterality Date    CHOLECYSTECTOMY      HYSTERECTOMY      MASTECTOMY       Social History     Tobacco Use    Smoking status: Former Smoker    Smokeless tobacco: Never Used   Substance Use Topics    Alcohol use: Yes     Comment: rarely    Drug use: No     Medications  No current facility-administered medications on file prior to encounter. Current Outpatient Medications on File Prior to Encounter   Medication Sig Dispense Refill    cyclobenzaprine (FLEXERIL) 5 MG tablet Take 5-10 mg by mouth 3 times daily as needed for Muscle spasms       Breast Prosthesis MISC RIGHT MASTECTOMY BRA  RIGHT BREAST PROTHESIS 4 each 0    albuterol (PROVENTIL HFA;VENTOLIN HFA) 108 (90 BASE) MCG/ACT inhaler Inhale 2 puffs into the lungs every 4 hours as needed.        Current Facility-Administered Medications   Medication Dose Route Frequency Provider Last Rate Last Admin    0.9 % sodium chloride infusion   IntraVENous Continuous Lydia Angel  mL/hr at 22 0854 New Bag at 22 0852    enoxaparin (LOVENOX) injection 40 mg  40 mg SubCUTAneous Daily Inga MotaMAHSA salazarN - CNP   40 mg at 22 1154    prochlorperazine (COMPAZINE) injection 5 mg  5 mg IntraVENous Q4H PRN Jun Remy MD   5 mg at 22 1537    sodium chloride flush 0.9 % injection 5-40 mL  5-40 mL IntraVENous 2 times per day [facility], DO   10 mL at 22 0848    sodium chloride flush 0.9 % injection 5-40 mL  5-40 mL IntraVENous PRN [facility], DO   10 mL at 22 2130    0.9 % sodium chloride infusion  25 mL IntraVENous PRN [facility], DO        insulin lispro (HUMALOG) injection vial 0-6 Units  0-6 Units SubCUTAneous TID WC [facility], DO        insulin lispro (HUMALOG) injection vial 0-3 Units  0-3 Units SubCUTAneous Nightly [facility], DO        glucose (GLUTOSE) 40 % oral gel 15 g  15 g Oral PRN [facility], DO        dextrose 50 % IV solution  12.5 g IntraVENous PRN [facility], DO        glucagon (rDNA) injection 1 mg  1 mg IntraMUSCular PRN [facility], DO        dextrose 5 % solution  100 mL/hr IntraVENous PRN [facility], DO        ondansetron [facility]) injection 4 mg  4 mg IntraVENous Q6H PRN [facility], DO   4 mg at 22 0434    HYDROmorphone (DILAUDID) injection 0.25 mg  0.25 mg IntraVENous Q3H PRN [facility], DO        Or    HYDROmorphone (DILAUDID) injection 0.5 mg  0.5 mg IntraVENous Q3H PRN [facility], DO   0.5 mg at 22 0848     Vital Signs (Current)   Vitals:    22 0449 22 0658 22 0726 22 1101   BP: (!) 142/83  (!) 144/78 137/75   Pulse: 81  81 76   Resp: 16  17 18   Temp: 98.3 °F (36.8 °C)  98.7 °F (37.1 °C) 97.3 °F (36.3 °C)   TempSrc: Oral  Oral Temporal   SpO2: 90%  94% 96%   Weight:  233 lb 4 oz (105.8 kg)     Height:                                                Vital Signs Statistics (for past 48 hrs)     Temp  Av.6 °F (37 °C)  Min: 97.3 °F (36.3 °C)   Min taken time: 22 1101  Max: 99.7 °F (37.6 °C)   Max taken time: 22 0854  Pulse  Av.5  Min: 68   Min taken time: 22 1101  Max: 109   Max taken time: 22 0854  Resp  Av.9  Min: 12   Min taken time: 22 0449  Max: 18   Max taken time: 22 1101  BP  Min: 128/61   Min taken time: 22  Max: 159/89   Max taken time: 22 1711  MAP (mmHg)  Av.1  Min: 80   Min taken time: 22  Max: 112   Max taken time: 22 0024  SpO2  Av.9 %  Min: 90 %   Min taken time: 22  Max: 99 %   Max taken time: 22 2313  BP Readings from Last 3 Encounters:   22 137/75   19 130/75   16 117/65       BMI  Body mass index is 38.81 kg/m². Estimated body mass index is 38.81 kg/m² as calculated from the following:    Height as of this encounter: 5' 5\" (1.651 m). Weight as of this encounter: 233 lb 4 oz (105.8 kg).     CBC   Lab Results   Component Value Date    WBC 2.9 2022    RBC 3.86 2022    HGB 11.3 2022    HCT 33.1 2022    MCV 85.7 2022    RDW 15.0 2022     2022     CMP    Lab Results   Component Value Date     2022    K 3.1 2022    K 3.7 2022    CL 96 2022    CO2 24 2022    BUN 9 2022    CREATININE 0.6 2022    GFRAA >60 2022    AGRATIO 1.1 2022    LABGLOM >60 2022    GLUCOSE 94 2022    PROT 6.2 2022    CALCIUM 8.1 2022    BILITOT 1.2 2022    ALKPHOS 206 2022     2022     2022     BMP    Lab Results   Component Value Date     2022    K 3.1 2022    K 3.7 2022    CL 96 2022    CO2 24 2022    BUN 9 2022    CREATININE 0.6 2022    CALCIUM 8.1 2022    GFRAA >60 2022    LABGLOM >60 2022    GLUCOSE 94 2022     POCGlucose  Recent Labs     22  0630 22  0650 22  0605   GLUCOSE 108* 98 94      Coags No results found for: PROTIME, INR, APTT  HCG (If Applicable) No results found for: PREGTESTUR, PREGSERUM, HCG, HCGQUANT   ABGs No results found for: PHART, PO2ART, FNB0GMI, VWH9GFJ, BEART, W9FYMWAA   Type & Screen (If Applicable)  No results found for: LABABO, LABRH                         BMI: Wt Readings from Last 3 Encounters:       NPO Status:   Date of last liquid consumption: 03/17/22   Time of last liquid consumption: 0000   Date of last solid food consumption: 03/17/22      Time of last solid consumption: 0000       Anesthesia Evaluation  Patient summary reviewed no history of anesthetic complications:   Airway: Mallampati: II  TM distance: >3 FB     Mouth opening: > = 3 FB Dental: normal exam         Pulmonary: breath sounds clear to auscultation  (+) asthma:     (-) COPD, sleep apnea and not a current smoker                           Cardiovascular:        (-) hypertension, past MI, CABG/stent and no hyperlipidemia        Rate: normal                    Neuro/Psych:   (+) headaches:, psychiatric history:depression/anxiety    (-) seizures, TIA and CVA           GI/Hepatic/Renal:        (-) GERD and PUD       Endo/Other:    (+) hyperthyroidism::., malignancy/cancer. Abdominal:             Vascular:     - DVT and PE. Other Findings:             Anesthesia Plan      general     ASA 3       Induction: intravenous. MIPS: Postoperative opioids intended and Prophylactic antiemetics administered. Anesthetic plan and risks discussed with patient. This pre-anesthesia assessment may be used as a history and physical.    DOS STAFF ADDENDUM:    Pt seen and examined, chart reviewed (including anesthesia, drug and allergy history). No interval changes to history and physical examination. Anesthetic plan, risks, benefits, alternatives, and personnel involved discussed with patient. Patient verbalized an understanding and agrees to proceed.       Oleg Pisano MD  March 19, 2022  11:38 AM

## 2022-03-20 LAB
A/G RATIO: 0.9 (ref 1.1–2.2)
ALBUMIN SERPL-MCNC: 3.1 G/DL (ref 3.4–5)
ALP BLD-CCNC: 201 U/L (ref 40–129)
ALT SERPL-CCNC: 333 U/L (ref 10–40)
ANION GAP SERPL CALCULATED.3IONS-SCNC: 13 MMOL/L (ref 3–16)
ANTI-NUCLEAR ANTIBODY (ANA): NEGATIVE
AST SERPL-CCNC: 146 U/L (ref 15–37)
BASOPHILS ABSOLUTE: 0 K/UL (ref 0–0.2)
BASOPHILS RELATIVE PERCENT: 0.4 %
BILIRUB SERPL-MCNC: 0.9 MG/DL (ref 0–1)
BUN BLDV-MCNC: 11 MG/DL (ref 7–20)
CALCIUM SERPL-MCNC: 7.4 MG/DL (ref 8.3–10.6)
CHLORIDE BLD-SCNC: 95 MMOL/L (ref 99–110)
CO2: 24 MMOL/L (ref 21–32)
CREAT SERPL-MCNC: 0.5 MG/DL (ref 0.6–1.2)
EOSINOPHILS ABSOLUTE: 0 K/UL (ref 0–0.6)
EOSINOPHILS RELATIVE PERCENT: 0.3 %
FERRITIN: 3310 NG/ML (ref 15–150)
GFR AFRICAN AMERICAN: >60
GFR NON-AFRICAN AMERICAN: >60
GLUCOSE BLD-MCNC: 130 MG/DL (ref 70–99)
GLUCOSE BLD-MCNC: 133 MG/DL (ref 70–99)
GLUCOSE BLD-MCNC: 139 MG/DL (ref 70–99)
GLUCOSE BLD-MCNC: 141 MG/DL (ref 70–99)
GLUCOSE BLD-MCNC: 146 MG/DL (ref 70–99)
HAV IGM SER IA-ACNC: NORMAL
HBV SURFACE AB TITR SER: <3.5 MIU/ML
HCT VFR BLD CALC: 30.6 % (ref 36–48)
HEMOGLOBIN: 10.7 G/DL (ref 12–16)
HEPATITIS B CORE IGM ANTIBODY: NORMAL
HEPATITIS B SURFACE ANTIGEN INTERPRETATION: NORMAL
HEPATITIS C ANTIBODY INTERPRETATION: NORMAL
INR BLD: 1.19 (ref 0.88–1.12)
LYMPHOCYTES ABSOLUTE: 0.7 K/UL (ref 1–5.1)
LYMPHOCYTES RELATIVE PERCENT: 13.4 %
MAGNESIUM: 1.9 MG/DL (ref 1.8–2.4)
MCH RBC QN AUTO: 29.4 PG (ref 26–34)
MCHC RBC AUTO-ENTMCNC: 35 G/DL (ref 31–36)
MCV RBC AUTO: 84.1 FL (ref 80–100)
MONOCYTES ABSOLUTE: 0.3 K/UL (ref 0–1.3)
MONOCYTES RELATIVE PERCENT: 5.2 %
NEUTROPHILS ABSOLUTE: 4.1 K/UL (ref 1.7–7.7)
NEUTROPHILS RELATIVE PERCENT: 80.7 %
PDW BLD-RTO: 14.7 % (ref 12.4–15.4)
PERFORMED ON: ABNORMAL
PLATELET # BLD: 130 K/UL (ref 135–450)
PMV BLD AUTO: 7 FL (ref 5–10.5)
POTASSIUM SERPL-SCNC: 3.6 MMOL/L (ref 3.5–5.1)
PROTHROMBIN TIME: 13.5 SEC (ref 9.9–12.7)
RBC # BLD: 3.64 M/UL (ref 4–5.2)
SODIUM BLD-SCNC: 132 MMOL/L (ref 136–145)
TOTAL PROTEIN: 6.7 G/DL (ref 6.4–8.2)
WBC # BLD: 5.1 K/UL (ref 4–11)

## 2022-03-20 PROCEDURE — 86708 HEPATITIS A ANTIBODY: CPT

## 2022-03-20 PROCEDURE — 97162 PT EVAL MOD COMPLEX 30 MIN: CPT

## 2022-03-20 PROCEDURE — 86705 HEP B CORE ANTIBODY IGM: CPT

## 2022-03-20 PROCEDURE — 6370000000 HC RX 637 (ALT 250 FOR IP): Performed by: ORTHOPAEDIC SURGERY

## 2022-03-20 PROCEDURE — 86706 HEP B SURFACE ANTIBODY: CPT

## 2022-03-20 PROCEDURE — 83516 IMMUNOASSAY NONANTIBODY: CPT

## 2022-03-20 PROCEDURE — 97530 THERAPEUTIC ACTIVITIES: CPT

## 2022-03-20 PROCEDURE — 6370000000 HC RX 637 (ALT 250 FOR IP): Performed by: HOSPITALIST

## 2022-03-20 PROCEDURE — 6370000000 HC RX 637 (ALT 250 FOR IP): Performed by: STUDENT IN AN ORGANIZED HEALTH CARE EDUCATION/TRAINING PROGRAM

## 2022-03-20 PROCEDURE — 86709 HEPATITIS A IGM ANTIBODY: CPT

## 2022-03-20 PROCEDURE — 2060000000 HC ICU INTERMEDIATE R&B

## 2022-03-20 PROCEDURE — 87340 HEPATITIS B SURFACE AG IA: CPT

## 2022-03-20 PROCEDURE — 2580000003 HC RX 258: Performed by: ORTHOPAEDIC SURGERY

## 2022-03-20 PROCEDURE — 36415 COLL VENOUS BLD VENIPUNCTURE: CPT

## 2022-03-20 PROCEDURE — 82728 ASSAY OF FERRITIN: CPT

## 2022-03-20 PROCEDURE — 80053 COMPREHEN METABOLIC PANEL: CPT

## 2022-03-20 PROCEDURE — 6360000002 HC RX W HCPCS: Performed by: NURSE PRACTITIONER

## 2022-03-20 PROCEDURE — 86803 HEPATITIS C AB TEST: CPT

## 2022-03-20 PROCEDURE — 97110 THERAPEUTIC EXERCISES: CPT

## 2022-03-20 PROCEDURE — 86704 HEP B CORE ANTIBODY TOTAL: CPT

## 2022-03-20 PROCEDURE — 85610 PROTHROMBIN TIME: CPT

## 2022-03-20 PROCEDURE — 83735 ASSAY OF MAGNESIUM: CPT

## 2022-03-20 PROCEDURE — 85025 COMPLETE CBC W/AUTO DIFF WBC: CPT

## 2022-03-20 PROCEDURE — 86038 ANTINUCLEAR ANTIBODIES: CPT

## 2022-03-20 PROCEDURE — 6360000002 HC RX W HCPCS: Performed by: ORTHOPAEDIC SURGERY

## 2022-03-20 RX ORDER — OXYCODONE HYDROCHLORIDE 10 MG/1
10 TABLET ORAL EVERY 4 HOURS PRN
Qty: 30 TABLET | Refills: 0 | Status: ON HOLD | OUTPATIENT
Start: 2022-03-20 | End: 2022-03-30 | Stop reason: SDUPTHER

## 2022-03-20 RX ORDER — CALCIUM CARBONATE 200(500)MG
500 TABLET,CHEWABLE ORAL 3 TIMES DAILY PRN
Status: DISCONTINUED | OUTPATIENT
Start: 2022-03-20 | End: 2022-03-22 | Stop reason: HOSPADM

## 2022-03-20 RX ADMIN — INSULIN LISPRO 1 UNITS: 100 INJECTION, SOLUTION INTRAVENOUS; SUBCUTANEOUS at 12:40

## 2022-03-20 RX ADMIN — HYDROMORPHONE HYDROCHLORIDE 0.5 MG: 1 INJECTION, SOLUTION INTRAMUSCULAR; INTRAVENOUS; SUBCUTANEOUS at 23:22

## 2022-03-20 RX ADMIN — OXYCODONE HYDROCHLORIDE 10 MG: 10 TABLET ORAL at 16:22

## 2022-03-20 RX ADMIN — ANTACID TABLETS 500 MG: 500 TABLET, CHEWABLE ORAL at 15:30

## 2022-03-20 RX ADMIN — INSULIN LISPRO 1 UNITS: 100 INJECTION, SOLUTION INTRAVENOUS; SUBCUTANEOUS at 17:40

## 2022-03-20 RX ADMIN — SENNOSIDES AND DOCUSATE SODIUM 1 TABLET: 50; 8.6 TABLET ORAL at 08:55

## 2022-03-20 RX ADMIN — HYDROMORPHONE HYDROCHLORIDE 0.5 MG: 1 INJECTION, SOLUTION INTRAMUSCULAR; INTRAVENOUS; SUBCUTANEOUS at 02:28

## 2022-03-20 RX ADMIN — SENNOSIDES AND DOCUSATE SODIUM 1 TABLET: 50; 8.6 TABLET ORAL at 21:55

## 2022-03-20 RX ADMIN — HYDROMORPHONE HYDROCHLORIDE 0.5 MG: 1 INJECTION, SOLUTION INTRAMUSCULAR; INTRAVENOUS; SUBCUTANEOUS at 12:41

## 2022-03-20 RX ADMIN — OXYCODONE HYDROCHLORIDE 10 MG: 10 TABLET ORAL at 04:50

## 2022-03-20 RX ADMIN — OXYCODONE HYDROCHLORIDE 10 MG: 10 TABLET ORAL at 08:55

## 2022-03-20 RX ADMIN — OXYCODONE HYDROCHLORIDE 10 MG: 10 TABLET ORAL at 21:55

## 2022-03-20 RX ADMIN — ENOXAPARIN SODIUM 40 MG: 100 INJECTION SUBCUTANEOUS at 08:56

## 2022-03-20 RX ADMIN — SODIUM CHLORIDE, PRESERVATIVE FREE 10 ML: 5 INJECTION INTRAVENOUS at 08:56

## 2022-03-20 RX ADMIN — ONDANSETRON 4 MG: 2 INJECTION INTRAMUSCULAR; INTRAVENOUS at 04:49

## 2022-03-20 RX ADMIN — SODIUM CHLORIDE, PRESERVATIVE FREE 10 ML: 5 INJECTION INTRAVENOUS at 21:56

## 2022-03-20 ASSESSMENT — PAIN DESCRIPTION - PAIN TYPE
TYPE: SURGICAL PAIN

## 2022-03-20 ASSESSMENT — PAIN - FUNCTIONAL ASSESSMENT
PAIN_FUNCTIONAL_ASSESSMENT: PREVENTS OR INTERFERES SOME ACTIVE ACTIVITIES AND ADLS

## 2022-03-20 ASSESSMENT — PAIN DESCRIPTION - DESCRIPTORS
DESCRIPTORS: ACHING

## 2022-03-20 ASSESSMENT — PAIN DESCRIPTION - ONSET
ONSET: ON-GOING
ONSET: GRADUAL
ONSET: ON-GOING
ONSET: GRADUAL
ONSET: ON-GOING

## 2022-03-20 ASSESSMENT — PAIN DESCRIPTION - ORIENTATION
ORIENTATION: LEFT

## 2022-03-20 ASSESSMENT — PAIN DESCRIPTION - FREQUENCY
FREQUENCY: CONTINUOUS

## 2022-03-20 ASSESSMENT — PAIN SCALES - GENERAL
PAINLEVEL_OUTOF10: 4
PAINLEVEL_OUTOF10: 3
PAINLEVEL_OUTOF10: 7
PAINLEVEL_OUTOF10: 8
PAINLEVEL_OUTOF10: 4
PAINLEVEL_OUTOF10: 9
PAINLEVEL_OUTOF10: 7
PAINLEVEL_OUTOF10: 5
PAINLEVEL_OUTOF10: 0
PAINLEVEL_OUTOF10: 8
PAINLEVEL_OUTOF10: 0

## 2022-03-20 ASSESSMENT — PAIN DESCRIPTION - LOCATION
LOCATION: HIP;KNEE
LOCATION: HIP;KNEE;LEG
LOCATION: HIP;KNEE;LEG
LOCATION: HIP;KNEE
LOCATION: HIP;KNEE;LEG

## 2022-03-20 ASSESSMENT — PAIN DESCRIPTION - PROGRESSION
CLINICAL_PROGRESSION: GRADUALLY WORSENING
CLINICAL_PROGRESSION: GRADUALLY IMPROVING
CLINICAL_PROGRESSION: GRADUALLY WORSENING
CLINICAL_PROGRESSION: GRADUALLY IMPROVING
CLINICAL_PROGRESSION: GRADUALLY IMPROVING
CLINICAL_PROGRESSION: GRADUALLY WORSENING
CLINICAL_PROGRESSION: GRADUALLY IMPROVING

## 2022-03-20 NOTE — PROGRESS NOTES
Hospitalist Progress Note      PCP: Leora Nice MD    Date of Admission: 3/16/2022        Subjective:       Feels better today. Pain is controlled. No acute events reported overnight. Medications:  Reviewed    Infusion Medications    sodium chloride 25 mL (03/19/22 1804)    dextrose       Scheduled Medications    sodium chloride flush  5-40 mL IntraVENous 2 times per day    sennosides-docusate sodium  1 tablet Oral BID    enoxaparin  40 mg SubCUTAneous Daily    insulin lispro  0-6 Units SubCUTAneous TID WC    insulin lispro  0-3 Units SubCUTAneous Nightly     PRN Meds: sodium chloride flush, sodium chloride, oxyCODONE, HYDROmorphone, ondansetron, prochlorperazine, glucose, dextrose, glucagon (rDNA), dextrose      Intake/Output Summary (Last 24 hours) at 3/20/2022 1015  Last data filed at 3/20/2022 0607  Gross per 24 hour   Intake 810.2 ml   Output 1700 ml   Net -889.8 ml       Physical Exam Performed:    /75   Pulse 85   Temp 97.8 °F (36.6 °C)   Resp 12   Ht 5' 5\" (1.651 m)   Wt 235 lb 7.2 oz (106.8 kg)   SpO2 94%   BMI 39.18 kg/m²     General appearance: No apparent distress, appears stated age and cooperative. HEENT: Pupils equal, round, and reactive to light. Conjunctivae/corneas clear. Neck: Supple, with full range of motion. No jugular venous distention. Trachea midline. Respiratory:  Normal respiratory effort. Clear to auscultation, bilaterally without Rales/Wheezes/Rhonchi. Cardiovascular: Regular rate and rhythm with normal S1/S2 without murmurs, rubs or gallops. Abdomen: Soft, non-tender, non-distended with normal bowel sounds. Musculoskeletal: No clubbing, cyanosis or edema bilaterally. Full range of motion without deformity. Skin: Skin color, texture, turgor normal.  No rashes or lesions. Neurologic:  Neurovascularly intact without any focal sensory/motor deficits.  Cranial nerves: II-XII intact, grossly non-focal.  Psychiatric: Alert and oriented, thought content appropriate, normal insight  Capillary Refill: Brisk,3 seconds, normal   Peripheral Pulses: +2 palpable, equal bilaterally       Labs:   Recent Labs     03/18/22  0650 03/19/22  0605 03/20/22  0617   WBC 3.1* 2.9* 5.1   HGB 11.5* 11.3* 10.7*   HCT 33.9* 33.1* 30.6*    147 130*     Recent Labs     03/18/22  0650 03/19/22  0605 03/20/22  0617    134* 132*   K 3.4* 3.1* 3.6    96* 95*   CO2 24 24 24   BUN 8 9 11   CREATININE 0.8 0.6 0.5*   CALCIUM 9.7 8.1* 7.4*     Recent Labs     03/18/22  0650 03/19/22  0605 03/20/22  0617   * 144* 146*   * 302* 333*   BILITOT 1.1* 1.2* 0.9   ALKPHOS 225* 206* 201*     Recent Labs     03/20/22  0855   INR 1.19*     No results for input(s): CKTOTAL, TROPONINI in the last 72 hours. Urinalysis:      Lab Results   Component Value Date    NITRU Negative 03/17/2022    BLOODU Negative 03/17/2022    SPECGRAV 1.016 03/17/2022    GLUCOSEU Negative 03/17/2022       Radiology:  XR HIP LEFT (2-3 VIEWS)   Final Result      CT CHEST WO CONTRAST   Final Result   1. Moderate subcutaneous inflammatory stranding within the right axilla   likely due to edema. 2. Right axillary adenopathy either due to sandeep metastasis or reactive   disease. 3. Diffuse osseous metastatic disease likely causing multiple   age-indeterminate pathologic fractures. If there is point tenderness   throughout the thoracolumbar spine, recommend nonemergent MRI of the   thoracolumbar spine with without contrast.         CT ABDOMEN PELVIS W IV CONTRAST Additional Contrast? None   Final Result   Lytic and sclerotic lesions are seen diffusely through the visualized osseous   structures in the pelvis and lumbar spine consistent with osseous metastatic   disease. No CT evidence of renal mass. CT THORACIC SPINE WO CONTRAST   Final Result   1. Diffuse bone metastasis. 2. Less than 50% compression fracture of L1 of uncertain chronicity. Subacute rib fractures.    3. Partially visualized questionable mass in the left kidney. CT abdomen   pelvis with contrast recommended. 4. Nonobstructive renal calculi. 5. Other findings as described. CT HIP LEFT WO CONTRAST   Final Result   1. Osseous metastatic disease throughout the imaged left hemipelvis, left   sacrum, and proximal left femur with large destructive lesions centered   within the left femoral head and neck and involving the partially imaged left   sacrum. 2. No definite pathologic fracture identified on the current exam, however,   the location and severe destructive changes of the left femoral metastatic   lesion do place the patient at a significant risk for pathologic fracture at   this site. Recommend orthopedic consultation. CT LUMBAR SPINE WO CONTRAST   Final Result   1. Diffuse bone metastasis. 2. Less than 50% compression fracture of L1 of uncertain chronicity. Subacute rib fractures. 3. Partially visualized questionable mass in the left kidney. CT abdomen   pelvis with contrast recommended. 4. Nonobstructive renal calculi. 5. Other findings as described. XR KNEE LEFT (1-2 VIEWS)   Final Result   Minimal degenerative changes of the left knee with no evidence of acute   osseous abnormality. XR HIP 2-3 VW W PELVIS LEFT   Final Result   Mild compression deformity of the T12 vertebral body, age indeterminate. No   acute lumbar spine fracture. No comparison imaging available. No acute osseous abnormality of the pelvis or left hip. Indeterminate 2 cm   lucent lesion within the left femoral neck. If the patient has a history of   malignancy, finding is concerning for possible metastatic focus. Consider   follow-up MRI or bone scan as clinically indicated. XR LUMBAR SPINE (2-3 VIEWS)   Final Result   Mild compression deformity of the T12 vertebral body, age indeterminate. No   acute lumbar spine fracture. No comparison imaging available.       No acute osseous abnormality of the pelvis or left hip. Indeterminate 2 cm   lucent lesion within the left femoral neck. If the patient has a history of   malignancy, finding is concerning for possible metastatic focus. Consider   follow-up MRI or bone scan as clinically indicated. FLUORO FOR SURGICAL PROCEDURES    (Results Pending)           Assessment/Plan:    -Left hip pain due to lytic lesion with femoral neck, likely metastatic--s/p prophylactic ORIF with intramedullary kelvin placement, femoral head biopsy 3/19--continue postop care, pain control, DVT prophylaxis    -Diffuse osseous metastatic disease, age-indeterminate pathological fractures involving T8-L1,old ribs #s right axillary adenopathy--- metastatic disease of unknown primary-suspect breast ca recurrence-s/p bone biospy 3/19--follow-up with oncology    -Hypercalcemia-due to malignancy--resolved with Zometa and IV fluids    -Transaminitis/acute liver injury--liver appears normal on imaging, serology work-up in progress--GI consult appreciated    -History of right breast cancer status post mastectomy and chemotherapy in 2014 and had been in remission for several years    -DM type II--diet controlled-continue sliding scale insulin    -Hypokalemia-replete potassium    -Anemia and leukopenia likely due to malignancy--continue to monitor    DVT Prophylaxis: Lovenox  Diet: ADULT DIET; Regular  Code Status: Full Code    Disposition--- PT/OT recommending ARU--patient/family would like to go home instead. ..  Await oncology plans prior to discharge    Mili Garcia MD

## 2022-03-20 NOTE — PROGRESS NOTES
INPATIENT CONSULTATION:    IDENTIFYING DATA/REASON FOR CONSULTATION   PATIENT:  Gaby Salmon  MRN:  [id number]  ADMIT DATE: 3/16/2022  TIME OF EVALUATION: 3/20/2022 9:44 AM  HOSPITAL STAY:   LOS: 4 days   CONSULTING PHYSICIAN: Cuauhtemoc Brody MD   REASON FOR CONSULTATION: Acute liver injury    Subjective:    No acute events overnight   Had surgery yesterday with bone biopsy. No complaints this morning  Notes no significant acetaminophen or alcohol use. No prior known knowledge of liver disease. Denies any known family history of liver disease or autoimmune disorders. Only taking Meloxicam and cyclobenzaprine prior to admission, and meloxicam has since been stopped. MEDICATIONS   SCHEDULED:  sodium chloride flush, 5-40 mL, 2 times per day  sennosides-docusate sodium, 1 tablet, BID  enoxaparin, 40 mg, Daily  insulin lispro, 0-6 Units, TID WC  insulin lispro, 0-3 Units, Nightly      FLUIDS/DRIPS:     sodium chloride 25 mL (03/19/22 1804)    dextrose       PRNs: sodium chloride flush, 5-40 mL, PRN  sodium chloride, 25 mL, PRN  oxyCODONE, 10 mg, Q4H PRN  HYDROmorphone, 0.5 mg, Q3H PRN  ondansetron, 4 mg, Q6H PRN  prochlorperazine, 5 mg, Q4H PRN  glucose, 15 g, PRN  dextrose, 12.5 g, PRN  glucagon (rDNA), 1 mg, PRN  dextrose, 100 mL/hr, PRN      ALLERGIES:  Allergies   Allergen Reactions    Cephalexin           PHYSICAL EXAM   Weight: 235 lb 7.2 oz (106.8 kg), BP: 135/75     I/O last 3 completed shifts: In: 1545.2 [P.O.:920;  I.V.:625.2]  Out: 2550 [Urine:2550]    Physical Exam:  Gen: Resting in bed, NAD   CV: RRR no MRG   Pul: CTAB   Abd: Soft, NT/ND, no masses, no HSM   Ext: No edema   Neuro: No asterixis   Skin: No jaundice, spider angiomas, owens erythema     LABS AND IMAGING     Recent Results (from the past 24 hour(s))   POCT Glucose    Collection Time: 03/19/22  4:25 PM   Result Value Ref Range    POC Glucose 104 (H) 70 - 99 mg/dl    Performed on ACCU-CHEK    POCT Glucose    Collection Time: 03/19/22  8:02 PM   Result Value Ref Range    POC Glucose 174 (H) 70 - 99 mg/dl    Performed on ACCU-CHEK    POCT Glucose    Collection Time: 03/20/22  6:07 AM   Result Value Ref Range    POC Glucose 130 (H) 70 - 99 mg/dl    Performed on ACCU-CHEK    CBC with Auto Differential    Collection Time: 03/20/22  6:17 AM   Result Value Ref Range    WBC 5.1 4.0 - 11.0 K/uL    RBC 3.64 (L) 4.00 - 5.20 M/uL    Hemoglobin 10.7 (L) 12.0 - 16.0 g/dL    Hematocrit 30.6 (L) 36.0 - 48.0 %    MCV 84.1 80.0 - 100.0 fL    MCH 29.4 26.0 - 34.0 pg    MCHC 35.0 31.0 - 36.0 g/dL    RDW 14.7 12.4 - 15.4 %    Platelets 337 (L) 467 - 450 K/uL    MPV 7.0 5.0 - 10.5 fL    Neutrophils % 80.7 %    Lymphocytes % 13.4 %    Monocytes % 5.2 %    Eosinophils % 0.3 %    Basophils % 0.4 %    Neutrophils Absolute 4.1 1.7 - 7.7 K/uL    Lymphocytes Absolute 0.7 (L) 1.0 - 5.1 K/uL    Monocytes Absolute 0.3 0.0 - 1.3 K/uL    Eosinophils Absolute 0.0 0.0 - 0.6 K/uL    Basophils Absolute 0.0 0.0 - 0.2 K/uL   Comprehensive Metabolic Panel    Collection Time: 03/20/22  6:17 AM   Result Value Ref Range    Sodium 132 (L) 136 - 145 mmol/L    Potassium 3.6 3.5 - 5.1 mmol/L    Chloride 95 (L) 99 - 110 mmol/L    CO2 24 21 - 32 mmol/L    Anion Gap 13 3 - 16    Glucose 133 (H) 70 - 99 mg/dL    BUN 11 7 - 20 mg/dL    CREATININE 0.5 (L) 0.6 - 1.2 mg/dL    GFR Non-African American >60 >60    GFR African American >60 >60    Calcium 7.4 (L) 8.3 - 10.6 mg/dL    Total Protein 6.7 6.4 - 8.2 g/dL    Albumin 3.1 (L) 3.4 - 5.0 g/dL    Albumin/Globulin Ratio 0.9 (L) 1.1 - 2.2    Total Bilirubin 0.9 0.0 - 1.0 mg/dL    Alkaline Phosphatase 201 (H) 40 - 129 U/L     (H) 10 - 40 U/L     (H) 15 - 37 U/L   Magnesium    Collection Time: 03/20/22  6:17 AM   Result Value Ref Range    Magnesium 1.90 1.80 - 2.40 mg/dL     Other Labs    Imaging  XR HIP LEFT (2-3 VIEWS)   Final Result      CT CHEST WO CONTRAST   Final Result   1.  Moderate subcutaneous inflammatory stranding within the right axilla   likely due to edema. 2. Right axillary adenopathy either due to sandeep metastasis or reactive   disease. 3. Diffuse osseous metastatic disease likely causing multiple   age-indeterminate pathologic fractures. If there is point tenderness   throughout the thoracolumbar spine, recommend nonemergent MRI of the   thoracolumbar spine with without contrast.         CT ABDOMEN PELVIS W IV CONTRAST Additional Contrast? None   Final Result   Lytic and sclerotic lesions are seen diffusely through the visualized osseous   structures in the pelvis and lumbar spine consistent with osseous metastatic   disease. No CT evidence of renal mass. CT THORACIC SPINE WO CONTRAST   Final Result   1. Diffuse bone metastasis. 2. Less than 50% compression fracture of L1 of uncertain chronicity. Subacute rib fractures. 3. Partially visualized questionable mass in the left kidney. CT abdomen   pelvis with contrast recommended. 4. Nonobstructive renal calculi. 5. Other findings as described. CT HIP LEFT WO CONTRAST   Final Result   1. Osseous metastatic disease throughout the imaged left hemipelvis, left   sacrum, and proximal left femur with large destructive lesions centered   within the left femoral head and neck and involving the partially imaged left   sacrum. 2. No definite pathologic fracture identified on the current exam, however,   the location and severe destructive changes of the left femoral metastatic   lesion do place the patient at a significant risk for pathologic fracture at   this site. Recommend orthopedic consultation. CT LUMBAR SPINE WO CONTRAST   Final Result   1. Diffuse bone metastasis. 2. Less than 50% compression fracture of L1 of uncertain chronicity. Subacute rib fractures. 3. Partially visualized questionable mass in the left kidney. CT abdomen   pelvis with contrast recommended. 4. Nonobstructive renal calculi.    5. Other findings as described. XR KNEE LEFT (1-2 VIEWS)   Final Result   Minimal degenerative changes of the left knee with no evidence of acute   osseous abnormality. XR HIP 2-3 VW W PELVIS LEFT   Final Result   Mild compression deformity of the T12 vertebral body, age indeterminate. No   acute lumbar spine fracture. No comparison imaging available. No acute osseous abnormality of the pelvis or left hip. Indeterminate 2 cm   lucent lesion within the left femoral neck. If the patient has a history of   malignancy, finding is concerning for possible metastatic focus. Consider   follow-up MRI or bone scan as clinically indicated. XR LUMBAR SPINE (2-3 VIEWS)   Final Result   Mild compression deformity of the T12 vertebral body, age indeterminate. No   acute lumbar spine fracture. No comparison imaging available. No acute osseous abnormality of the pelvis or left hip. Indeterminate 2 cm   lucent lesion within the left femoral neck. If the patient has a history of   malignancy, finding is concerning for possible metastatic focus. Consider   follow-up MRI or bone scan as clinically indicated. FLUORO FOR SURGICAL PROCEDURES    (Results Pending)      ASSESSMENT AND RECOMMENDATIONS   Tiffany Arteaga is a 64 y.o. female breast cancer in 2007 which is in remission who presented with left hip pain since November 2021. She is being seen by multiple other specialties, and has been found to have a left hip lytic lesion which is possibly metastatic. There is an unknown primary. CT of the abdomen pelvis was also done showing multiple lytic and sclerotic bone lesions. She is currently in the OR, so I am not able to complete a full history and physical at this time.     GI is consulted for elevated liver chemistries.     IMPRESSION:     1. Acute liver injury. Primarily hepatocellular pattern. Admission , , alk phos 230, total bilirubin 1.2. Liver chemistries have stayed about stable. On CT of the abdomen, liver is normal in size. There is no splenomegaly or hepatomegaly. INR is preserved. There is no suggestion of portal hypertension. There is no evidence of portal vein, hepatic vein, hepatic artery thrombosis. Immunoglobulins have been checked and within normal limits. This could be any number of things at this time and differential is very broad. She is not currently on any culprit medications. Prior to admission was ONLY taking cyclobenzaprine and meloxicam. Cyclobenzaprine and gabapentin are less likely causes of liver injury. Would note that inhaled anesthetic agents as she may receive today can be a cause of clinically apparent liver injury. 2. Metastatic malignancy, unknown primary  3. History of breast cancer 2007     RECOMMENDATIONS:    -Awaiting serologic liver disease workup.  -Continue to monitor liver chemistries, avoiding hepatotoxic agents. -Bone biopsy may help provide some clues about liver disease, although the liver chemistries are not elevated in an infiltrative pattern so there is low concern for malignancy within the liver.   -If liver chemistries continue to worsen, may need to consider a liver biopsy. If you have any questions or need any further information, please feel free to contact anyone on our consult team.  Thank you for allowing us to participate in the care of Lucretia Ni.     Paulino Garcia MD   [id number] [facility]

## 2022-03-20 NOTE — PROGRESS NOTES
Orthopedic surgery progress note     Subjective  No events overnight. Sitting in chair.  Appears comfortable.     Objective  Vitals:    03/20/22 1131   BP: 114/64   Pulse: 79   Resp: 14   Temp: 97.8 °F (36.6 °C)   SpO2: 99%     Physical examination  Left lower extremity dressing is clean and intact, intact plantarflexion/dorsiflexion/EHL function, sensation is intact to light touch throughout all distributions, palpable DP pulse, brisk cap refill of the foot     Hemoglobin 10.7  Pathology report pending     Assessment  Status post  prophylactic intramedullary nailing of left hip and femur     Plan  Pain control  PT/OT, weightbearing as tolerated  Lovenox for DVT prophylaxis while in hospital followed by a total of 4 weeks postoperatively

## 2022-03-20 NOTE — DISCHARGE INSTR - COC
[facility]) Continuity of Care Form    Patient Name:  Isabella Guillory  : 1960    MRN:  [id number]    Admit date:  3/16/2022  Discharge date:  ***    Code Status Order: Full Code  Advance Directives: {YES OR NO:}    Admitting Physician: Jenny Miller DO  PCP: Sarai Ivey MD    Discharging Nurse: [facility] Unit/Room#: T1G-7978/3124-01  Discharging Unit Phone Number: ***    Emergency Contact:        Past Surgical History:  Past Surgical History:   Procedure Laterality Date    CHOLECYSTECTOMY      HYSTERECTOMY      MASTECTOMY         Immunization History: There is no immunization history on file for this patient. Active Problems:  Principal Problem:    Left hip pain  Active Problems:    Pathological fracture of left hip, initial encounter ([facility] [address]    Malignancy ([facility] [address]    Hypercalcemia  Resolved Problems:    * No resolved hospital problems. *      Isolation/Infection:       Nurse Assessment:  Last Vital Signs:/64   Pulse 79   Temp 97.8 °F (36.6 °C)   Resp 14   Ht 5' 5\" (1.651 m)   Wt 235 lb 7.2 oz (106.8 kg)   SpO2 99%   BMI 39.18 kg/m²   Last documented pain score (0-10 scale): Pain Level: 8  Last Weight:   Wt Readings from Last 1 Encounters:   22 235 lb 7.2 oz (106.8 kg)     Mental Status:  {IP PT MENTAL STATUS:}     IV Access:  { GILBERT IV ACCESS:606926829}    Nursing Mobility/ADLs:  Walking   {P DME VNBF:818996905}  Transfer  {P DME VMGP:757133972}  Bathing  {P DME YYC}  Dressing  {CHP DME QEV}  Toileting  {P DME UOHX:829551214}  Feeding  {P DME SHXX:501332903}  Med Admin  {P DME DNMW:735136578}  Med Delivery   { GILBERT MED Delivery:169774538}    Wound Care Documentation and Therapy:  Keep Mepilex AG dressing on operative hip until POD #7 then wound to air.  If still draining then apply clean new Mepilex AG     Elimination:  Urinary Catheter: {Urinary Catheter:853917416}   Colostomy/Ileostomy: {YES / ZB:49910}  Continence: Bowel: {YES / HA:14855}  Bladder: {YES / AI:73555}  Date of Last BM: ***    Intake/Output Summary (Last 24 hours)     Intake/Output Summary (Last 24 hours) at 3/20/2022 1249  Last data filed at 3/20/2022 0607  Gross per 24 hour   Intake 810.2 ml   Output 1700 ml   Net -889.8 ml     Safety Concerns:     [address] GILBERT Safety Concerns:492071969}    Impairments/Disabilities:      [address] GILBERT Impairments/Disabilities:710478209}    Nutrition Therapy:  Current Nutrition Therapy: ADULT DIET; Regular  Routes of Feeding: {CHP DME Other Feedings:721209143}  Liquids: {Slp liquid thickness:02543}  Daily Fluid Restriction: {CHP DME Yes amt example:897118500}  Last Modified Barium Swallow with Video (Video Swallowing Test): {Done Not Done VBHA:730619789}    Treatments at the Time of Hospital Discharge:   Respiratory Treatments: ***  Oxygen Therapy:  {Therapy; copd oxygen:36316}  Ventilator:    {MH CC Vent CODC:283512757}    Lab orders for discharge:        Rehab Therapies: Physical Therapy, Occupational Therapy and nursing care  Weight Bearing Status/Restrictions: No weight bearing restrictions,   Other Medical Equipment (for information only, NOT a DME order):  Rolling walker  Other Treatments: Lovenox or Heparin as ordered for 28 days post hip surgery, bilateral knee high ALVA hose for 2 weeks after surgery    Patient's personal belongings (please select all that are sent with patient):      RN SIGNATURE:  {Esignature:252223825}    PHYSICIAN SECTION    Prognosis: Good    Condition at Discharge: Stable    Rehab Potential (if transferring to Rehab): Good    Physician Certification: I certify the above orders, information, and transfer of Esteban Black is necessary for the continuing treatment of the diagnosis listed and that he requires Acute Rehab unit for less 30 days.      Update Admission H&P: No change in H&P    PHYSICIAN SIGNATURE:  Electronically signed by GRACIA Cobos CNP on 3/21/22 at 12:49 PM EDT/ Dr Paul Mahmood in office 2 weeks after surgery   Zachery Maguire, [facility] and Sports Medicine  [address] [facility]   [phone]     CASE MANAGEMENT/SOCIAL WORK SECTION    Inpatient Status Date: ***    [facility] Readmission Risk Assessment Score:    Discharging to Facility/ Agency   Name:   Address:  Phone:  Fax:    Dialysis Facility (if applicable)   Name:  Address:  Dialysis Schedule:  Phone:  Fax:    / signature: {Esignature:[id number]}  Activity:  Elevate your leg if swelling occurs in your ankle. Use elastic wraps/hose until swelling decreases. Continue the exercise program as prescribed by physical therapists. Take frequent walks. Use walker, crutches, or cane with weight bearing instructions as indicated by the physical therapists. Take rest periods often. Elevate leg during rest period. Avoid sitting in low chairs or toilets without raised seats. Keep knees apart. Sleep with a pillow between your legs. Do not cross your legs, especially when putting on shoes and socks. Wound Care:  Cover the wound with a sterile gauze dressing and change daily as long as there is drainage. Do not scrub wound. Pat it dry with a soft towel. Dont apply any lotions or creams to your wound. Check the incision every day for redness, swelling, or increase in drainage. Diet:  You can resume your normal diet. There are no limits on your diet due to your surgery. Pain pills and activity changes may lead to constipation. To prevent this, use prune juice or bran cereals liberally. You may need to use a laxative such as Dulcolax, Senokot, or Milk of Magnesia. Drink plenty of fluids. Medications: Take pain pills if needed to maintain comfort. Never drive while taking pain medicine. Avoid over the counter medications until checking with your doctor.   Resume previous medications as instructed by your doctor. You will be onAspirin or Eliquis for 30 days only. Stay off other anti-inflammatory medications (except Celebrex) while on blood thinners. Call Your Doctor If:  You have increased pain not controlled by medications. Excessive swelling in your ankle. You develop numbness, tingling, or decreased movement. You have a fever greater than 100 degrees for a day or over 101 degrees at any one time. Your wound becomes more reddened, starts draining, or opens. If you fall. You have any questions about your recovery. Inform your family doctor/dentist or any other doctor who cares for you in the future that you have a joint replacement. You may need antibiotics for dental or surgical procedures if there is any evidence of infection present. If you have required the use of insulin to control your blood sugar after surgery, follow up with your family doctor. Call your surgeons office to schedule your appointment to be seen after surgery. Make your appointment to continue physical therapy per doctors orders.   Smoking cessation assistance can be obtained from your family doctor or by calling [place] @ [phone]    _______________________________   _____   _______________________  ____                Patient Signature              Date      Witness                               Date

## 2022-03-20 NOTE — PROGRESS NOTES
Patient resting in bed. Patient medicated with oxycodone and ice pack for left hip pain. Call light and belongings in reach, bed alarm activated, patient calls appropriately. Assessment done and charted. Will continue to monitor.

## 2022-03-20 NOTE — PLAN OF CARE
Problem: Pain:  Goal: Pain level will decrease  Description: Pain level will decrease  3/20/2022 0003 by Jeovany Jurado RN  Outcome: Ongoing  Note: Pt educated to attempt non-phagological method of pain control, but it it becomes too strong use PRN analgesics. Pain and discomfort being managed PRN analgesics per MD orders. Pt able to express presence of pain. Problem: Pain:  Goal: Control of acute pain  Description: Control of acute pain  3/20/2022 0003 by Jeovany Jurado RN  Outcome: Ongoing  Note: Patient educated on acute pain. Taught patient to use call light to ask for pain medication. PRN pain medication given for acute pain. Will continue to monitor pain per unit protocol. Problem: Pain:  Goal: Control of chronic pain  Description: Control of chronic pain  3/20/2022 0003 by Jeovany Jurado RN  Outcome: Ongoing  Note: Patient educated on chronic pain. Taught patient to use call light to ask for pain medication. PRN pain medication given for chronic pain. Will continue to monitor pain per unit protocol. Problem: Skin Integrity:  Goal: Will show no infection signs and symptoms  Description: Will show no infection signs and symptoms  3/20/2022 0003 by Jeovany Jurado RN  Outcome: Ongoing  Note: Assessment of surgical site complete. No signs of redness, warmth or swelling noted. Afebrile. Education provided on signs and symptoms of surgical site infections. Problem: Skin Integrity:  Goal: Absence of new skin breakdown  Description: Absence of new skin breakdown  3/20/2022 0003 by Jeovany Jurado RN  Outcome: Ongoing  Note: Skin assessment complete. No new signs of skin breakdown noted. Repositioning patient with pillows at two hour intervals. Heels elevated off bed.       Problem: Falls - Risk of:  Goal: Will remain free from falls  Description: Will remain free from falls  3/20/2022 0003 by Jeovany Jurado RN  Outcome: Ongoing  Note: Fall risk assessment completed .Fall precautions in place, bed/ chair alarm on, side rails 2/4 up, call light in reach, educated pt on calling for assistance when needed, room clear of clutter. Pt verbalized understanding. Problem: Falls - Risk of:  Goal: Absence of physical injury  Description: Absence of physical injury  3/20/2022 0003 by Lamont Figueroa RN  Outcome: Ongoing  Note: Patient remains free from physical injury. Patient educated on safety precautions. Will continue to monitor to ensure patient remains free from physical injury throughout remainder of shift. Problem: Nutrition  Goal: Optimal nutrition therapy  3/20/2022 0003 by Lamont Figueroa RN  Outcome: Ongoing  Note: Patient educated on proper nutrition. Patients intake monitored and patient assessed to make sure no assistance with eating was required. Patient encouraged to eat a well balanced diet.

## 2022-03-20 NOTE — PROGRESS NOTES
Physical Therapy    Facility/Department: [address] ORTHOPEDICS  Initial Assessment    NAME: Carol Bynum  : 1960  MRN: [id number]    Date of Service: 3/20/2022    Discharge Recommendations:  Continue to assess pending progress,5-7 sessions per week   PT Equipment Recommendations  Other: Defer to next level of care. Carol Bynum scored a 9/24 on the AM-PAC short mobility form. Current research shows that an AM-PAC score of 17 or less is typically not associated with a discharge to the patient's home setting. Based on the patient's AM-PAC score and their current functional mobility deficits, it is recommended that the patient have 5-7 sessions per week of Physical Therapy at d/c to increase the patient's independence. At this time, this patient demonstrates the endurance, and/or tolerance for 3 hours of therapy each day, with a treatment frequency of 5-7x/wk. Please see assessment section for further patient specific details. If patient discharges prior to next session this note will serve as a discharge summary. Please see below for the latest assessment towards goals. Assessment   Body structures, Functions, Activity limitations: Decreased functional mobility ; Decreased strength;Decreased endurance; Increased pain  Assessment: 65 y/o female admit 3/16/2022 with L Hip Pain, Met Malignant Neoplasm. CT Hip : Osseous Met Disease throughout L Hemipelvis, L Sacrum and Prox L Femur. CT T/L Spine L Compress Fx L1 (uncertain chronicity), Subacute Rib Fxs, Diffuse Bony Mets. 3/19/2022 S/P L Hip ORIF with IM Supa, L Fem Head Biopsy. PMH as noted including Breast Ca/Mastectomy. 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 Currently pt requiring assist x 2 to eob/oob via Stedy; caterina relatively well. Pt/dtr are hopeful for cont PT (5-7) upon d/c enabling return home in short period of time. Prognosis: Fair  Decision Making: Medium Complexity  History: 65 y/o female admit 3/16/2022 with L Hip Pain, Met Malignant Neoplasm. CT Hip : Osseous Met Disease throughout L Hemipelvis, L Sacrum and Prox L Femur. CT T/L Spine L Compress Fx L1 (uncertain chronicity), Subacute Rib Fxs, Diffuse Bony Mets. 3/19/2022 S/P L Hip ORIF with IM Supa, L Fem Head Biopsy. PMH as noted including Breast Ca/Mastectomy. Exam: See above. Clinical Presentation: See above. Patient Education: Role of PT, POC, Need to call for assist, LE Exs, OOB via Stedy. Barriers to Learning: None. REQUIRES PT FOLLOW UP: Yes  Activity Tolerance  Activity Tolerance: Patient limited by pain  Activity Tolerance: Pt requiring assist x 2 to eob and oob via Stedy. Unable to attempt Transfer/Amb with Walker at this time. Patient Diagnosis(es): The primary encounter diagnosis was Left hip pain. Diagnoses of Metastatic malignant neoplasm, unspecified site [facility]) and Difficulty in walking were also pertinent to this visit. has a past medical history of Cancer ([address] Depression, Hyperthyroidism, and Nephrolithiasis. has a past surgical history that includes Cholecystectomy; Hysterectomy; and Mastectomy. Restrictions  Restrictions/Precautions  Restrictions/Precautions: Weight Bearing,Fall Risk  Lower Extremity Weight Bearing Restrictions  Left Lower Extremity Weight Bearing: Weight Bearing As Tolerated  Vision/Hearing  Vision: Within Functional Limits  Hearing: Within functional limits     Subjective  General  Chart Reviewed: Yes  Patient assessed for rehabilitation services?: Yes  Additional Pertinent Hx: 65 y/o female admit 3/16/2022 with L Hip Pain, Met Malignant Neoplasm. CT Hip : Osseous Met Disease throughout L Hemipelvis, L Sacrum and Prox L Femur.   CT T/L Spine L Compress Fx L1 (uncertain chronicity), Subacute Rib Fxs, Diffuse Bony Mets.  3/19/2022 S/P L Hip ORIF with IM Supa, L Fem Head Biopsy. PMH as noted including Breast Ca/Mastectomy. Family / Caregiver Present: Yes (Dtr.)  Referring Practitioner: Dr. Barcenas Bracket: Within Functional Limits  Subjective  Subjective: Pt/dtr agreeable to PT Eval/Rx. Pain Screening  Patient Currently in Pain: Yes          Orientation  Orientation  Overall Orientation Status: Within Functional Limits  Social/Functional History  Social/Functional History  Lives With: Family (Dtr (temp staying), 3 Grandsons (19 (total care), 12, 8 yr). )  Type of Home: House  Home Layout: Multi-level,Laundry in basement,Able to Live on Main level with bedroom/bathroom  Home Access: Stairs to enter with rails,Ramped entrance (1 step to enter.)  Bathroom Shower/Tub: Tub/Shower unit (Overhead lift system for grandlenora (bed to/from w/c, w/c to/from tub/shower). )  Bathroom Toilet: Standard  Bathroom Accessibility: Accessible  Home Equipment: 4 wheeled walker  ADL Assistance: Independent (Increasing difficulty. 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 Additional Comments: Dtr (works fulltime), staying within home to assist while pt in hospital.  Cognition   Cognition  Overall Cognitive Status: WFL    Objective          AROM LLE (degrees)  LLE General AROM: Adequate for eob/oob. AROM RUE (degrees)  RUE AROM : WFL  AROM LUE (degrees)  LUE AROM : WFL  Strength RLE  Strength RLE: WFL  Strength LLE  Comment: Hip 3-/5; Knee 3/5; Ankle 4/5.   Strength RUE  Strength RUE: WFL  Strength LUE  Strength LUE: WFL     Sensation  Overall Sensation Status: WFL  Bed mobility  Supine to Sit: Moderate assistance;Maximum assistance;2 Person assistance (HOB elevated.)  Transfers  Sit to Stand: Moderate Assistance;2 Person Assistance ([place] [place]; WBAT.)  Stand to sit: Moderate Assistance;2 Person Assistance ([place] [place]; WBAT.)  Bed to Chair: Dependent/Total ([address].)  Comment: Additional Transfer via [place] Mod/Max assist x 2 from chair (lower surface). Balance  Comments: EOB static sitting SBA. Static Sitting during use of Stedy CGA. Pt able to briefly maintain Stand during use of Stedy Min assist.  Exercises  Quad Sets: 10x  Gluteal Sets: 10x  Hip Flexion: 10x  Knee Long Arc Quad: 10x  Ankle Pumps: 10x     Plan   Plan  Times per week: 3-5x week while in acute care setting. Current Treatment Recommendations: Strengthening,Functional Mobility Training,Transfer Training,Gait Training,Safety Education & Training,Patient/Caregiver Education & Training  Safety Devices  Type of devices: Call light within reach,Left in chair,Nurse notified      AM-PAC Score  AM-PAC Inpatient Mobility Raw Score : 9 (03/20/22 1218)  AM-PAC Inpatient T-Scale Score : 30.55 (03/20/22 1218)  Mobility Inpatient CMS 0-100% Score: 81.38 (03/20/22 1218)  Mobility Inpatient CMS G-Code Modifier : CM (03/20/22 1218)          Goals  Short term goals  Time Frame for Short term goals: Upon d/c acute care setting. Short term goal 1: Bed Mob Mod assist.  Short term goal 2: Transfer via [employer] assist x 2. Short term goal 3: Attempt Transfer/Amb with Walker as approp. Short term goal 4: Pt participating in approp ROM/Strength Exs. Patient Goals   Patient goals : Return home.        Therapy Time   Individual Concurrent Group Co-treatment   Time In 5080         Time Out [facility]         Minutes 10[facility]

## 2022-03-20 NOTE — PROGRESS NOTES
Pt resting in bed in afternoon. She was able to sit up in chair for a while after working with therapy this shift. Utilizing stedy x 2 for transfers, pt tolerating well. She remains SR on telemetry. Reinforced dressing to L hip as bandage becoming loose. Fall precautions in place, belongings within reach. Will continue to monitor and assess.

## 2022-03-21 LAB
A/G RATIO: 1.2 (ref 1.1–2.2)
ALBUMIN SERPL-MCNC: 3.6 G/DL (ref 3.4–5)
ALP BLD-CCNC: 244 U/L (ref 40–129)
ALT SERPL-CCNC: 381 U/L (ref 10–40)
ANION GAP SERPL CALCULATED.3IONS-SCNC: 18 MMOL/L (ref 3–16)
AST SERPL-CCNC: 170 U/L (ref 15–37)
BASOPHILS ABSOLUTE: 0 K/UL (ref 0–0.2)
BASOPHILS RELATIVE PERCENT: 0.6 %
BILIRUB SERPL-MCNC: 0.9 MG/DL (ref 0–1)
BUN BLDV-MCNC: 20 MG/DL (ref 7–20)
CALCIUM SERPL-MCNC: 7.8 MG/DL (ref 8.3–10.6)
CHLORIDE BLD-SCNC: 95 MMOL/L (ref 99–110)
CO2: 20 MMOL/L (ref 21–32)
CREAT SERPL-MCNC: 0.8 MG/DL (ref 0.6–1.2)
EOSINOPHILS ABSOLUTE: 0.1 K/UL (ref 0–0.6)
EOSINOPHILS RELATIVE PERCENT: 1.3 %
GFR AFRICAN AMERICAN: >60
GFR NON-AFRICAN AMERICAN: >60
GLUCOSE BLD-MCNC: 116 MG/DL (ref 70–99)
GLUCOSE BLD-MCNC: 126 MG/DL (ref 70–99)
GLUCOSE BLD-MCNC: 129 MG/DL (ref 70–99)
GLUCOSE BLD-MCNC: 130 MG/DL (ref 70–99)
GLUCOSE BLD-MCNC: 144 MG/DL (ref 70–99)
HAV AB SERPL IA-ACNC: NEGATIVE
HCT VFR BLD CALC: 29.9 % (ref 36–48)
HEMOGLOBIN: 10.5 G/DL (ref 12–16)
HEPATITIS B CORE TOTAL ANTIBODY: NEGATIVE
INR BLD: 1.1 (ref 0.88–1.12)
LYMPHOCYTES ABSOLUTE: 1.1 K/UL (ref 1–5.1)
LYMPHOCYTES RELATIVE PERCENT: 25.3 %
MAGNESIUM: 2.1 MG/DL (ref 1.8–2.4)
MCH RBC QN AUTO: 29.6 PG (ref 26–34)
MCHC RBC AUTO-ENTMCNC: 35.1 G/DL (ref 31–36)
MCV RBC AUTO: 84.3 FL (ref 80–100)
MONOCYTES ABSOLUTE: 0.2 K/UL (ref 0–1.3)
MONOCYTES RELATIVE PERCENT: 5.6 %
NEUTROPHILS ABSOLUTE: 3 K/UL (ref 1.7–7.7)
NEUTROPHILS RELATIVE PERCENT: 67.2 %
PDW BLD-RTO: 14.9 % (ref 12.4–15.4)
PERFORMED ON: ABNORMAL
PLATELET # BLD: 139 K/UL (ref 135–450)
PMV BLD AUTO: 7.3 FL (ref 5–10.5)
POTASSIUM SERPL-SCNC: 3.2 MMOL/L (ref 3.5–5.1)
PROTHROMBIN TIME: 12.5 SEC (ref 9.9–12.7)
RBC # BLD: 3.55 M/UL (ref 4–5.2)
SODIUM BLD-SCNC: 133 MMOL/L (ref 136–145)
TOTAL PROTEIN: 6.6 G/DL (ref 6.4–8.2)
WBC # BLD: 4.4 K/UL (ref 4–11)

## 2022-03-21 PROCEDURE — 83735 ASSAY OF MAGNESIUM: CPT

## 2022-03-21 PROCEDURE — 97166 OT EVAL MOD COMPLEX 45 MIN: CPT

## 2022-03-21 PROCEDURE — 6370000000 HC RX 637 (ALT 250 FOR IP): Performed by: STUDENT IN AN ORGANIZED HEALTH CARE EDUCATION/TRAINING PROGRAM

## 2022-03-21 PROCEDURE — 2580000003 HC RX 258: Performed by: ORTHOPAEDIC SURGERY

## 2022-03-21 PROCEDURE — 80053 COMPREHEN METABOLIC PANEL: CPT

## 2022-03-21 PROCEDURE — 85610 PROTHROMBIN TIME: CPT

## 2022-03-21 PROCEDURE — 6370000000 HC RX 637 (ALT 250 FOR IP): Performed by: PHYSICAL MEDICINE & REHABILITATION

## 2022-03-21 PROCEDURE — 97116 GAIT TRAINING THERAPY: CPT

## 2022-03-21 PROCEDURE — 36415 COLL VENOUS BLD VENIPUNCTURE: CPT

## 2022-03-21 PROCEDURE — 97535 SELF CARE MNGMENT TRAINING: CPT

## 2022-03-21 PROCEDURE — 6360000002 HC RX W HCPCS: Performed by: HOSPITALIST

## 2022-03-21 PROCEDURE — 6360000002 HC RX W HCPCS: Performed by: NURSE PRACTITIONER

## 2022-03-21 PROCEDURE — 97530 THERAPEUTIC ACTIVITIES: CPT

## 2022-03-21 PROCEDURE — 97110 THERAPEUTIC EXERCISES: CPT

## 2022-03-21 PROCEDURE — 2060000000 HC ICU INTERMEDIATE R&B

## 2022-03-21 PROCEDURE — 6370000000 HC RX 637 (ALT 250 FOR IP): Performed by: ORTHOPAEDIC SURGERY

## 2022-03-21 PROCEDURE — 85025 COMPLETE CBC W/AUTO DIFF WBC: CPT

## 2022-03-21 PROCEDURE — 6370000000 HC RX 637 (ALT 250 FOR IP): Performed by: HOSPITALIST

## 2022-03-21 RX ORDER — FAMOTIDINE 20 MG/1
20 TABLET, FILM COATED ORAL 2 TIMES DAILY
Status: DISCONTINUED | OUTPATIENT
Start: 2022-03-21 | End: 2022-03-22 | Stop reason: HOSPADM

## 2022-03-21 RX ADMIN — OXYCODONE HYDROCHLORIDE 10 MG: 10 TABLET ORAL at 04:16

## 2022-03-21 RX ADMIN — ENOXAPARIN SODIUM 40 MG: 100 INJECTION SUBCUTANEOUS at 08:37

## 2022-03-21 RX ADMIN — OXYCODONE HYDROCHLORIDE 10 MG: 10 TABLET ORAL at 08:43

## 2022-03-21 RX ADMIN — SODIUM CHLORIDE, PRESERVATIVE FREE 10 ML: 5 INJECTION INTRAVENOUS at 08:38

## 2022-03-21 RX ADMIN — PROCHLORPERAZINE EDISYLATE 5 MG: 5 INJECTION INTRAMUSCULAR; INTRAVENOUS at 04:38

## 2022-03-21 RX ADMIN — ANTACID TABLETS 500 MG: 500 TABLET, CHEWABLE ORAL at 19:02

## 2022-03-21 RX ADMIN — SENNOSIDES AND DOCUSATE SODIUM 1 TABLET: 50; 8.6 TABLET ORAL at 21:05

## 2022-03-21 RX ADMIN — OXYCODONE HYDROCHLORIDE 10 MG: 10 TABLET ORAL at 17:42

## 2022-03-21 RX ADMIN — FAMOTIDINE 20 MG: 20 TABLET ORAL at 21:05

## 2022-03-21 RX ADMIN — INSULIN LISPRO 1 UNITS: 100 INJECTION, SOLUTION INTRAVENOUS; SUBCUTANEOUS at 12:00

## 2022-03-21 RX ADMIN — METHYLNALTREXONE BROMIDE 12 MG: 12 INJECTION, SOLUTION SUBCUTANEOUS at 12:16

## 2022-03-21 RX ADMIN — OXYCODONE HYDROCHLORIDE 10 MG: 10 TABLET ORAL at 21:49

## 2022-03-21 RX ADMIN — SENNOSIDES AND DOCUSATE SODIUM 1 TABLET: 50; 8.6 TABLET ORAL at 08:37

## 2022-03-21 ASSESSMENT — PAIN DESCRIPTION - PROGRESSION
CLINICAL_PROGRESSION: GRADUALLY WORSENING
CLINICAL_PROGRESSION: GRADUALLY IMPROVING
CLINICAL_PROGRESSION: GRADUALLY WORSENING
CLINICAL_PROGRESSION: GRADUALLY WORSENING
CLINICAL_PROGRESSION: GRADUALLY IMPROVING
CLINICAL_PROGRESSION: GRADUALLY IMPROVING

## 2022-03-21 ASSESSMENT — PAIN SCALES - GENERAL
PAINLEVEL_OUTOF10: 8
PAINLEVEL_OUTOF10: 0
PAINLEVEL_OUTOF10: 9
PAINLEVEL_OUTOF10: 9
PAINLEVEL_OUTOF10: 6
PAINLEVEL_OUTOF10: 8
PAINLEVEL_OUTOF10: 7
PAINLEVEL_OUTOF10: 9
PAINLEVEL_OUTOF10: 6

## 2022-03-21 ASSESSMENT — PAIN DESCRIPTION - FREQUENCY
FREQUENCY: CONTINUOUS

## 2022-03-21 ASSESSMENT — PAIN DESCRIPTION - ORIENTATION
ORIENTATION: LEFT

## 2022-03-21 ASSESSMENT — PAIN DESCRIPTION - LOCATION
LOCATION: HIP
LOCATION: BACK;HIP;KNEE
LOCATION: HIP;KNEE
LOCATION: HIP
LOCATION: HIP
LOCATION: HIP;KNEE;BACK
LOCATION: HIP;KNEE
LOCATION: HIP;KNEE;BACK

## 2022-03-21 ASSESSMENT — PAIN DESCRIPTION - PAIN TYPE
TYPE: SURGICAL PAIN
TYPE: SURGICAL PAIN;ACUTE PAIN
TYPE: ACUTE PAIN;SURGICAL PAIN
TYPE: SURGICAL PAIN

## 2022-03-21 ASSESSMENT — PAIN DESCRIPTION - DESCRIPTORS
DESCRIPTORS: ACHING;CONSTANT
DESCRIPTORS: ACHING;CONSTANT
DESCRIPTORS: ACHING
DESCRIPTORS: ACHING;CONSTANT
DESCRIPTORS: SORE;CONSTANT
DESCRIPTORS: SORE;CONSTANT

## 2022-03-21 ASSESSMENT — PAIN - FUNCTIONAL ASSESSMENT
PAIN_FUNCTIONAL_ASSESSMENT: PREVENTS OR INTERFERES SOME ACTIVE ACTIVITIES AND ADLS

## 2022-03-21 ASSESSMENT — PAIN DESCRIPTION - ONSET
ONSET: ON-GOING
ONSET: GRADUAL
ONSET: ON-GOING

## 2022-03-21 NOTE — PROGRESS NOTES
Comprehensive Nutrition Assessment    Type and Reason for Visit:  Reassess    Nutrition Recommendations/Plan:   Adding cottage cheese bid as pt declined ONS  Encourage protein foods each meal  Continue to monitor intake    Nutrition Assessment:  Pt now on regular diet with one meal recorded as %. Pt had not touched lunch tray. Pt states she is constipated and does not feel like eating at this time. No protein selected on lunch tray. Pt declined ONS. Agreeable to adding cottage cheese to each tray and encouraged possible selection of yogurt daily. Will continue to comitor. Malnutrition Assessment:  Malnutrition Status: At risk for malnutrition (Comment)    Context:  Acute Illness     Findings of the 6 clinical characteristics of malnutrition:  Energy Intake:  Mild decrease in energy intake (Comment) (Poor intake prior to admission)  Weight Loss:  Unable to assess (no wt hx per EMR. Pt states she has lost 20# in 2 weeks)     Body Fat Loss:  No significant body fat loss     Muscle Mass Loss:  No significant muscle mass loss    Fluid Accumulation:  1 - Mild Extremities   Strength:  Not Performed    Estimated Daily Nutrient Needs:  Energy (kcal):  0981-0394 kcal (25-30 kcal/kg); Weight Used for Energy Requirements:  Ideal     Protein (g):  57-68 gm (1.0-1.2 g/kg); Weight Used for Protein Requirements:  Ideal        Fluid (ml/day):   ; Method Used for Fluid Requirements:  1 ml/kcal      Nutrition Related Findings:  BM 3/15, LLE +1 non pitting, , K+ 3.2      Wounds:  Surgical Incision       Current Nutrition Therapies:    ADULT DIET;  Regular    Anthropometric Measures:  · Height: 5' 5\" (165.1 cm)  · Current Body Weight: 243 lb 6.2 oz (110.4 kg)   · Admission Body Weight: 236 lb (107 kg)    · Usual Body Weight: 255 lb (115.7 kg)     · Ideal Body Weight: 125 lbs; % Ideal Body Weight 190.4 %   · BMI: 40.5  · Adjusted Body Weight:  ; No Adjustment   · BMI Categories: Obese Class 2 (BMI 35.0 -39.9) Nutrition Diagnosis:   · Unintended weight loss related to inadequate protein-energy intake,pain as evidenced by weight loss,poor intake prior to admission,NPO or clear liquid status due to medical condition      Nutrition Interventions:   Food and/or Nutrient Delivery:  Continue Current Diet  Nutrition Education/Counseling:  No recommendation at this time   Coordination of Nutrition Care:  Continue to monitor while inpatient    Goals:  PO intake >50%       Nutrition Monitoring and Evaluation:   Behavioral-Environmental Outcomes:  None Identified   Food/Nutrient Intake Outcomes:  Food and Nutrient Intake  Physical Signs/Symptoms Outcomes:  Biochemical Data,Constipation,Fluid Status or Edema,Nutrition Focused Physical Findings,Skin,Weight     Discharge Planning:    No discharge needs at this time     Electronically signed by Simona Pitts RD, LD on 3/21/22 at 2:43 PM EDT    Contact: [phone]

## 2022-03-21 NOTE — PROGRESS NOTES
Physical Therapy    Facility/Department: [address] ORTHOPEDICS  Treatment note    NAME: Denys Francois  : 1960  MRN: [id number]    Date of Service: 3/21/2022    Assessment / Discharge Recommendations:  -progressing well   -will need continued PT OT and nursing care in a skilled setting prior to home  -anticipate will tolerate and benefit from High frequency of sessions     Denys Francois scored a 10/24 on the AM-PAC short mobility form. Current research shows that an AM-PAC score of 17 or less is typically not associated with a discharge to the patient's home setting. Based on the patient's AM-PAC score and their current functional mobility deficits, it is recommended that the patient have 5-7 sessions per week of Physical Therapy at d/c to increase the patient's independence. Body structures, Functions, Activity limitations: Decreased functional mobility ; Decreased ADL status; Decreased strength; Increased pain  Prognosis: Good  Activity Tolerance  Activity Tolerance: Patient Tolerated treatment well       Patient Diagnosis(es): The primary encounter diagnosis was Left hip pain. Diagnoses of Metastatic malignant neoplasm, unspecified site [facility]), Difficulty in walking, and Pathological fracture of left hip, initial encounter [facility]) were also pertinent to this visit. has a past medical history of Cancer ([address] Depression, Hyperthyroidism, and Nephrolithiasis. has a past surgical history that includes Cholecystectomy; Hysterectomy; Mastectomy; and Femur fracture surgery (Left, 3/19/2022).     Restrictions  Restrictions/Precautions  Restrictions/Precautions: Weight Bearing,Fall Risk  Lower Extremity Weight Bearing Restrictions  Left Lower Extremity Weight Bearing: Weight Bearing As Tolerated  Vision/Hearing  Vision: Within Functional Limits  Hearing: Within functional limits     Subjective  General  Chart Reviewed: Yes  Patient assessed for rehabilitation services?: Yes  Additional Pertinent Hx: 64 y/o female admit 3/16/2022 with L Hip Pain, Met Malignant Neoplasm. CT Hip : Osseous Met Disease throughout L Hemipelvis, L Sacrum and Prox L Femur. CT T/L Spine L Compress Fx L1 (uncertain chronicity), Subacute Rib Fxs, Diffuse Bony Mets. 3/19/2022 S/P L Hip ORIF with IM Supa, L Fem Head Biopsy. PMH as noted including Breast Ca/Mastectomy. Response To Previous Treatment: Patient with no complaints from previous session. Family / Caregiver Present: No  Follows Commands: Within Functional Limits  Subjective  Subjective: arrived to room along with OT to patient sitting OOB in recliner - states she was able to get up in stedy with staff - agreeable to PTOT session and to assess up to walker   -states pain medications are helpful  Pain Screening  Patient Currently in Pain: Yes  Orientation  Orientation  Overall Orientation Status: Within Functional Limits  Social/Functional History  Social/Functional History  Lives With: Family (Dtr (temp staying), 3 Grandsons (19 (total care), 16, 8 yr). )  Type of Home: House  Home Layout: Multi-level,Laundry in basement,Able to Live on Main level with bedroom/bathroom  Home Access: Stairs to enter with rails,Ramped entrance (1 step to enter.)  Bathroom Shower/Tub: Tub/Shower unit (Overhead lift system for grandlen[employer] (bed to/from w/c, w/c to/from tub/shower). )  Bathroom Toilet: Standard  Bathroom Accessibility: Accessible  Home Equipment: 4 wheeled walker  ADL Assistance: Independent (Increasing difficulty. 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   Additional Comments: Dtr (works fulltime), staying within home to assist while pt in hospital.  Cognition   Cognition  Overall Cognitive Status: WFL  Objective  Sensation  Overall Sensation Status: WFL  Bed mobility  Comment: not observed at this session  Transfers  Sit to Stand: Moderate Assistance (in stedy and up to walker)  Stand to sit: Moderate Assistance  Ambulation  Ambulation?: Yes  Ambulation 1  Surface: level tile  Device: Rolling Walker  Assistance: Minimal assistance  Quality of Gait: able to take a step or two forward and backward  Distance: na  Comments: stable  Stairs/Curb  Stairs?: No  Balance  Comments: midline in sitting in the recliner   - midline in static stance on the walker and in the stedy   -dynamic is fair on rolling walker (limited observation)  Exercises  Ankle Pumps: 30 per hour in easy pace  Comments: encouraged practice with the spirometer as instructed by RT   Plan   Plan  Times per week: qdx1-2 then 3-5 while on acute setting  Current Treatment Recommendations: Strengthening,Functional Mobility Training,Transfer [employer] Training,Safety Education & Training,Patient/Caregiver Education & Training,ADL/Self-care Training,Positioning,Modalities  Safety Devices  Type of devices: All fall risk precautions in place,Call light within reach,Left in chair,Nurse notified Ellen David)  AM-PAC Score  AM-PAC Inpatient Mobility Raw Score : 10 (03/21/22 1128)  AM-PAC Inpatient T-Scale Score : 32.29 (03/21/22 1128)  Mobility Inpatient CMS 0-100% Score: 76.75 (03/21/22 1128)  Mobility Inpatient CMS G-Code Modifier : CL (03/21/22 1128)  Goals  Short term goals  Time Frame for Short term goals: 1-2 days  Short term goal 1: Bed Mob Mod assist.  Short term goal 2: Transfer via [employer] assist x 2. Short term goal 3: Attempt Transfer/Amb with Walker as approp. Short term goal 4: Pt participating in approp ROM/Strength Exs. Patient Goals   Patient goals : Return home.        Therapy Time   Individual Concurrent Group Co-treatment   Time In 1100         Time Out 1140         Minutes [address] [facility] Dali Joey, PT

## 2022-03-21 NOTE — PROGRESS NOTES
Hospitalist Progress Note      PCP: Pranav Gomes MD    Date of Admission: 3/16/2022    Chief Complaint: leg pain    Hospital Course: 61f with leg pain x 4 months, found to have pathologic fx L hip with hypercalcemia, widespread skeletal metastasis. Underwent L hip ORIF 3/19,     Subjective: Denies uncontrolled pain, has had some nausea, no vomiting, last BM a week ago      Medications:  Reviewed    Infusion Medications    sodium chloride 25 mL (03/19/22 1804)    dextrose       Scheduled Medications    sodium chloride flush  5-40 mL IntraVENous 2 times per day    sennosides-docusate sodium  1 tablet Oral BID    enoxaparin  40 mg SubCUTAneous Daily    insulin lispro  0-6 Units SubCUTAneous TID WC    insulin lispro  0-3 Units SubCUTAneous Nightly     PRN Meds: calcium carbonate, sodium chloride flush, sodium chloride, oxyCODONE, HYDROmorphone, ondansetron, prochlorperazine, glucose, dextrose, glucagon (rDNA), dextrose      Intake/Output Summary (Last 24 hours) at 3/21/2022 0986  Last data filed at 3/21/2022 4592  Gross per 24 hour   Intake 920 ml   Output 800 ml   Net 120 ml       Physical Exam Performed:    BP (!) 182/72   Pulse 94   Temp 98.5 °F (36.9 °C) (Oral)   Resp 18   Ht 5' 5\" (1.651 m)   Wt 243 lb 6.2 oz (110.4 kg)   SpO2 94%   BMI 40.50 kg/m²     General appearance: No apparent distress, appears stated age and cooperative. HEENT: Pupils equal, round,  Conjunctivae/corneas clear. Neck: Supple, with full range of motion. No jugular venous distention. Trachea midline. Respiratory:  Normal respiratory effort. No use of accessory muscles  Cardiovascular: Regular rate and rhythm    Abdomen: Soft, non-tender, non-distended  . Musculoskeletal: No clubbing, cyanosis or edema bilaterally. Skin: Skin color, texture, turgor normal.     Neurologic:  Neurovascularly intact without any focal sensory/motor deficits.  Cranial nerves: II-XII intact, grossly non-focal.  Psychiatric: Alert and oriented, thought content appropriate        Labs:   Recent Labs     03/19/22  0605 03/20/22  0617 03/21/22  0455   WBC 2.9* 5.1 4.4   HGB 11.3* 10.7* 10.5*   HCT 33.1* 30.6* 29.9*    130* 139     Recent Labs     03/19/22  0605 03/20/22  0617 03/21/22  0455   * 132* 133*   K 3.1* 3.6 3.2*   CL 96* 95* 95*   CO2 24 24 20*   BUN 9 11 20   CREATININE 0.6 0.5* 0.8   CALCIUM 8.1* 7.4* 7.8*     Recent Labs     03/19/22  0605 03/20/22  0617 03/21/22  0455   * 146* 170*   * 333* 381*   BILITOT 1.2* 0.9 0.9   ALKPHOS 206* 201* 244*     Recent Labs     03/20/22  0855 03/21/22  0455   INR 1.19* 1.10     No results for input(s): CKTOTAL, TROPONINI in the last 72 hours. Urinalysis:      Lab Results   Component Value Date    NITRU Negative 03/17/2022    BLOODU Negative 03/17/2022    SPECGRAV 1.016 03/17/2022    GLUCOSEU Negative 03/17/2022       Radiology:  XR HIP LEFT (2-3 VIEWS)   Final Result      CT CHEST WO CONTRAST   Final Result   1. Moderate subcutaneous inflammatory stranding within the right axilla   likely due to edema. 2. Right axillary adenopathy either due to sandeep metastasis or reactive   disease. 3. Diffuse osseous metastatic disease likely causing multiple   age-indeterminate pathologic fractures. If there is point tenderness   throughout the thoracolumbar spine, recommend nonemergent MRI of the   thoracolumbar spine with without contrast.         CT ABDOMEN PELVIS W IV CONTRAST Additional Contrast? None   Final Result   Lytic and sclerotic lesions are seen diffusely through the visualized osseous   structures in the pelvis and lumbar spine consistent with osseous metastatic   disease. No CT evidence of renal mass. CT THORACIC SPINE WO CONTRAST   Final Result   1. Diffuse bone metastasis. 2. Less than 50% compression fracture of L1 of uncertain chronicity. Subacute rib fractures. 3. Partially visualized questionable mass in the left kidney.   CT abdomen pelvis with contrast recommended. 4. Nonobstructive renal calculi. 5. Other findings as described. CT HIP LEFT WO CONTRAST   Final Result   1. Osseous metastatic disease throughout the imaged left hemipelvis, left   sacrum, and proximal left femur with large destructive lesions centered   within the left femoral head and neck and involving the partially imaged left   sacrum. 2. No definite pathologic fracture identified on the current exam, however,   the location and severe destructive changes of the left femoral metastatic   lesion do place the patient at a significant risk for pathologic fracture at   this site. Recommend orthopedic consultation. CT LUMBAR SPINE WO CONTRAST   Final Result   1. Diffuse bone metastasis. 2. Less than 50% compression fracture of L1 of uncertain chronicity. Subacute rib fractures. 3. Partially visualized questionable mass in the left kidney. CT abdomen   pelvis with contrast recommended. 4. Nonobstructive renal calculi. 5. Other findings as described. XR KNEE LEFT (1-2 VIEWS)   Final Result   Minimal degenerative changes of the left knee with no evidence of acute   osseous abnormality. XR HIP 2-3 VW W PELVIS LEFT   Final Result   Mild compression deformity of the T12 vertebral body, age indeterminate. No   acute lumbar spine fracture. No comparison imaging available. No acute osseous abnormality of the pelvis or left hip. Indeterminate 2 cm   lucent lesion within the left femoral neck. If the patient has a history of   malignancy, finding is concerning for possible metastatic focus. Consider   follow-up MRI or bone scan as clinically indicated. XR LUMBAR SPINE (2-3 VIEWS)   Final Result   Mild compression deformity of the T12 vertebral body, age indeterminate. No   acute lumbar spine fracture. No comparison imaging available. No acute osseous abnormality of the pelvis or left hip.   Indeterminate 2 cm   lucent lesion within the left femoral neck. If the patient has a history of   malignancy, finding is concerning for possible metastatic focus. Consider   follow-up MRI or bone scan as clinically indicated. FLUORO FOR SURGICAL PROCEDURES    (Results Pending)           Assessment/Plan:    Active Hospital Problems    Diagnosis Date Noted    Malignancy ([facility] [address] [C80.1] 03/17/2022    Hypercalcemia [E83.52] 03/17/2022    Left hip pain [M25.552] 03/16/2022    Pathological fracture of left hip, initial encounter [facility]) [W57.571Z] 03/16/2022     1) Hip fracture  - PT/OT  - Rehab consult    2) constipation  - relistor     3) DM  - no hypoglycemia, controlled    DVT Prophylaxis: lovenox  Diet: ADULT DIET;  Regular  Code Status: Full Code         Roseanna Ponce MD

## 2022-03-21 NOTE — CARE COORDINATION
Spoke with Basilio Done on Acute Rehab--they are reviewing patient for admit.     Electronically signed by LINCOLN Juarez, DUNG, Case Management on 3/21/2022 at 4:21 PM  [address] [facility] [phone]

## 2022-03-21 NOTE — CONSULTS
Consult Note  Physical Medicine and Rehabilitation    Patient: Mo Shaver  [id number]  Date: 3/21/2022      Chief Complaint: left hip pain    History of Present Illness/Hospital Course:  Patient is a 63 yo F with pmh R breast cancer (s/p R mastectomy 2007, chemo, 5 years anti-estrogen treatment, then lost follow-up with Dr. Donn Sun), DM2, hyperthyroidism, and depression who initially presented  3/16/2022 with left hip pain. Had been progressing since 11/2021. Imaging revealed diffuse metastatic disease/lytic bone lesions involving the chest, thoracic spine, lumbar spine, left hemipelvis and proximal left femur. Ortho consulted and patient deemed high risk for pathologic fracture given large destructive lesions centered around the left femoral head. Therefore underwent ORIF with IM kelvin and bone biopsy (3/19 with Dr. Joe Shields). Now WBAT. Biopsy is pending and primary malignancy remains unclear. Per Oncology, plan will be for radiation in few weeks. Course complicated by hypercalcemia, acute liver injury, constipation. Currently, patient reports moderate pain in the left groin and lateral hip with radiation into the anterolateral thigh. She also has mild pain in the right hip. She denies tingling/numbness or focal weakness. She would like to come to ARU to improve her function prior to returning home. 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    Current Level of Function:   Mod A for mobility  Up to maxA for ADLs    Pertinent Social History:  Support: Lives with 3 grandsons: 24 yo (special needs, requires total care, uses lift equipment), 11 yo, 7 yo (autism); daughter temporarily staying  Home set-up: multi-level home with FFSU, ramped entry    Past Medical History:   Diagnosis Date    Cancer ([facility] [address]     Depression     Hyperthyroidism     Nephrolithiasis        Past Surgical History:   Procedure Laterality Date    CHOLECYSTECTOMY      FEMUR FRACTURE SURGERY Left 3/19/2022    OPEN REDUCTION INTERNAL FIXATION LEFT HIP WITH INTRAMEDULLARY JAMISON; LEFT FEMORAL HEAD BONE BIOPSY INTRAOPERATIVELY performed by Kym Day MD at [address] [facility]         Family History   Problem Relation Age of Onset    Mental Illness Mother     High Blood Pressure Mother     High Blood Pressure Father     Diabetes Father        Social History     Socioeconomic History    Marital status: Single     Spouse name: None    Number of children: None    Years of education: None    Highest education level: None   Occupational History    None   Tobacco Use    Smoking status: Former Smoker    Smokeless tobacco: Never Used   Substance and Sexual Activity    Alcohol use: Yes     Comment: rarely    Drug use: No    Sexual activity: None   Other Topics Concern    None   Social History Narrative    None     Social Determinants of Health     Financial Resource Strain:     Difficulty of Paying Living Expenses: Not on file   Food Insecurity:     Worried About Running Out of Food in the Last Year: Not on file    [place] of Food in the Last Year: Not on file   Transportation Needs:     Lack of Transportation (Medical): Not on file    Lack of Transportation (Non-Medical):  Not on file   Physical Activity:     Days of Exercise per Week: Not on file    Minutes of Exercise per Session: Not on file   Stress:     Feeling of Stress : Not on file   Social Connections:     Frequency of Communication with Friends and Family: Not on file    Frequency of Social Gatherings with Friends and Family: Not on file    Attends [employer] Services: Not on file    Active Member of Clubs or Organizations: Not on file    Attends Club or Organization Meetings: Not on file    Marital Status: Not on file   Intimate Partner Violence:     Fear of Current or Ex-Partner: Not on file    Emotionally Abused: Not on file    Physically Abused: Not on file    Sexually Abused: Not on file   Housing Stability:     Unable to Pay for Housing in the Last Year: Not on file    Number of Places Lived in the Last Year: Not on file    Unstable Housing in the Last Year: Not on file           REVIEW OF SYSTEMS:   CONSTITUTIONAL: negative for fevers, chills, diaphoresis, appetite change, night sweats, unexpected weight change, fatigue  EYES: negative for blurred vision, eye discharge, visual disturbance and icterus  HEENT: negative for hearing loss, tinnitus, ear drainage, sinus pressure, nasal congestion, epistaxis and snoring  RESPIRATORY: Negative for hemoptysis, cough, sputum production  CARDIOVASCULAR: negative for chest pain, palpitations, exertional chest pressure/discomfort, syncope, edema  GASTROINTESTINAL: negative for nausea, vomiting, diarrhea, blood in stool, abdominal pain, + constipation, heart burn  GENITOURINARY: negative for frequency, dysuria, urinary incontinence, decreased urine volume, and hematuria  HEMATOLOGIC/LYMPHATIC: negative for easy bruising, bleeding and lymphadenopathy  ALLERGIC/IMMUNOLOGIC: negative for recurrent infections, angioedema, anaphylaxis and drug reactions  ENDOCRINE: negative for weight changes and diabetic symptoms including polyuria, polydipsia and polyphagia  MUSCULOSKELETAL: refer to HPI  NEUROLOGICAL: negative for headaches, slurred speech, unilateral weakness  PSYCHIATRIC/BEHAVIORAL: negative for hallucinations, behavioral problems, confusion and agitation.      Physical Examination:  Vitals:   Patient Vitals for the past 24 hrs:   BP Temp Temp src Pulse Resp SpO2 Weight   03/21/22 1612 127/80 98.4 °F (36.9 °C) Oral 97 16 95 % --   03/21/22 1116 130/66 97.7 °F (36.5 °C) Oral 103 16 94 % --   03/21/22 1054 139/80 98 °F (36.7 °C) Oral 96 16 95 % --   03/21/22 0736 (!) 182/72 98.5 °F (36.9 °C) Oral 94 18 94 % --   03/21/22 0530 -- -- -- -- -- -- 243 lb 6.2 oz (110.4 kg)   03/21/22 0307 134/78 98 °F (36.7 °C) Oral 75 16 94 % --   03/21/22 0053 124/74 98.5 °F (36.9 °C) Oral 86 16 95 % --   03/20/22 1920 (!) 146/76 98.1 °F (36.7 °C) Oral 87 17 99 % --     Const: Alert. WDWN. No distress  Eyes: Conjunctiva noninjected, no icterus noted; pupils equal, round, and reactive to light. HENT: Atraumatic, normocephalic; Oral mucosa moist  Neck: Trachea midline, neck supple. No thyromegaly noted. CV: Regular rate and rhythm, no murmur rub or gallop noted  Resp: Lungs clear to auscultation bilaterally, no rales wheezes or rhonchi, no retractions. Respirations unlabored. GI: Soft, nontender, nondistended. Normal bowel sounds. No palpable masses. Skin: Normal temperature and turgor. No rashes or breakdown noted. Ext: trace LE edema. No varicosities. MSK: Decreased left>right hip ROM, post op swelling left thigh. Otherwise no joint tenderness, erythema, warmth noted. Neuro: Alert, oriented, appropriate. No cranial nerve deficits appreciated. Sensation intact to light touch. Motor examination reveals strength 5/5 BUE, 5/5 RLE (except 4/5 right hip flexion, 5-/5 right knee extension), 2-3/5 left hip and knee (pain limited), 5/5 left ankle DF and PF. No abnormalities with finger/nose noted. Reflexes diminished, symmetric.   Psych: Stable mood, normal judgement, normal affect     Lab Results   Component Value Date    WBC 4.4 03/21/2022    HGB 10.5 (L) 03/21/2022    HCT 29.9 (L) 03/21/2022    MCV 84.3 03/21/2022     03/21/2022     Lab Results   Component Value Date    INR 1.10 03/21/2022    INR 1.19 (H) 03/20/2022    PROTIME 12.5 03/21/2022    PROTIME 13.5 (H) 03/20/2022     Lab Results   Component Value Date    CREATININE 0.8 03/21/2022    BUN 20 03/21/2022     (L) 03/21/2022    K 3.2 (L) 03/21/2022    CL 95 (L) 03/21/2022    CO2 20 (L) 03/21/2022     Lab Results   Component Value Date     (H) 03/21/2022     (H) 03/21/2022    GGT 33 03/16/2022    ALKPHOS 244 (H) 03/21/2022    BILITOT 0.9 03/21/2022       Most recent EKG revealed:  Sinus rhythm    Most recent imaging studies revealed:    CT chest  1. Moderate subcutaneous inflammatory stranding within the right axilla   likely due to edema. 2. Right axillary adenopathy either due to sandeep metastasis or reactive   disease. 3. Diffuse osseous metastatic disease likely causing multiple   age-indeterminate pathologic fractures.  If there is point tenderness   throughout the thoracolumbar spine, recommend nonemergent MRI of the   thoracolumbar spine with without contrast.       CT abdomen/pelvis  Lytic and sclerotic lesions are seen diffusely through the visualized osseous   structures in the pelvis and lumbar spine consistent with osseous metastatic   disease.       No CT evidence of renal mass. CT thoracic/lumbar spine  1. Diffuse bone metastasis. 2. Less than 50% compression fracture of L1 of uncertain chronicity. Subacute rib fractures. 3. Partially visualized questionable mass in the left kidney.  CT abdomen   pelvis with contrast recommended. 4. Nonobstructive renal calculi. 5. Other findings as described. CT left hip  1. Osseous metastatic disease throughout the imaged left hemipelvis, left   sacrum, and proximal left femur with large destructive lesions centered   within the left femoral head and neck and involving the partially imaged left   sacrum. 2. No definite pathologic fracture identified on the current exam, however,   the location and severe destructive changes of the left femoral metastatic   lesion do place the patient at a significant risk for pathologic fracture at   this site. [facility] \[facility]"" orthopedic consultation.         Assessment:  Left hip metastatic lytic bone lesion -s/p ORIF (3/19 with Dr. Chance Stephens)  Metastatic malignancy with diffuse bony lesions -unknown primary, possibly recurrent breast cancer (previously treated 2007)  Hypercalcemia  Acute liver injury  DM2  Hyperthyroidism  Depression       Impairments: decreased L>R hip ROM, decreased balance, endurance    Recommendations:    Patient with new functional deficits and ongoing medical complexity. Demonstrates ability to tolerate 3 hours therapy/day. She is a good candidate for acute inpatient rehab when medically appropriate. Thank you for this consult. Please contact me with any questions or concerns. Nuha Mustafa.  Jaycob Campos MD 3/21/2022, 6:36 PM

## 2022-03-21 NOTE — PLAN OF CARE
Problem: Pain:  Goal: Pain level will decrease  Description: Pain level will decrease  3/21/2022 9433 by Sunitha Garcia RN  Outcome: Ongoing  Note: Pt educated to attempt non-phagological method of pain control, but it it becomes too strong use PRN analgesics. Pain and discomfort being managed PRN analgesics per MD orders. Pt able to express presence of pain. Problem: Pain:  Goal: Control of acute pain  Description: Control of acute pain  3/21/2022 0832 by Sunitha Garcia RN  Outcome: Ongoing  Note: Patient educated on acute pain. Taught patient to use call light to ask for pain medication. PRN pain medication given for acute pain. Will continue to monitor pain per unit protocol. Problem: Pain:  Goal: Control of chronic pain  Description: Control of chronic pain  3/21/2022 0832 by Sunitha Garcia RN  Outcome: Ongoing  Note: Patient educated on chronic pain. Taught patient to use call light to ask for pain medication. PRN pain medication given for chronic pain. Will continue to monitor pain per unit protocol. Problem: Skin Integrity:  Goal: Will show no infection signs and symptoms  Description: Will show no infection signs and symptoms  3/21/2022 9251 by Sunitha Garcia RN  Outcome: Ongoing  Note: Assessment of surgical site complete. No signs of redness, warmth or swelling noted. Afebrile. Education provided on signs and symptoms of surgical site infections. Problem: Skin Integrity:  Goal: Absence of new skin breakdown  Description: Absence of new skin breakdown  3/21/2022 0832 by Sunitha Garcia RN  Outcome: Ongoing  Note: Skin assessment complete. No new signs of skin breakdown noted. Repositioning patient with pillows at two hour intervals. Heels elevated off bed.       Problem: Falls - Risk of:  Goal: Will remain free from falls  Description: Will remain free from falls  3/21/2022 3920 by Sunitha Garcia RN  Outcome: Ongoing  Note: Fall risk assessment completed .Fall precautions in place, bed/ chair alarm on, side rails 2/4 up, call light in reach, educated pt on calling for assistance when needed, room clear of clutter. Pt verbalized understanding. Problem: Falls - Risk of:  Goal: Absence of physical injury  Description: Absence of physical injury  3/21/2022 6323 by Ciara Del Angel, NICK  Outcome: Ongoing  Note: Patient remains free from physical injury. Patient educated on safety precautions. Will continue to monitor to ensure patient remains free from pprecautions. Will continue to monitor to ensure patient remains free from physical injury throughout remainder of shift.        Problem: Nutrition  Goal: Optimal nutrition therapy  3/21/2022 0755 by Lexi James RN  Outcome: Ongoing

## 2022-03-21 NOTE — PROGRESS NOTES
Hematology Oncology Daily Progress Note    Admit Date: 3/16/2022  Hospital day several    Subjective:     Patient has complaints of improved left hip pain--denies sob/cp. Medication side effects: none    Scheduled Meds:   sodium chloride flush  5-40 mL IntraVENous 2 times per day    sennosides-docusate sodium  1 tablet Oral BID    enoxaparin  40 mg SubCUTAneous Daily    insulin lispro  0-6 Units SubCUTAneous TID WC    insulin lispro  0-3 Units SubCUTAneous Nightly     Continuous Infusions:   sodium chloride 25 mL (03/19/22 1804)    dextrose       PRN Meds:calcium carbonate, sodium chloride flush, sodium chloride, oxyCODONE, HYDROmorphone, ondansetron, prochlorperazine, glucose, dextrose, glucagon (rDNA), dextrose    Review of Systems  Pertinent items are noted in HPI. REVIEW OF SYSTEMS:         · Constitutional: Denies fever, sweats, weight loss     · Eyes: No visual changes or diplopia. No scleral icterus. · ENT: No Headaches, hearing loss or vertigo. No mouth sores or sore throat. · Cardiovascular: No chest pain, dyspnea on exertion, palpitations or loss of consciousness. · Respiratory: No cough or wheezing, no sputum production. No hemoptysis. .    · Gastrointestinal: No abdominal pain, appetite loss, blood in stools. No change in bowel habits. · Genitourinary: No dysuria, trouble voiding, or hematuria. · Musculoskeletal:  Generalized weakness. No joint complaints. · Integumentary: No rash or pruritis. · Neurological: No headache, diplopia. No change in gait, balance, or coordination. No paresthesias. · Endocrine: No temperature intolerance. No excessive thirst, fluid intake, or urination. · Hematologic/Lymphatic: No abnormal bruising or ecchymoses, blood clots or swollen lymph nodes. · Allergic/Immunologic: No nasal congestion or hives.    ·     Objective:     Patient Vitals for the past 8 hrs:   BP Temp Temp src Pulse Resp SpO2 Weight   03/21/22 0736 (!) 182/72 98.5 °F (36.9 °C) Oral 94 18 94 % --   03/21/22 0530 -- -- -- -- -- -- 243 lb 6.2 oz (110.4 kg)   03/21/22 0307 134/78 98 °F (36.7 °C) Oral 75 16 94 % --   03/21/22 0053 124/74 98.5 °F (36.9 °C) Oral 86 16 95 % --     I/O last 3 completed shifts: In: 1290.2 [P.O.:1040; I.V.:250.2]  Out: 2500 [Urine:2500]  No intake/output data recorded.     BP (!) 182/72   Pulse 94   Temp 98.5 °F (36.9 °C) (Oral)   Resp 18   Ht 5' 5\" (1.651 m)   Wt 243 lb 6.2 oz (110.4 kg)   SpO2 94%   BMI 40.50 kg/m²     General Appearance:    Alert, cooperative, no distress, appears stated age   Head:    Normocephalic, without obvious abnormality, atraumatic   Eyes:    PERRL, conjunctiva/corneas clear, EOM's intact, fundi     benign, both eyes        Ears:    Normal TM's and external ear canals, both ears   Nose:   Nares normal, septum midline, mucosa normal, no drainage    or sinus tenderness   Throat:   Lips, mucosa, and tongue normal; teeth and gums normal   Neck:   Supple, symmetrical, trachea midline, no adenopathy;        thyroid:  No enlargement/tenderness/nodules; no carotid    bruit or JVD   Back:     Symmetric, no curvature, ROM normal, no CVA tenderness   Lungs:     Clear to auscultation bilaterally, respirations unlabored   Chest wall:    No tenderness or deformity   Heart:    Regular rate and rhythm, S1 and S2 normal, no murmur, rub   or gallop   Abdomen:     Soft, non-tender, bowel sounds active all four quadrants,     no masses, no organomegaly           Extremities:   Extremities normal, atraumatic, no cyanosis or edema   Pulses:   2+ and symmetric all extremities   Skin:   Skin color, texture, turgor normal, no rashes or lesions   Lymph nodes:   Cervical, supraclavicular, and axillary nodes normal   Neurologic:   Stable       Data Review  CBC:   Lab Results   Component Value Date    WBC 4.4 03/21/2022    RBC 3.55 03/21/2022       Assessment:     Principal Problem:    Left hip pain  Active Problems:    Pathological fracture of left hip, initial encounter ([facility] [address]    Malignancy ([facility] [address]    Hypercalcemia  Resolved Problems:    * No resolved hospital problems. *      Plan:     1. Cancer of unknown primary. Her biopsy from her left hip pending is pending. She will need radiation therapy to that area starting in a few weeks. 2.  Hypercalcemia. This has resolved.         Electronically signed by Jamaica Anguiano MD on 3/21/2022 at 7:52 AM

## 2022-03-21 NOTE — PROGRESS NOTES
Patient in bed, awake, a&ox4. Patient took morning medications whole, thin liquids, no complications. Patient assessment completed. Patient complaining of pain in left hip, back and left knee. PRN pain medication given as ordered. Patient tolerating PO intake at this time. No n/v voiced at the moment. Patient IV flushed and locked. Patient SCD's on and legs elevated. Patient have not had BM since last Tuesday, MD put in order, see MAR. Patient able to make needs known. No needs voiced at this time. Call light and bedside table within reach. Will continue to monitor and reassess.   Electronically signed by Laury Bach RN on 3/21/2022 at 11:35 AM

## 2022-03-21 NOTE — PROGRESS NOTES
INPATIENT PROGRESS NOTE        IDENTIFYING DATA/REASON FOR CONSULTATION   PATIENT:  Ender Mary  MRN:  [id number]  ADMIT DATE: 3/16/2022  TIME OF EVALUATION: 3/21/2022 7:19 AM  HOSPITAL STAY:   LOS: 5 days   CONSULTING PHYSICIAN: Isha Monteiro MD   REASON FOR CONSULTATION:elevated liver chemistries    Subjective:    Patient seen in follow up. She has no complaints. Denies abdominal pain. Denies ETOH use. Denies known history of liver disease    MEDICATIONS   SCHEDULED:  sodium chloride flush, 5-40 mL, 2 times per day  sennosides-docusate sodium, 1 tablet, BID  enoxaparin, 40 mg, Daily  insulin lispro, 0-6 Units, TID WC  insulin lispro, 0-3 Units, Nightly      FLUIDS/DRIPS:     sodium chloride 25 mL (22 1804)    dextrose       PRNs: calcium carbonate, 500 mg, TID PRN  sodium chloride flush, 5-40 mL, PRN  sodium chloride, 25 mL, PRN  oxyCODONE, 10 mg, Q4H PRN  HYDROmorphone, 0.5 mg, Q3H PRN  ondansetron, 4 mg, Q6H PRN  prochlorperazine, 5 mg, Q4H PRN  glucose, 15 g, PRN  dextrose, 12.5 g, PRN  glucagon (rDNA), 1 mg, PRN  dextrose, 100 mL/hr, PRN      ALLERGIES:    Allergies   Allergen Reactions    Cephalexin          PHYSICAL EXAM   VITALS:  /78   Pulse 75   Temp 98 °F (36.7 °C) (Oral)   Resp 16   Ht 5' 5\" (1.651 m)   Wt 243 lb 6.2 oz (110.4 kg)   SpO2 94%   BMI 40.50 kg/m²   TEMPERATURE:  Current - Temp: 98 °F (36.7 °C); Max - Temp  Av.2 °F (36.8 °C)  Min: 97.8 °F (36.6 °C)  Max: 98.8 °F (37.1 °C)    Physical Exam:  General appearance: alert, cooperative, no distress, appears stated age  Eyes: Anicteric  Head: Normocephalic, without obvious abnormality  Lungs: clear to auscultation bilaterally, Normal Effort  Heart: regular rate and rhythm, normal S1 and S2, no murmurs or rubs  Abdomen: soft, non-distended, non-tender.  Bowel sounds normal.   Extremities: atraumatic, no cyanosis or edema  Skin: warm and dry, no jaundice  Neuro: Grossly intact, A&OX3    LABS AND IMAGING   Laboratory Recent Labs     03/19/22  0605 03/20/22  0617 03/21/22  0455   WBC 2.9* 5.1 4.4   HGB 11.3* 10.7* 10.5*   HCT 33.1* 30.6* 29.9*   MCV 85.7 84.1 84.3    130* 139     Recent Labs     03/19/22  0605 03/20/22  0617 03/21/22  0455   * 132* 133*   K 3.1* 3.6 3.2*   CL 96* 95* 95*   CO2 24 24 20*   BUN 9 11 20   CREATININE 0.6 0.5* 0.8     Recent Labs     03/19/22  0605 03/20/22  0617 03/21/22  0455   * 146* 170*   * 333* 381*   BILITOT 1.2* 0.9 0.9   ALKPHOS 206* 201* 244*     No results for input(s): LIPASE, AMYLASE in the last 72 hours. Recent Labs     03/20/22  0855 03/21/22  0455   PROTIME 13.5* 12.5   INR 1.19* 1.10         Imaging  XR HIP LEFT (2-3 VIEWS)   Final Result      CT CHEST WO CONTRAST   Final Result   1. Moderate subcutaneous inflammatory stranding within the right axilla   likely due to edema. 2. Right axillary adenopathy either due to sandeep metastasis or reactive   disease. 3. Diffuse osseous metastatic disease likely causing multiple   age-indeterminate pathologic fractures. If there is point tenderness   throughout the thoracolumbar spine, recommend nonemergent MRI of the   thoracolumbar spine with without contrast.         CT ABDOMEN PELVIS W IV CONTRAST Additional Contrast? None   Final Result   Lytic and sclerotic lesions are seen diffusely through the visualized osseous   structures in the pelvis and lumbar spine consistent with osseous metastatic   disease. No CT evidence of renal mass. CT THORACIC SPINE WO CONTRAST   Final Result   1. Diffuse bone metastasis. 2. Less than 50% compression fracture of L1 of uncertain chronicity. Subacute rib fractures. 3. Partially visualized questionable mass in the left kidney. CT abdomen   pelvis with contrast recommended. 4. Nonobstructive renal calculi. 5. Other findings as described. CT HIP LEFT WO CONTRAST   Final Result   1.  Osseous metastatic disease throughout the imaged left hemipelvis, left   sacrum, and proximal left femur with large destructive lesions centered   within the left femoral head and neck and involving the partially imaged left   sacrum. 2. No definite pathologic fracture identified on the current exam, however,   the location and severe destructive changes of the left femoral metastatic   lesion do place the patient at a significant risk for pathologic fracture at   this site. Recommend orthopedic consultation. CT LUMBAR SPINE WO CONTRAST   Final Result   1. Diffuse bone metastasis. 2. Less than 50% compression fracture of L1 of uncertain chronicity. Subacute rib fractures. 3. Partially visualized questionable mass in the left kidney. CT abdomen   pelvis with contrast recommended. 4. Nonobstructive renal calculi. 5. Other findings as described. XR KNEE LEFT (1-2 VIEWS)   Final Result   Minimal degenerative changes of the left knee with no evidence of acute   osseous abnormality. XR HIP 2-3 VW W PELVIS LEFT   Final Result   Mild compression deformity of the T12 vertebral body, age indeterminate. No   acute lumbar spine fracture. No comparison imaging available. No acute osseous abnormality of the pelvis or left hip. Indeterminate 2 cm   lucent lesion within the left femoral neck. If the patient has a history of   malignancy, finding is concerning for possible metastatic focus. Consider   follow-up MRI or bone scan as clinically indicated. XR LUMBAR SPINE (2-3 VIEWS)   Final Result   Mild compression deformity of the T12 vertebral body, age indeterminate. No   acute lumbar spine fracture. No comparison imaging available. No acute osseous abnormality of the pelvis or left hip. Indeterminate 2 cm   lucent lesion within the left femoral neck. If the patient has a history of   malignancy, finding is concerning for possible metastatic focus. Consider   follow-up MRI or bone scan as clinically indicated.          FLUORO FOR SURGICAL PROCEDURES    (Results Pending)       Endoscopy      ASSESSMENT AND RECOMMENDATIONS   Socorro Naqvi is a 64 y.o. female with PMH of breast cancer in 2007 which is in remission who presented with left hip pain. CT of the left hip showed metastatic disease throughout the left hemipelvis and proximal left femur with large destructive lesions within the left femoral head. She had a CT of the thoracic spine which showed diffuse bony metastasis and an L1 partial compression fracture. A CT of the abdomen and pelvis showed multiple lytic and sclerotic bone lesions. GI consulted regarding elevated liver chemistries    1.  Primarily hepatocellular pattern.  Admission , , alk phos 230, total bilirubin 1.2.  Liver chemistries have stayed about stable.  On CT of the abdomen, liver is normal in size.  There is no splenomegaly or hepatomegaly. INR is preserved. There is no suggestion of portal hypertension. Jia Knuckles is no evidence of portal vein, hepatic vein, hepatic artery thrombosis.  Immunoglobulins have been checked and within normal limits.  This could be any number of things at this time and differential is very broad.  She is not currently on any culprit medications. Prior to admission was ONLY taking cyclobenzaprine and meloxicam. Cyclobenzaprine and gabapentin are less likely causes of liver injury.  Would note that inhaled anesthetic agents as she may receive today can be a cause of clinically apparent liver injury. 2. Lf hip lytic lesion s/p ORIF left hip with bone biospy  3. Metastatic malignancy, unknown primary  4. History of breast cancer 2007    RECOMMENDATIONS:    Follow up bone bx  Follow up on liver serologies. If you have any questions or need any further information, please feel free to contact us [phone]. Thank you for allowing us to participate in the care of Socorro Naqvi. The note was completed using Dragon voice recognition transcription.  Every effort was made to ensure accuracy; however, inadvertent transcription errors may be present despite my best efforts to edit errors. Margoth CORREA    Attending physician addendum:    I have personally seen and examined the patient, reviewed the patient's medical record and pertinent labs and clinical imaging. I have personally staffed the case with Margoth CORREA. I agree with her consultation note, exam findings, assessment and plans  as written above. I have made appropriate modifications and edited her assessment and plan where needed to reflect my impression and plans for this patient. Kenna Young is a 64 y.o. female breast cancer in 2007 which is in remission who presented with left hip pain since November 2021. She is being seen by multiple other specialties, and has been found to have a left hip lytic lesion which is possibly metastatic. We have been consulted regarding abnormal LFT. CT with normal appearing liver no tumors. No history of heavy alcohol use. No medications on list could be contributing. Serologic work-up ordered and will await results. We will also await results of bone biopsy performed on 3/19/     Thank you for allowing me to participate in this patient's care. If there are any questions or concerns regarding this patient, or the plan we have set in place, please feel free to contact me at [phone].      Cindy Parsons MD

## 2022-03-21 NOTE — PLAN OF CARE
Problem: Nutrition  Goal: Optimal nutrition therapy  3/21/2022 1444 by Carol Silverman RD, LD  Outcome: Ongoing  3/21/2022 0755 by Lexi James RN  Outcome: Ongoing

## 2022-03-21 NOTE — PROGRESS NOTES
[facility] Orthopedic Surgery   Progress Note      S/P :  SUBJECTIVE  In bed. Alert and oriented. States no BM since last Tues. . Pain is   described in left hip and with the intensity of moderate. Pain is described as aching. Pain is worse with activity and better at rest.       OBJECTIVE              Physical                      VITALS:  BP (!) 182/72   Pulse 94   Temp 98.5 °F (36.9 °C) (Oral)   Resp 18   Ht 5' 5\" (1.651 m)   Wt 243 lb 6.2 oz (110.4 kg)   SpO2 94%   BMI 40.50 kg/m²                     MUSCULOSKELETAL:  left foot NVI. Wiggles toes to command. Able to plantarflex and dorsiflex ankle Pedal pulses are palpable. NEUROLOGIC:                                  Sensory:  Touch:  Left Lower Extremity:  normal                                                 Surgical wound appears clean and dry left hip with Mepilex dressings x3 sites, changed by me today. ALVA hose applied.      Data       CBC:   Lab Results   Component Value Date    WBC 4.4 03/21/2022    RBC 3.55 03/21/2022    HGB 10.5 03/21/2022    HCT 29.9 03/21/2022    MCV 84.3 03/21/2022    MCH 29.6 03/21/2022    MCHC 35.1 03/21/2022    RDW 14.9 03/21/2022     03/21/2022    MPV 7.3 03/21/2022        WBC:    Lab Results   Component Value Date    WBC 4.4 03/21/2022        Hemoglobin/Hematocrit:    Lab Results   Component Value Date    HGB 10.5 03/21/2022    HCT 29.9 03/21/2022        PT/INR:    Lab Results   Component Value Date    PROTIME 12.5 03/21/2022    INR 1.10 03/21/2022              Current Inpatient Medications             Current Facility-Administered Medications: methylnaltrexone (RELISTOR) injection 12 mg, 12 mg, SubCUTAneous, Once  calcium carbonate (TUMS) chewable tablet 500 mg, 500 mg, Oral, TID PRN  sodium chloride flush 0.9 % injection 5-40 mL, 5-40 mL, IntraVENous, 2 times per day  sodium chloride flush 0.9 % injection 5-40 mL, 5-40 mL, IntraVENous, PRN  0.9 % sodium chloride infusion, 25 mL, IntraVENous, PRN  oxyCODONE HCl (OXY-IR) immediate release tablet 10 mg, 10 mg, Oral, Q4H PRN  HYDROmorphone (DILAUDID) injection 0.5 mg, 0.5 mg, IntraVENous, Q3H PRN  sennosides-docusate sodium (SENOKOT-S) 8.6-50 MG tablet 1 tablet, 1 tablet, Oral, BID  ondansetron (ZOFRAN) injection 4 mg, 4 mg, IntraVENous, Q6H PRN  enoxaparin (LOVENOX) injection 40 mg, 40 mg, SubCUTAneous, Daily  prochlorperazine (COMPAZINE) injection 5 mg, 5 mg, IntraVENous, Q4H PRN  insulin lispro (HUMALOG) injection vial 0-6 Units, 0-6 Units, SubCUTAneous, TID WC  insulin lispro (HUMALOG) injection vial 0-3 Units, 0-3 Units, SubCUTAneous, Nightly  glucose (GLUTOSE) 40 % oral gel 15 g, 15 g, Oral, PRN  dextrose 50 % IV solution, 12.5 g, IntraVENous, PRN  glucagon (rDNA) injection 1 mg, 1 mg, IntraMUSCular, PRN  dextrose 5 % solution, 100 mL/hr, IntraVENous, PRN    ASSESSMENT AND PLAN      Post left hip IM kelvin for lytic lesion, Bx pending  Hx Breast CA 2007  Left hip pain since NOv 2021  Unable to walk due to pain since Jan 2022  Stable postop exam  DVT prophylaxis ordered, Lovenox as ordered, ALVA hose. PT OT for ADL's and ambulation as tolerated, WBAT  SS for DC planning, home vs ARU depending on PT OT recommendations.    IV or PO pain med as ordered  Constipation, added Relistor today    GRACIA Mcnally - CNP  3/21/2022  10:05 AM

## 2022-03-21 NOTE — PROGRESS NOTES
care. Anticipate Mod/Max A LB and set up seated UB ADL based on balance, endurance, cognition, pain and PLOF. Cont acute OT to address above deficits. Rec OT 5-7x/week prior to safe return home as pt will need to be Mod I with ADL and fxl mobility. Pt highly motivated  Treatment Diagnosis: impaired ADL/fxl mobility  Prognosis: Good  Decision Making: Medium Complexity  OT Education: OT Role;Transfer Training;Plan of Care;Precautions  REQUIRES OT FOLLOW UP: Yes  Activity Tolerance  Activity Tolerance: Patient limited by pain  Safety Devices  Safety Devices in place: Yes  Type of devices: Nurse notified;Gait belt;Call light within reach; Chair alarm in place; Left in chair;Patient at risk for falls         Patient Diagnosis(es): The primary encounter diagnosis was Left hip pain. Diagnoses of Metastatic malignant neoplasm, unspecified site [facility]), Difficulty in walking, and Pathological fracture of left hip, initial encounter [facility]) were also pertinent to this visit. has a past medical history of Cancer ([address] Depression, Hyperthyroidism, and Nephrolithiasis. has a past surgical history that includes Cholecystectomy; Hysterectomy; Mastectomy; and Femur fracture surgery (Left, 3/19/2022). Treatment Diagnosis: impaired ADL/fxl mobility      Restrictions  Restrictions/Precautions  Restrictions/Precautions: Weight Bearing,Fall Risk  Lower Extremity Weight Bearing Restrictions  Left Lower Extremity Weight Bearing: Weight Bearing As Tolerated    Subjective   General  Chart Reviewed: Yes  Patient assessed for rehabilitation services?: Yes  Additional Pertinent Hx: 65 yo female admit 3/16 with L Hip Pain, Met Malignant Neoplasm throughout L Hemipelvis, L Sacrum, Prox L Femur, Compress Fx L1, Subacute Rib Fxs, Diffuse Bony Mets. 3/19 S/P L Hip ORIF with IM Supa, L Fem Head Biopsy.  PMH: Breast Ca/Mastectomy, Depression  Family / Caregiver Present: No  Referring Practitioner: Martha Preston MD  Subjective  Subjective: Pt resting in recliner upon arrival and agreeable to OT/PT eval/tx. Pt reporting 4/10 L hip pain at rest. Pt motivated  General Comment  Comments: RN ok to see  Patient Currently in Pain: Yes  Vital Signs  Temp: 97.7 °F (36.5 °C)  Temp Source: Oral  Pulse: 103  Heart Rate Source: Monitor  Resp: 16  BP: 130/66  BP Location: Left Arm  MAP (mmHg): 87  Level of Consciousness: Alert (0)  MEWS Score: 2  Patient Currently in Pain: Yes  Oxygen Therapy  SpO2: 94 %  O2 Device: None (Room air)    Social/Functional History  Social/Functional History  Lives With: Family (Dtr (temp staying), 3 Grandsons (19 (total care), 16, 8 yr). )  Type of Home: House  Home Layout: Multi-level,Laundry in basement,Able to Live on Main level with bedroom/bathroom  Home Access: Stairs to enter with rails,Ramped entrance (1 step to enter.)  Bathroom Shower/Tub: Tub/Shower unit (Overhead lift system for jax (bed to/from w/c, w/c to/from tub/shower). )  Bathroom Toilet: Standard  Bathroom Accessibility: Accessible  Home Equipment: 4 wheeled walker  ADL Assistance: Independent (Increasing difficulty. 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 Additional Comments: Dtr (works fulltime), staying within home to assist while pt in hospital.       Objective   Vision: Within Functional Limits  Hearing: Within functional limits    Orientation  Overall Orientation Status: Within Functional Limits     Balance  Sitting Balance: Stand by assistance (in prep for tx within recliner)  Standing Balance: Contact guard assistance (static stand and weight shifting BUE on RW with CGA)  Functional Mobility  Functional Mobility Comments: 2 steps forward, 2 steps back with Min A, increased time d/t pain.  Very little to no unsteadiness  Toilet Transfers  Toilet - Technique:  (stedy)  Toilet Transfer: Maximum assistance  Toilet Transfers Comments: within stedy  ADL  Toileting:  (attempts, does not need to)  Additional Comments: Anticipate Mod/Max A LB and set up seated UB ADL based on balance, endurance, cognition, pain and PLOF  Tone RUE  RUE Tone: Normotonic  Tone LUE  LUE Tone: Normotonic  Coordination  Movements Are Fluid And Coordinated: Yes        Transfers  Sit to stand: Moderate assistance  Stand to sit: Moderate assistance  Transfer Comments: to/from stedy/RW, recliner and toilet     Cognition  Overall Cognitive Status: WFL        Sensation  Overall Sensation Status: WFL        LUE AROM (degrees)  LUE AROM : WFL  RUE AROM (degrees)  RUE AROM : WFL  LUE Strength  Gross LUE Strength: WFL  RUE Strength  Gross RUE Strength: WFL                Plan   Plan  Times per week: 3-5  Current Treatment Recommendations: Strengthening,Endurance Training,Patient/Caregiver Education & Training,Balance Training,Pain Management,Functional Mobility Training,Safety Education & Training,Self-Care / ADL    AM-[facility] Score        -[facility] Inpatient Daily Activity Raw Score: 16 (03/21/22 1142)  -PAC Inpatient ADL T-Scale Score : 35.96 (03/21/22 1142)  ADL Inpatient CMS 0-100% Score: 53.32 (03/21/22 1142)  ADL Inpatient CMS G-Code Modifier : CK (03/21/22 1142)    Goals  Short term goals  Time Frame for Short term goals: prior to d/c  Short term goal 1: toileting Min A  Short term goal 2: LB dressing Mod A  Short term goal 3: UB bathing/dressing seated set up  Short term goal 4: tx and short fxl mobility with RW SBA  Short term goal 5: tolerate 2 min static standing in prep for ADL tx  Patient Goals   Patient goals : get back home       Therapy Time   Individual Concurrent Group Co-treatment   Time In 1100         Time Out 1140         Minutes 40         Timed Code Treatment Minutes: 25 Minutes (15 eval. 10 ADL.  15 TA)       Maddie Olivarez, OTJOSE ANGEL, OTR/L

## 2022-03-22 ENCOUNTER — HOSPITAL ENCOUNTER (INPATIENT)
Age: 62
LOS: 9 days | Discharge: HOME HEALTH | DRG: 308 | End: 2022-03-31
Attending: PHYSICAL MEDICINE & REHABILITATION | Admitting: PHYSICAL MEDICINE & REHABILITATION
Payer: COMMERCIAL

## 2022-03-22 VITALS
HEIGHT: 65 IN | DIASTOLIC BLOOD PRESSURE: 75 MMHG | WEIGHT: 242.95 LBS | SYSTOLIC BLOOD PRESSURE: 110 MMHG | HEART RATE: 94 BPM | TEMPERATURE: 98.4 F | BODY MASS INDEX: 40.48 KG/M2 | RESPIRATION RATE: 18 BRPM | OXYGEN SATURATION: 95 %

## 2022-03-22 DIAGNOSIS — M84.452A: ICD-10-CM

## 2022-03-22 DIAGNOSIS — C50.919 CARCINOMA OF BREAST METASTATIC TO BONE, UNSPECIFIED LATERALITY (HCC): Primary | ICD-10-CM

## 2022-03-22 DIAGNOSIS — C79.51 CARCINOMA OF BREAST METASTATIC TO BONE, UNSPECIFIED LATERALITY (HCC): Primary | ICD-10-CM

## 2022-03-22 DIAGNOSIS — C79.51 MALIGNANT NEOPLASM METASTATIC TO FEMUR WITH UNKNOWN PRIMARY SITE (HCC): ICD-10-CM

## 2022-03-22 DIAGNOSIS — C80.1 MALIGNANT NEOPLASM METASTATIC TO FEMUR WITH UNKNOWN PRIMARY SITE (HCC): ICD-10-CM

## 2022-03-22 LAB
A/G RATIO: 1.3 (ref 1.1–2.2)
ALBUMIN SERPL-MCNC: 3.5 G/DL (ref 3.4–5)
ALP BLD-CCNC: 410 U/L (ref 40–129)
ALT SERPL-CCNC: 872 U/L (ref 10–40)
ANION GAP SERPL CALCULATED.3IONS-SCNC: 15 MMOL/L (ref 3–16)
AST SERPL-CCNC: 352 U/L (ref 15–37)
BASOPHILS ABSOLUTE: 0 K/UL (ref 0–0.2)
BASOPHILS RELATIVE PERCENT: 0.8 %
BILIRUB SERPL-MCNC: 0.8 MG/DL (ref 0–1)
BUN BLDV-MCNC: 12 MG/DL (ref 7–20)
CALCIUM SERPL-MCNC: 7.7 MG/DL (ref 8.3–10.6)
CHLORIDE BLD-SCNC: 98 MMOL/L (ref 99–110)
CO2: 21 MMOL/L (ref 21–32)
CREAT SERPL-MCNC: <0.5 MG/DL (ref 0.6–1.2)
EOSINOPHILS ABSOLUTE: 0.1 K/UL (ref 0–0.6)
EOSINOPHILS RELATIVE PERCENT: 1.6 %
F-ACTIN AB IGG: 4 UNITS (ref 0–19)
GFR AFRICAN AMERICAN: >60
GFR NON-AFRICAN AMERICAN: >60
GLUCOSE BLD-MCNC: 116 MG/DL (ref 70–99)
GLUCOSE BLD-MCNC: 117 MG/DL (ref 70–99)
GLUCOSE BLD-MCNC: 122 MG/DL (ref 70–99)
GLUCOSE BLD-MCNC: 125 MG/DL (ref 70–99)
GLUCOSE BLD-MCNC: 136 MG/DL (ref 70–99)
HCT VFR BLD CALC: 29.6 % (ref 36–48)
HEMOGLOBIN: 10.4 G/DL (ref 12–16)
INR BLD: 1.05 (ref 0.88–1.12)
LYMPHOCYTES ABSOLUTE: 1.2 K/UL (ref 1–5.1)
LYMPHOCYTES RELATIVE PERCENT: 27.7 %
MAGNESIUM: 2.2 MG/DL (ref 1.8–2.4)
MCH RBC QN AUTO: 29.6 PG (ref 26–34)
MCHC RBC AUTO-ENTMCNC: 35 G/DL (ref 31–36)
MCV RBC AUTO: 84.4 FL (ref 80–100)
MONOCYTES ABSOLUTE: 0.4 K/UL (ref 0–1.3)
MONOCYTES RELATIVE PERCENT: 8.2 %
NEUTROPHILS ABSOLUTE: 2.7 K/UL (ref 1.7–7.7)
NEUTROPHILS RELATIVE PERCENT: 61.7 %
PDW BLD-RTO: 14.8 % (ref 12.4–15.4)
PERFORMED ON: ABNORMAL
PLATELET # BLD: 133 K/UL (ref 135–450)
PMV BLD AUTO: 7.3 FL (ref 5–10.5)
POTASSIUM SERPL-SCNC: 3.3 MMOL/L (ref 3.5–5.1)
PROTHROMBIN TIME: 11.9 SEC (ref 9.9–12.7)
RBC # BLD: 3.51 M/UL (ref 4–5.2)
SODIUM BLD-SCNC: 134 MMOL/L (ref 136–145)
TOTAL PROTEIN: 6.1 G/DL (ref 6.4–8.2)
WBC # BLD: 4.4 K/UL (ref 4–11)

## 2022-03-22 PROCEDURE — 97530 THERAPEUTIC ACTIVITIES: CPT

## 2022-03-22 PROCEDURE — 97110 THERAPEUTIC EXERCISES: CPT

## 2022-03-22 PROCEDURE — 6360000002 HC RX W HCPCS: Performed by: NURSE PRACTITIONER

## 2022-03-22 PROCEDURE — 6370000000 HC RX 637 (ALT 250 FOR IP): Performed by: PHYSICAL MEDICINE & REHABILITATION

## 2022-03-22 PROCEDURE — 1280000000 HC REHAB R&B

## 2022-03-22 PROCEDURE — 97535 SELF CARE MNGMENT TRAINING: CPT

## 2022-03-22 PROCEDURE — 80053 COMPREHEN METABOLIC PANEL: CPT

## 2022-03-22 PROCEDURE — 85610 PROTHROMBIN TIME: CPT

## 2022-03-22 PROCEDURE — 85025 COMPLETE CBC W/AUTO DIFF WBC: CPT

## 2022-03-22 PROCEDURE — 6370000000 HC RX 637 (ALT 250 FOR IP): Performed by: HOSPITALIST

## 2022-03-22 PROCEDURE — 83735 ASSAY OF MAGNESIUM: CPT

## 2022-03-22 PROCEDURE — 6370000000 HC RX 637 (ALT 250 FOR IP): Performed by: ORTHOPAEDIC SURGERY

## 2022-03-22 PROCEDURE — 36415 COLL VENOUS BLD VENIPUNCTURE: CPT

## 2022-03-22 PROCEDURE — 97116 GAIT TRAINING THERAPY: CPT

## 2022-03-22 RX ORDER — SODIUM CHLORIDE 9 MG/ML
25 INJECTION, SOLUTION INTRAVENOUS PRN
Status: CANCELLED | OUTPATIENT
Start: 2022-03-22

## 2022-03-22 RX ORDER — ACETAMINOPHEN 325 MG/1
650 TABLET ORAL EVERY 6 HOURS PRN
Status: DISCONTINUED | OUTPATIENT
Start: 2022-03-22 | End: 2022-03-25 | Stop reason: SDUPTHER

## 2022-03-22 RX ORDER — ONDANSETRON 2 MG/ML
4 INJECTION INTRAMUSCULAR; INTRAVENOUS EVERY 6 HOURS PRN
Status: CANCELLED | OUTPATIENT
Start: 2022-03-22

## 2022-03-22 RX ORDER — SENNA AND DOCUSATE SODIUM 50; 8.6 MG/1; MG/1
1 TABLET, FILM COATED ORAL DAILY
COMMUNITY
Start: 2022-03-23

## 2022-03-22 RX ORDER — DEXTROSE MONOHYDRATE 25 G/50ML
12.5 INJECTION, SOLUTION INTRAVENOUS PRN
Status: CANCELLED | OUTPATIENT
Start: 2022-03-22

## 2022-03-22 RX ORDER — POLYETHYLENE GLYCOL 3350 17 G/17G
17 POWDER, FOR SOLUTION ORAL DAILY PRN
Status: DISCONTINUED | OUTPATIENT
Start: 2022-03-22 | End: 2022-03-31 | Stop reason: HOSPADM

## 2022-03-22 RX ORDER — ONDANSETRON 4 MG/1
4 TABLET, FILM COATED ORAL EVERY 8 HOURS PRN
Status: CANCELLED | OUTPATIENT
Start: 2022-03-22

## 2022-03-22 RX ORDER — OXYCODONE HYDROCHLORIDE 10 MG/1
10 TABLET ORAL EVERY 4 HOURS PRN
Status: DISCONTINUED | OUTPATIENT
Start: 2022-03-22 | End: 2022-03-31 | Stop reason: HOSPADM

## 2022-03-22 RX ORDER — DEXTROSE MONOHYDRATE 50 MG/ML
100 INJECTION, SOLUTION INTRAVENOUS PRN
Status: DISCONTINUED | OUTPATIENT
Start: 2022-03-22 | End: 2022-03-31 | Stop reason: HOSPADM

## 2022-03-22 RX ORDER — POTASSIUM CHLORIDE 20 MEQ/1
40 TABLET, EXTENDED RELEASE ORAL ONCE
Status: COMPLETED | OUTPATIENT
Start: 2022-03-22 | End: 2022-03-22

## 2022-03-22 RX ORDER — FAMOTIDINE 20 MG/1
20 TABLET, FILM COATED ORAL 2 TIMES DAILY
Qty: 60 TABLET | Refills: 3 | Status: ON HOLD | OUTPATIENT
Start: 2022-03-22 | End: 2022-03-30 | Stop reason: SDUPTHER

## 2022-03-22 RX ORDER — POLYETHYLENE GLYCOL 3350 17 G/17G
17 POWDER, FOR SOLUTION ORAL DAILY PRN
Status: CANCELLED | OUTPATIENT
Start: 2022-03-22

## 2022-03-22 RX ORDER — FAMOTIDINE 20 MG/1
20 TABLET, FILM COATED ORAL 2 TIMES DAILY
Status: CANCELLED | OUTPATIENT
Start: 2022-03-22

## 2022-03-22 RX ORDER — FAMOTIDINE 20 MG/1
20 TABLET, FILM COATED ORAL 2 TIMES DAILY
Status: DISCONTINUED | OUTPATIENT
Start: 2022-03-22 | End: 2022-03-31 | Stop reason: HOSPADM

## 2022-03-22 RX ORDER — SODIUM CHLORIDE 9 MG/ML
25 INJECTION, SOLUTION INTRAVENOUS PRN
Status: DISCONTINUED | OUTPATIENT
Start: 2022-03-22 | End: 2022-03-31 | Stop reason: HOSPADM

## 2022-03-22 RX ORDER — SODIUM CHLORIDE 0.9 % (FLUSH) 0.9 %
5-40 SYRINGE (ML) INJECTION PRN
Status: CANCELLED | OUTPATIENT
Start: 2022-03-22

## 2022-03-22 RX ORDER — NICOTINE POLACRILEX 4 MG
15 LOZENGE BUCCAL PRN
Status: CANCELLED | OUTPATIENT
Start: 2022-03-22

## 2022-03-22 RX ORDER — SENNA AND DOCUSATE SODIUM 50; 8.6 MG/1; MG/1
1 TABLET, FILM COATED ORAL 2 TIMES DAILY
Status: CANCELLED | OUTPATIENT
Start: 2022-03-22

## 2022-03-22 RX ORDER — SODIUM CHLORIDE 0.9 % (FLUSH) 0.9 %
5-40 SYRINGE (ML) INJECTION EVERY 12 HOURS SCHEDULED
Status: DISCONTINUED | OUTPATIENT
Start: 2022-03-22 | End: 2022-03-29

## 2022-03-22 RX ORDER — BISACODYL 10 MG
10 SUPPOSITORY, RECTAL RECTAL DAILY PRN
Status: DISCONTINUED | OUTPATIENT
Start: 2022-03-22 | End: 2022-03-31 | Stop reason: HOSPADM

## 2022-03-22 RX ORDER — DEXTROSE MONOHYDRATE 50 MG/ML
100 INJECTION, SOLUTION INTRAVENOUS PRN
Status: CANCELLED | OUTPATIENT
Start: 2022-03-22

## 2022-03-22 RX ORDER — SODIUM CHLORIDE 0.9 % (FLUSH) 0.9 %
5-40 SYRINGE (ML) INJECTION EVERY 12 HOURS SCHEDULED
Status: CANCELLED | OUTPATIENT
Start: 2022-03-22

## 2022-03-22 RX ORDER — ACETAMINOPHEN 325 MG/1
650 TABLET ORAL EVERY 6 HOURS PRN
Status: CANCELLED | OUTPATIENT
Start: 2022-03-22

## 2022-03-22 RX ORDER — DEXTROSE MONOHYDRATE 25 G/50ML
12.5 INJECTION, SOLUTION INTRAVENOUS PRN
Status: DISCONTINUED | OUTPATIENT
Start: 2022-03-22 | End: 2022-03-31 | Stop reason: HOSPADM

## 2022-03-22 RX ORDER — NICOTINE POLACRILEX 4 MG
15 LOZENGE BUCCAL PRN
Status: DISCONTINUED | OUTPATIENT
Start: 2022-03-22 | End: 2022-03-31 | Stop reason: HOSPADM

## 2022-03-22 RX ORDER — OXYCODONE HYDROCHLORIDE 10 MG/1
10 TABLET ORAL EVERY 4 HOURS PRN
Status: CANCELLED | OUTPATIENT
Start: 2022-03-22

## 2022-03-22 RX ORDER — CALCIUM CARBONATE 200(500)MG
500 TABLET,CHEWABLE ORAL 3 TIMES DAILY PRN
Status: CANCELLED | OUTPATIENT
Start: 2022-03-22

## 2022-03-22 RX ORDER — ONDANSETRON 4 MG/1
4 TABLET, FILM COATED ORAL EVERY 8 HOURS PRN
Status: DISCONTINUED | OUTPATIENT
Start: 2022-03-22 | End: 2022-03-31 | Stop reason: HOSPADM

## 2022-03-22 RX ORDER — CALCIUM CARBONATE 200(500)MG
500 TABLET,CHEWABLE ORAL 3 TIMES DAILY PRN
Qty: 30 TABLET | Refills: 0 | Status: ON HOLD | OUTPATIENT
Start: 2022-03-22 | End: 2022-03-30 | Stop reason: HOSPADM

## 2022-03-22 RX ORDER — SODIUM CHLORIDE 0.9 % (FLUSH) 0.9 %
5-40 SYRINGE (ML) INJECTION PRN
Status: DISCONTINUED | OUTPATIENT
Start: 2022-03-22 | End: 2022-03-31 | Stop reason: HOSPADM

## 2022-03-22 RX ORDER — CALCIUM CARBONATE 200(500)MG
500 TABLET,CHEWABLE ORAL 3 TIMES DAILY PRN
Status: DISCONTINUED | OUTPATIENT
Start: 2022-03-22 | End: 2022-03-31 | Stop reason: HOSPADM

## 2022-03-22 RX ORDER — ONDANSETRON 2 MG/ML
4 INJECTION INTRAMUSCULAR; INTRAVENOUS EVERY 6 HOURS PRN
Status: DISCONTINUED | OUTPATIENT
Start: 2022-03-22 | End: 2022-03-31 | Stop reason: HOSPADM

## 2022-03-22 RX ORDER — SENNA AND DOCUSATE SODIUM 50; 8.6 MG/1; MG/1
1 TABLET, FILM COATED ORAL 2 TIMES DAILY
Status: DISCONTINUED | OUTPATIENT
Start: 2022-03-22 | End: 2022-03-31 | Stop reason: HOSPADM

## 2022-03-22 RX ADMIN — METHYLNALTREXONE BROMIDE 12 MG: 12 INJECTION, SOLUTION SUBCUTANEOUS at 12:38

## 2022-03-22 RX ADMIN — OXYCODONE HYDROCHLORIDE 10 MG: 10 TABLET ORAL at 03:38

## 2022-03-22 RX ADMIN — BISACODYL 10 MG: 10 SUPPOSITORY RECTAL at 21:26

## 2022-03-22 RX ADMIN — FAMOTIDINE 20 MG: 20 TABLET ORAL at 08:49

## 2022-03-22 RX ADMIN — FAMOTIDINE 20 MG: 20 TABLET ORAL at 20:41

## 2022-03-22 RX ADMIN — ENOXAPARIN SODIUM 40 MG: 100 INJECTION SUBCUTANEOUS at 08:49

## 2022-03-22 RX ADMIN — SENNOSIDES AND DOCUSATE SODIUM 1 TABLET: 50; 8.6 TABLET ORAL at 20:41

## 2022-03-22 RX ADMIN — OXYCODONE HYDROCHLORIDE 10 MG: 10 TABLET ORAL at 20:54

## 2022-03-22 RX ADMIN — POTASSIUM CHLORIDE 40 MEQ: 20 TABLET, EXTENDED RELEASE ORAL at 12:21

## 2022-03-22 RX ADMIN — SENNOSIDES AND DOCUSATE SODIUM 1 TABLET: 50; 8.6 TABLET ORAL at 08:49

## 2022-03-22 ASSESSMENT — PAIN DESCRIPTION - DESCRIPTORS
DESCRIPTORS: DISCOMFORT
DESCRIPTORS: ACHING
DESCRIPTORS: DISCOMFORT;DULL;ACHING;SHARP

## 2022-03-22 ASSESSMENT — PAIN - FUNCTIONAL ASSESSMENT
PAIN_FUNCTIONAL_ASSESSMENT: PREVENTS OR INTERFERES SOME ACTIVE ACTIVITIES AND ADLS

## 2022-03-22 ASSESSMENT — PAIN SCALES - GENERAL
PAINLEVEL_OUTOF10: 0
PAINLEVEL_OUTOF10: 8
PAINLEVEL_OUTOF10: 5
PAINLEVEL_OUTOF10: 7
PAINLEVEL_OUTOF10: 8

## 2022-03-22 ASSESSMENT — PAIN DESCRIPTION - LOCATION
LOCATION: HIP

## 2022-03-22 ASSESSMENT — PAIN DESCRIPTION - ONSET
ONSET: ON-GOING

## 2022-03-22 ASSESSMENT — PAIN DESCRIPTION - ORIENTATION
ORIENTATION: LEFT

## 2022-03-22 ASSESSMENT — PAIN DESCRIPTION - PROGRESSION
CLINICAL_PROGRESSION: NOT CHANGED

## 2022-03-22 ASSESSMENT — PAIN DESCRIPTION - PAIN TYPE
TYPE: ACUTE PAIN
TYPE: ACUTE PAIN
TYPE: ACUTE PAIN;SURGICAL PAIN

## 2022-03-22 ASSESSMENT — PAIN SCALES - WONG BAKER: WONGBAKER_NUMERICALRESPONSE: 2

## 2022-03-22 ASSESSMENT — PAIN DESCRIPTION - FREQUENCY
FREQUENCY: CONTINUOUS

## 2022-03-22 NOTE — CARE COORDINATION
3/22 spoke w/ shima w/ admissions w/ [facility] acute Rehab- they can accept today-  Informed patient who is agreeable to plan.     DISCHARGE SUMMARY     DATE OF DISCHARGE: 3/22/22    DISCHARGE DESTINATION:  [facility]    FACILITY    Level of Care: acute Rehab    Electronically signed by Brenda Arechiga on 3/22/2022 at 9:04 AM  #[phone]

## 2022-03-22 NOTE — PROGRESS NOTES
[facility]   Progress Note      S/P :  SUBJECTIVE  In recliner. Alert and oriented. States still needs to have BM, none for 7 days. . Pain is   described in left hip and with the intensity of moderate at rest. Pain is described as aching. OBJECTIVE              Physical                      VITALS:  /84   Pulse 89   Temp 98 °F (36.7 °C) (Oral)   Resp 16   Ht 5' 5\" (1.651 m)   Wt 242 lb 15.2 oz (110.2 kg)   SpO2 97%   BMI 40.43 kg/m²                     MUSCULOSKELETAL:  right foot NVI. Wiggles toes to command. Able to plantarflex and dorsiflex ankle Pedal pulses are palpable. NEUROLOGIC:                                  Sensory:  Touch:  Right Lower Extremity:  normal                                                 Surgical wound appears clean and dry left hip with Mepilex dressings x3.      Data       CBC:   Lab Results   Component Value Date    WBC 4.4 03/22/2022    RBC 3.51 03/22/2022    HGB 10.4 03/22/2022    HCT 29.6 03/22/2022    MCV 84.4 03/22/2022    MCH 29.6 03/22/2022    MCHC 35.0 03/22/2022    RDW 14.8 03/22/2022     03/22/2022    MPV 7.3 03/22/2022        WBC:    Lab Results   Component Value Date    WBC 4.4 03/22/2022        Hemoglobin/Hematocrit:    Lab Results   Component Value Date    HGB 10.4 03/22/2022    HCT 29.6 03/22/2022        PT/INR:    Lab Results   Component Value Date    PROTIME 11.9 03/22/2022    INR 1.05 03/22/2022              Current Inpatient Medications             Current Facility-Administered Medications: potassium chloride (KLOR-CON M) extended release tablet 40 mEq, 40 mEq, Oral, Once  famotidine (PEPCID) tablet 20 mg, 20 mg, Oral, BID  calcium carbonate (TUMS) chewable tablet 500 mg, 500 mg, Oral, TID PRN  sodium chloride flush 0.9 % injection 5-40 mL, 5-40 mL, IntraVENous, 2 times per day  sodium chloride flush 0.9 % injection 5-40 mL, 5-40 mL, IntraVENous, PRN  0.9 % sodium chloride infusion, 25 mL, IntraVENous,

## 2022-03-22 NOTE — PROGRESS NOTES
Pt AAO x4. States pain in left hip tolerable at this time. Assessment completed and charted. Left hip with Mepilex's in place x3. Small amount of sanguinous drainage noted to proximal dressing. Pedal pulses palpable. Pt still has not had a BM since Relistor given. Passing flatus. Hypoactive BS noted. No c/o nausea or vomiting. Denies any needs. Call light in reach. Will monitor.

## 2022-03-22 NOTE — PROGRESS NOTES
Occupational Therapy  Facility/Department: [address] ORTHOPEDICS  Daily Treatment Note  NAME: Gaby Salmon  : 1960  MRN: [id number]    Date of Service: 3/22/2022    Discharge Recommendations:  5-7 sessions per week,Patient would benefit from continued therapy after discharge     Gaby Salmon scored a 17/24 on the AM-PAC ADL Inpatient form. Current research shows that an AM-PAC score of 17 or less is typically not associated with a discharge to the patient's home setting. Based on the patient's AM-PAC score and their current ADL deficits, it is recommended that the patient have 5-7 sessions per week of Occupational Therapy at d/c to increase the patient's independence. At this time, this patient demonstrates the endurance, and/or tolerance for 3 hours of therapy each day, with a treatment frequency of 5-7x/wk. Please see assessment section for further patient specific details. If patient discharges prior to next session this note will serve as a discharge summary. Please see below for the latest assessment towards goals. Assessment   Discussed am pac score of 17 with OTR which indicates need for continued OT services to decrease lift assistance,  increase independence, decrease caregiver burden, improve fxl mobility to household distances, and increase ADL independence. Pt. performed sit<>stand SBA/CGA from recliner>stedy>toilet>recliner>RW>recliner. Pt. performed toileting with SUP. Pt. performed ADL grooming tasks at the sink while sitting in the stedy with setup. Pt. completed fxl mobility CGA with RW from recliner>curtain with slow steady gait, no LOB, and was highly motivated. S/TRACEY followed pt. with recliner chair following during fxl mobility. Pt. sat in recliner once reaching the curtain during fxl mobility and was left in recliner at the end of the session back in it's typical location. Continue POC.         Patient Diagnosis(es): The primary encounter diagnosis was Left hip pain. Diagnoses of Metastatic malignant neoplasm, unspecified site [facility]), Difficulty in walking, and Pathological fracture of left hip, initial encounter [facility]) were also pertinent to this visit. has a past medical history of Cancer ([address] Depression, Hyperthyroidism, and Nephrolithiasis. has a past surgical history that includes Cholecystectomy; Hysterectomy; Mastectomy; and Femur fracture surgery (Left, 3/19/2022). Restrictions  Restrictions/Precautions  Restrictions/Precautions: Weight Bearing,Fall Risk  Lower Extremity Weight Bearing Restrictions  Left Lower Extremity Weight Bearing: Weight Bearing As Tolerated  Subjective   General  Chart Reviewed: Yes  Patient assessed for rehabilitation services?: Yes  Additional Pertinent Hx: 63 yo female admit 3/16 with L Hip Pain, Met Malignant Neoplasm throughout L Hemipelvis, L Sacrum, Prox L Femur, Compress Fx L1, Subacute Rib Fxs, Diffuse Bony Mets. 3/19 S/P L Hip ORIF with IM Supa, L Fem Head Biopsy. PMH: Breast Ca/Mastectomy, Depression  Family / Caregiver Present: No  Referring Practitioner: John Gomez MD  Subjective  Subjective: Pt. resting in recliner upon arrival and agreeable to OT. Pt. reporting mild pain \"it's there, but not a lot. \"  Pt. motivated. General Comment  Comments: RN ok to see      Orientation  Orientation  Overall Orientation Status: Within Functional Limits  Objective    ADL  Grooming: Setup (seated at stedy at sink.)  Toileting: Supervision        Balance  Sitting Balance: Stand by assistance  Standing Balance: Contact guard assistance  Standing Balance  Time: ~3 minutes  Activity: stood getting into stedy, stood before and after ambulation with RW  Functional Mobility  Functional - Mobility Device: Rolling Walker  Activity: Other  Assist Level: Contact guard assistance  Functional Mobility Comments: Pt. walked with RW from recliner>curtain with recliner chair following. Slow steady pace with no LOB.   Toilet Transfers  Toilet Transfer: Contact guard assistance  Toilet Transfers Comments: within stedy     Transfers  Sit to stand: Stand by assistance;Contact guard assistance  Stand to sit: Stand by assistance;Contact guard assistance  Transfer Comments: to/from stedy/RW, recliner and toilet      Cognition  Overall Cognitive Status: [facility]  Times per week: 3-5  Current Treatment Recommendations: Strengthening,Endurance Training,Patient/Caregiver Education & Training,Balance Training,Pain Management,Functional Mobility Training,Safety Education & Training,Self-Care / ADL    AM-PAC Score        AM-PAC Inpatient Daily Activity Raw Score: 17 (03/22/22 0955)  AM-PAC Inpatient ADL T-Scale Score : 37.26 (03/22/22 0955)  ADL Inpatient CMS 0-100% Score: 50.11 (03/22/22 0955)  ADL Inpatient CMS G-Code Modifier : CK (03/22/22 0955)    Goals  Short term goals  Time Frame for Short term goals: prior to d/c  Short term goal 1: toileting Min A - MET 9/22  Short term goal 2: LB dressing Mod A  Short term goal 3: UB bathing/dressing seated set up  Short term goal 4: tx and short fxl mobility with RW SBA  Short term goal 5: tolerate 2 min static standing in prep for ADL tx  Patient Goals   Patient goals : get back home       Therapy Time   Individual Concurrent Group Co-treatment   Time In 0905         Time Out 0945         Minutes 40                  Susy OSCAR/TRACEY  I attest that I was present for and made a skilled and mindful clinical judgement during the treatment of this patient 3/22/2022     Electronically signed by Nikunj Knig, [id number] on 3/22/2022 at 10:29 AM

## 2022-03-22 NOTE — PROGRESS NOTES
Pt discharged to our acute rehab in room 3269. Pts belongings taken to new room. Report given to NICK Miranda. No further questions at this time.   Electronically signed by Aditi Mock RN on 3/22/2022 at 3:21 PM

## 2022-03-22 NOTE — PROGRESS NOTES
Physical Therapy    Facility/Department: [address] ORTHOPEDICS      Treatment note    NAME: Brenda Neves  : 1960  MRN: [id number]    Date of Service: 3/22/2022    Assessment / Discharge Recommendations:  -progressing well  -plans discharge today to  Rehab   -anticipate will do well for eventual return to home  -able to initiate ambulation with rolling walker room distances today      Body structures, Functions, Activity limitations: Decreased functional mobility ; Decreased ADL status; Decreased strength; Increased pain;Decreased balance          Patient Diagnosis(es): The primary encounter diagnosis was Left hip pain. Diagnoses of Metastatic malignant neoplasm, unspecified site [facility]), Difficulty in walking, and Pathological fracture of left hip, initial encounter [facility]) were also pertinent to this visit. has a past medical history of Cancer ([address] Depression, Hyperthyroidism, and Nephrolithiasis. has a past surgical history that includes Cholecystectomy; Hysterectomy; Mastectomy; and Femur fracture surgery (Left, 3/19/2022). Restrictions  Restrictions/Precautions  Restrictions/Precautions: Weight Bearing,Fall Risk  Lower Extremity Weight Bearing Restrictions  Left Lower Extremity Weight Bearing: Weight Bearing As Tolerated  Subjective  General  Chart Reviewed: Yes  Patient assessed for rehabilitation services?: Yes  Additional Pertinent Hx: 63 y/o female admit 3/16/2022 with L Hip Pain, Met Malignant Neoplasm. CT Hip : Osseous Met Disease throughout L Hemipelvis, L Sacrum and Prox L Femur. CT T/L Spine L Compress Fx L1 (uncertain chronicity), Subacute Rib Fxs, Diffuse Bony Mets. 3/19/2022 S/P L Hip ORIF with IM Supa, L Fem Head Biopsy. PMH as noted including Breast Ca/Mastectomy. Response To Previous Treatment: Patient with no complaints from previous session.   Family / Caregiver Present: No  Follows Commands: Within Functional Limits  Subjective  Subjective: to room along with OT to patient resting in recliner - she is alert oriented and agreeable to PTOT session - states pain at rest is minimal  Pain Screening  Patient Currently in Pain: Yes  Social/Functional History  Social/Functional History  Lives With: Family (Dtr (temp staying), 3 Grandsons (19 (total care), 16, 8 yr). )  Type of Home: House  Home Layout: Multi-level,Laundry in basement,Able to Live on Main level with bedroom/bathroom  Home Access: Stairs to enter with rails,Ramped entrance (1 step to enter.)  Bathroom Shower/Tub: Tub/Shower unit (Overhead lift system for grandlenora (bed to/from w/c, w/c to/from tub/shower). )  Bathroom Toilet: Standard  Bathroom Accessibility: Accessible  Home Equipment: 4 wheeled walker  ADL Assistance: Independent (Increasing difficulty. 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  Objective  Bed mobility  Comment: not observed at this session - OOB in [address] [place] [address] at start and conclusion  Transfers  Sit to Stand: Stand by assistance;Contact guard assistance  Stand to sit: Stand by assistance;Contact guard assistance  Ambulation  Ambulation?: Yes  Ambulation 1  Surface: level tile  Device: Rolling Walker  Assistance: Contact guard assistance  Quality of Gait: step through pattern with good heel contact and good weight bearing at quiet pace  Distance: ~15 feet  Comments: steady on the walker  Stairs/Curb  Stairs?: No  Balance  Comments: steady with transfers and ambulation on rolling walker with cga for safety  Exercises  Ankle Pumps: 30 per hour in easy pace  Comments: practiced spirometer and demonstrates good effort   Plan   Plan  Times per week: qdx1-2 then 3-5 while on acute setting  Current Treatment Recommendations: Strengthening,Functional Mobility Training,Transfer [employer] Training,Safety Education & Training,Patient/Caregiver Education & Training,ADL/Self-care Training,Positioning,Modalities  Safety Devices  Type of devices: All fall risk precautions in place,Call light within reach,Chair alarm in place,Left in chair,Nurse notified Mansoor Xuan)  AM-PAC Score  AM-PAC Inpatient Mobility Raw Score : 13 (03/22/22 0948)  AM-PAC Inpatient T-Scale Score : 36.74 (03/22/22 0948)  Mobility Inpatient CMS 0-100% Score: 64.91 (03/22/22 0948)  Mobility Inpatient CMS G-Code Modifier : CL (03/22/22 3398)  Goals  Short term goals  Time Frame for Short term goals: 1-2 days  Short term goal 1: Bed Mob Mod assist.  Short term goal 2: Transfer via [employer] assist x 2. Short term goal 3: Attempt Transfer/Amb with Walker as approp. Short term goal 4: Pt participating in approp ROM/Strength Exs. Patient Goals   Patient goals : Return home.        Therapy Time   Individual Concurrent Group Co-treatment   Time In [address]         Time Out 0945         Minutes [facility] Guzman Morrell, PT

## 2022-03-22 NOTE — H&P
Department of Physical Medicine & Rehabilitation  History & Physical      Patient Identification:  Marnie Pearce  : 1960  Admit date: 3/22/2022   Attending provider: Karli Kahn MD        Primary care provider: Melinda Lewis MD     Chief Complaint: left hip pain     History of Present Illness/Hospital Course:  Patient is a 63 yo F with pmh R breast cancer (s/p R mastectomy 2007, chemo, 5 years anti-estrogen treatment, then lost follow-up with Dr. Ramirez Dowling), DM2, hyperthyroidism, and depression who initially presented  3/16/2022 with left hip pain. Had been progressing since 2021. Imaging revealed diffuse metastatic disease/lytic bone lesions involving the chest, thoracic spine, lumbar spine, left hemipelvis and proximal left femur. Ortho consulted and patient deemed high risk for pathologic fracture given large destructive lesions centered around the left femoral head. Therefore underwent ORIF with IM kelvin and bone biopsy (3/19 with Dr. Josselin Mahmood). Now WBAT. Biopsy is pending and primary malignancy remains unclear. Per Oncology, plan will be for radiation in few weeks. Course complicated by hypercalcemia, acute liver injury, constipation. Now presents to ARU with impaired mobility and self-care below her baseline. Currently, patient reports moderate pain in the left groin and lateral hip with radiation into the anterolateral thigh. She also has mild-moderate pain in the low back and right hip. She denies tingling/numbness or focal weakness. She is motivated to start inpatient program.      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     Current Level of Function:   Mod A for mobility  Up to maxA for ADLs     Pertinent Social History:  Support: Lives with 3 grandsons: 24 yo (special needs, requires total care, uses lift equipment), 13 yo, 5 yo (autism); daughter temporarily staying  Home set-up: multi-level home with [facility], ramped entry    Past Medical History:   Diagnosis Date    Cancer ([address]     Depression     Hyperthyroidism     Nephrolithiasis        Past Surgical History:   Procedure Laterality Date    CHOLECYSTECTOMY      FEMUR FRACTURE SURGERY Left 3/19/2022    OPEN REDUCTION INTERNAL FIXATION LEFT HIP WITH INTRAMEDULLARY JAMISON; LEFT FEMORAL HEAD BONE BIOPSY INTRAOPERATIVELY performed by Telma Guo MD at [facility]         Family History   Problem Relation Age of Onset    Mental Illness Mother     High Blood Pressure Mother     High Blood Pressure Father     Diabetes Father        Social History     Socioeconomic History    Marital status: Single     Spouse name: Not on file    Number of children: Not on file    Years of education: Not on file    Highest education level: Not on file   Occupational History    Not on file   Tobacco Use    Smoking status: Former Smoker    Smokeless tobacco: Never Used   Substance and Sexual Activity    Alcohol use: Yes     Comment: rarely    Drug use: No    Sexual activity: Not on file   Other Topics Concern    Not on file   Social History Narrative    Not on file     Social Determinants of Health     Financial Resource Strain:     Difficulty of Paying Living Expenses: Not on file   Food Insecurity:     Worried About [address] in the Last Year: Not on file    [address] in the Last Year: Not on file   Transportation Needs:     Lack of Transportation (Medical): Not on file    Lack of Transportation (Non-Medical):  Not on file   Physical Activity:     Days of Exercise per Week: Not on file    Minutes of Exercise per Session: Not on file   Stress:     Feeling of Stress : Not on file   Social Connections:     Frequency of Communication with Friends and Family: Not on file    Frequency of Social Gatherings with Friends and Family: Not on file    Attends Anabaptist Services: Not on file   [employer]s or Organizations: Not on file    Attends Club or Organization Meetings: Not on file    Marital Status: Not on file   Intimate Partner Violence:     Fear of Current or Ex-Partner: Not on file    Emotionally Abused: Not on file    Physically Abused: Not on file    Sexually Abused: Not on file   Housing Stability:     Unable to Pay for Housing in the Last Year: Not on file    Number of Places Lived in the Last Year: Not on file    Unstable Housing in the Last Year: Not on file       Allergies   Allergen Reactions    Cephalexin          Current Facility-Administered Medications   Medication Dose Route Frequency Provider Last Rate Last Admin    0.9 % sodium chloride infusion  25 mL IntraVENous PRN Joshua Johnson MD        ondansetron [facility]) injection 4 mg  4 mg IntraVENous Q6H PRN Joshua Johnson MD        oxyCODONE HCl (OXY-IR) immediate release tablet 10 mg  10 mg Oral Q4H PRN Joshua Johnson MD        sodium chloride flush 0.9 % injection 5-40 mL  5-40 mL IntraVENous 2 times per day Joshua Johnson MD        sodium chloride flush 0.9 % injection 5-40 mL  5-40 mL IntraVENous PRN Joshua Johnson MD        acetaminophen (TYLENOL) tablet 650 mg  650 mg Oral Q6H PRN Joshua Johnson MD        [START ON 3/23/2022] enoxaparin (LOVENOX) injection 40 mg  40 mg SubCUTAneous Daily Joshua Johnson MD        magnesium hydroxide (MILK OF MAGNESIA) 400 MG/5ML suspension 30 mL  30 mL Oral Daily PRN Joshua Johnson MD        polyethylene glycol [facility]) packet 17 g  17 g Oral Daily PRN Joshua Johnson MD        sennosides-docusate sodium (SENOKOT-S) 8.6-50 MG tablet 1 tablet  1 tablet Oral BID Joshua Johnson MD        calcium carbonate (TUMS) chewable tablet 500 mg  500 mg Oral TID PRN Joshua Johnson MD        dextrose 5 % solution  100 mL/hr IntraVENous PRN Joshua Johnson MD        dextrose 50 % IV solution  12.5 g IntraVENous PRN Joshua Johnson MD       [facility] famotidine (PEPCID) tablet 20 mg  20 mg Oral BID Christopher Koch MD        glucagon (rDNA) injection 1 mg  1 mg IntraMUSCular PRN Christopher Koch MD        glucose (GLUTOSE) 40 % oral gel 15 g  15 g Oral PRN Christopher Koch MD        insulin lispro (HUMALOG) injection vial 0-3 Units  0-3 Units SubCUTAneous Nightly Christopher Koch MD        insulin lispro (HUMALOG) injection vial 0-6 Units  0-6 Units SubCUTAneous TID [facility] Christopher Koch MD        ondansetron [facility] tablet 4 mg  4 mg Oral Q8H PRN Christopher Koch MD             REVIEW OF SYSTEMS:   CONSTITUTIONAL: negative for fevers, chills, diaphoresis, appetite change, night sweats, unexpected weight change, fatigue  EYES: negative for blurred vision, eye discharge, visual disturbance and icterus  HEENT: negative for hearing loss, tinnitus, ear drainage, sinus pressure, nasal congestion, epistaxis and snoring  RESPIRATORY: Negative for hemoptysis, cough, sputum production  CARDIOVASCULAR: negative for chest pain, palpitations, exertional chest pressure/discomfort, syncope, edema  GASTROINTESTINAL: negative for nausea, vomiting, diarrhea, blood in stool, abdominal pain, + constipation, heart burn  GENITOURINARY: negative for frequency, dysuria, urinary incontinence, decreased urine volume, and hematuria  HEMATOLOGIC/LYMPHATIC: negative for easy bruising, bleeding and lymphadenopathy  ALLERGIC/IMMUNOLOGIC: negative for recurrent infections, angioedema, anaphylaxis and drug reactions  ENDOCRINE: negative for weight changes and diabetic symptoms including polyuria, polydipsia and polyphagia  MUSCULOSKELETAL: refer to HPI  NEUROLOGICAL: negative for headaches, slurred speech, unilateral weakness  PSYCHIATRIC/BEHAVIORAL: negative for hallucinations, behavioral problems, confusion and agitation.        All pertinent positives are noted in the HPI.     Physical Examination:  Vitals:   Patient Vitals for the past 24 hrs:   BP Temp Temp src Pulse Resp Height   03/22/22 1644 -- -- -- -- -- 5' 5\" (1.651 m)   03/22/22 1535 136/86 97.9 °F (36.6 °C) Oral 88 18 --       Const: Alert. WDWN. No distress  Eyes: Conjunctiva noninjected, no icterus noted; pupils equal, round, and reactive to light. HENT: Atraumatic, normocephalic; Oral mucosa moist  Neck: Trachea midline, neck supple. No thyromegaly noted. CV: Regular rate and rhythm, no murmur rub or gallop noted  Resp: Lungs clear to auscultation bilaterally, no rales wheezes or rhonchi, no retractions. Respirations unlabored. GI: Soft, nontender, nondistended. Normal bowel sounds. No palpable masses. Skin: Normal temperature and turgor. No rashes or breakdown noted. Ext: trace LE edema. No varicosities. MSK: Decreased left>right hip ROM, post op swelling left thigh. Otherwise no joint tenderness, erythema, warmth noted. Neuro: Alert, oriented, appropriate. No cranial nerve deficits appreciated. Sensation intact to light touch. Motor examination reveals strength 5/5 BUE, 5/5 RLE (except 4/5 right hip flexion, 5-/5 right knee extension), 2-3/5 left hip and knee (pain limited), 5/5 left ankle DF and PF. No abnormalities with finger/nose noted. Reflexes diminished, symmetric.   Psych: Stable mood, normal judgement, normal affect      Lab Results   Component Value Date    WBC 4.4 03/22/2022    HGB 10.4 (L) 03/22/2022    HCT 29.6 (L) 03/22/2022    MCV 84.4 03/22/2022     (L) 03/22/2022     Lab Results   Component Value Date    INR 1.05 03/22/2022    INR 1.10 03/21/2022    INR 1.19 (H) 03/20/2022    PROTIME 11.9 03/22/2022    PROTIME 12.5 03/21/2022    PROTIME 13.5 (H) 03/20/2022     Lab Results   Component Value Date    CREATININE <0.5 (L) 03/22/2022    BUN 12 03/22/2022     (L) 03/22/2022    K 3.3 (L) 03/22/2022    CL 98 (L) 03/22/2022    CO2 21 03/22/2022     Lab Results   Component Value Date     (H) 03/22/2022     (H) 03/22/2022    GGT 33 03/16/2022    ALKPHOS 410 (H) 03/22/2022 BILITOT 0.8 03/22/2022       Most recent EKG revealed:  Sinus rhythm     Most recent imaging studies revealed:     CT chest  1. Moderate subcutaneous inflammatory stranding within the right axilla   likely due to edema. 2. Right axillary adenopathy either due to sandeep metastasis or reactive   disease. 3. Diffuse osseous metastatic disease likely causing multiple   age-indeterminate pathologic fractures.  If there is point tenderness   throughout the thoracolumbar spine, recommend nonemergent MRI of the   thoracolumbar spine with without contrast.         CT abdomen/pelvis  Lytic and sclerotic lesions are seen diffusely through the visualized osseous   structures in the pelvis and lumbar spine consistent with osseous metastatic   disease.       No CT evidence of renal mass.      CT thoracic/lumbar spine  1. Diffuse bone metastasis. 2. Less than 50% compression fracture of L1 of uncertain chronicity. Subacute rib fractures. 3. Partially visualized questionable mass in the left kidney.  CT abdomen   pelvis with contrast recommended. 4. Nonobstructive renal calculi. 5. Other findings as described.         CT left hip  1. Osseous metastatic disease throughout the imaged left hemipelvis, left   sacrum, and proximal left femur with large destructive lesions centered   within the left femoral head and neck and involving the partially imaged left   sacrum. 2. No definite pathologic fracture identified on the current exam, however,   the location and severe destructive changes of the left femoral metastatic   lesion do place the patient at a significant risk for pathologic fracture at   this site. [facility] orthopedic consultation. The above laboratory data have been reviewed. The above imaging data have been reviewed. The above medical testing have been reviewed. Body mass index is 40.43 kg/m².     POST ADMISSION PHYSICIAN EVALUATION  The patient has agreed to being admitted to our comprehensive inpatient rehabilitation facility and can tolerate the intensity of service consisting of at least:  --180 minutes of therapy a day, 5 out of 7 days a week. OR  --15 hours of intensive therapy within a 7 consecutive day period. The patient/family has a good understanding of our discharge process and will benefit from an interdisciplinary inpatient rehabilitation program. The patient has potential to make improvement and is in need of at least two of the following multidisciplinary therapies including but not limited to physical, occupational, respiratory, and speech, nutritional services, wound care, and prosthetics and orthotics. Given the patients complex condition and risk of further medical complications, rehabilitation services cannot be safely provided at a lower level of care such as a skilled nursing facility. All of the goals listed below were reviewed with the patient and he/she is in agreement. I have compared the patients medical and functional status at the time of the preadmission screening and the same on this date, and there are no significant changes. By signing this document, I acknowledge that I have personally performed a full physical examination on this patient within 24 hours of admission to this inpatient rehabilitation facility and have determined the patient to be able to tolerate the above course of treatment at an intensive level for a reasonable period of time. I will be completing a detailed individualized  Plan of Care for this patient by day four of the patients stay based upon the Preadmission Screen, this Post-Admission Evaluation, and the therapy evaluations.      Barriers: decreased L>R hip ROM, decreased balance, endurance, medical comorbidities  Services Required: PT, OT  Goals: Nando for mobility and ADLs  Prognosis: Good  Anticipated Dispo: home with 3 grandsons: 22 yo (special needs, requires total care, uses lift equipment), 13 yo, 5 yo (autism); daughter temporarily staying  ELOS: 10-14 days    Rehabilitation Diagnosis:   Orthopedic, 8.9, Other Orthopedic      Assessment and Plan:    Impairments: decreased L>R hip ROM, decreased balance, endurance    Left hip metastatic lytic bone lesion   -s/p ORIF (3/19 with Dr. María Isaac)  -Wound care  -WBAT  -PT/OT    Metastatic malignancy with diffuse bony lesions   -unknown primary, possibly recurrent breast cancer (previously treated 2007)  -Bone biopsy pending  -Plan for radiation in few weeks  -Oncology (Dr. Preet Herbert) following    Hypercalcemia  -Resolved s/p Zometa  -Monitor    Acute liver injury/elevated LFTs  -Etiology unclear  -GI following  -Serologies ordered  -Monitor    DM2  -ISS    Depression  -Not on treatment, monitor mood    Bladder   -High risk retention   -Monitor PVRs, SC prn >300cc    Bowel   -High risk constipation   -senna+colace BID, PRN miralax, MoM, and bisacodyl supp. Safety   -fall precautions    Pain control  -oxycodone prn    PPx  -DVT: lovenox  -GI: famotidine    FULL CODE    Maeve Kelly MD 3/22/2022, 4:56 PM

## 2022-03-22 NOTE — PROGRESS NOTES
Hematology Oncology Daily Progress Note    Admit Date: 3/16/2022  Hospital day several    Subjective:     Patient has complaints of stable hip/back pain--denies sob/cp. Medication side effects: none    Scheduled Meds:   potassium chloride  40 mEq Oral Once    methylnaltrexone  12 mg SubCUTAneous Once    famotidine  20 mg Oral BID    sodium chloride flush  5-40 mL IntraVENous 2 times per day    sennosides-docusate sodium  1 tablet Oral BID    enoxaparin  40 mg SubCUTAneous Daily    insulin lispro  0-6 Units SubCUTAneous TID WC    insulin lispro  0-3 Units SubCUTAneous Nightly     Continuous Infusions:   sodium chloride 25 mL (03/19/22 1804)    dextrose       PRN Meds:calcium carbonate, sodium chloride flush, sodium chloride, oxyCODONE, HYDROmorphone, ondansetron, prochlorperazine, glucose, dextrose, glucagon (rDNA), dextrose    Review of Systems  Pertinent items are noted in HPI. REVIEW OF SYSTEMS:         · Constitutional: Denies fever, sweats, weight loss     · Eyes: No visual changes or diplopia. No scleral icterus. · ENT: No Headaches, hearing loss or vertigo. No mouth sores or sore throat. · Cardiovascular: No chest pain, dyspnea on exertion, palpitations or loss of consciousness. · Respiratory: No cough or wheezing, no sputum production. No hemoptysis. .    · Gastrointestinal: No abdominal pain, appetite loss, blood in stools. No change in bowel habits. · Genitourinary: No dysuria, trouble voiding, or hematuria. · Musculoskeletal:  Generalized weakness. No joint complaints. · Integumentary: No rash or pruritis. · Neurological: No headache, diplopia. No change in gait, balance, or coordination. No paresthesias. · Endocrine: No temperature intolerance. No excessive thirst, fluid intake, or urination. · Hematologic/Lymphatic: No abnormal bruising or ecchymoses, blood clots or swollen lymph nodes. · Allergic/Immunologic: No nasal congestion or hives.    ·     Objective:     Patient Vitals for the past 8 hrs:   BP Temp Temp src Pulse Resp SpO2 Weight   03/22/22 0746 133/84 98 °F (36.7 °C) Oral 89 16 97 % --   03/22/22 0655 -- -- -- -- -- -- 242 lb 15.2 oz (110.2 kg)   03/22/22 0430 136/77 98.4 °F (36.9 °C) Oral 95 18 94 % --     I/O last 3 completed shifts: In: 0709 [P.O.:1620]  Out: -   No intake/output data recorded.     /84   Pulse 89   Temp 98 °F (36.7 °C) (Oral)   Resp 16   Ht 5' 5\" (1.651 m)   Wt 242 lb 15.2 oz (110.2 kg)   SpO2 97%   BMI 40.43 kg/m²     General Appearance:    Alert, cooperative, no distress, appears stated age   Head:    Normocephalic, without obvious abnormality, atraumatic   Eyes:    PERRL, conjunctiva/corneas clear, EOM's intact, fundi     benign, both eyes        Ears:    Normal TM's and external ear canals, both ears   Nose:   Nares normal, septum midline, mucosa normal, no drainage    or sinus tenderness   Throat:   Lips, mucosa, and tongue normal; teeth and gums normal   Neck:   Supple, symmetrical, trachea midline, no adenopathy;        thyroid:  No enlargement/tenderness/nodules; no carotid    bruit or JVD   Back:     Symmetric, no curvature, ROM normal, no CVA tenderness   Lungs:     Clear to auscultation bilaterally, respirations unlabored   Chest wall:    No tenderness or deformity   Heart:    Regular rate and rhythm, S1 and S2 normal, no murmur, rub   or gallop   Abdomen:     Soft, non-tender, bowel sounds active all four quadrants,     no masses, no organomegaly           Extremities:   Extremities normal, atraumatic, no cyanosis or edema   Pulses:   2+ and symmetric all extremities   Skin:   Skin color, texture, turgor normal, no rashes or lesions   Lymph nodes:   Cervical, supraclavicular, and axillary nodes normal   Neurologic:   Stable         Data Review  CBC:   Lab Results   Component Value Date    WBC 4.4 03/22/2022    RBC 3.51 03/22/2022       Assessment:     Principal Problem:    Left hip pain  Active Problems:    Pathological fracture of left hip, initial encounter ([facility] [address]    Malignancy ([facility] [address]    Hypercalcemia  Resolved Problems:    * No resolved hospital problems. *      Plan:     1. Cancer unknown primary--biopsy pending    2. Hypercalcemia--resolved    3.  Dispo--rehab today        Electronically signed by Dinora Puckett MD on 3/22/2022 at 11:14 AM

## 2022-03-22 NOTE — PROGRESS NOTES
INPATIENT PROGRESS NOTE        IDENTIFYING DATA/REASON FOR CONSULTATION   PATIENT:  Mo Shaver  MRN:  [id number]  ADMIT DATE: 3/16/2022  TIME OF EVALUATION: 3/22/2022 10:52 AM  HOSPITAL STAY:   LOS: 6 days   CONSULTING PHYSICIAN: Laura Blount MD   REASON FOR CONSULTATION:elevated liver chemistries    Subjective:    Patient seen in follow up. She has no complaints. Denies abdominal pain. MEDICATIONS   SCHEDULED:  potassium chloride, 40 mEq, Once  famotidine, 20 mg, BID  sodium chloride flush, 5-40 mL, 2 times per day  sennosides-docusate sodium, 1 tablet, BID  enoxaparin, 40 mg, Daily  insulin lispro, 0-6 Units, TID WC  insulin lispro, 0-3 Units, Nightly      FLUIDS/DRIPS:     sodium chloride 25 mL (22 1804)    dextrose       PRNs: calcium carbonate, 500 mg, TID PRN  sodium chloride flush, 5-40 mL, PRN  sodium chloride, 25 mL, PRN  oxyCODONE, 10 mg, Q4H PRN  HYDROmorphone, 0.5 mg, Q3H PRN  ondansetron, 4 mg, Q6H PRN  prochlorperazine, 5 mg, Q4H PRN  glucose, 15 g, PRN  dextrose, 12.5 g, PRN  glucagon (rDNA), 1 mg, PRN  dextrose, 100 mL/hr, PRN      ALLERGIES:    Allergies   Allergen Reactions    Cephalexin          PHYSICAL EXAM   VITALS:  /84   Pulse 89   Temp 98 °F (36.7 °C) (Oral)   Resp 16   Ht 5' 5\" (1.651 m)   Wt 242 lb 15.2 oz (110.2 kg)   SpO2 97%   BMI 40.43 kg/m²   TEMPERATURE:  Current - Temp: 98 °F (36.7 °C); Max - Temp  Av.2 °F (36.8 °C)  Min: 97.7 °F (36.5 °C)  Max: 99.1 °F (37.3 °C)    Physical Exam:  General appearance: alert, cooperative, no distress, appears stated age  Eyes: Anicteric  Head: Normocephalic, without obvious abnormality  Lungs: clear to auscultation bilaterally, Normal Effort  Heart: regular rate and rhythm, normal S1 and S2, no murmurs or rubs  Abdomen: soft, non-distended, non-tender.  Bowel sounds normal.   Extremities: atraumatic, no cyanosis or edema  Skin: warm and dry, no jaundice  Neuro: Grossly intact, A&OX3    LABS AND IMAGING Laboratory   Recent Labs     03/20/22  0617 03/21/22  0455 03/22/22  0527   WBC 5.1 4.4 4.4   HGB 10.7* 10.5* 10.4*   HCT 30.6* 29.9* 29.6*   MCV 84.1 84.3 84.4   * 139 133*     Recent Labs     03/20/22  0617 03/21/22  0455 03/22/22  0527   * 133* 134*   K 3.6 3.2* 3.3*   CL 95* 95* 98*   CO2 24 20* 21   BUN 11 20 12   CREATININE 0.5* 0.8 <0.5*     Recent Labs     03/20/22  0617 03/21/22 0455 03/22/22  0527   * 170* 352*   * 381* 872*   BILITOT 0.9 0.9 0.8   ALKPHOS 201* 244* 410*     No results for input(s): LIPASE, AMYLASE in the last 72 hours. Recent Labs     03/20/22  0855 03/21/22 0455 03/22/22  0527   PROTIME 13.5* 12.5 11.9   INR 1.19* 1.10 1.05         Imaging  XR HIP LEFT (2-3 VIEWS)   Final Result      CT CHEST WO CONTRAST   Final Result   1. Moderate subcutaneous inflammatory stranding within the right axilla   likely due to edema. 2. Right axillary adenopathy either due to sandeep metastasis or reactive   disease. 3. Diffuse osseous metastatic disease likely causing multiple   age-indeterminate pathologic fractures. If there is point tenderness   throughout the thoracolumbar spine, recommend nonemergent MRI of the   thoracolumbar spine with without contrast.         CT ABDOMEN PELVIS W IV CONTRAST Additional Contrast? None   Final Result   Lytic and sclerotic lesions are seen diffusely through the visualized osseous   structures in the pelvis and lumbar spine consistent with osseous metastatic   disease. No CT evidence of renal mass. CT THORACIC SPINE WO CONTRAST   Final Result   1. Diffuse bone metastasis. 2. Less than 50% compression fracture of L1 of uncertain chronicity. Subacute rib fractures. 3. Partially visualized questionable mass in the left kidney. CT abdomen   pelvis with contrast recommended. 4. Nonobstructive renal calculi. 5. Other findings as described. CT HIP LEFT WO CONTRAST   Final Result   1.  Osseous metastatic disease throughout the imaged left hemipelvis, left   sacrum, and proximal left femur with large destructive lesions centered   within the left femoral head and neck and involving the partially imaged left   sacrum. 2. No definite pathologic fracture identified on the current exam, however,   the location and severe destructive changes of the left femoral metastatic   lesion do place the patient at a significant risk for pathologic fracture at   this site. Recommend orthopedic consultation. CT LUMBAR SPINE WO CONTRAST   Final Result   1. Diffuse bone metastasis. 2. Less than 50% compression fracture of L1 of uncertain chronicity. Subacute rib fractures. 3. Partially visualized questionable mass in the left kidney. CT abdomen   pelvis with contrast recommended. 4. Nonobstructive renal calculi. 5. Other findings as described. XR KNEE LEFT (1-2 VIEWS)   Final Result   Minimal degenerative changes of the left knee with no evidence of acute   osseous abnormality. XR HIP 2-3 VW W PELVIS LEFT   Final Result   Mild compression deformity of the T12 vertebral body, age indeterminate. No   acute lumbar spine fracture. No comparison imaging available. No acute osseous abnormality of the pelvis or left hip. Indeterminate 2 cm   lucent lesion within the left femoral neck. If the patient has a history of   malignancy, finding is concerning for possible metastatic focus. Consider   follow-up MRI or bone scan as clinically indicated. XR LUMBAR SPINE (2-3 VIEWS)   Final Result   Mild compression deformity of the T12 vertebral body, age indeterminate. No   acute lumbar spine fracture. No comparison imaging available. No acute osseous abnormality of the pelvis or left hip. Indeterminate 2 cm   lucent lesion within the left femoral neck. If the patient has a history of   malignancy, finding is concerning for possible metastatic focus.   Consider   follow-up MRI or bone scan as clinically indicated. FLUORO FOR SURGICAL PROCEDURES    (Results Pending)       Endoscopy      ASSESSMENT AND RECOMMENDATIONS   Marnie Pearce is a 64 y.o. female with PMH of breast cancer in 2007 which is in remission who presented with left hip pain. CT of the left hip showed metastatic disease throughout the left hemipelvis and proximal left femur with large destructive lesions within the left femoral head. She had a CT of the thoracic spine which showed diffuse bony metastasis and an L1 partial compression fracture. A CT of the abdomen and pelvis showed multiple lytic and sclerotic bone lesions. GI consulted regarding elevated liver chemistries    1. Elevated liver chemistries: Primarily hepatocellular pattern. Etiology unclear at this time. On CT of the abdomen, liver is normal in size.  There is no splenomegaly or hepatomegaly. INR is preserved. There is no suggestion of portal hypertension. [place] is no evidence of portal vein, hepatic vein, hepatic artery thrombosis.  Immunoglobulins have been checked and within normal limits.  This could be any number of things at this time and differential is very broad.  She is not currently on any culprit medications. She denies ETOH use. LILIANA and factin normal.  AMA pending. Acute viral hep panel negative. Ferritin significantly elevated, likely reactive but will check hemochromatosis. 2. Lf hip lytic lesion s/p ORIF left hip with bone biospy  3. Metastatic malignancy, unknown primary  4. History of breast cancer 2007    RECOMMENDATIONS:    Follow up bone bx  Follow up on liver serologies. Continue to trend LFTs    If you have any questions or need any further information, please feel free to contact us [phone]. Thank you for allowing us to participate in the care of Marnie Pearce. The note was completed using Dragon voice recognition transcription.  Every effort was made to ensure accuracy; however, inadvertent transcription errors may be present despite my best efforts to edit errors. Luis CORREA    Attending physician addendum:    I have personally seen and examined the patient, reviewed the patient's medical record and pertinent labs and clinical imaging. I have personally staffed the case with Luis CORREA. I agree with her consultation note, exam findings, assessment and plans  as written above. I have made appropriate modifications and edited her assessment and plan where needed to reflect my impression and plans for this patient. Mansoor Medels a 64 y.o. female breast cancer in 2007 which is in remission who presented with left hip pain since November 2021.  She is being seen by multiple other specialties, and has been found to have a left hip lytic lesion which is possibly metastatic. We have been consulted regarding abnormal LFT. CT with normal appearing liver no tumors. No history of heavy alcohol use. No medications on list could be contributing. Serologic work-up ordered and will await pathology results. Patient is being transferred to rehab and will follow at rehab.        Thank you for allowing me to participate in this patient's care. If there are any questions or concerns regarding this patient, or the plan we have set in place, please feel free to contact me at [phone].      Blanca Amezquita MD

## 2022-03-22 NOTE — PROGRESS NOTES
Patient admitted to room 3269 per chair. Transferred to with assist and stedy. Patient was oriented to the Call Light, Phone, TV, Thermostat, Bed Controls, Bathroom and Emergency Cord. Patient verbalized and demonstrated understanding of all. Patient was also given an over view of Unit Routines for Acute Rehab, including what to wear for therapy. The patient's role in goal setting was reviewed along with an explanation of the Interdisciplinary Team meeting, the 's role in coordinating services and the Discharge Planning/Continuum of Care process. Patient Rights and Responsibilities were reviewed. Meal times were explained, including how to order food. The white board (used for communication) was pointed out emphasizing  the 3 hours/day Therapy Schedule (posted most evenings), the number (and process) for reporting grievances, and the Doctor's, Nurse's, and PCA's names. It was recommended that any family that will be care givers or any care givers the patient has, take part in therapy. There are no set visiting hours, and it was suggested that non-caregiver friends and family visitors come after therapy (at 4 PM or later) to allow patient to rest in between sessions.

## 2022-03-22 NOTE — PROGRESS NOTES
4 Eyes Skin Assessment     NAME:  Denys Francois  YOB: 1960  MEDICAL RECORD NUMBER:  [id number]    The patient is being assess for  Admission    I agree that 2 RN's have performed a thorough Head to Toe Skin Assessment on the patient. ALL assessment sites listed below have been assessed. Areas assessed by both nurses:Ruth & Nohelia    Head, Face, Ears, Shoulders, Back, Chest, Arms, Elbows, Hands, Sacrum. Buttock, Coccyx, Ischium and Legs. Feet and Heels        Does the Patient have a Wound?  No noted wound(s)   Both heels with callouses       Cristhian Prevention initiated:  Yes   Wound Care Orders initiated:  Yes    Pressure Injury (Stage 3,4, Unstageable, DTI, NWPT, and Complex wounds) if present place consult order under [de-identified] No    New and Established Ostomies if present place consult order under : No      Nurse 1 eSignature: Electronically signed by Magnolia Correia RN on 3/22/22 at 4:10 PM EDT    **SHARE this note so that the co-signing nurse is able to place an eSignature**    Nurse 2 eSignature: Electronically signed by Alexa Jacobs RN on 3/22/22 at 5:29 PM EDT

## 2022-03-22 NOTE — PROGRESS NOTES
A complete drug regimen review was completed for this patient.      [x]  No clinically significant medication issue was identified    []   Yes, a clinically significant medication issue was identified     []  Adverse Drug Event:      []  Allergy:      []  Side Effect:      []  Ineffective Therapy:      []  Drug Interaction:     []  Duplicated Therapy:     []  Untreated Indication:      []  Non-adherence:     []  Other:               Electronically signed by Leatha Hanley RN on 3/22/22 at 4:14 PM EDT

## 2022-03-22 NOTE — PLAN OF CARE
Problem: Pain:  Goal: Pain level will decrease  Description: Pain level will decrease  3/22/2022 1455 by Markus Cooper RN  Outcome: Ongoing   Pt assessed for pain. Pt in pain and assessed with 0-10 pain rating scale. Pt satisfied with pain relief thus far. Will reassess and continue to monitor. Problem: Skin Integrity:  Goal: Will show no infection signs and symptoms  Description: Will show no infection signs and symptoms  3/22/2022 1455 by Markus Cooper RN  Outcome: Ongoing   Pt shows no signs of infection. Will monitor VS.   Problem: Skin Integrity:  Goal: Absence of new skin breakdown  Description: Absence of new skin breakdown  3/22/2022 1455 by Markus Cooper RN  Outcome: Ongoing   Pt shows no new skin breakdown. Will monitor. Problem: Falls - Risk of:  Goal: Will remain free from falls  Description: Will remain free from falls  3/22/2022 1455 by Markus Cooper RN  Outcome: Ongoing   Fall risk assessment completed. Fall precautions in place. Call light within reach. Pt educated on calling for assistance before getting up. Walkway free of clutter. Problem: Nutrition  Goal: Optimal nutrition therapy  3/22/2022 1455 by Markus Cooper RN  Outcome: Ongoing   encouraged patient to call for assistance especially if feeling dizzy, SOB, or weakness. Will continue to monitor.

## 2022-03-22 NOTE — DISCHARGE SUMMARY
[facility] ORTHOPEDIC AND SPINE [facility]   Discharge Summary    Patient:  Patric Jimenez  YOB: 1960    MRN: [id number]   Acct: [de-identified]    Primary Care Physician: Gabrielle Colon MD    Admit date:  3/16/2022    Discharge date:   3/22/22      Discharge Diagnoses:   Left hip pain      Pathological fracture of left hip, initial encounter ([facility] [address]    Malignancy ([facility] [address] unknown primary, biposy pending    Hypercalcemia, resolved    Elevated transaminases        Admitted for: (HPI) leg pain    Hospital Course:  61f with leg pain x 4 months, found to have pathologic fx L hip with hypercalcemia, widespread skeletal metastasis. Underwent L hip ORIF 3/19 with bone biopsy. Pain is currently well controlled with oral medications, she is constipated without improvement following relistor x1. Elevated transaminase etiology unknown with serologies pending. Consultants:  Shan Hein ; HemeOnc - Dr. Linda Lares ; GI - Dr. Alon Wilson     Discharge Medications:       Medication List      START taking these medications    calcium carbonate 500 MG chewable tablet  Commonly known as: TUMS  Take 1 tablet by mouth 3 times daily as needed for Heartburn     enoxaparin 40 MG/0.4ML injection  Commonly known as: LOVENOX  Inject 0.4 mLs into the skin daily     famotidine 20 MG tablet  Commonly known as: PEPCID  Take 1 tablet by mouth 2 times daily     oxyCODONE HCl 10 MG immediate release tablet  Commonly known as: OXY-IR  Take 1 tablet by mouth every 4 hours as needed for Pain for up to 5 days.         CONTINUE taking these medications    albuterol sulfate  (90 Base) MCG/ACT inhaler     Breast Prosthesis Misc  RIGHT MASTECTOMY BRA  RIGHT BREAST PROTHESIS        STOP taking these medications    cyclobenzaprine 5 MG tablet  Commonly known as: FLEXERIL           Where to Get Your Medications      These medications were sent to [address] [place] [address] Rd, [address] [phone] - F [address] [facility] [place] [address]    Phone: [phone]   · calcium carbonate 500 MG chewable tablet  · famotidine 20 MG tablet     You can get these medications from any pharmacy    Bring a paper prescription for each of these medications  · enoxaparin 40 MG/0.4ML injection  · oxyCODONE HCl 10 MG immediate release tablet           Physical Exam:    Vitals:  Vitals:    03/21/22 2339 03/22/22 0430 03/22/22 0655 03/22/22 0746   BP: 125/63 136/77  133/84   Pulse: 100 95  89   Resp: 18 18  16   Temp: 99.1 °F (37.3 °C) 98.4 °F (36.9 °C)  98 °F (36.7 °C)   TempSrc: Oral Oral  Oral   SpO2: 94% 94%  97%   Weight:   242 lb 15.2 oz (110.2 kg)    Height:         Weight: Weight: 242 lb 15.2 oz (110.2 kg)     24 hour intake/output:    Intake/Output Summary (Last 24 hours) at 3/22/2022 1252  Last data filed at 3/22/2022 1154  Gross per 24 hour   Intake 1300 ml   Output --   Net 1300 ml       General appearance - alert, well appearing, and in no distress  Chest - no use of accessory muscles, no intercostal retractions  Heart - normal rate, regular rhythm   Abdomen - soft, nontender, nondistended   Obese: Yes; Protuberant: Yes    Neurological - alert, oriented, normal speech, no focal findings or movement disorder noted  Extremities - peripheral pulses normal, no pedal edema   Skin - normal coloration and turgor     Radiology reports as per the Radiologist  Radiology: XR LUMBAR SPINE (2-3 VIEWS)    Result Date: 3/17/2022  EXAMINATION: ONE XRAY VIEW OF THE PELVIS AND TWO XRAY VIEWS LEFT HIP; THREE XRAY VIEWS OF THE LUMBAR SPINE 3/16/2022 3:09 pm COMPARISON: None HISTORY: ORDERING SYSTEM PROVIDED HISTORY: pain TECHNOLOGIST PROVIDED HISTORY: Reason for exam:->pain Reason for Exam: pt fell back in nov and pain has gotten worse over time, unable to put weight on left leg, lower back pain FINDINGS: Lumbar spine, three views: There is mild wedge-shaped deformity of the T12 vertebral body.   There is no other evidence of acute spinal fracture. Vertebral alignment is maintained. Mild multilevel osteoarthritic spurring with lower lumbar facet arthropathy. Mild osteopenia. Post cholecystectomy clips. AP pelvis and left hip: No acute osseous abnormality of the pelvis or left hip. Mild osteopenia. Poorly marginated lucency in the left femoral neck measuring around 2 cm. The SI joints are maintained. Mild symmetric osteoarthritic changes of the hips. Multiple calcified pelvic phleboliths. Mild compression deformity of the T12 vertebral body, age indeterminate. No acute lumbar spine fracture. No comparison imaging available. No acute osseous abnormality of the pelvis or left hip. Indeterminate 2 cm lucent lesion within the left femoral neck. If the patient has a history of malignancy, finding is concerning for possible metastatic focus. Consider follow-up MRI or bone scan as clinically indicated. XR KNEE LEFT (1-2 VIEWS)    Result Date: 3/16/2022  EXAMINATION: 2 XRAY VIEWS OF THE LEFT KNEE 3/16/2022 3:09 pm COMPARISON: None HISTORY: ORDERING SYSTEM PROVIDED HISTORY: pain TECHNOLOGIST PROVIDED HISTORY: Reason for exam:->pain Reason for Exam: pt fell back in nov and pain has gotten worse over time, unable to put weight on left leg, lower back pain FINDINGS: There is minimal patella nolan. There is minimal tricompartmental degenerative spurring about the knee. There is suggestion of presence of tiny osteochondral defect along the posterior margin of the upper portion of the patella as visualized on the lateral view. No evidence of significant joint effusion. No evidence of acute/recent fracture or dislocation. Minimal degenerative changes of the left knee with no evidence of acute osseous abnormality.      CT THORACIC SPINE WO CONTRAST    Result Date: 3/16/2022  EXAMINATION: CT OF THE LUMBAR SPINE WITHOUT CONTRAST; CT OF THE THORACIC SPINE WITHOUT CONTRAST  3/16/2022 TECHNIQUE: CT of the lumbar spine was performed without the administration of intravenous contrast. Multiplanar reformatted images are provided for review. Adjustment of mA and/or kV according to patient size was utilized. Dose modulation, iterative reconstruction, and/or weight based adjustment of the mA/kV was utilized to reduce the radiation dose to as low as reasonably achievable.; CT of the thoracic spine was performed without the administration of intravenous contrast. Multiplanar reformatted images are provided for review. Dose modulation, iterative reconstruction, and/or weight based adjustment of the mA/kV was utilized to reduce the radiation dose to as low as reasonably achievable. COMPARISON: None HISTORY: ORDERING SYSTEM PROVIDED HISTORY: pain TECHNOLOGIST PROVIDED HISTORY: Reason for exam:->pain Decision Support Exception - unselect if not a suspected or confirmed emergency medical condition->Emergency Medical Condition (MA) Reason for Exam: Fall back in November, c/o mostly of left hip pain; ORDERING SYSTEM PROVIDED HISTORY: T12 fracture seen on XR TECHNOLOGIST PROVIDED HISTORY: Reason for exam:->T12 fracture seen on XR Reason for Exam: Fall back in November, c/o mostly of left hip pain FINDINGS: Thoracic spine: BONES/ALIGNMENT: Grade 1 retrolisthesis of T12 on L1. Chronic appearing less than 50% loss of height of T12. Vertebral body heights appear maintained aside from chronic appearing endplate changes. Mild and moderate loss of disc height. Posterior elements appear intact. Diffuse heterogeneous appearance of spine with lytic and sclerotic lesions. Subacute rib fractures noted. DEGENERATIVE CHANGES: Scattered degenerative changes noted in the visualized spine without spondylolisthesis. SOFT TISSUES: Visualized paraspinal soft tissues appear unremarkable. Small hiatal hernia. Lumbar spine: BONES/ALIGNMENT: The inferior-most complete disc space represents L5-S1. Grade 1 retrolisthesis of L2 on L3 and L1 on L2.  Diffuse heterogeneous appearance of spine with lytic and sclerotic lesions. Less than 50% compression fracture of L1 of uncertain chronicity. Vertebral body heights appear otherwise maintained aside from chronic endplate changes. Mild loss of disc height. Posterior elements appear intact. DEGENERATIVE CHANGES: Scattered degenerative changes noted in the visualized spine with spondylolistheses. SOFT TISSUES: Visualized paraspinal soft tissues appear unremarkable. Nonobstructive renal calculi. Partially visualized questionable mass in the left kidney. 1. Diffuse bone metastasis. 2. Less than 50% compression fracture of L1 of uncertain chronicity. Subacute rib fractures. 3. Partially visualized questionable mass in the left kidney. CT abdomen pelvis with contrast recommended. 4. Nonobstructive renal calculi. 5. Other findings as described. CT LUMBAR SPINE WO CONTRAST    Result Date: 3/16/2022  EXAMINATION: CT OF THE LUMBAR SPINE WITHOUT CONTRAST; CT OF THE THORACIC SPINE WITHOUT CONTRAST  3/16/2022 TECHNIQUE: CT of the lumbar spine was performed without the administration of intravenous contrast. Multiplanar reformatted images are provided for review. Adjustment of mA and/or kV according to patient size was utilized. Dose modulation, iterative reconstruction, and/or weight based adjustment of the mA/kV was utilized to reduce the radiation dose to as low as reasonably achievable.; CT of the thoracic spine was performed without the administration of intravenous contrast. Multiplanar reformatted images are provided for review. Dose modulation, iterative reconstruction, and/or weight based adjustment of the mA/kV was utilized to reduce the radiation dose to as low as reasonably achievable.  COMPARISON: None HISTORY: ORDERING SYSTEM PROVIDED HISTORY: pain TECHNOLOGIST PROVIDED HISTORY: Reason for exam:->pain Decision Support Exception - unselect if not a suspected or confirmed emergency medical condition->Emergency Medical Condition (MA) Reason for Exam: Fall back in November, c/o mostly of left hip pain; ORDERING SYSTEM PROVIDED HISTORY: T12 fracture seen on XR TECHNOLOGIST PROVIDED HISTORY: Reason for exam:->T12 fracture seen on XR Reason for Exam: Fall back in November, c/o mostly of left hip pain FINDINGS: Thoracic spine: BONES/ALIGNMENT: Grade 1 retrolisthesis of T12 on L1. Chronic appearing less than 50% loss of height of T12. Vertebral body heights appear maintained aside from chronic appearing endplate changes. Mild and moderate loss of disc height. Posterior elements appear intact. Diffuse heterogeneous appearance of spine with lytic and sclerotic lesions. Subacute rib fractures noted. DEGENERATIVE CHANGES: Scattered degenerative changes noted in the visualized spine without spondylolisthesis. SOFT TISSUES: Visualized paraspinal soft tissues appear unremarkable. Small hiatal hernia. Lumbar spine: BONES/ALIGNMENT: The inferior-most complete disc space represents L5-S1. Grade 1 retrolisthesis of L2 on L3 and L1 on L2. Diffuse heterogeneous appearance of spine with lytic and sclerotic lesions. Less than 50% compression fracture of L1 of uncertain chronicity. Vertebral body heights appear otherwise maintained aside from chronic endplate changes. Mild loss of disc height. Posterior elements appear intact. DEGENERATIVE CHANGES: Scattered degenerative changes noted in the visualized spine with spondylolistheses. SOFT TISSUES: Visualized paraspinal soft tissues appear unremarkable. Nonobstructive renal calculi. Partially visualized questionable mass in the left kidney. 1. Diffuse bone metastasis. 2. Less than 50% compression fracture of L1 of uncertain chronicity. Subacute rib fractures. 3. Partially visualized questionable mass in the left kidney. CT abdomen pelvis with contrast recommended. 4. Nonobstructive renal calculi. 5. Other findings as described.      CT ABDOMEN PELVIS W IV CONTRAST Additional Contrast? None    Result Date: 3/17/2022  EXAMINATION: CT OF THE ABDOMEN AND PELVIS WITH CONTRAST 3/16/2022 10:29 pm TECHNIQUE: CT of the abdomen and pelvis was performed with the administration of intravenous contrast. Multiplanar reformatted images are provided for review. Dose modulation, iterative reconstruction, and/or weight based adjustment of the mA/kV was utilized to reduce the radiation dose to as low as reasonably achievable. COMPARISON: None. HISTORY: ORDERING SYSTEM PROVIDED HISTORY: concern for kidney lesion/mass, bone lesions, hypercalcemia TECHNOLOGIST PROVIDED HISTORY: Reason for exam:->concern for kidney lesion/mass, bone lesions, hypercalcemia Additional Contrast?->None Decision Support Exception - unselect if not a suspected or confirmed emergency medical condition->Emergency Medical Condition (MA) Reason for Exam: concern for kidney lesion/mass, bone lesions, hypercalcemia FINDINGS: CARDIOVASCULAR: The visualized heart and pericardium demonstrate no acute abnormality. The aorta and branch vessels are patent and normal in caliber. LUNG BASES: There are no focal consolidations or pleural effusions. HEPATOBILIARY: The liver is normal in size. There are no focal hepatic lesions. The gallbladder is surgically absent. There is prominence/mild dilatation of the biliary ducts, likely due to prior cholecystectomy. SPLEEN: Unremarkable. PANCREAS: Unremarkable ADRENAL GLANDS: Unremarkable. KIDNEYS: Kidneys are normal in size and contour and demonstrate symmetric enhancement. There is no hydronephrosis. There are bilateral parapelvic cysts. There are bilateral nonobstructing renal calculi. Tiny hypodensities are noted in the kidneys bilaterally, which are too small to characterize. ABDOMINAL NODES: No adenopathy is appreciated. PELVIC ORGANS: The urinary bladder is unremarkable. The uterus is surgically absent. PERITONEUM/MESENTERY/BOWEL: There is a small hiatal hernia. There is no bowel obstruction.  There is no bowel wall thickening. There is diverticulosis without evidence of diverticulitis. The appendix is normal. BONES/SOFT TISSUES: Lytic and sclerotic lesions are seen diffusely through the visualized osseous structures in the pelvis and lumbar spine consistent with osseous metastatic disease. Lytic and sclerotic lesions are seen diffusely through the visualized osseous structures in the pelvis and lumbar spine consistent with osseous metastatic disease. No CT evidence of renal mass. CT HIP LEFT WO CONTRAST    Result Date: 3/16/2022  EXAMINATION: CT OF THE LEFT HIP WITHOUT CONTRAST 3/16/2022 4:21 pm TECHNIQUE: CT of the left hip was performed without the administration of intravenous contrast.  Multiplanar reformatted images are provided for review. Dose modulation, iterative reconstruction, and/or weight based adjustment of the mA/kV was utilized to reduce the radiation dose to as low as reasonably achievable. COMPARISON: None. HISTORY ORDERING SYSTEM PROVIDED HISTORY: abnormal finding on XR imaging, concerning for bone mets, eval for pathologic fracture TECHNOLOGIST PROVIDED HISTORY: Reason for exam:->abnormal finding on XR imaging, concerning for bone mets, eval for pathologic fracture Decision Support Exception - unselect if not a suspected or confirmed emergency medical condition->Emergency Medical Condition (MA) Reason for Exam: Fall back in November, c/o mostly of left hip pain FINDINGS: Bones: Destructive osseous lesion centered at the left metatarsal head and neck consistent with metastatic lesion. Partially imaged destructive lesion of the sacrum also present. There are several additional lytic and sclerotic foci of the left hemipelvis most compatible with metastatic disease. No definite fracture line identified at the left hip, however the location and destructive appearance does place the patient at significant risk for pathologic fracture at this site.  Soft Tissue: Visualized intrapelvic structures demonstrate colonic diverticulosis. Imaged subcutaneous tissues appear grossly unremarkable. Joint: Anatomic alignment of the left hip with mild degenerative change of the left hip. 1. Osseous metastatic disease throughout the imaged left hemipelvis, left sacrum, and proximal left femur with large destructive lesions centered within the left femoral head and neck and involving the partially imaged left sacrum. 2. No definite pathologic fracture identified on the current exam, however, the location and severe destructive changes of the left femoral metastatic lesion do place the patient at a significant risk for pathologic fracture at this site. Recommend orthopedic consultation. XR HIP 2-3 VW W PELVIS LEFT    Result Date: 3/17/2022  EXAMINATION: ONE XRAY VIEW OF THE PELVIS AND TWO XRAY VIEWS LEFT HIP; THREE XRAY VIEWS OF THE LUMBAR SPINE 3/16/2022 3:09 pm COMPARISON: None HISTORY: ORDERING SYSTEM PROVIDED HISTORY: pain TECHNOLOGIST PROVIDED HISTORY: Reason for exam:->pain Reason for Exam: pt fell back in nov and pain has gotten worse over time, unable to put weight on left leg, lower back pain FINDINGS: Lumbar spine, three views: There is mild wedge-shaped deformity of the T12 vertebral body. There is no other evidence of acute spinal fracture. Vertebral alignment is maintained. Mild multilevel osteoarthritic spurring with lower lumbar facet arthropathy. Mild osteopenia. Post cholecystectomy clips. AP pelvis and left hip: No acute osseous abnormality of the pelvis or left hip. Mild osteopenia. Poorly marginated lucency in the left femoral neck measuring around 2 cm. The SI joints are maintained. Mild symmetric osteoarthritic changes of the hips. Multiple calcified pelvic phleboliths. Mild compression deformity of the T12 vertebral body, age indeterminate. No acute lumbar spine fracture. No comparison imaging available. No acute osseous abnormality of the pelvis or left hip.   Indeterminate 2 cm lucent lesion within the left femoral neck. If the patient has a history of malignancy, finding is concerning for possible metastatic focus. Consider follow-up MRI or bone scan as clinically indicated.         Results for orders placed or performed during the hospital encounter of 03/16/22   COVID-19, Rapid    Specimen: Nasopharyngeal Swab   Result Value Ref Range    SARS-CoV-2, NAAT Not Detected Not Detected   CBC with Auto Differential   Result Value Ref Range    WBC 4.1 4.0 - 11.0 K/uL    RBC 3.77 (L) 4.00 - 5.20 M/uL    Hemoglobin 11.3 (L) 12.0 - 16.0 g/dL    Hematocrit 32.6 (L) 36.0 - 48.0 %    MCV 86.6 80.0 - 100.0 fL    MCH 30.1 26.0 - 34.0 pg    MCHC 34.7 31.0 - 36.0 g/dL    RDW 14.9 12.4 - 15.4 %    Platelets 414 760 - 645 K/uL    MPV 7.0 5.0 - 10.5 fL    Neutrophils % 59.5 %    Lymphocytes % 30.5 %    Monocytes % 7.8 %    Eosinophils % 1.7 %    Basophils % 0.5 %    Neutrophils Absolute 2.4 1.7 - 7.7 K/uL    Lymphocytes Absolute 1.3 1.0 - 5.1 K/uL    Monocytes Absolute 0.3 0.0 - 1.3 K/uL    Eosinophils Absolute 0.1 0.0 - 0.6 K/uL    Basophils Absolute 0.0 0.0 - 0.2 K/uL   Comprehensive Metabolic Panel w/ Reflex to MG   Result Value Ref Range    Sodium 142 136 - 145 mmol/L    Potassium reflex Magnesium 3.7 3.5 - 5.1 mmol/L    Chloride 100 99 - 110 mmol/L    CO2 22 21 - 32 mmol/L    Anion Gap 20 (H) 3 - 16    Glucose 103 (H) 70 - 99 mg/dL    BUN 13 7 - 20 mg/dL    CREATININE 0.8 0.6 - 1.2 mg/dL    GFR Non-African American >60 >60    GFR African American >60 >60    Calcium 12.1 (HH) 8.3 - 10.6 mg/dL    Total Protein 7.5 6.4 - 8.2 g/dL    Albumin 3.9 3.4 - 5.0 g/dL    Albumin/Globulin Ratio 1.1 1.1 - 2.2    Total Bilirubin 1.2 (H) 0.0 - 1.0 mg/dL    Alkaline Phosphatase 230 (H) 40 - 129 U/L     (H) 10 - 40 U/L     (H) 15 - 37 U/L   CBC with Auto Differential   Result Value Ref Range    WBC 3.6 (L) 4.0 - 11.0 K/uL    RBC 4.02 4.00 - 5.20 M/uL    Hemoglobin 11.8 (L) 12.0 - 16.0 g/dL    Hematocrit 35.2 (L) 36.0 - 48.0 %    MCV 87.5 80.0 - 100.0 fL    MCH 29.3 26.0 - 34.0 pg    MCHC 33.5 31.0 - 36.0 g/dL    RDW 14.9 12.4 - 15.4 %    Platelets 990 149 - 260 K/uL    MPV 7.2 5.0 - 10.5 fL    Neutrophils % 55.3 %    Lymphocytes % 35.1 %    Monocytes % 7.3 %    Eosinophils % 1.6 %    Basophils % 0.7 %    Neutrophils Absolute 2.0 1.7 - 7.7 K/uL    Lymphocytes Absolute 1.3 1.0 - 5.1 K/uL    Monocytes Absolute 0.3 0.0 - 1.3 K/uL    Eosinophils Absolute 0.1 0.0 - 0.6 K/uL    Basophils Absolute 0.0 0.0 - 0.2 K/uL   CBC with Auto Differential   Result Value Ref Range    WBC 3.2 (L) 4.0 - 11.0 K/uL    RBC 3.72 (L) 4.00 - 5.20 M/uL    Hemoglobin 11.1 (L) 12.0 - 16.0 g/dL    Hematocrit 32.0 (L) 36.0 - 48.0 %    MCV 86.0 80.0 - 100.0 fL    MCH 29.8 26.0 - 34.0 pg    MCHC 34.6 31.0 - 36.0 g/dL    RDW 14.7 12.4 - 15.4 %    Platelets 216 577 - 220 K/uL    MPV 7.0 5.0 - 10.5 fL    Neutrophils % 49.4 %    Lymphocytes % 40.0 %    Monocytes % 7.6 %    Eosinophils % 2.1 %    Basophils % 0.9 %    Neutrophils Absolute 1.6 (L) 1.7 - 7.7 K/uL    Lymphocytes Absolute 1.3 1.0 - 5.1 K/uL    Monocytes Absolute 0.2 0.0 - 1.3 K/uL    Eosinophils Absolute 0.1 0.0 - 0.6 K/uL    Basophils Absolute 0.0 0.0 - 0.2 K/uL   Comprehensive Metabolic Panel   Result Value Ref Range    Sodium 136 136 - 145 mmol/L    Potassium 3.4 (L) 3.5 - 5.1 mmol/L    Chloride 100 99 - 110 mmol/L    CO2 24 21 - 32 mmol/L    Anion Gap 12 3 - 16    Glucose 108 (H) 70 - 99 mg/dL    BUN 11 7 - 20 mg/dL    CREATININE 0.8 0.6 - 1.2 mg/dL    GFR Non-African American >60 >60    GFR African American >60 >60    Calcium 10.9 (H) 8.3 - 10.6 mg/dL    Total Protein 7.0 6.4 - 8.2 g/dL    Albumin 3.6 3.4 - 5.0 g/dL    Albumin/Globulin Ratio 1.1 1.1 - 2.2    Total Bilirubin 1.2 (H) 0.0 - 1.0 mg/dL    Alkaline Phosphatase 199 (H) 40 - 129 U/L     (H) 10 - 40 U/L     (H) 15 - 37 U/L   Magnesium   Result Value Ref Range    Magnesium 2.00 1.80 - 2.40 mg/dL   PTH, Intact Result Value Ref Range    PTH 9.7 (L) 14.0 - 72.0 pg/mL   Vitamin D 25 Hydroxy   Result Value Ref Range    Vit D, 25-Hydroxy 39.5 >=30 ng/mL   Vitamin D 1,25 Dihydroxy   Result Value Ref Range    Vit D, 1,25-Dihydroxy 8.9 (L) 19.9 - 79.3 pg/mL   Urinalysis   Result Value Ref Range    Color, UA YELLOW Straw/Yellow    Clarity, UA Clear Clear    Glucose, Ur Negative Negative mg/dL    Bilirubin Urine Negative Negative    Ketones, Urine TRACE (A) Negative mg/dL    Specific Gravity, UA 1.016 1.005 - 1.030    Blood, Urine Negative Negative    pH, UA 6.0 5.0 - 8.0    Protein, UA Negative Negative mg/dL    Urobilinogen, Urine 1.0 <2.0 E.U./dL    Nitrite, Urine Negative Negative    Leukocyte Esterase, Urine Negative Negative    Microscopic Examination Not Indicated     Urine Type NotGiven    Protein / Creatinine Ratio, Urine   Result Value Ref Range    Protein, Ur 15.00 (H) <12 mg/dL    Creatinine, Ur 112.2 28.0 - 259.0 mg/dL    Protein/Creat Ratio 0.1 mg/dL   Comprehensive Metabolic Panel w/ Reflex to MG   Result Value Ref Range    Sodium 136 136 - 145 mmol/L    Potassium reflex Magnesium 3.7 3.5 - 5.1 mmol/L    Chloride 98 (L) 99 - 110 mmol/L    CO2 21 21 - 32 mmol/L    Anion Gap 17 (H) 3 - 16    Glucose 113 (H) 70 - 99 mg/dL    BUN 13 7 - 20 mg/dL    CREATININE 0.8 0.6 - 1.2 mg/dL    GFR Non-African American >60 >60    GFR African American >60 >60    Calcium 11.4 (H) 8.3 - 10.6 mg/dL    Total Protein 7.2 6.4 - 8.2 g/dL    Albumin 3.5 3.4 - 5.0 g/dL    Albumin/Globulin Ratio 0.9 (L) 1.1 - 2.2    Total Bilirubin 1.4 (H) 0.0 - 1.0 mg/dL    Alkaline Phosphatase 212 (H) 40 - 129 U/L     (H) 10 - 40 U/L     (H) 15 - 37 U/L   Gamma GT   Result Value Ref Range    GGT 33 5 - 36 U/L   SPECIMEN REJECTION   Result Value Ref Range    Rejected Test bmp mg     Reason for Rejection see below    Basic Metabolic Panel   Result Value Ref Range    Sodium 139 136 - 145 mmol/L    Potassium 3.4 (L) 3.5 - 5.1 mmol/L    Chloride g/dL    Gamma Globulin 1.5 0.6 - 1.8 g/dL    M Luis 1, Electrophoresis Protein Serum 1.0 g/dL   Kappa/Lambda Quantitative Free Light Chains, Serum   Result Value Ref Range    KAPPA, FREE LIGHT CHAINS, SERUM 12.04 3.30 - 19.40 mg/L    LAMBDA, FREE LIGHT CHAINS, SERUM 16.00 5.71 - 26.30 mg/L    K/L RATIO 0.75 0.26 - 1.65    KAPPA/LAMBDA TEST COMMENT see below    IgG, IgA, IgM   Result Value Ref Range    IgA 113.0 70.0 - 400.0 mg/dL    IgG 1438.0 700.0 - 1600.0 mg/dL    IgM 125.0 40.0 - 230.0 mg/dL   Pathology Interpretation   Result Value Ref Range    SPE/ADAMA Interpretation REVIEWED    CBC with Auto Differential   Result Value Ref Range    WBC 2.9 (L) 4.0 - 11.0 K/uL    RBC 3.86 (L) 4.00 - 5.20 M/uL    Hemoglobin 11.3 (L) 12.0 - 16.0 g/dL    Hematocrit 33.1 (L) 36.0 - 48.0 %    MCV 85.7 80.0 - 100.0 fL    MCH 29.3 26.0 - 34.0 pg    MCHC 34.2 31.0 - 36.0 g/dL    RDW 15.0 12.4 - 15.4 %    Platelets 529 894 - 941 K/uL    MPV 6.8 5.0 - 10.5 fL    Neutrophils % 69.1 %    Lymphocytes % 23.3 %    Monocytes % 4.9 %    Eosinophils % 1.6 %    Basophils % 1.1 %    Neutrophils Absolute 2.0 1.7 - 7.7 K/uL    Lymphocytes Absolute 0.7 (L) 1.0 - 5.1 K/uL    Monocytes Absolute 0.1 0.0 - 1.3 K/uL    Eosinophils Absolute 0.0 0.0 - 0.6 K/uL    Basophils Absolute 0.0 0.0 - 0.2 K/uL   Comprehensive Metabolic Panel   Result Value Ref Range    Sodium 134 (L) 136 - 145 mmol/L    Potassium 3.1 (L) 3.5 - 5.1 mmol/L    Chloride 96 (L) 99 - 110 mmol/L    CO2 24 21 - 32 mmol/L    Anion Gap 14 3 - 16    Glucose 94 70 - 99 mg/dL    BUN 9 7 - 20 mg/dL    CREATININE 0.6 0.6 - 1.2 mg/dL    GFR Non-African American >60 >60    GFR African American >60 >60    Calcium 8.1 (L) 8.3 - 10.6 mg/dL    Total Protein 6.2 (L) 6.4 - 8.2 g/dL    Albumin 3.3 (L) 3.4 - 5.0 g/dL    Albumin/Globulin Ratio 1.1 1.1 - 2.2    Total Bilirubin 1.2 (H) 0.0 - 1.0 mg/dL    Alkaline Phosphatase 206 (H) 40 - 129 U/L     (H) 10 - 40 U/L     (H) 15 - 37 U/L   Magnesium Result Value Ref Range    Magnesium 1.80 1.80 - 2.40 mg/dL   CBC with Auto Differential   Result Value Ref Range    WBC 5.1 4.0 - 11.0 K/uL    RBC 3.64 (L) 4.00 - 5.20 M/uL    Hemoglobin 10.7 (L) 12.0 - 16.0 g/dL    Hematocrit 30.6 (L) 36.0 - 48.0 %    MCV 84.1 80.0 - 100.0 fL    MCH 29.4 26.0 - 34.0 pg    MCHC 35.0 31.0 - 36.0 g/dL    RDW 14.7 12.4 - 15.4 %    Platelets 394 (L) 677 - 450 K/uL    MPV 7.0 5.0 - 10.5 fL    Neutrophils % 80.7 %    Lymphocytes % 13.4 %    Monocytes % 5.2 %    Eosinophils % 0.3 %    Basophils % 0.4 %    Neutrophils Absolute 4.1 1.7 - 7.7 K/uL    Lymphocytes Absolute 0.7 (L) 1.0 - 5.1 K/uL    Monocytes Absolute 0.3 0.0 - 1.3 K/uL    Eosinophils Absolute 0.0 0.0 - 0.6 K/uL    Basophils Absolute 0.0 0.0 - 0.2 K/uL   Comprehensive Metabolic Panel   Result Value Ref Range    Sodium 132 (L) 136 - 145 mmol/L    Potassium 3.6 3.5 - 5.1 mmol/L    Chloride 95 (L) 99 - 110 mmol/L    CO2 24 21 - 32 mmol/L    Anion Gap 13 3 - 16    Glucose 133 (H) 70 - 99 mg/dL    BUN 11 7 - 20 mg/dL    CREATININE 0.5 (L) 0.6 - 1.2 mg/dL    GFR Non-African American >60 >60    GFR African American >60 >60    Calcium 7.4 (L) 8.3 - 10.6 mg/dL    Total Protein 6.7 6.4 - 8.2 g/dL    Albumin 3.1 (L) 3.4 - 5.0 g/dL    Albumin/Globulin Ratio 0.9 (L) 1.1 - 2.2    Total Bilirubin 0.9 0.0 - 1.0 mg/dL    Alkaline Phosphatase 201 (H) 40 - 129 U/L     (H) 10 - 40 U/L     (H) 15 - 37 U/L   Magnesium   Result Value Ref Range    Magnesium 1.90 1.80 - 2.40 mg/dL   F-Actin (Smooth Muscle) Antibody w Reflex to Titer   Result Value Ref Range    F-ACTIN AB IGG 4 0 - 19 Units   LILIANA   Result Value Ref Range    LILIANA Negative Negative   Hepatitis A Antibody, IgM   Result Value Ref Range    Hep A IgM Non-reactive Non-reactive   Hepatitis A Antibody, Total   Result Value Ref Range    Hep A Total Ab Negative Negative   Hepatitis B Core Antibody, IgM   Result Value Ref Range    Hep B Core Ab, IgM Non-reactive Non-reactive Hepatitis B Core Antibody, Total   Result Value Ref Range    Hep B Core Total Ab Negative Negative   Hepatitis B Surface Antibody   Result Value Ref Range    Hep B S Ab <3.50 mIU/mL   Hepatitis C Antibody   Result Value Ref Range    Hep C Ab Interp Non-reactive Non-reactive   Hepatitis B Surface Antigen   Result Value Ref Range    Hep B S Ag Interp Non-reactive Non-reactive   Iron and TIBC   Result Value Ref Range    Iron 49 37 - 145 ug/dL    TIBC 259 (L) 260 - 445 ug/dL    Iron Saturation 19 15 - 50 %   Ferritin   Result Value Ref Range    Ferritin 3,310.0 (H) 15.0 - 150.0 ng/mL   Protime-INR   Result Value Ref Range    Protime 13.5 (H) 9.9 - 12.7 sec    INR 1.19 (H) 0.88 - 1.12   CBC with Auto Differential   Result Value Ref Range    WBC 4.4 4.0 - 11.0 K/uL    RBC 3.55 (L) 4.00 - 5.20 M/uL    Hemoglobin 10.5 (L) 12.0 - 16.0 g/dL    Hematocrit 29.9 (L) 36.0 - 48.0 %    MCV 84.3 80.0 - 100.0 fL    MCH 29.6 26.0 - 34.0 pg    MCHC 35.1 31.0 - 36.0 g/dL    RDW 14.9 12.4 - 15.4 %    Platelets 743 964 - 905 K/uL    MPV 7.3 5.0 - 10.5 fL    Neutrophils % 67.2 %    Lymphocytes % 25.3 %    Monocytes % 5.6 %    Eosinophils % 1.3 %    Basophils % 0.6 %    Neutrophils Absolute 3.0 1.7 - 7.7 K/uL    Lymphocytes Absolute 1.1 1.0 - 5.1 K/uL    Monocytes Absolute 0.2 0.0 - 1.3 K/uL    Eosinophils Absolute 0.1 0.0 - 0.6 K/uL    Basophils Absolute 0.0 0.0 - 0.2 K/uL   Comprehensive Metabolic Panel   Result Value Ref Range    Sodium 133 (L) 136 - 145 mmol/L    Potassium 3.2 (L) 3.5 - 5.1 mmol/L    Chloride 95 (L) 99 - 110 mmol/L    CO2 20 (L) 21 - 32 mmol/L    Anion Gap 18 (H) 3 - 16    Glucose 129 (H) 70 - 99 mg/dL    BUN 20 7 - 20 mg/dL    CREATININE 0.8 0.6 - 1.2 mg/dL    GFR Non-African American >60 >60    GFR African American >60 >60    Calcium 7.8 (L) 8.3 - 10.6 mg/dL    Total Protein 6.6 6.4 - 8.2 g/dL    Albumin 3.6 3.4 - 5.0 g/dL    Albumin/Globulin Ratio 1.2 1.1 - 2.2    Total Bilirubin 0.9 0.0 - 1.0 mg/dL    Alkaline Phosphatase 244 (H) 40 - 129 U/L     (H) 10 - 40 U/L     (H) 15 - 37 U/L   Magnesium   Result Value Ref Range    Magnesium 2.10 1.80 - 2.40 mg/dL   Protime-INR   Result Value Ref Range    Protime 12.5 9.9 - 12.7 sec    INR 1.10 0.88 - 1.12   CBC with Auto Differential   Result Value Ref Range    WBC 4.4 4.0 - 11.0 K/uL    RBC 3.51 (L) 4.00 - 5.20 M/uL    Hemoglobin 10.4 (L) 12.0 - 16.0 g/dL    Hematocrit 29.6 (L) 36.0 - 48.0 %    MCV 84.4 80.0 - 100.0 fL    MCH 29.6 26.0 - 34.0 pg    MCHC 35.0 31.0 - 36.0 g/dL    RDW 14.8 12.4 - 15.4 %    Platelets 370 (L) 452 - 450 K/uL    MPV 7.3 5.0 - 10.5 fL    Neutrophils % 61.7 %    Lymphocytes % 27.7 %    Monocytes % 8.2 %    Eosinophils % 1.6 %    Basophils % 0.8 %    Neutrophils Absolute 2.7 1.7 - 7.7 K/uL    Lymphocytes Absolute 1.2 1.0 - 5.1 K/uL    Monocytes Absolute 0.4 0.0 - 1.3 K/uL    Eosinophils Absolute 0.1 0.0 - 0.6 K/uL    Basophils Absolute 0.0 0.0 - 0.2 K/uL   Comprehensive Metabolic Panel   Result Value Ref Range    Sodium 134 (L) 136 - 145 mmol/L    Potassium 3.3 (L) 3.5 - 5.1 mmol/L    Chloride 98 (L) 99 - 110 mmol/L    CO2 21 21 - 32 mmol/L    Anion Gap 15 3 - 16    Glucose 117 (H) 70 - 99 mg/dL    BUN 12 7 - 20 mg/dL    CREATININE <0.5 (L) 0.6 - 1.2 mg/dL    GFR Non-African American >60 >60    GFR African American >60 >60    Calcium 7.7 (L) 8.3 - 10.6 mg/dL    Total Protein 6.1 (L) 6.4 - 8.2 g/dL    Albumin 3.5 3.4 - 5.0 g/dL    Albumin/Globulin Ratio 1.3 1.1 - 2.2    Total Bilirubin 0.8 0.0 - 1.0 mg/dL    Alkaline Phosphatase 410 (H) 40 - 129 U/L     (H) 10 - 40 U/L     (H) 15 - 37 U/L   Magnesium   Result Value Ref Range    Magnesium 2.20 1.80 - 2.40 mg/dL   Protime-INR   Result Value Ref Range    Protime 11.9 9.9 - 12.7 sec    INR 1.05 0.88 - 1.12   POCT Glucose   Result Value Ref Range    POC Glucose 125 (H) 70 - 99 mg/dl    Performed on ACCU-CHEK    POCT Glucose   Result Value Ref Range    POC Glucose 118 (H) 70 - 99 mg/dl Performed on ACCU-CHEK    POCT Glucose   Result Value Ref Range    POC Glucose 112 (H) 70 - 99 mg/dl    Performed on ACCU-CHEK    POCT Glucose   Result Value Ref Range    POC Glucose 112 (H) 70 - 99 mg/dl    Performed on ACCU-CHEK    POCT Glucose   Result Value Ref Range    POC Glucose 104 (H) 70 - 99 mg/dl    Performed on ACCU-CHEK    POCT Glucose   Result Value Ref Range    POC Glucose 99 70 - 99 mg/dl    Performed on ACCU-CHEK    POCT Glucose   Result Value Ref Range    POC Glucose 106 (H) 70 - 99 mg/dl    Performed on ACCU-CHEK    POCT Glucose   Result Value Ref Range    POC Glucose 92 70 - 99 mg/dl    Performed on ACCU-CHEK    POCT Glucose   Result Value Ref Range    POC Glucose 117 (H) 70 - 99 mg/dl    Performed on ACCU-CHEK    POCT Glucose   Result Value Ref Range    POC Glucose 108 (H) 70 - 99 mg/dl    Performed on ACCU-CHEK    POCT Glucose   Result Value Ref Range    POC Glucose 104 (H) 70 - 99 mg/dl    Performed on ACCU-CHEK    POCT Glucose   Result Value Ref Range    POC Glucose 174 (H) 70 - 99 mg/dl    Performed on ACCU-CHEK    POCT Glucose   Result Value Ref Range    POC Glucose 130 (H) 70 - 99 mg/dl    Performed on ACCU-CHEK    POCT Glucose   Result Value Ref Range    POC Glucose 141 (H) 70 - 99 mg/dl    Performed on ACCU-CHEK    POCT Glucose   Result Value Ref Range    POC Glucose 146 (H) 70 - 99 mg/dl    Performed on ACCU-CHEK    POCT Glucose   Result Value Ref Range    POC Glucose 139 (H) 70 - 99 mg/dl    Performed on ACCU-CHEK    POCT Glucose   Result Value Ref Range    POC Glucose 126 (H) 70 - 99 mg/dl    Performed on ACCU-CHEK    POCT Glucose   Result Value Ref Range    POC Glucose 144 (H) 70 - 99 mg/dl    Performed on ACCU-CHEK    POCT Glucose   Result Value Ref Range    POC Glucose 116 (H) 70 - 99 mg/dl    Performed on ACCU-CHEK    POCT Glucose   Result Value Ref Range    POC Glucose 130 (H) 70 - 99 mg/dl    Performed on ACCU-CHEK    POCT Glucose   Result Value Ref Range    POC Glucose 116 (H) 70 - 99 mg/dl    Performed on ACCU-CHEK    POCT Glucose   Result Value Ref Range    POC Glucose 125 (H) 70 - 99 mg/dl    Performed on ACCU-CHEK        Diet:  ADULT DIET;  Regular    Activity:  Activity as tolerated (Patient may move about with assist as indicated or with supervision.)    Follow-up:  in the next few weeks with Blanca Berumen MD,      Disposition: acute rehab    Condition: Stable      Time Spent: 35 minutes    Electronically signed by Kamila Duckworth MD on 3/22/2022 at 12:52 PM    Discharging Hospitalist

## 2022-03-22 NOTE — PLAN OF CARE
Problem: Pain:  Goal: Pain level will decrease  Description: Pain level will decrease  Outcome: Ongoing  Goal: Control of acute pain  Description: Control of acute pain  Outcome: Ongoing  Goal: Control of chronic pain  Description: Control of chronic pain  Outcome: Ongoing   Pain/discomfort being managed with PRN analgesics per MD orders. Pt able to express presence and absence of pain and rate pain appropriately using numerical scale. Problem: Skin Integrity:  Goal: Will show no infection signs and symptoms  Description: Will show no infection signs and symptoms  Outcome: Ongoing  Goal: Absence of new skin breakdown  Description: Absence of new skin breakdown  Outcome: Ongoing   Skin assessment completed every shift. Pt encouraged to turn/rotate every 2 hours. Assistance provided if pt unable to do so themselves. Problem: Falls - Risk of:  Goal: Will remain free from falls  Description: Will remain free from falls  Outcome: Ongoing  Goal: Absence of physical injury  Description: Absence of physical injury  Outcome: Ongoing   Fall risk assessment completed every shift. All precautions in place. Pt has call light within reach at all times. Room clear of clutter. Pt aware to call for assistance when getting up.      Problem: Nutrition  Goal: Optimal nutrition therapy  Outcome: Ongoing

## 2022-03-23 LAB
A/G RATIO: 0.9 (ref 1.1–2.2)
ALBUMIN SERPL-MCNC: 3.1 G/DL (ref 3.4–5)
ALP BLD-CCNC: 366 U/L (ref 40–129)
ALT SERPL-CCNC: 742 U/L (ref 10–40)
ANION GAP SERPL CALCULATED.3IONS-SCNC: 14 MMOL/L (ref 3–16)
AST SERPL-CCNC: 229 U/L (ref 15–37)
BASOPHILS ABSOLUTE: 0 K/UL (ref 0–0.2)
BASOPHILS RELATIVE PERCENT: 1 %
BILIRUB SERPL-MCNC: 1 MG/DL (ref 0–1)
BUN BLDV-MCNC: 10 MG/DL (ref 7–20)
CALCIUM SERPL-MCNC: 8 MG/DL (ref 8.3–10.6)
CHLORIDE BLD-SCNC: 100 MMOL/L (ref 99–110)
CO2: 21 MMOL/L (ref 21–32)
CREAT SERPL-MCNC: <0.5 MG/DL (ref 0.6–1.2)
EOSINOPHILS ABSOLUTE: 0.2 K/UL (ref 0–0.6)
EOSINOPHILS RELATIVE PERCENT: 4 %
GFR AFRICAN AMERICAN: >60
GFR NON-AFRICAN AMERICAN: >60
GLUCOSE BLD-MCNC: 116 MG/DL (ref 70–99)
GLUCOSE BLD-MCNC: 121 MG/DL (ref 70–99)
GLUCOSE BLD-MCNC: 126 MG/DL (ref 70–99)
GLUCOSE BLD-MCNC: 130 MG/DL (ref 70–99)
GLUCOSE BLD-MCNC: 136 MG/DL (ref 70–99)
HCT VFR BLD CALC: 28 % (ref 36–48)
HEMOGLOBIN: 9.6 G/DL (ref 12–16)
INR BLD: 1.11 (ref 0.88–1.12)
LYMPHOCYTES ABSOLUTE: 1.4 K/UL (ref 1–5.1)
LYMPHOCYTES RELATIVE PERCENT: 32 %
MAGNESIUM: 2.2 MG/DL (ref 1.8–2.4)
MCH RBC QN AUTO: 29.2 PG (ref 26–34)
MCHC RBC AUTO-ENTMCNC: 34.3 G/DL (ref 31–36)
MCV RBC AUTO: 84.9 FL (ref 80–100)
MONOCYTES ABSOLUTE: 0.2 K/UL (ref 0–1.3)
MONOCYTES RELATIVE PERCENT: 5 %
MYELOCYTE PERCENT: 1 %
NEUTROPHILS ABSOLUTE: 2.6 K/UL (ref 1.7–7.7)
NEUTROPHILS RELATIVE PERCENT: 57 %
OVALOCYTES: ABNORMAL
PDW BLD-RTO: 15.2 % (ref 12.4–15.4)
PERFORMED ON: ABNORMAL
PLATELET # BLD: 151 K/UL (ref 135–450)
PLATELET SLIDE REVIEW: ADEQUATE
PMV BLD AUTO: 7.5 FL (ref 5–10.5)
POLYCHROMASIA: ABNORMAL
POTASSIUM SERPL-SCNC: 3.4 MMOL/L (ref 3.5–5.1)
PREALBUMIN: 7.3 MG/DL (ref 20–40)
PROTHROMBIN TIME: 12.6 SEC (ref 9.9–12.7)
RBC # BLD: 3.3 M/UL (ref 4–5.2)
SLIDE REVIEW: ABNORMAL
SODIUM BLD-SCNC: 135 MMOL/L (ref 136–145)
TOTAL PROTEIN: 6.6 G/DL (ref 6.4–8.2)
WBC # BLD: 4.4 K/UL (ref 4–11)

## 2022-03-23 PROCEDURE — 97116 GAIT TRAINING THERAPY: CPT

## 2022-03-23 PROCEDURE — 97165 OT EVAL LOW COMPLEX 30 MIN: CPT

## 2022-03-23 PROCEDURE — 85610 PROTHROMBIN TIME: CPT

## 2022-03-23 PROCEDURE — 36415 COLL VENOUS BLD VENIPUNCTURE: CPT

## 2022-03-23 PROCEDURE — 97110 THERAPEUTIC EXERCISES: CPT

## 2022-03-23 PROCEDURE — 97530 THERAPEUTIC ACTIVITIES: CPT

## 2022-03-23 PROCEDURE — 84134 ASSAY OF PREALBUMIN: CPT

## 2022-03-23 PROCEDURE — 80053 COMPREHEN METABOLIC PANEL: CPT

## 2022-03-23 PROCEDURE — 1280000000 HC REHAB R&B

## 2022-03-23 PROCEDURE — 97163 PT EVAL HIGH COMPLEX 45 MIN: CPT

## 2022-03-23 PROCEDURE — 97535 SELF CARE MNGMENT TRAINING: CPT

## 2022-03-23 PROCEDURE — 6370000000 HC RX 637 (ALT 250 FOR IP): Performed by: PHYSICAL MEDICINE & REHABILITATION

## 2022-03-23 PROCEDURE — 85025 COMPLETE CBC W/AUTO DIFF WBC: CPT

## 2022-03-23 PROCEDURE — 83735 ASSAY OF MAGNESIUM: CPT

## 2022-03-23 PROCEDURE — 81256 HFE GENE: CPT

## 2022-03-23 PROCEDURE — 6360000002 HC RX W HCPCS: Performed by: PHYSICAL MEDICINE & REHABILITATION

## 2022-03-23 RX ORDER — POTASSIUM CHLORIDE 20 MEQ/1
40 TABLET, EXTENDED RELEASE ORAL ONCE
Status: COMPLETED | OUTPATIENT
Start: 2022-03-23 | End: 2022-03-23

## 2022-03-23 RX ADMIN — FAMOTIDINE 20 MG: 20 TABLET ORAL at 07:13

## 2022-03-23 RX ADMIN — OXYCODONE HYDROCHLORIDE 10 MG: 10 TABLET ORAL at 05:04

## 2022-03-23 RX ADMIN — OXYCODONE HYDROCHLORIDE 10 MG: 10 TABLET ORAL at 23:41

## 2022-03-23 RX ADMIN — SENNOSIDES AND DOCUSATE SODIUM 1 TABLET: 50; 8.6 TABLET ORAL at 07:13

## 2022-03-23 RX ADMIN — FAMOTIDINE 20 MG: 20 TABLET ORAL at 20:01

## 2022-03-23 RX ADMIN — ENOXAPARIN SODIUM 40 MG: 100 INJECTION SUBCUTANEOUS at 07:14

## 2022-03-23 RX ADMIN — SENNOSIDES AND DOCUSATE SODIUM 1 TABLET: 50; 8.6 TABLET ORAL at 20:02

## 2022-03-23 RX ADMIN — POLYETHYLENE GLYCOL 3350 17 G: 17 POWDER, FOR SOLUTION ORAL at 07:13

## 2022-03-23 RX ADMIN — MAGNESIUM HYDROXIDE 30 ML: 400 SUSPENSION ORAL at 19:10

## 2022-03-23 RX ADMIN — POTASSIUM CHLORIDE 40 MEQ: 20 TABLET, EXTENDED RELEASE ORAL at 20:28

## 2022-03-23 RX ADMIN — ONDANSETRON HYDROCHLORIDE 4 MG: 4 TABLET, FILM COATED ORAL at 20:01

## 2022-03-23 RX ADMIN — ONDANSETRON HYDROCHLORIDE 4 MG: 4 TABLET, FILM COATED ORAL at 09:08

## 2022-03-23 RX ADMIN — OXYCODONE HYDROCHLORIDE 10 MG: 10 TABLET ORAL at 10:59

## 2022-03-23 ASSESSMENT — PAIN - FUNCTIONAL ASSESSMENT
PAIN_FUNCTIONAL_ASSESSMENT: PREVENTS OR INTERFERES SOME ACTIVE ACTIVITIES AND ADLS

## 2022-03-23 ASSESSMENT — PAIN DESCRIPTION - DESCRIPTORS
DESCRIPTORS: DISCOMFORT

## 2022-03-23 ASSESSMENT — PAIN SCALES - GENERAL
PAINLEVEL_OUTOF10: 9
PAINLEVEL_OUTOF10: 2
PAINLEVEL_OUTOF10: 7
PAINLEVEL_OUTOF10: 4
PAINLEVEL_OUTOF10: 4

## 2022-03-23 ASSESSMENT — PAIN DESCRIPTION - PAIN TYPE
TYPE: ACUTE PAIN

## 2022-03-23 ASSESSMENT — PAIN DESCRIPTION - FREQUENCY
FREQUENCY: CONTINUOUS

## 2022-03-23 ASSESSMENT — PAIN DESCRIPTION - PROGRESSION
CLINICAL_PROGRESSION: NOT CHANGED

## 2022-03-23 ASSESSMENT — PAIN DESCRIPTION - ONSET
ONSET: ON-GOING

## 2022-03-23 ASSESSMENT — PAIN DESCRIPTION - ORIENTATION
ORIENTATION: LEFT

## 2022-03-23 ASSESSMENT — PAIN SCALES - WONG BAKER: WONGBAKER_NUMERICALRESPONSE: 2

## 2022-03-23 ASSESSMENT — PAIN DESCRIPTION - LOCATION
LOCATION: HIP

## 2022-03-23 ASSESSMENT — PAIN DESCRIPTION - DIRECTION: RADIATING_TOWARDS: LOWER BACK

## 2022-03-23 NOTE — PLAN OF CARE
Problem: Skin Integrity:  Goal: Will show no infection signs and symptoms  Description: Will show no infection signs and symptoms  3/23/2022 1052 by Nguyen Moses RN  Note: Pt is at risk for impaired skin integrity. Assess skin every shift and prn. Turn every 2 hours. Keep heels off bed. Keep skin clean and dry. Monitor hip incision for s/s of infection     Problem: Falls - Risk of:  Goal: Will remain free from falls  Description: Will remain free from falls  3/23/2022 1052 by Nguyen Moses RN  Outcome: Ongoing  Note: Pt is at risk for falls. Call light in reach. Bed in low position. Alarm on. Nonskid footwear on. Possessions in reach. Gait belt with front wheeled walker for ambulation     Problem: Pain:  Goal: Pain level will decrease  Description: Pain level will decrease  3/23/2022 1052 by Nguyen Moses RN  Outcome: Ongoing  Note: Assess pts level of pain. Medicate as ordered for pain. Evaluate effectiveness of pain med given. Notify physician if pain not controlled.  Alternative pain reliever

## 2022-03-23 NOTE — CARE COORDINATION
GROCERY SHOPS:   independent      DME CURRENTLY AT HOME:  Rollator, cane     She mostly uses the rollator      CURRENT HOME CARE/SERVICES:  None currently. Informed her of possible post acute services such as home care or out patient services to continue her progress. Provided her with CMS star rated list of agencies for her review and       PREFERRED HOME CARE:   To be determined. ADVANCED DIRECTIVES:      None. Does not want to complete them. TEAM CONFERENCE DAY:  Thursdays. Informed her of weekly Team conferences where Team reviews her progress, barriers, DME recommendations and dc date weekly. This worker will return to give this update and plan for DC needs. LSW informed patient of preferred  time on date of discharge which is between 10 - 12 noon. LSW informed patient of recommendation for PCP visit within 7 days post discharge.     [facility]     Case Management   [phone]    3/23/2022  4:28 PM

## 2022-03-23 NOTE — PROGRESS NOTES
INPATIENT PROGRESS NOTE        IDENTIFYING DATA/REASON FOR CONSULTATION   PATIENT:  Kim Sanford  MRN:  [id number]  ADMIT DATE: 3/22/2022  TIME OF EVALUATION: 3/23/2022 8:59 AM  HOSPITAL STAY:   LOS: 1 day   CONSULTING PHYSICIAN: Romain Sullivan MD   REASON FOR CONSULTATION:elevated liver chemistries    Subjective:    Patient seen in follow up. She has no complaints. Denies abdominal pain. MEDICATIONS   SCHEDULED:  sodium chloride flush, 5-40 mL, 2 times per day  enoxaparin, 40 mg, Daily  sennosides-docusate sodium, 1 tablet, BID  famotidine, 20 mg, BID  insulin lispro, 0-3 Units, Nightly  insulin lispro, 0-6 Units, TID WC      FLUIDS/DRIPS:     sodium chloride      dextrose       PRNs: sodium chloride, 25 mL, PRN  ondansetron, 4 mg, Q6H PRN  oxyCODONE, 10 mg, Q4H PRN  sodium chloride flush, 5-40 mL, PRN  acetaminophen, 650 mg, Q6H PRN  magnesium hydroxide, 30 mL, Daily PRN  polyethylene glycol, 17 g, Daily PRN  calcium carbonate, 500 mg, TID PRN  dextrose, 100 mL/hr, PRN  dextrose, 12.5 g, PRN  glucagon (rDNA), 1 mg, PRN  glucose, 15 g, PRN  ondansetron, 4 mg, Q8H PRN  bisacodyl, 10 mg, Daily PRN      ALLERGIES:    Allergies   Allergen Reactions    Cephalexin          PHYSICAL EXAM   VITALS:  /65   Pulse 85   Temp 97.9 °F (36.6 °C) (Oral)   Resp 16   Ht 5' 5\" (1.651 m)   Wt 235 lb 3.7 oz (106.7 kg)   SpO2 94%   BMI 39.14 kg/m²   TEMPERATURE:  Current - Temp: 97.9 °F (36.6 °C); Max - Temp  Av °F (36.7 °C)  Min: 97.7 °F (36.5 °C)  Max: 98.4 °F (36.9 °C)    Physical Exam:  General appearance: alert, cooperative, no distress, appears stated age  Eyes: Anicteric  Head: Normocephalic, without obvious abnormality  Lungs: clear to auscultation bilaterally, Normal Effort  Heart: regular rate and rhythm, normal S1 and S2, no murmurs or rubs  Abdomen: soft, non-distended, non-tender.  Bowel sounds normal.   Extremities: atraumatic, no cyanosis or edema  Skin: warm and dry, no jaundice  Neuro: Grossly intact, A&OX3    LABS AND IMAGING   Laboratory   Recent Labs     03/21/22 0455 03/22/22  0527 03/23/22  0617   WBC 4.4 4.4 4.4   HGB 10.5* 10.4* 9.6*   HCT 29.9* 29.6* 28.0*   MCV 84.3 84.4 84.9    133* 151     Recent Labs     03/21/22 0455 03/22/22 0527 03/23/22  0617   * 134* 135*   K 3.2* 3.3* 3.4*   CL 95* 98* 100   CO2 20* 21 21   BUN 20 12 10   CREATININE 0.8 <0.5* <0.5*     Recent Labs     03/21/22 0455 03/22/22 0527 03/23/22 0617   * 352* 229*   * 872* 742*   BILITOT 0.9 0.8 1.0   ALKPHOS 244* 410* 366*     No results for input(s): LIPASE, AMYLASE in the last 72 hours. Recent Labs     03/21/22 0455 03/22/22 0527 03/23/22 0617   PROTIME 12.5 11.9 12.6   INR 1.10 1.05 1.11         Imaging  No orders to display       Endoscopy      ASSESSMENT AND RECOMMENDATIONS   Petr Jones is a 64 y.o. female with PMH of breast cancer in 2007 which is in remission who presented with left hip pain. CT of the left hip showed metastatic disease throughout the left hemipelvis and proximal left femur with large destructive lesions within the left femoral head. She had a CT of the thoracic spine which showed diffuse bony metastasis and an L1 partial compression fracture. A CT of the abdomen and pelvis showed multiple lytic and sclerotic bone lesions. Underwent ORIF left hip with bone biospy. Path showed metastatic breast carcinoma. GI consulted regarding elevated liver chemistries    1. Elevated liver chemistries: Remains elevated. Primarily hepatocellular pattern. Etiology unclear at this time. May need to consider liver bx. On CT of the abdomen, liver is normal in size.  There is no splenomegaly or hepatomegaly.  INR is preserved. There is no suggestion of portal hypertension. Chris Echevarria is no evidence of portal vein, hepatic vein, hepatic artery thrombosis.  Immunoglobulins have been checked and within normal limits.  This could be any number of things at this time and differential is very broad.  She is not currently on any culprit medications. She denies ETOH use. LILIANA and factin normal.  AMA pending. Acute viral hep panel negative. Ferritin significantly elevated, likely reactive, Hemochromatosis gene pending. 2. Metastatic breast carcinoma. RECOMMENDATIONS:    Follow up on liver serologies. Continue to trend LFTs    If you have any questions or need any further information, please feel free to contact us [phone]. Thank you for allowing us to participate in the care of Andres Chambers. The note was completed using Dragon voice recognition transcription. Every effort was made to ensure accuracy; however, inadvertent transcription errors may be present despite my best efforts to edit errors.     Marvin CORREA

## 2022-03-23 NOTE — PROGRESS NOTES
Patient is currently sleeping in recliner chair with legs elevated. Patient admitted to ARU on 3/22 S/P ORIF to left hip. Bone biopsy pending. Patient is AAO x 4, using call light for needs and assistance when wanting to get up. Using stedy x 1, patient tolerates fairly well. Patient with complaints of left hip pain, rated 8 on 1-10 pain scale. Pain relief to a 5, 30 minutes after pain medication was given. Lungs clear, denies SOB and cough. Bowel sounds are positive to all four quadrants. LBM 3/22 small, patient is complaining of constipation with some abdominal cramping. Received relistor on days, given suppository this evening at 2126, with no results as of yet. Patient is passing lots of flatus. +1 edema noted to left hip and knee area. Ice packs provided for pain as well. Fall precautions are in place. Call light and personal belongings are within reach. Will continue to monitor and assist as needed.

## 2022-03-23 NOTE — PROGRESS NOTES
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 Patient seen this afternoon sitting up in room. She reports therapy going well and she is making progress already. Pain controlled. She asks about bone biopsy results. Informed her consistent with metastatic breast carcinoma. She indicates she previously discussed this possibility with Oncology. She is upset but is trying to keep positive attitude and remains very motivated for rehab. Labs reviewed. ROS: No f/c, n/v, cp     Objective:  Patient Vitals for the past 24 hrs:   BP Temp Temp src Pulse Resp SpO2 Weight   22 1513 (!) 143/66 97.6 °F (36.4 °C) Oral 89 13 95 % --   22 0710 131/72 97.9 °F (36.6 °C) Oral 82 16 93 % --   22 0609 121/65 97.9 °F (36.6 °C) Oral 85 16 94 % 235 lb 3.7 oz (106.7 kg)   22 0524 -- -- -- -- -- -- 235 lb 3.7 oz (106.7 kg)   22 127/76 97.7 °F (36.5 °C) Oral 89 16 96 % --     Const: Alert. No distress, pleasant. HEENT: Normocephalic, atraumatic. Normal sclera/conjunctiva. MMM. CV: Regular rate and rhythm. Resp: No respiratory distress. Lungs CTAB. Abd: Soft, nontender, nondistended, NABS+   Ext: trace edema (L>R). MSK: Decreased left>right hip ROM, post op swelling left thigh  Neuro: Alert, oriented, appropriately interactive. Psych: Cooperative, appropriate mood and affect    Laboratory data: Available via EMR.    Last 24 hour lab  Recent Results (from the past 24 hour(s))   POCT Glucose    Collection Time: 22  8:43 PM   Result Value Ref Range    POC Glucose 136 (H) 70 - 99 mg/dl    Performed on ACCU-CHEK    Prealbumin    Collection Time: 22  6:17 AM   Result Value Ref Range    Prealbumin 7.3 (L) 20.0 - 40.0 mg/dL   CBC with Auto Differential    Collection Time: 03/23/22  6:17 AM   Result Value Ref Range    WBC 4.4 4.0 - 11.0 K/uL    RBC 3.30 (L) 4.00 - 5.20 M/uL    Hemoglobin 9.6 (L) 12.0 - 16.0 g/dL    Hematocrit 28.0 (L) 36.0 - 48.0 %    MCV 84.9 80.0 - 100.0 fL    MCH 29.2 26.0 - 34.0 pg    MCHC 34.3 31.0 - 36.0 g/dL    RDW 15.2 12.4 - 15.4 %    Platelets 863 277 - 604 K/uL    MPV 7.5 5.0 - 10.5 fL    PLATELET SLIDE REVIEW Adequate     SLIDE REVIEW see below     Neutrophils % 57.0 %    Lymphocytes % 32.0 %    Monocytes % 5.0 %    Eosinophils % 4.0 %    Basophils % 1.0 %    Neutrophils Absolute 2.6 1.7 - 7.7 K/uL    Lymphocytes Absolute 1.4 1.0 - 5.1 K/uL    Monocytes Absolute 0.2 0.0 - 1.3 K/uL    Eosinophils Absolute 0.2 0.0 - 0.6 K/uL    Basophils Absolute 0.0 0.0 - 0.2 K/uL    Myelocyte Percent 1 (A) %    Polychromasia Occasional (A)     Ovalocytes Occasional (A)    Comprehensive Metabolic Panel    Collection Time: 03/23/22  6:17 AM   Result Value Ref Range    Sodium 135 (L) 136 - 145 mmol/L    Potassium 3.4 (L) 3.5 - 5.1 mmol/L    Chloride 100 99 - 110 mmol/L    CO2 21 21 - 32 mmol/L    Anion Gap 14 3 - 16    Glucose 126 (H) 70 - 99 mg/dL    BUN 10 7 - 20 mg/dL    CREATININE <0.5 (L) 0.6 - 1.2 mg/dL    GFR Non-African American >60 >60    GFR African American >60 >60    Calcium 8.0 (L) 8.3 - 10.6 mg/dL    Total Protein 6.6 6.4 - 8.2 g/dL    Albumin 3.1 (L) 3.4 - 5.0 g/dL    Albumin/Globulin Ratio 0.9 (L) 1.1 - 2.2    Total Bilirubin 1.0 0.0 - 1.0 mg/dL    Alkaline Phosphatase 366 (H) 40 - 129 U/L     (H) 10 - 40 U/L     (H) 15 - 37 U/L   Magnesium    Collection Time: 03/23/22  6:17 AM   Result Value Ref Range    Magnesium 2.20 1.80 - 2.40 mg/dL   Protime-INR    Collection Time: 03/23/22  6:17 AM   Result Value Ref Range    Protime 12.6 9.9 - 12.7 sec    INR 1.11 0.88 - 1.12   POCT Glucose    Collection Time: 03/23/22  7:23 AM   Result Value Ref Range    POC Glucose 130 (H) 70 - 99 mg/dl    Performed on ACCU-CHEK    POCT Glucose    Collection Time: 03/23/22 11:11 AM   Result Value Ref Range    POC Glucose 116 (H) 70 - 99 mg/dl    Performed on ACCU-CHEK    POCT Glucose    Collection Time: 03/23/22  4:11 PM   Result Value Ref Range    POC Glucose 121 (H) 70 - 99 mg/dl    Performed on ACCU-CHEK        Therapy progress:  PT     Objective     Sit to Stand: Contact guard assistance  Stand to sit: Contact guard assistance  Bed to Chair: Contact guard assistance (with use of the walker)  Device: Rolling Walker  Assistance: Contact guard assistance  Distance: 55' x 1 and short distances between activities  OT  PT Equipment Recommendations  Equipment Needed: Yes  Mobility Devices:  Yeh: Rolling  Other: Anticipate need for FWW at d/c, Pt. has rollator but this will likely be unsafe for Pt. at d/c  Toilet - Technique: Ambulating  Equipment Used: Grab bars  Toilet Transfers Comments: RW  Assessment        SLP          Body mass index is 39.14 kg/m². Rehabilitation Diagnosis:   Orthopedic, 8.9, Other Orthopedic        Assessment and Plan:     Impairments: decreased L>R hip ROM, decreased balance, endurance     Left hip metastatic lytic bone lesion   -s/p ORIF (3/19 with Dr. Kathie Diaz)  -Wound care  -WBAT  -PT/OT     Metastatic malignancy with diffuse bony lesions   -unknown primary, possibly recurrent breast cancer (previously treated 2007)  -Bone biopsy pending -- metastatic breast carcinoma.  Informed patient of results and will see if Dr. Tim Cooper can discuss next steps with her.   -Plan for radiation in few weeks  -Oncology (Dr. Tim Cooper) following     Hypercalcemia  -Resolved s/p Zometa  -Monitor     Acute liver injury/elevated LFTs  -Etiology unclear  -GI following  -Serologies ordered  -Monitor -- remain elevated but now trending down     DM2  -ISS     Depression  -Not on treatment, monitor mood     Bladder   -High risk retention   -Monitor PVRs, SC prn >300cc     Bowel   -Constipation   -senna+colace BID, PRN miralax, MoM, and bisacodyl supp.  -Enema 3/23     Safety   -fall precautions     Pain control  -oxycodone prn     PPx  -DVT: lovenox  -GI: famotidine    Rehab Progress: Making progress. Working on functional mobility, balance, compensatory strategies. Anticipated Dispo: home with 3 grandsons: 22 yo (special needs, requires total care, uses lift equipment), 13 yo, 7 yo (autism); daughter temporarily staying  Services/DME: TBD  ELOS: 7-10 days      [address]  Lizbeth Warner MD 3/23/2022, 6:37 PM

## 2022-03-23 NOTE — PROGRESS NOTES
Physical Therapy    Facility/Department: [address] IP REHAB  Initial Assessment    NAME: Rajni Stockton  : 1960  MRN: [id number]    Date of Service: 3/23/2022    Discharge Recommendations:  Continue to assess pending progress,Home with Home health PT,S Level 3,Home with assist PRN,Patient would benefit from continued therapy after discharge   PT Equipment Recommendations  Equipment Needed: Yes  Mobility Devices: Charlott Goon: Rolling  Other: Anticipate need for FWW at d/c, Pt. has rollator but this will likely be unsafe for Pt. at d/c    Assessment   Assessment: 65 y/o female admit 3/16/2022 with L Hip Pain, Met Malignant Neoplasm. CT Hip : Osseous Met Disease throughout L Hemipelvis, L Sacrum and Prox L Femur. CT T/L Spine L Compress Fx L1 (uncertain chronicity), Subacute Rib Fxs, Diffuse Bony Mets. 3/19/2022 S/P L Hip ORIF with IM Supa, L Fem Head Biopsy. PMH as noted including Breast Ca/Mastectomy. 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 Pt. Mod/Max A for bed mobilty and Min to [address] for trasnfer and gait. Pt. will beneift from skilled PT to improve functional mobility and optimize independence. Treatment Diagnosis: Impaired functional mobility  Prognosis: Good  PT Education: Goals;PT Role;Plan of Care;Transfer Training;Gait Training;Functional Mobility Training;Weight-bearing Education  Patient Education: Verbalized understanding  Barriers to Learning: None. REQUIRES PT FOLLOW UP: Yes  Activity Tolerance  Activity Tolerance: Patient Tolerated treatment well  Activity Tolerance: Needs increased rest breaks between activities       Patient Diagnosis(es): There were no encounter diagnoses. has a past medical history of Cancer ([address] Depression, Hyperthyroidism, and Nephrolithiasis.    has a past surgical history that includes Cholecystectomy; Hysterectomy; Mastectomy; and Femur fracture surgery (Left, 3/19/2022). Restrictions  Restrictions/Precautions  Restrictions/Precautions: Weight Bearing,Fall Risk  Lower Extremity Weight Bearing Restrictions  Left Lower Extremity Weight Bearing: Weight Bearing As Tolerated  Vision/Hearing  Vision: Impaired  Vision Exceptions: Wears glasses for distance  Hearing: Within functional limits     Subjective  General  Chart Reviewed: Yes  Patient assessed for rehabilitation services?: Yes  Additional Pertinent Hx: 63 y/o female admit 3/16/2022 with L Hip Pain, Met Malignant Neoplasm. CT Hip : Osseous Met Disease throughout L Hemipelvis, L Sacrum and Prox L Femur. CT T/L Spine L Compress Fx L1 (uncertain chronicity), Subacute Rib Fxs, Diffuse Bony Mets. 3/19/2022 S/P L Hip ORIF with IM Supa, L Fem Head Biopsy. PMH as noted including Breast Ca/Mastectomy. Response To Previous Treatment: Patient with no complaints from previous session. Family / Caregiver Present: No  Referring Practitioner: Dr. Agata Gonzales  Referral Date : 03/23/22  Diagnosis: Jennifer Shown neoplssm to the L femur of unknown origin  Follows Commands: Within Functional Limits  General Comment  Comments: Presents to the gym sitting in w/c, agreeable to therapy evaluation  Subjective  Subjective: 1/10 pain in L LE, had pain medicine prior to presenting to the gym. Does have dull painin low back \"Not that bad\" per Pt. Orientation  Orientation  Overall Orientation Status: Within Normal Limits  Social/Functional History  Social/Functional History  Lives With: Family (Dtr (temp staying), 3 Grandsons (23 (total care), 12, 8 yr). )  Type of Home: House  Home Layout: Laundry in basement,Able to Live on Main level with bedroom/bathroom,Two level ([facility] house, 2 stories and basement)  Home Access: Stairs to enter with rails,Ramped entrance (1 step to enter.)  Entrance Stairs - Number of Steps: 1 (uses a suitcase ramp)  Bathroom Shower/Tub: Tub/Shower unit,Curtain (Overhead lift system for jax (bed to/from w/c, w/c to/from tub/shower). Walk in shower in basement)  Bathroom Toilet: Standard  Bathroom Accessibility: Accessible  Home Equipment: 4 wheeled walker  ADL Assistance: Independent (Increasing difficulty. 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 Additional Comments: Dtr (works fulltime), staying within home to assist while pt in hospital. Pt. had been ambulating only short distances prior to admission d/t hip pain L LE  Cognition        Objective     Observation/Palpation  Posture: Fair    AROM RLE (degrees)  RLE AROM: WFL  AROM LLE (degrees)  LLE AROM : WFL  LLE General AROM: Except hip flexion, limited by pain to 90 degrees, Ankle and knee WFL  Strength RLE  Strength RLE: WFL  Strength LLE  Comment: Hip 3-/5; Knee 3/5; Ankle 4/5. Strength RUE  Strength RUE: WFL  Strength LUE  Strength LUE: WFL  Tone RLE  RLE Tone: Normotonic  Tone LLE  LLE Tone: Normotonic  Motor Control  Gross Motor?: WFL  Sensation  Overall Sensation Status: WFL (Describes intermittent tingling in L LE but none at time of evaluation)  Bed mobility  Bridging: Unable to assess  Rolling to Left: Maximum assistance  Rolling to Right: Maximum assistance  Supine to Sit: Maximum assistance (flat bed and no rail)  Sit to Supine:  Moderate assistance (PT assisted B LE's)  Scooting: Contact guard assistance  Comment: flat bed and no use of rail  Transfers  Sit to Stand: Contact guard assistance  Stand to sit: Contact guard assistance  Bed to Chair: Contact guard assistance (with use of the walker)  Car Transfer: Moderate Assistance (PT assisted LE's in/out of car)  Ambulation  Ambulation?: Yes  Ambulation 1  Surface: level tile  Device: Rolling Walker  Assistance: Contact guard assistance  Quality of Gait: step through pattern with good heel contact and good weight bearing at quiet pace  Gait Deviations: Slow Ngozi;Decreased step length;Decreased step height  Distance: 55' x 1 and short distances between activities  Stairs/Curb  Stairs?: Yes  Stairs  # Steps : 4  Stairs Height: 6\"  Rails: Bilateral  Curbs: 6\"  Device: Rolling walker; No Device (Rolling walker on the curb step)  Assistance: Minimal assistance  Comment: Step to pattern leading with the R LE     Balance  Posture: Fair  Sitting - Static: Good  Sitting - Dynamic: Good  Standing - Static: Fair  Standing - Dynamic: Fair  Comments: Supervision sitting at the EOB and CGA to stand to the walker        Plan   Plan  Times per week: Pt. to be seen 90 minutes/day, 5 out of the 7 days/wk  Current Treatment Recommendations: Strengthening,Functional Mobility Training,Transfer [employer] Education & Training,Patient/Caregiver Education & Training,ADL/Self-care Training,Positioning,Modalities,Neuromuscular Re-education,Endurance Training,Balance Training,Stair training,Home Exercise Program,Equipment Evaluation, Education, & procurement  Safety Devices  Type of devices: All fall risk precautions in place,Call light within reach,Chair alarm in place,Left in chair,Nurse notified  Restraints  Initially in place: No    Second session:  Pt. Presented to gym in w/c. Completed bed mobility as documented above. Supine exercises:  Glut and quad sets x 10 B with initial tactile facilitation. SAQs x 10 reps B.  AP's x 10 B. Curb step as documented above. Amb. ~20' in room with FWW with CGA. Left on board for staff to ambulate Pt. To the bathroom with the FWW and gait belt.   Pt. Left sitting in BS recliner chair with ice placed on hip. Call light in reach and seat alarm in place. Goals  Short term goals  Time Frame for Short term goals: 10 days  Short term goal 1: Supine to/from sit wtih Mod I  Short term goal 2: Sit to/from stand with Mod I  Short term goal 3: Up and down 4 steps with B rails and Supervision  Short term goal 4: Up and down curb step with Supervision  Short term goal 5: Car transfer with Supervision  Long term goals  Time Frame for Long term goals : LTG == STG's  Patient Goals   Patient goals : Return home.        Therapy Time   Individual Concurrent Group Co-treatment   Time In 1115         Time Out 1205         Minutes 50         Timed Code Treatment Minutes: 35 Minutes    Second Session Therapy Time:   Individual Concurrent Group Co-treatment   Time In 1430         Time Out 5180         Minutes 45           Timed Code Treatment Minutes:  80+02    Total Treatment Minutes:  Sarah, [address] [facility], [id number]

## 2022-03-23 NOTE — PLAN OF CARE
Problem: Skin Integrity:  Goal: Will show no infection signs and symptoms  Description: Will show no infection signs and symptoms  Outcome: Ongoing  Note: Skin assessment performed each shift per protocol. Surgical sites to left hip and outer knee with 3 small mepilex's intact, upper mepilex noted with small amount of old bloody drainage. Skin with scattered bruising. No s/s of infection noted. Problem: Falls - Risk of:  Goal: Will remain free from falls  Description: Will remain free from falls  Outcome: Ongoing  Note: Patient free from falls this shift. Fall precautions in place at all times. Bed in lowest position with two side rails up and wheels locked. Call light within reach. Patient able and agreeable to contact for safety appropriately. Patient up with stedy x 1 tolerates fairly well. Problem: Pain:  Description: Pain management should include both nonpharmacologic and pharmacologic interventions. Goal: Control of acute pain  Description: Control of acute pain  Outcome: Ongoing  Note: Pt able to express presence/absence of pain and rate pain appropriately using numerical scale. Pain/discomfort being managed with PRN analgesics per MD orders (see MAR). Pain assessed every shift and after interventions. Patient currently taking Oxy IR 10mg prn for complaints of pain. Rated hip and knee pain 8 on 1-10 pain scale. Pain relief to a 5 after receiving pain medication.

## 2022-03-23 NOTE — PLAN OF CARE
ARU PATIENT TREATMENT PLAN  [facility]   [address], [address]  [phone]    Zehra De    : 1960  Acct #: [de-identified]  MRN: [id number]   PHYSICIAN:  Al Morrison MD  Primary Problem    Patient Active Problem List   Diagnosis    Chronic abdominal pain    Personal history of malignant neoplasm of breast    Asthma    Migraine headache    Personal history of breast cancer    Encounter for follow-up surveillance of breast cancer    History of mastectomy    Left hip pain    Pathological fracture of left hip, initial encounter ([address]    Malignancy ([address]    Hypercalcemia    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 Latosha Bob)  -Wound care  -WBAT  -PT/OT     Metastatic malignancy with diffuse bony lesions   -unknown primary, possibly recurrent breast cancer (previously treated )  -Bone biopsy pending  -Plan for radiation in few weeks  -Oncology (Dr. Padmini Martinez) following     Hypercalcemia  -Resolved s/p Zometa  -Monitor     Acute liver injury/elevated LFTs  -Etiology unclear  -GI following  -Serologies ordered  -Monitor     DM2  -ISS     Depression  -Not on treatment, monitor mood     Barriers: decreased L>R hip ROM, decreased balance, endurance, medical comorbidities  Participation: Good     CARE PLAN     NURSING:  Conception Kenisha while on this unit will:     [x] Be continent of bowel and bladder     [x] Have an adequate number of bowel movements  [x] Urinate with no urinary retention >300ml in bladder  [] Complete bladder protocol with pineda removal  [x] Maintain O2 SATs at 92%  [x] Have pain managed while on ARU       [] Be pain free by discharge   [x] Have no skin breakdown while on ARU  [] Have improved skin integrity via wound measurements  [x] Have no signs/symptoms of infection at the wound site  [x] Be free from injury during hospitalization   [x] Complete education with patient/family with understanding demonstrated for:  [x] Adjustment   [x] Other: hip safety, diabetic needs, education to prevent constipation. Nursing interventions may include bowel/bladder training, education for medical assistive devices, medication education, O2 saturation management, energy conservation, stress management techniques, fall prevention, alarms protocol, seating and positioning, skin/wound care, pressure relief instruction,dressing changes,  infection protection, DVT prophylaxis, and/or assistance with in room safety with transfers to bed, toilet, wheelchair, shower as well as bathroom activities and hygiene. Patient/caregiver education for:   [x] Disease/sustained injury/management      [x] Medication Use   [x] Surgical intervention   [x] Safety   [x] Body mechanics and or joint protection   [x] Health maintenance         PHYSICAL THERAPY:  Goals:                  Short term goals  Time Frame for Short term goals: 10 days  Short term goal 1: Supine to/from sit wtih Mod I  Short term goal 2: Sit to/from stand with Mod I  Short term goal 3: Up and down 4 steps with B rails and Supervision  Short term goal 4: Up and down curb step with Supervision  Short term goal 5: Car transfer with Supervision            Long term goals  Time Frame for Long term goals : LTG == STG's  These goals were reviewed with this patient at the time of assessment and Alia Esquivel is in agreement. Plan of Care: Pt to be seen 90 mins per day for 5 day/week 1.5 weeks.                    Current Treatment Recommendations: Strengthening,Functional Mobility Training,Transfer Training,Gait Training,Safety Education & Training,Patient/Caregiver Education & Training,ADL/Self-care Training,Positioning,Modalities,Neuromuscular Re-education,Endurance Training,Balance Training,Stair training,Home Exercise Program,Equipment Evaluation, Education, & procurement      OCCUPATIONAL THERAPY:  Goals:             Short term goals  Time Frame for Short term goals: 1 week  Short term goal 1: Pt will be mod I with functional transfers  Short term goal 2: Pt will be mod I with functional mobility  Short term goal 3: Pt will be mod I with toileting  Short term goal 4: Pt will be mod I with bathing and dressing  Short term goal 5: Pt will be mod I with bed mobility :  Long term goals  Time Frame for Long term goals : STG = LTG :    These goals were reviewed with this patient at the time of assessment and Sofia Horn is in agreement    Plan of Care:  Pt to be seen 90  mins per day for 5-6 day/week 1 week. Patient Education: loaned & educated on reacher, SA, LH sponge & shoe horn      SPEECH THERAPY: Goals will be left blank if speech is not following this patient. Goals: These goals were reviewed with this patient at the time of assessment and Sofia Horn is in agreement    Plan of Care: Pt to be seen    mins per day for  day/week  weeks. CASE MANAGEMENT:  Goals:   Assist patient/family with discharge planning, patient/family counseling,   and coordination with insurance during ARU stay.       Admission Period/Goal QIM CODES   QIM  Admit/Goal Score    Eating     Oral Hygiene     Toileting Hygiene     Shower/Bathe Self     Upper Body Dressing     Lower Body Dressing     Putting on/Taking off Footwear     Roll Left and Right     Sit to Lying     Lying to Sitting on Side of Bed     Sit to Stand     Chair/Bed to Chair Transfer     Toilet Transfer     Car Transfer Walk 10 feet     Walk 50 feet with 2 Turns     Walk 150 feet with 2 turns     Walk 10 feet on Uneven Surfaces     1 Step     4 Steps     12 Steps     Picking up Object     Wheel 50 feet with 2 Turns   Type? Wheel 150 feet with 2 Turns   Type? Rajni Stockton will be seen a minimum of 3 hours of therapy per day, a minimum of 5 out of 7 days per week. [] In this rare instance due to the nature of this patient's medical involvement, this patient will be seen 15 hours per week (900 minutes within a 7 day period). Treatments may include therapeutic exercises, gait training, transfer training, community reintegration, bed mobility, self care, home mgmt, cognitive training, energy conservation,dysphagia tx, speech/language/communication therapy, and patient/family education. In addition, dietician/nutritionist may monitor calorie count as well as intake and collaboratively work with SLP on dietary upgrades. Neuropsychology/Psychology may evaluate and provide necessary support.     Medical issues being managed closely and that require 24 hour availability of a physician:   [] Swallowing Precautions  [x] Bowel/Bladder Fx  [] Weight bearing precautions   [x] Wound Care    [x] Pain Mgmt   [x] Infection Protection   [x] DVT Prophylaxis   [x] Fall Precautions  [x] Fluid/Electrolyte/Nutrition Balance   [] Voice Protection   [] Respiratory  [] Other:    Medical Prognosis: [x] Good  [] Fair    [] Guarded   Total expected IRF days 10-14 days  Anticipated discharge destination:    [] Home Independently   [] Home Modified Independent  [] Home with supervision    []SNF     [] Other                                           Physician anticipated functional outcomes:  Nando for mobility and ADLs  IPOC brief synthesis: Patient is a 63 yo F with pmh R breast cancer (s/p R mastectomy 2007, chemo, 5 years anti-estrogen treatment, then lost follow-up with Dr. Anastasia Berry), DM2, hyperthyroidism, and depression who initially presented  3/16/2022 with left hip pain. Had been progressing since 11/2021. Imaging revealed diffuse metastatic disease/lytic bone lesions involving the chest, thoracic spine, lumbar spine, left hemipelvis and proximal left femur. Ortho consulted and patient deemed high risk for pathologic fracture given large destructive lesions centered around the left femoral head. Therefore underwent ORIF with IM kelvin and bone biopsy (3/19 with Dr. Brie Toussaint). Now WBAT. Biopsy reveals metastatic breast cancer, now starting on oral cancer treatment. Per Oncology, plan will be for radiation in few weeks as well. Course complicated by hypercalcemia, acute liver injury, constipation. Now presents to ARU with impaired mobility and self-care below her baseline. She requires comprehensive inpatient rehab program in order to return to community setting. I have reviewed this initial plan of care and agree with its contents:    Title   Name    Date    Time    Physician: Jorge Luis Brock. Sourav Davis MD 3/25/2022, 3:35 PM    Case Mgmt:  [facility]     Case Management   [phone]    3/25/2022  1:38 PM    OT: Cj Kim OTR/L #[id number] 3/25/2022 1:38 PM    PT:Electronically signed by Hiro Keita PT on 3/25/22 at 1:39 PM EDT (on behalf of [facility], [address] [place] [address], [id number] who completed the plan of care on 3/23/2022)    RN: Leyla Salomon RN, JEANETTERANITA 03/24/2022 9:05 am    ST:    :     Other: Flora Ahumada RN

## 2022-03-23 NOTE — PROGRESS NOTES
Occupational Therapy   Occupational Therapy Initial Assessment  Date: 3/23/2022   Patient Name: Esteban Black  MRN: [id number]     : 1960    Date of Service: 3/23/2022    Discharge Recommendations:  Home with assist PRN,Continue to assess pending progress,Patient would benefit from continued therapy after discharge  OT Equipment Recommendations  Equipment Needed: Yes  Mobility Devices: ADL Assistive Devices  ADL Assistive Devices: Walker Basket;Long-handled Sponge;Long-handled Shoe Horn;Sock-Aid Hard;Reacher; Shower Chair with back    Assessment   Performance deficits / Impairments: Decreased functional mobility ; Decreased safe awareness;Decreased balance;Decreased ADL status; Decreased endurance;Decreased high-level IADLs;Decreased strength  Assessment: Pt presents after femur fx resulting in ORIF of LLE. Pt lives at home with 3 grandsons who all have special needs including one who is total care with overhead lift equipment (youngest with ASD and middle one with PTSD and ADHD). 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 Currently, she requires CGA for functional transfers and mod A with LB bathing and dressing. Recommend cont OT to increase independence with self care and functional mobility for return home. Pt would benefit from hip kit and shower chair for use at home. Prognosis: Good  Decision Making: Medium Complexity  History: see above  Exam: mobility, self care  Assistance / Modification: assist of 1, RW  OT Education: OT Role;Transfer Training;Plan of Care;Precautions; ADL Adaptive Strategies; Energy Conservation;Equipment  Patient Education: rehab schedule  Barriers to Learning: none  REQUIRES OT FOLLOW UP: Yes  Activity Tolerance  Activity Tolerance: Patient Tolerated treatment well;Treatment limited secondary to medical complications (free text) (+emesis x 3 during session)  Safety Devices  Safety Devices in place: Yes  Type of devices: Nurse notified;Gait belt;Call light within reach; Chair alarm in place; Left in chair;Patient at risk for falls           Patient Diagnosis(es): There were no encounter diagnoses. has a past medical history of Cancer ([facility] Depression, Hyperthyroidism, and Nephrolithiasis. has a past surgical history that includes Cholecystectomy; Hysterectomy; Mastectomy; and Femur fracture surgery (Left, 3/19/2022). Restrictions  Restrictions/Precautions  Restrictions/Precautions: Weight Bearing,Fall Risk  Lower Extremity Weight Bearing Restrictions  Left Lower Extremity Weight Bearing: Weight Bearing As Tolerated    Subjective   General  Chart Reviewed: Yes,Progress Notes,History and Physical  Patient assessed for rehabilitation services?: Yes  Additional Pertinent Hx: Per Dr. Tamara Tompkins, \"Patient is a 65 yo F with pmh R breast cancer (s/p R mastectomy 2007, chemo, 5 years anti-estrogen treatment, then lost follow-up with Dr. Jennifer Pena), DM2, hyperthyroidism, and depression who initially presented  3/16/2022 with left hip pain. Had been progressing since 11/2021. Imaging revealed diffuse metastatic disease/lytic bone lesions involving the chest, thoracic spine, lumbar spine, left hemipelvis and proximal left femur. Ortho consulted and patient deemed high risk for pathologic fracture given large destructive lesions centered around the left femoral head. Therefore underwent ORIF with IM kelvin and bone biopsy (3/19 with Dr. Chris Thomas). Now WBAT. Biopsy is pending and primary malignancy remains unclear. Per Oncology, plan will be for radiation in few weeks. Course complicated by hypercalcemia, acute liver injury, constipation. Now presents to ARU with impaired mobility and self-care below her baseline. Currently, patient reports moderate pain in the left groin and lateral hip with radiation into the anterolateral thigh. She also has mild-moderate pain in the low back and right hip. She denies tingling/numbness or focal weakness.  She is motivated to start inpatient program.\"  Family / Caregiver Present: No  Referring Practitioner: Justina Herman  Diagnosis: L hip fx s/p ORIF  Subjective  Subjective: Pt. resting in recliner upon arrival and agreeable to OT. Pt with pain \"2/10\" in right hip. Complained of nausea when up.  +emesis x 3. Social/Functional History  Social/Functional History  Lives With: Family (Dtr (temp staying), 3 Grandsons (23 (total care), 12, 8 yr). )  Type of Home: House  Home Layout: Laundry in basement,Able to Live on Main level with bedroom/bathroom,Two level ([place] house, 2 stories and basement)  Home Access: Stairs to enter with rails,Ramped entrance (1 step to enter.)  Entrance Stairs - Number of Steps: 1  Bathroom Shower/Tub: Tub/Shower unit,Curtain (Overhead lift system for grandson (bed to/from w/c, w/c to/from tub/shower). Walk in shower in basement)  Bathroom Toilet: Standard  Bathroom Accessibility: Accessible  Home Equipment: 4 wheeled walker  ADL Assistance: Independent (Increasing difficulty. 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   Additional Comments: Dtr (works fulltime), staying within home to assist while pt in hospital.       Objective   Vision: Within Functional Limits  Hearing: Within functional limits    Orientation  Overall Orientation Status: Within Functional Limits     Balance  Sitting Balance: Stand by assistance  Standing Balance: Contact guard assistance  Functional Mobility  Functional - Mobility Device: Rolling Walker  Activity: Other  Assist Level: Contact guard assistance  Toilet Transfers  Toilet - Technique: Ambulating  Equipment Used: Grab bars  Toilet Transfer: Contact guard assistance  Toilet Transfers Comments: RW  ADL  Feeding: Independent  Grooming: Setup (completed grooming while seated on seat infront of seat)  LE Bathing: Minimal assistance (to wash feet while seated on shower chair)  UE Dressing: Stand by assistance  LE Dressing: Moderate assistance  Toileting: Minimal assistance (assist to wash post. periarea)  Additional Comments: Pt may benefit from LB dressing equipment for hip pain. Tone RUE  RUE Tone: Normotonic  Tone LUE  LUE Tone: Normotonic  Coordination  Movements Are Fluid And Coordinated: Yes     Bed mobility  Comment: not observed, up in chair at beginning and end of session  Transfers  Sit to stand: Contact guard assistance  Stand to sit: Contact guard assistance     Cognition  Overall Cognitive Status: WFL        Sensation  Overall Sensation Status: WFL        LUE AROM (degrees)  LUE AROM : WFL  RUE AROM (degrees)  RUE AROM : WFL  LUE Strength  Gross LUE Strength: WFL  RUE Strength  Gross RUE Strength: WFL                   Plan   Plan  Times per week: 5-6  Times per day: Daily  Plan weeks: 1  Current Treatment Recommendations: Strengthening,Endurance Training,Patient/Caregiver Education & Training,Balance Training,Pain Management,Functional Mobility Training,Safety Education & Training,Self-Care / ADL,Equipment Evaluation, Education, & procurement,Home Management Training                 Goals  Short term goals  Time Frame for Short term goals: 1 week  Short term goal 1: Pt will be mod I with functional transfers  Short term goal 2: Pt will be mod I with functional mobility  Short term goal 3: Pt will be mod I with toileting  Short term goal 4: Pt will be mod I with bathing and dressing  Short term goal 5: Pt will be mod I with bed mobility  Long term goals  Time Frame for Long term goals : STG = LTG  Patient Goals   Patient goals : Pt's goal is to return home with her grandsons as soon as possible.        Therapy Time   Individual Concurrent Group Co-treatment Time In 0730         Time Out 0900         Minutes 90         Timed Code Treatment Minutes: Grace Murcia

## 2022-03-23 NOTE — PROGRESS NOTES
Patient admitted to rehab with left hip metastatic lytic bone lesion-S/P ORIF. A/Ox4. Transfers with one assist using gait belt with front wheeled walker. Mobility restrictions: WBAT. On regular diet, tolerating well. Medications taken whole with fluids. Pain medication given as ordered. On Lovenox for DVT prophylaxis. Skin: Surgical incision to right hip, 3 incisions with staples in place. Small amount of serosangious drainage. New dressings applied after shower. Oxygen: room air. LDA: none. Has been continent of  bladder. LBM 3/15, has had Miralax this morning due to not having a good bowel movement. She had three emesis during shower. Zofran given which she states was effective. Bowel sounds present in all four quads. Chair/bed alarms in use and call light in reach. Will continue to monitor status and safety.

## 2022-03-23 NOTE — PATIENT CARE CONFERENCE
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 PHYSICAL THERAPY    Bed mobility  Bridging: Unable to assess  Rolling to Left: Maximum assistance  Rolling to Right: Maximum assistance  Supine to Sit: Maximum assistance (flat bed and no rail)  Sit to Supine: Moderate assistance (PT assisted B LE's)  Scooting: Contact guard assistance  Comment: flat bed and no use of rail    Transfers:  Sit to Stand: Contact guard assistance  Stand to sit: Contact guard assistance  Bed to Chair: Contact guard assistance (with use of the walker)    Ambulation 1  Surface: level tile  Device: Rolling Walker  Assistance: Contact guard assistance  Quality of Gait: step through pattern with good heel contact and good weight bearing at quiet pace  Gait Deviations: Slow Ngozi,Decreased step length,Decreased step height  Distance: 55' x 1 and short distances between activities           Stairs  # Steps : 4  Stairs Height: 6\"  Rails: Bilateral  Curbs: 6\"  Device: Rolling walker,No Device (Rolling walker on the curb step)  Assistance: Minimal assistance  Comment: Step to pattern leading with the R LE    Car Transfer: Moderate Assistance (PT assisted LE's in/out of car)      Assessment: 65 y/o female admit 3/16/2022 with L Hip Pain, Met Malignant Neoplasm. CT Hip : Osseous Met Disease throughout L Hemipelvis, L Sacrum and Prox L Femur. CT T/L Spine L Compress Fx L1 (uncertain chronicity), Subacute Rib Fxs, Diffuse Bony Mets. 3/19/2022 S/P L Hip ORIF with IM Supa, L Fem Head Biopsy. PMH as noted including Breast Ca/Mastectomy.   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 Pt. Mod/Max A for bed mobilty and Min to [address] for trasnfer and gait. Pt. will beneift from skilled PT to improve functional mobility and optimize independence. SPEECH THERAPY (intentionally left blank if not actively being seen by this service):      OCCUPATIONAL THERAPY    ADL  Feeding: Independent  Grooming: Setup (completed grooming while seated on seat infront of seat)  LE Bathing: Minimal assistance (to wash feet while seated on shower chair)  UE Dressing: Stand by assistance  LE Dressing: Moderate assistance  Toileting: Minimal assistance (assist to wash post. periarea)  Additional Comments: Pt may benefit from LB dressing equipment for hip pain. Bed mobility  Bridging: Unable to assess  Rolling to Left: Maximum assistance  Rolling to Right: Maximum assistance  Supine to Sit: Maximum assistance (flat bed and no rail)  Sit to Supine: Moderate assistance (PT assisted B LE's)  Scooting: Contact guard assistance  Comment: flat bed and no use of rail    Functional Transfers: Toilet Transfers  Toilet - Technique: Ambulating  Equipment Used: Grab bars  Toilet Transfer: Contact guard assistance  Toilet Transfers Comments: RW                    UE Function:  WFLs    Assessment: Pt presents after femur fx resulting in ORIF of LLE. Pt lives at home with 3 grandsons who all have special needs including one who is total care with overhead lift equipment (youngest with ASD and middle one with PTSD and ADHD). 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 Currently, she requires CGA for functional transfers and mod A with LB bathing and dressing. Recommend cont OT x 1 wk to increase independence with self care and functional mobility for return home. Pt would benefit from hip kit and shower chair for use at home.             NUTRITION  Most recent weightWeight: 235 lb 3.7 oz (106.7 kg)  BMI (Calculated): 39.2   Diet Order: ADULT DIET; Regular  ADULT ORAL NUTRITION SUPPLEMENT; Breakfast, Dinner; Standard High Calorie/High Protein Oral Supplement  PO Meals Eaten (%): 76 - 100%  Please see nutrition note for details. NURSING  Continent of bladder, did require enema. Monitor and maintain skin integrity, incision care. Family Education: Patient Education: hip safety, skin and incision care, medications, pain control, diabetic needs, education to prevention constipation, safety and fall prevention. MEDICAL  Bone biopsy confirmed breast cancer is primary. Oncology starting Arimidex and making plans for additional chemo and radiation as outpatient. TEAM SUMMARY AND DISCHARGE PLAN  Estimated length of stay: 3/31/2022  Destination: HOME  · Anticipated Services at Discharge:    [x] OT  [x] PT   [] SLP    [] RN   [] Home Health aide []   Community Resources: _______________________________  Equipment recommendations:  [] Hospital bed [] Tub bench  [] Shower chair [] Hand held shower  [] Raised toilet seat [] Toilet safety frame [] Bedside commode   [] W/C: _____  [x] Nate Mitchell [] Standard walker [] Gait belt [] cane: _________  [] Sliding board [] Alternate seating/furniture [] O2 [] Hip Kit: _______  [] Life Line [] Other: _______  Factors facilitating achievement of predicted outcomes: pt cooperative, motivated  Barriers to the achievement of predicted outcomes/Interventions:   L hip pain      Interdisciplinary Individualized Plan of Care Review:    · Continue Current Plan of Care: Yes    · Modifications:_____________________________    Special Needs in the Upcoming Week :    [] Family/Caregiver Education  [] Home visit  []Therapeutic Pass   [] Consults:_______    [] Other;_______    Patient Rehab Team Goals for the Upcoming Week:  1. Functional transfers in room with wheeled walker with modified independence   2.  Pt will perform ADL tasks with decreased assist across disciplines  3. Team Members Present at Conference:  Physician: Dr. Micki Cuellar  : Gautam Giron [place]     Occupational Therapist: Irene Krishnamurthy, OTR/L#[id number]  Physical Therapist:China Taylor, PT #[id number]  Speech Therapist:   Nurse: Ian Molina RN  Dietitian: Valerie Phillips, RD, LD  Psychologist:  Andrea Diaz RN      I led this team conference and I approve the established interdisciplinary plan of care as documented within the medical record of Juliane Tsang. MD: Jean Pierre Brown.  Micki Cuellar MD 3/24/2022, 4:09 PM

## 2022-03-23 NOTE — ACP (ADVANCE CARE PLANNING)
Conversation in minutes:  4 minutes    Conversation Outcomes:  [x] ACP discussion completed  [] Existing advance directive reviewed with patient; no changes to patient's previously recorded wishes  [] New Advance Directive completed  [] Portable Do Not Rescitate prepared for Provider review and signature  [] POLST/POST/MOLST/MOST prepared for Provider review and signature      Follow-up plan:    [] Schedule follow-up conversation to continue planning  [] Referred individual to Provider for additional questions/concerns   [] Advised patient/agent/surrogate to review completed ACP document and update if needed with changes in condition, patient preferences or care setting    [] This note routed to one or more involved healthcare providers            Discussion held regarding Adv Directives and legal decision making hierarchy. She reports she is not legally . She reports she has two children that she would want them to make these decisions for her. She is NOT interested in creating Adv Directives.     [place], [place]     Case Management   [phone]    3/23/2022  4:14 PM

## 2022-03-24 PROBLEM — E44.0 MODERATE MALNUTRITION (HCC): Chronic | Status: ACTIVE | Noted: 2022-03-24

## 2022-03-24 LAB
A/G RATIO: 0.8 (ref 1.1–2.2)
ALBUMIN SERPL-MCNC: 3.1 G/DL (ref 3.4–5)
ALP BLD-CCNC: 318 U/L (ref 40–129)
ALT SERPL-CCNC: 595 U/L (ref 10–40)
ANION GAP SERPL CALCULATED.3IONS-SCNC: 14 MMOL/L (ref 3–16)
ANISOCYTOSIS: ABNORMAL
AST SERPL-CCNC: 174 U/L (ref 15–37)
BANDED NEUTROPHILS RELATIVE PERCENT: 11 % (ref 0–7)
BASOPHILS ABSOLUTE: 0 K/UL (ref 0–0.2)
BASOPHILS RELATIVE PERCENT: 0 %
BILIRUB SERPL-MCNC: 1 MG/DL (ref 0–1)
BUN BLDV-MCNC: 13 MG/DL (ref 7–20)
CALCIUM SERPL-MCNC: 8.4 MG/DL (ref 8.3–10.6)
CHLORIDE BLD-SCNC: 100 MMOL/L (ref 99–110)
CO2: 22 MMOL/L (ref 21–32)
CREAT SERPL-MCNC: <0.5 MG/DL (ref 0.6–1.2)
EOSINOPHILS ABSOLUTE: 0 K/UL (ref 0–0.6)
EOSINOPHILS RELATIVE PERCENT: 0 %
GFR AFRICAN AMERICAN: >60
GFR NON-AFRICAN AMERICAN: >60
GLUCOSE BLD-MCNC: 103 MG/DL (ref 70–99)
GLUCOSE BLD-MCNC: 118 MG/DL (ref 70–99)
GLUCOSE BLD-MCNC: 124 MG/DL (ref 70–99)
GLUCOSE BLD-MCNC: 124 MG/DL (ref 70–99)
GLUCOSE BLD-MCNC: 134 MG/DL (ref 70–99)
HCT VFR BLD CALC: 30.3 % (ref 36–48)
HEMOGLOBIN: 10.3 G/DL (ref 12–16)
INR BLD: 1.08 (ref 0.88–1.12)
LYMPHOCYTES ABSOLUTE: 1.6 K/UL (ref 1–5.1)
LYMPHOCYTES RELATIVE PERCENT: 24 %
MAGNESIUM: 2.6 MG/DL (ref 1.8–2.4)
MCH RBC QN AUTO: 29.5 PG (ref 26–34)
MCHC RBC AUTO-ENTMCNC: 34.1 G/DL (ref 31–36)
MCV RBC AUTO: 86.4 FL (ref 80–100)
MITOCHONDRIAL M2 AB, IGG: 1 U/ML (ref 0–4)
MONOCYTES ABSOLUTE: 0.3 K/UL (ref 0–1.3)
MONOCYTES RELATIVE PERCENT: 4 %
MYELOCYTE PERCENT: 1 %
NEUTROPHILS ABSOLUTE: 4.9 K/UL (ref 1.7–7.7)
NEUTROPHILS RELATIVE PERCENT: 60 %
NUCLEATED RED BLOOD CELLS: 1 /100 WBC
PDW BLD-RTO: 15.5 % (ref 12.4–15.4)
PERFORMED ON: ABNORMAL
PLATELET # BLD: 177 K/UL (ref 135–450)
PLATELET SLIDE REVIEW: ADEQUATE
PMV BLD AUTO: 8 FL (ref 5–10.5)
POTASSIUM SERPL-SCNC: 3.7 MMOL/L (ref 3.5–5.1)
PROTHROMBIN TIME: 12.2 SEC (ref 9.9–12.7)
RBC # BLD: 3.51 M/UL (ref 4–5.2)
SLIDE REVIEW: ABNORMAL
SODIUM BLD-SCNC: 136 MMOL/L (ref 136–145)
TOTAL PROTEIN: 7 G/DL (ref 6.4–8.2)
WBC # BLD: 6.8 K/UL (ref 4–11)

## 2022-03-24 PROCEDURE — 97530 THERAPEUTIC ACTIVITIES: CPT

## 2022-03-24 PROCEDURE — 97116 GAIT TRAINING THERAPY: CPT

## 2022-03-24 PROCEDURE — 97110 THERAPEUTIC EXERCISES: CPT | Performed by: PHYSICAL THERAPIST

## 2022-03-24 PROCEDURE — 6370000000 HC RX 637 (ALT 250 FOR IP): Performed by: INTERNAL MEDICINE

## 2022-03-24 PROCEDURE — 85610 PROTHROMBIN TIME: CPT

## 2022-03-24 PROCEDURE — 36415 COLL VENOUS BLD VENIPUNCTURE: CPT

## 2022-03-24 PROCEDURE — 97530 THERAPEUTIC ACTIVITIES: CPT | Performed by: PHYSICAL THERAPIST

## 2022-03-24 PROCEDURE — 6370000000 HC RX 637 (ALT 250 FOR IP): Performed by: PHYSICAL MEDICINE & REHABILITATION

## 2022-03-24 PROCEDURE — 1280000000 HC REHAB R&B

## 2022-03-24 PROCEDURE — 97110 THERAPEUTIC EXERCISES: CPT

## 2022-03-24 PROCEDURE — 97535 SELF CARE MNGMENT TRAINING: CPT

## 2022-03-24 PROCEDURE — 85025 COMPLETE CBC W/AUTO DIFF WBC: CPT

## 2022-03-24 PROCEDURE — 83735 ASSAY OF MAGNESIUM: CPT

## 2022-03-24 PROCEDURE — 80053 COMPREHEN METABOLIC PANEL: CPT

## 2022-03-24 PROCEDURE — 6360000002 HC RX W HCPCS: Performed by: PHYSICAL MEDICINE & REHABILITATION

## 2022-03-24 RX ORDER — ANASTROZOLE 1 MG/1
1 TABLET ORAL DAILY
Status: DISCONTINUED | OUTPATIENT
Start: 2022-03-24 | End: 2022-03-31 | Stop reason: HOSPADM

## 2022-03-24 RX ADMIN — ANASTROZOLE 1 MG: 1 TABLET ORAL at 13:12

## 2022-03-24 RX ADMIN — OXYCODONE HYDROCHLORIDE 10 MG: 10 TABLET ORAL at 13:12

## 2022-03-24 RX ADMIN — FAMOTIDINE 20 MG: 20 TABLET ORAL at 21:55

## 2022-03-24 RX ADMIN — OXYCODONE HYDROCHLORIDE 10 MG: 10 TABLET ORAL at 07:04

## 2022-03-24 RX ADMIN — SENNOSIDES AND DOCUSATE SODIUM 1 TABLET: 50; 8.6 TABLET ORAL at 08:29

## 2022-03-24 RX ADMIN — FAMOTIDINE 20 MG: 20 TABLET ORAL at 08:29

## 2022-03-24 RX ADMIN — SENNOSIDES AND DOCUSATE SODIUM 1 TABLET: 50; 8.6 TABLET ORAL at 21:55

## 2022-03-24 RX ADMIN — ENOXAPARIN SODIUM 40 MG: 100 INJECTION SUBCUTANEOUS at 08:29

## 2022-03-24 RX ADMIN — OXYCODONE HYDROCHLORIDE 10 MG: 10 TABLET ORAL at 21:58

## 2022-03-24 ASSESSMENT — PAIN DESCRIPTION - LOCATION
LOCATION: HIP

## 2022-03-24 ASSESSMENT — PAIN SCALES - GENERAL
PAINLEVEL_OUTOF10: 5
PAINLEVEL_OUTOF10: 0
PAINLEVEL_OUTOF10: 6
PAINLEVEL_OUTOF10: 2
PAINLEVEL_OUTOF10: 5
PAINLEVEL_OUTOF10: 7
PAINLEVEL_OUTOF10: 7

## 2022-03-24 ASSESSMENT — PAIN DESCRIPTION - DESCRIPTORS
DESCRIPTORS: DISCOMFORT

## 2022-03-24 ASSESSMENT — PAIN - FUNCTIONAL ASSESSMENT
PAIN_FUNCTIONAL_ASSESSMENT: PREVENTS OR INTERFERES SOME ACTIVE ACTIVITIES AND ADLS

## 2022-03-24 ASSESSMENT — PAIN DESCRIPTION - FREQUENCY
FREQUENCY: CONTINUOUS

## 2022-03-24 ASSESSMENT — PAIN DESCRIPTION - ORIENTATION
ORIENTATION: LEFT

## 2022-03-24 ASSESSMENT — PAIN DESCRIPTION - ONSET
ONSET: ON-GOING

## 2022-03-24 ASSESSMENT — PAIN DESCRIPTION - PROGRESSION
CLINICAL_PROGRESSION: NOT CHANGED

## 2022-03-24 ASSESSMENT — PAIN DESCRIPTION - DIRECTION
RADIATING_TOWARDS: LOWER BACK
RADIATING_TOWARDS: LOWER BACK

## 2022-03-24 ASSESSMENT — PAIN DESCRIPTION - PAIN TYPE
TYPE: ACUTE PAIN

## 2022-03-24 ASSESSMENT — PAIN SCALES - WONG BAKER: WONGBAKER_NUMERICALRESPONSE: 2

## 2022-03-24 NOTE — CONSULTS
RADIATION ONCOLOGY CONSULT     Patient: Juliane Tasng  YOB: 1960    Date of Consultation: 3/24/2022    Reason for Consultation: Metastatic breast cancer    PCP: Ryan Dan MD  Requesting Provider: Marshall Courtney        HISTORY OF PRESENT ILLNESS:     Ms. Aixa Escalera is a 64 y.o. female with newly diagnosis metastatic breast cancer. She presented to the ED on 3/16/2022 with low back and left hip pain. On 3/16/2022, CT thoracic/lumbar spine demonstrated, among other findings, diffuse bone metastases, chronic appearing less than 50% loss of height of T12, less than 50% compression fracture of L1 of uncertain chronicity, subacute rib fractures, and a partially visualized questionable mass in the left kidney. On 3/16/2022, CT left hip demonstrated osseous metastatic disease throughout the imaged left hemipelvis, left sacrum, and proximal left femur with large destructive lesions centered within the left femoral head and neck. On 3/16/2022, CT abdomen/pelvis with contrast demonstrated diffuse lytic and sclerotic lesions throughout the visualized osseous structures, without CT evidence of renal mass. On 3/18/2022, CT chest demonstrated moderate subcutaneous inflammatory stranding within the right axilla, right axillary adenopathy, and diffuse osseous metastatic disease likely causing multiple age-indeterminate pathologic fractures, including multiple subacute healing bilateral rib fractures, and age-indeterminate compression fractures involving the superior endplates of T8, T9, Q18, T12, and L1. On 3/19/2022, she underwent ORIF of left hip with intramedullary kelvin and left femoral head bone biopsy. Pathology demonstrated metastatic breast carcinoma, ER positive, ID negative, HER-2 negative. She feels fair currently. She is currently in inpatient rehabilitation and feels as though she is making good progress.   She acknowledges some pain in the left hip, most prominent at the incision sites. She also notes more significant pain in the low back/sacral region. She is taking oxycodone with some relief. She ambulates with a walker. She reports no bowel or bladder incontinence. Reports no extremity numbness or focal weakness. She endorses some constipation. PAST MEDICAL HISTORY:     Past Medical History:   Diagnosis Date    Cancer ([facility] [address]     Depression     Hyperthyroidism     Nephrolithiasis        PAST SURGICAL HISTORY:     Past Surgical History:   Procedure Laterality Date    CHOLECYSTECTOMY      FEMUR FRACTURE SURGERY Left 3/19/2022    OPEN REDUCTION INTERNAL FIXATION LEFT HIP WITH INTRAMEDULLARY JAMISON; LEFT FEMORAL HEAD BONE BIOPSY INTRAOPERATIVELY performed by Blas Manuel MD at [address]:     Social History     Socioeconomic History    Marital status: Single     Spouse name: Not on file    Number of children: Not on file    Years of education: Not on file    Highest education level: Not on file   Occupational History    Not on file   Tobacco Use    Smoking status: Former Smoker    Smokeless tobacco: Never Used   Substance and Sexual Activity    Alcohol use: Yes     Comment: rarely    Drug use: No    Sexual activity: Not on file   Other Topics Concern    Not on file   Social History Narrative    Not on file     Social Determinants of Health     Financial Resource Strain:     Difficulty of Paying Living Expenses: Not on file   Food Insecurity:     Worried About [address] in the Last Year: Not on file    [address] [facility] [address] in the Last Year: Not on file   Transportation Needs:     Lack of Transportation (Medical): Not on file    Lack of Transportation (Non-Medical):  Not on file   Physical Activity:     Days of Exercise per Week: Not on file    Minutes of Exercise per Session: Not on file   Stress:     Feeling of Stress : Not on file   Social Connections:     Frequency of Communication with Friends and Family: Not on file    Frequency of Social Gatherings with Friends and Family: Not on file    Attends Jain Services: Not on file    Active Member of Clubs or Organizations: Not on file    Attends Club or Organization Meetings: Not on file    Marital Status: Not on file   Intimate Partner Violence:     Fear of Current or Ex-Partner: Not on file    Emotionally Abused: Not on file    Physically Abused: Not on file    Sexually Abused: Not on file   Housing Stability:     Unable to Pay for Housing in the Last Year: Not on file    Number of [place] in the Last Year: Not on file    Unstable Housing in the Last Year: Not on file       FAMILY HISTORY:     Family History   Problem Relation Age of Onset    Mental Illness Mother     High Blood Pressure Mother     High Blood Pressure Father     Diabetes Father        ALLERGIES:     Allergies   Allergen Reactions    Cephalexin        MEDICATIONS:     Current Facility-Administered Medications   Medication Dose Route Frequency Provider Last Rate Last Admin    anastrozole (ARIMIDEX) tablet 1 mg  1 mg Oral Daily Thor Gorman MD        0.9 % sodium chloride infusion  25 mL IntraVENous PRN Cj Holland MD        ondansetron [facility] injection 4 mg  4 mg IntraVENous Q6H PRN Cj Holland MD        oxyCODONE HCl (OXY-IR) immediate release tablet 10 mg  10 mg Oral Q4H PRN Cj Holland MD   10 mg at 03/24/22 0704    sodium chloride flush 0.9 % injection 5-40 mL  5-40 mL IntraVENous 2 times per day Cj Holland MD        sodium chloride flush 0.9 % injection 5-40 mL  5-40 mL IntraVENous PRN Cj Holland MD        acetaminophen (TYLENOL) tablet 650 mg  650 mg Oral Q6H PRN Cj Holland MD        enoxaparin (LOVENOX) injection 40 mg  40 mg SubCUTAneous Daily Cj Holland MD   40 mg at 03/24/22 0829    magnesium hydroxide (MILK OF MAGNESIA) 400 MG/5ML suspension 30 mL  30 mL Oral Daily PRN Garima Bajwa MD   30 mL at 03/23/22 1910    polyethylene glycol (GLYCOLAX) packet 17 g  17 g Oral Daily PRN Garima Bajwa MD   17 g at 03/23/22 0713    sennosides-docusate sodium (SENOKOT-S) 8.6-50 MG tablet 1 tablet  1 tablet Oral BID Garima Bajwa MD   1 tablet at 03/24/22 0829    calcium carbonate (TUMS) chewable tablet 500 mg  500 mg Oral TID PRN Garima Bajwa MD        dextrose 5 % solution  100 mL/hr IntraVENous PRN Garima Bajwa MD        dextrose 50 % IV solution  12.5 g IntraVENous PRN Garima Bajwa MD        famotidine (PEPCID) tablet 20 mg  20 mg Oral BID Garima Bajwa MD   20 mg at 03/24/22 0829    glucagon (rDNA) injection 1 mg  1 mg IntraMUSCular PRN Garima Bajwa MD        glucose (GLUTOSE) 40 % oral gel 15 g  15 g Oral PRN Garima Bajwa MD        insulin lispro (HUMALOG) injection vial 0-3 Units  0-3 Units SubCUTAneous Nightly Garima Bajwa MD        insulin lispro (HUMALOG) injection vial 0-6 Units  0-6 Units SubCUTAneous TID [facility] Garima Bajwa MD        ondansetron [facility]) tablet 4 mg  4 mg Oral Q8H PRN Garima Bajwa MD   4 mg at 03/23/22 2001    bisacodyl (DULCOLAX) suppository 10 mg  10 mg Rectal Daily PRN Garima Bajwa MD   10 mg at 03/22/22 2126       REVIEW OF SYSTEMS:       Constitutional:   no fever, no chills, no night sweats  Eyes:  No impairment or change in vision  ENT / Mouth:  No pain, no ulceration, no bleeding, no change in voice, no hearing loss  Cardiovascular:  No chest pain, no palpitations, no new edema  Respiratory:  No pain with breathing, no hemoptysis, no change to breathing  Gastrointestinal:  No pain, no cramping, no change in swallowing, no nausea  Urinary:  No pain, no bleeding, no change in continence  Musculoskeletal:  No redness, no pain, no edema, no weakness  Skin:  No pruritus, no rash, no change to nodules or lesions  Neurologic:  No headache, no change in mental status, no speech changes, no altered sensory or motor activity  Psychiatric:  No change in concentration, no change to affect or mood  Endocrine:  No hot flashes, no increased thirst  Hematologic: No petechiae, no ecchymosis, no bleeding  Lymphatic:  No lymphadenopathy, no lymphedema  Allergy / Immunologic:  No rash, no nonhealing wounds    PHYSICAL EXAM:       Vitals:    03/24/22 0815   BP: (!) 134/98   Pulse: 78   Resp: 18   Temp: 97.4 °F (36.3 °C)   SpO2:        ECOG PERFORMANCE STATUS: 1  CONSTITUTIONAL: This is a 64y.o. year old female in no acute distress who appears her stated age. Awake, alert, cooperative. EYES:  Pupils equally round, sclera non-icteric, conjunctiva normal.  ENT:  Normocephalic, without obvious abnormality or trauma. External ears without lesions. No visible masses in the oral cavity or oropharynx. Mucous membranes moist with no exudate noted. NECK:  Supple and symmetrical, trachea midline, no visible or palpable adenopathy, thyroid symmetric without enlargement or tenderness. Overlying skin normal.  HEMATOLOGIC/LYMPHATICS:  No cervical or supraclavicular lymphadenopathy. BACK:  Symmetric, no curvature, no pain on palpation/percussion. LUNGS:  Breathing comfortably on room air, no stridor  CARDIOVASCULAR:  Regular rate  ABDOMEN:  Non distended  MUSCULOSKELETAL:  There is no redness, warmth, or swelling of the joints. 2 separate bandaged left lateral thigh incisions, no surrounding erythema or visible drainage. NEUROLOGIC:   No focal deficits noted. Speech is normal. Can move extremities against gravity. Sensation intact and symmetric. SKIN:  No visible rashes or ecchymoses on the exposed skin. EXT: No clubbing, cyanosis or edema.       LABS:     CBC:  Lab Results   Component Value Date    WBC 6.8 03/24/2022    HGB 10.3 03/24/2022    HCT 30.3 03/24/2022    MCV 86.4 03/24/2022     03/24/2022       BMP:   Lab Results   Component Value Date     03/24/2022    K 3.7 03/24/2022    K 3.7 03/17/2022     03/24/2022    CO2 22 03/24/2022    BUN 13 03/24/2022    CREATININE <0.5 03/24/2022    GLUCOSE 103 03/24/2022    CALCIUM 8.4 03/24/2022       HEPATIC FUNCTION PANEL:   Lab Results   Component Value Date    ALKPHOS 318 03/24/2022     03/24/2022     03/24/2022    PROT 7.0 03/24/2022    BILITOT 1.0 03/24/2022    LABALBU 3.1 03/24/2022       Lab Results   Component Value Date    INR 1.08 03/24/2022     No results found for: PTINR    TUMOR MARKERS:    No results found for: PSA, CEA, , [id number],     IMAGING:     I have reviewed all relevant imaging in detail, and discussed findings with Ms. Juan Pablo Flannery. XR LUMBAR SPINE (2-3 VIEWS)    Result Date: 3/17/2022  EXAMINATION: ONE XRAY VIEW OF THE PELVIS AND TWO XRAY VIEWS LEFT HIP; THREE XRAY VIEWS OF THE LUMBAR SPINE 3/16/2022 3:09 pm COMPARISON: None HISTORY: ORDERING SYSTEM PROVIDED HISTORY: pain TECHNOLOGIST PROVIDED HISTORY: Reason for exam:->pain Reason for Exam: pt fell back in nov and pain has gotten worse over time, unable to put weight on left leg, lower back pain FINDINGS: Lumbar spine, three views: There is mild wedge-shaped deformity of the T12 vertebral body. There is no other evidence of acute spinal fracture. Vertebral alignment is maintained. Mild multilevel osteoarthritic spurring with lower lumbar facet arthropathy. Mild osteopenia. Post cholecystectomy clips. AP pelvis and left hip: No acute osseous abnormality of the pelvis or left hip. Mild osteopenia. Poorly marginated lucency in the left femoral neck measuring around 2 cm. The SI joints are maintained. Mild symmetric osteoarthritic changes of the hips. Multiple calcified pelvic phleboliths. Mild compression deformity of the T12 vertebral body, age indeterminate. No acute lumbar spine fracture. No comparison imaging available. No acute osseous abnormality of the pelvis or left hip. Indeterminate 2 cm lucent lesion within the left femoral neck.   If the patient has a history of malignancy, finding is concerning for possible metastatic focus. Consider follow-up MRI or bone scan as clinically indicated. XR HIP LEFT (2-3 VIEWS)    Result Date: 3/19/2022  Radiology exam is complete. No Radiologist dictation. Please follow up with ordering provider. XR KNEE LEFT (1-2 VIEWS)    Result Date: 3/16/2022  EXAMINATION: 2 XRAY VIEWS OF THE LEFT KNEE 3/16/2022 3:09 pm COMPARISON: None HISTORY: ORDERING SYSTEM PROVIDED HISTORY: pain TECHNOLOGIST PROVIDED HISTORY: Reason for exam:->pain Reason for Exam: pt fell back in nov and pain has gotten worse over time, unable to put weight on left leg, lower back pain FINDINGS: There is minimal patella nolan. There is minimal tricompartmental degenerative spurring about the knee. There is suggestion of presence of tiny osteochondral defect along the posterior margin of the upper portion of the patella as visualized on the lateral view. No evidence of significant joint effusion. No evidence of acute/recent fracture or dislocation. Minimal degenerative changes of the left knee with no evidence of acute osseous abnormality. CT CHEST WO CONTRAST    Result Date: 3/18/2022  EXAMINATION: CT OF THE CHEST WITHOUT CONTRAST 3/18/2022 11:30 am TECHNIQUE: CT of the chest was performed without the administration of intravenous contrast. Multiplanar reformatted images are provided for review. Dose modulation, iterative reconstruction, and/or weight based adjustment of the mA/kV was utilized to reduce the radiation dose to as low as reasonably achievable. COMPARISON: 03/16/2022 HISTORY: ORDERING SYSTEM PROVIDED HISTORY: cancer unknown primary TECHNOLOGIST PROVIDED HISTORY: Reason for exam:->cancer unknown primary Reason for Exam: cancer unknown primary FINDINGS: Mediastinum: Coronary artery calcifications are a marker of atherosclerosis. There are no enlarged mediastinal lymph nodes. Lungs/pleura: The tracheobronchial tree is patent. There is no pneumothorax or pleural effusion. There is bibasilar atelectasis. Upper Abdomen: A small hiatal hernia is noted. The liver is diffusely low in attenuation consistent with hepatic steatosis. Status post cholecystectomy. Soft Tissues/Bones: Degenerative changes involve the thoracic spine. No change in the diffuse lytic and sclerotic lesions throughout the axial skeleton due to osseous metastatic disease. There are multiple subacute healing bilateral rib fractures, likely pathologic. No change in the age-indeterminate compression fractures involving the superior endplates of T8, T9, D97, T12 and L1. Status post right mastectomy. There is moderate subcutaneous inflammatory stranding within the right axilla likely due to edema. In addition, there is a mildly enlarged right axillary lymph node measuring 1.1 x 1.7 cm.     1. Moderate subcutaneous inflammatory stranding within the right axilla likely due to edema. 2. Right axillary adenopathy either due to sandeep metastasis or reactive disease. 3. Diffuse osseous metastatic disease likely causing multiple age-indeterminate pathologic fractures. If there is point tenderness throughout the thoracolumbar spine, recommend nonemergent MRI of the thoracolumbar spine with without contrast.     CT THORACIC SPINE WO CONTRAST    Result Date: 3/16/2022  EXAMINATION: CT OF THE LUMBAR SPINE WITHOUT CONTRAST; CT OF THE THORACIC SPINE WITHOUT CONTRAST  3/16/2022 TECHNIQUE: CT of the lumbar spine was performed without the administration of intravenous contrast. Multiplanar reformatted images are provided for review. Adjustment of mA and/or kV according to patient size was utilized.   Dose modulation, iterative reconstruction, and/or weight based adjustment of the mA/kV was utilized to reduce the radiation dose to as low as reasonably achievable.; CT of the thoracic spine was performed without the administration of intravenous contrast. Multiplanar reformatted images are provided for review. Dose modulation, iterative reconstruction, and/or weight based adjustment of the mA/kV was utilized to reduce the radiation dose to as low as reasonably achievable. COMPARISON: None HISTORY: ORDERING SYSTEM PROVIDED HISTORY: pain TECHNOLOGIST PROVIDED HISTORY: Reason for exam:->pain Decision Support Exception - unselect if not a suspected or confirmed emergency medical condition->Emergency Medical Condition (MA) Reason for Exam: Fall back in November, c/o mostly of left hip pain; ORDERING SYSTEM PROVIDED HISTORY: T12 fracture seen on XR TECHNOLOGIST PROVIDED HISTORY: Reason for exam:->T12 fracture seen on XR Reason for Exam: Fall back in November, c/o mostly of left hip pain FINDINGS: Thoracic spine: BONES/ALIGNMENT: Grade 1 retrolisthesis of T12 on L1. Chronic appearing less than 50% loss of height of T12. Vertebral body heights appear maintained aside from chronic appearing endplate changes. Mild and moderate loss of disc height. Posterior elements appear intact. Diffuse heterogeneous appearance of spine with lytic and sclerotic lesions. Subacute rib fractures noted. DEGENERATIVE CHANGES: Scattered degenerative changes noted in the visualized spine without spondylolisthesis. SOFT TISSUES: Visualized paraspinal soft tissues appear unremarkable. Small hiatal hernia. Lumbar spine: BONES/ALIGNMENT: The inferior-most complete disc space represents L5-S1. Grade 1 retrolisthesis of L2 on L3 and L1 on L2. Diffuse heterogeneous appearance of spine with lytic and sclerotic lesions. Less than 50% compression fracture of L1 of uncertain chronicity. Vertebral body heights appear otherwise maintained aside from chronic endplate changes. Mild loss of disc height. Posterior elements appear intact. DEGENERATIVE CHANGES: Scattered degenerative changes noted in the visualized spine with spondylolistheses. SOFT TISSUES: Visualized paraspinal soft tissues appear unremarkable. Nonobstructive renal calculi. Partially visualized questionable mass in the left kidney. 1. Diffuse bone metastasis. 2. Less than 50% compression fracture of L1 of uncertain chronicity. Subacute rib fractures. 3. Partially visualized questionable mass in the left kidney. CT abdomen pelvis with contrast recommended. 4. Nonobstructive renal calculi. 5. Other findings as described. CT LUMBAR SPINE WO CONTRAST    Result Date: 3/16/2022  EXAMINATION: CT OF THE LUMBAR SPINE WITHOUT CONTRAST; CT OF THE THORACIC SPINE WITHOUT CONTRAST  3/16/2022 TECHNIQUE: CT of the lumbar spine was performed without the administration of intravenous contrast. Multiplanar reformatted images are provided for review. Adjustment of mA and/or kV according to patient size was utilized. Dose modulation, iterative reconstruction, and/or weight based adjustment of the mA/kV was utilized to reduce the radiation dose to as low as reasonably achievable.; CT of the thoracic spine was performed without the administration of intravenous contrast. Multiplanar reformatted images are provided for review. Dose modulation, iterative reconstruction, and/or weight based adjustment of the mA/kV was utilized to reduce the radiation dose to as low as reasonably achievable. COMPARISON: None HISTORY: ORDERING SYSTEM PROVIDED HISTORY: pain TECHNOLOGIST PROVIDED HISTORY: Reason for exam:->pain Decision Support Exception - unselect if not a suspected or confirmed emergency medical condition->Emergency Medical Condition (MA) Reason for Exam: Fall back in November, c/o mostly of left hip pain; ORDERING SYSTEM PROVIDED HISTORY: T12 fracture seen on XR TECHNOLOGIST PROVIDED HISTORY: Reason for exam:->T12 fracture seen on XR Reason for Exam: Fall back in November, c/o mostly of left hip pain FINDINGS: Thoracic spine: BONES/ALIGNMENT: Grade 1 retrolisthesis of T12 on L1. Chronic appearing less than 50% loss of height of T12.  Vertebral body heights appear maintained aside from chronic appearing endplate changes. Mild and moderate loss of disc height. Posterior elements appear intact. Diffuse heterogeneous appearance of spine with lytic and sclerotic lesions. Subacute rib fractures noted. DEGENERATIVE CHANGES: Scattered degenerative changes noted in the visualized spine without spondylolisthesis. SOFT TISSUES: Visualized paraspinal soft tissues appear unremarkable. Small hiatal hernia. Lumbar spine: BONES/ALIGNMENT: The inferior-most complete disc space represents L5-S1. Grade 1 retrolisthesis of L2 on L3 and L1 on L2. Diffuse heterogeneous appearance of spine with lytic and sclerotic lesions. Less than 50% compression fracture of L1 of uncertain chronicity. Vertebral body heights appear otherwise maintained aside from chronic endplate changes. Mild loss of disc height. Posterior elements appear intact. DEGENERATIVE CHANGES: Scattered degenerative changes noted in the visualized spine with spondylolistheses. SOFT TISSUES: Visualized paraspinal soft tissues appear unremarkable. Nonobstructive renal calculi. Partially visualized questionable mass in the left kidney. 1. Diffuse bone metastasis. 2. Less than 50% compression fracture of L1 of uncertain chronicity. Subacute rib fractures. 3. Partially visualized questionable mass in the left kidney. CT abdomen pelvis with contrast recommended. 4. Nonobstructive renal calculi. 5. Other findings as described. CT ABDOMEN PELVIS W IV CONTRAST Additional Contrast? None    Result Date: 3/17/2022  EXAMINATION: CT OF THE ABDOMEN AND PELVIS WITH CONTRAST 3/16/2022 10:29 pm TECHNIQUE: CT of the abdomen and pelvis was performed with the administration of intravenous contrast. Multiplanar reformatted images are provided for review. Dose modulation, iterative reconstruction, and/or weight based adjustment of the mA/kV was utilized to reduce the radiation dose to as low as reasonably achievable. COMPARISON: None.  HISTORY: ORDERING SYSTEM PROVIDED HISTORY: mass.     CT HIP LEFT WO CONTRAST    Result Date: 3/16/2022  EXAMINATION: CT OF THE LEFT HIP WITHOUT CONTRAST 3/16/2022 4:21 pm TECHNIQUE: CT of the left hip was performed without the administration of intravenous contrast.  Multiplanar reformatted images are provided for review. Dose modulation, iterative reconstruction, and/or weight based adjustment of the mA/kV was utilized to reduce the radiation dose to as low as reasonably achievable. COMPARISON: None. HISTORY ORDERING SYSTEM PROVIDED HISTORY: abnormal finding on XR imaging, concerning for bone mets, eval for pathologic fracture TECHNOLOGIST PROVIDED HISTORY: Reason for exam:->abnormal finding on XR imaging, concerning for bone mets, eval for pathologic fracture Decision Support Exception - unselect if not a suspected or confirmed emergency medical condition->Emergency Medical Condition (MA) Reason for Exam: Fall back in November, c/o mostly of left hip pain FINDINGS: Bones: Destructive osseous lesion centered at the left metatarsal head and neck consistent with metastatic lesion. Partially imaged destructive lesion of the sacrum also present. There are several additional lytic and sclerotic foci of the left hemipelvis most compatible with metastatic disease. No definite fracture line identified at the left hip, however the location and destructive appearance does place the patient at significant risk for pathologic fracture at this site. Soft Tissue: Visualized intrapelvic structures demonstrate colonic diverticulosis. Imaged subcutaneous tissues appear grossly unremarkable. Joint: Anatomic alignment of the left hip with mild degenerative change of the left hip. 1. Osseous metastatic disease throughout the imaged left hemipelvis, left sacrum, and proximal left femur with large destructive lesions centered within the left femoral head and neck and involving the partially imaged left sacrum.  2. No definite pathologic fracture identified on the current exam, however, the location and severe destructive changes of the left femoral metastatic lesion do place the patient at a significant risk for pathologic fracture at this site. Recommend orthopedic consultation. XR HIP 2-3 VW W PELVIS LEFT    Result Date: 3/17/2022  EXAMINATION: ONE XRAY VIEW OF THE PELVIS AND TWO XRAY VIEWS LEFT HIP; THREE XRAY VIEWS OF THE LUMBAR SPINE 3/16/2022 3:09 pm COMPARISON: None HISTORY: ORDERING SYSTEM PROVIDED HISTORY: pain TECHNOLOGIST PROVIDED HISTORY: Reason for exam:->pain Reason for Exam: pt fell back in nov and pain has gotten worse over time, unable to put weight on left leg, lower back pain FINDINGS: Lumbar spine, three views: There is mild wedge-shaped deformity of the T12 vertebral body. There is no other evidence of acute spinal fracture. Vertebral alignment is maintained. Mild multilevel osteoarthritic spurring with lower lumbar facet arthropathy. Mild osteopenia. Post cholecystectomy clips. AP pelvis and left hip: No acute osseous abnormality of the pelvis or left hip. Mild osteopenia. Poorly marginated lucency in the left femoral neck measuring around 2 cm. The SI joints are maintained. Mild symmetric osteoarthritic changes of the hips. Multiple calcified pelvic phleboliths. Mild compression deformity of the T12 vertebral body, age indeterminate. No acute lumbar spine fracture. No comparison imaging available. No acute osseous abnormality of the pelvis or left hip. Indeterminate 2 cm lucent lesion within the left femoral neck. If the patient has a history of malignancy, finding is concerning for possible metastatic focus. Consider follow-up MRI or bone scan as clinically indicated. ASSESSMENT:     Ms. Romayne Fortune is a 64 y.o. female with a diagnosis of metastatic breast cancer. She presented with progressive lower back and left hip pain prompting ED evaluation and subsequent hospital admission.   She has been found to have diffuse osseous metastatic disease on imaging. On 3/19/2022, she underwent left hip ORIF, intramedullary nailing, and biopsy with pathology confirming static breast carcinoma, ER positive, GA negative, HER-2 negative. She is currently in inpatient rehab and making good progress. She has ongoing left hip and low back pain. PLAN:      - Reviewed her presentation, work-up to date, diagnosis, staging, and prognosis. - Discussed potential role of palliative radiotherapy in her care. In her particular situation, she would benefit from postoperative treatment to the left femur region to prevent future destabilization of the joint and also assist with further pain control. Additionally, she appears to have ongoing low back pain which I feel corresponds closely with her extensive sacral disease. As such I recommended palliative radiation to both of these areas. - We discussed that we typically wait approximately 2 weeks after a surgical procedure to initiate treatment to that area. However, we could start treatment sooner to the sacral region in an effort to get her pain under better control. She was in favor of proceeding.  - I had an in depth discussion on the rationale, benefits, risks, side effects, and alternatives to radiation treatment. Our discussion included, but was not limited to, the risks of fatigue, pain flare, dermatitis, nausea/vomiting, diarrhea, cystitis, cytopenias, transient bone weakening/fracture, nerve injury, lymphedema, and second malignancy. The patient expressed understanding and agreed to proceed. - I have recommended a dose of  2000 cGy to the left hip/femur and sacrum over the course of approximately 1 week. 2 separate plans will be utilized. The patient will be simulated today and we will plan to begin treatment tomorrow. The goal of the treatment is palliative. The treatments will be asynchronous given the need to allow healing from her left hip surgery.   - Treatment can be completed as an outpatient whenever she is appropriate to be discharged from inpatient rehab. Thank you for the opportunity to see this patient and participate in her care. Srini Feliciano M.D. Radiation Oncology      [email or web address] [email or web address]. [email or web address]  [phone]

## 2022-03-24 NOTE — CONSULTS
Comprehensive Nutrition Assessment    Type and Reason for Visit:  Initial,Consult    Nutrition Recommendations/Plan:   - Continue regular diet  - Continue Ensure Enlive BID    Nutrition Assessment:  RD consult for new admission to rehab. Hx left hip metastatic lytic bone lesion. Underwent hip sx with bone biopsy 3/19. Pt with newly diagnosed breast cancer. Plan for liver biopsy tomorrow. Pt has lost 20lbs (7.8%) over the past three months per care everywhere. On regular diet with variable intakes. ONS onboard. Will continue current interventions. Malnutrition Assessment:  Malnutrition Status: Moderate malnutrition    Context:  Acute Illness     Findings of the 6 clinical characteristics of malnutrition:  Energy Intake:  7 - 50% or less of estimated energy requirements for 5 or more days  Weight Loss:  7 - Greater than 7.5% over 3 months     Body Fat Loss:  No significant body fat loss     Muscle Mass Loss:  No significant muscle mass loss    Fluid Accumulation:  No significant fluid accumulation     Strength:  Not Performed    Estimated Daily Nutrient Needs:  Energy (kcal):  8819-6859 (11-14kcal/107kg); Weight Used for Energy Requirements:  Current     Protein (g):  68-80 (1.2-1.4g/57kg); Weight Used for Protein Requirements:  Ideal        Fluid (ml/day): 1 ml/kcal      Nutrition Related Findings:  +BM 3/23. Wounds:  Surgical Incision       Current Nutrition Therapies:    ADULT DIET;  Regular  ADULT ORAL NUTRITION SUPPLEMENT; Breakfast, Dinner; Standard High Calorie/High Protein Oral Supplement    Anthropometric Measures:  · Height: 5' 5\" (165.1 cm)  · Current Body Weight: 236 lb (107 kg)   · Ideal Body Weight: 125 lbs; % Ideal Body Weight 188.8 %   · BMI: 39.3  · BMI Categories: Obese Class 2 (BMI 35.0 -39.9)       Nutrition Diagnosis:   · Moderate malnutrition related to catabolic illness as evidenced by poor intake prior to admission,weight loss    Nutrition Interventions:   Food and/or Nutrient Delivery:  Continue Current Diet,Continue Oral Nutrition Supplement  Nutrition Education/Counseling:  Education not indicated   Coordination of Nutrition Care:  Continue to monitor while inpatient    Goals:  PO intake greater than 50%       Nutrition Monitoring and Evaluation:   Behavioral-Environmental Outcomes:  None Identified   Food/Nutrient Intake Outcomes:  Food and Nutrient Intake,Supplement Intake  Physical Signs/Symptoms Outcomes:  Weight,Meal Time Behavior     Discharge Planning:     Too soon to determine     Electronically signed by Valerio Soto RD, LD on 3/24/22 at 12:08 PM EDT    Contact: [phone]

## 2022-03-24 NOTE — PROGRESS NOTES
Occupational Therapy  Facility/Department: [address] IP REHAB  Daily Treatment Note  NAME: Mir Wilkerson  : 1960  MRN: [id number]    Date of Service: 3/24/2022    Discharge Recommendations:  Home with assist PRN,Continue to assess pending progress,Patient would benefit from continued therapy after discharge  OT Equipment Recommendations  ADL Assistive Devices: Walker Basket;Long-handled Sponge;Long-handled Shoe Horn;Sock-Aid Hard;Reacher; Shower Chair with back    Assessment   Performance deficits / Impairments: Decreased functional mobility ; Decreased safe awareness;Decreased balance;Decreased ADL status; Decreased endurance;Decreased high-level IADLs;Decreased strength  Assessment: pt is making steady gains w/ OT intervention. She was educated & practiced use of reacher & andi cloth SA to doff/don L sock. She was shown leg  to assist L LE on/off footrest but she didn't want to use it onto stretcher at end of session. Pt ambulated short distance in/out of bathrm to completed toilet t/f, needed CGA due to unsteadiness & L LE stiffness & pain. She ambulated to sink to wash hands using RW w/ CGA. Pt tolerated UE HEP using 2 lb wts for 1 set of 10 for strengthening to be IND w/ sit<>stand transitions. Pt is sweating & anxious at end of session. Cont w/ POC  Treatment Diagnosis: impaired ADL/fxl mobility  Prognosis: Good  Decision Making: Medium Complexity  History: see above  Exam: mobility, self care  Assistance / Modification: assist of 1, RW  OT Education: Equipment;ADL Adaptive Strategies  Patient Education: loaned & educated on reacher, SA, LH sponge & shoe horn  REQUIRES OT FOLLOW UP: Yes  Activity Tolerance  Activity Tolerance: Patient Tolerated treatment well  Activity Tolerance: Pt. motivated throughout session. Safety Devices  Safety Devices in place: Yes  Type of devices: Gait belt     PM session: met in therapy, she just finished PT session.  Pt had xray earlier to \"map out my radiation treatment on my back. \" She is reporting 8 out of 10 L hip thru back pain (already had medications). She is able to tolerate UE strengthening using 5 lb dowel kelvin--completed 1 set of 10 w/ rest breaks as needed, some SOB noted. Pt taken back to her room, she completed w/c transfer using RW, ambulated in/out of bathrm & completed toilet t/f w/ close CGA, she was able to manage clothing w/ SB/CGA, OT helped position her legs open so she could have better reach between to wipe herself after urination. She used bathing wipe to clean her hands then ambulated back to recliner x 15 ft w/ close SB/CGA. She is reporting fatigue & mild SOB. Pt talked openly to OT about her disabled grandson(s) and the extensive medical care received at [address] [place] [address]. Left in recliner, call light in reach. Tx time: 45 min, cont w/ POC. Patient Diagnosis(es): There were no encounter diagnoses. has a past medical history of Cancer ([facility] [address] Depression, Hyperthyroidism, and Nephrolithiasis. has a past surgical history that includes Cholecystectomy; Hysterectomy; Mastectomy; and Femur fracture surgery (Left, 3/19/2022). Restrictions  Restrictions/Precautions  Restrictions/Precautions: Weight Bearing,Fall Risk  Lower Extremity Weight Bearing Restrictions  Left Lower Extremity Weight Bearing: Weight Bearing As Tolerated  Position Activity Restriction  Other position/activity restrictions: WBAT L LE  Subjective   General  Chart Reviewed: Yes,Progress Notes,History and Physical  Patient assessed for rehabilitation services?: Yes  Additional Pertinent Hx: Per Dr. Katrina Resendez, \"Patient is a 65 yo F with pmh R breast cancer (s/p R mastectomy 2007, chemo, 5 years anti-estrogen treatment, then lost follow-up with Dr. Pierce Valles), DM2, hyperthyroidism, and depression who initially presented  3/16/2022 with left hip pain. Had been progressing since 11/2021.  Imaging revealed diffuse metastatic disease/lytic bone lesions involving the chest, thoracic spine, lumbar spine, left hemipelvis and proximal left femur. Ortho consulted and patient deemed high risk for pathologic fracture given large destructive lesions centered around the left femoral head. Therefore underwent ORIF with IM kelvin and bone biopsy (3/19 with Dr. Vale Tolentino). Now WBAT. Biopsy is pending and primary malignancy remains unclear. Per Oncology, plan will be for radiation in few weeks. Course complicated by hypercalcemia, acute liver injury, constipation. Now presents to ARU with impaired mobility and self-care below her baseline. Currently, patient reports moderate pain in the left groin and lateral hip with radiation into the anterolateral thigh. She also has mild-moderate pain in the low back and right hip. She denies tingling/numbness or focal weakness.  She is motivated to start inpatient program.\"  Family / Caregiver Present: No  Referring Practitioner: Livia Morrissey  Diagnosis: L hip fx s/p ORIF  Subjective  Subjective: met in therapy dept, she just finished PT session; had enema recently; reporting \"soreness\" L hip  General Comment  Comments: agreeable for UE strengthening exercises, t/f      Orientation  Orientation  Overall Orientation Status: Within Normal Limits  Objective    ADL  Equipment Provided: Reacher;Sock aid;Leg ;Long-handled shoe horn;Long-handled sponge  Toileting: Contact guard assistance (gave steadying during clothing management, used GB)  Additional Comments: educated & loaned reacher, SA, Leg , LH sponge & shoe horn, only wanted to doff/don L sock        Balance  Sitting Balance: Stand by assistance (difficulty bending over to reach L foot)  Standing Balance: Contact guard assistance  Standing Balance  Time: ~3 minutes  Activity: during toileting, grooming at sink  Comment: using GB or RW  Functional Mobility  Functional - Mobility Device: Rolling Walker  Activity: To/from bathroom  Assist Level: Contact guard assistance  Functional Mobility Comments: ambulated from w/c in/out of bathroom & completed toilet t/f, ambulated over to sink to wash hands  Toilet Transfers  Toilet - Technique: Ambulating  Equipment Used: Grab bars  Toilet Transfer: Contact guard assistance  Toilet Transfers Comments: RW to [employer] Transfers  Wheelchair/Bed - Technique: Ambulating  Equipment Used: Wheelchair;Bed  Level of Asssistance: Contact guard assistance  Wheelchair Transfers Comments: using RW  Bed mobility  Sit to Supine:  Moderate assistance  Comment: semi reclined stretcher, declined to try leg  due to fearful/anxiousness, OT lifted B LEs onto stretcher  Transfers  Sit to stand: Contact guard assistance  Stand to sit: Contact guard assistance  Transfer Comments: using RW                                            Type of ROM/Therapeutic Exercise  Type of ROM/Therapeutic Exercise: Free weights  Comment: using 2 lb wts for the following strengthening exercises to be IND w/ sit<>stand transitions  Exercises  Shoulder Elevation: x 15 shld shrugs  Shoulder Flexion: x10  Horizontal ABduction: x10  Horizontal ADduction: x10  Elbow Flexion: x10  Elbow Extension: x10  Supination: x10  Pronation: x10  Wrist Flexion: x 20 no wts  Wrist Extension: x 20 no wts  Finger Extension: x 20 no wts  Grasp/Release: 2 sets of 20 using 3 lb gripper  Other: x15 abdominal squeezes for core strengthening to support balance during t/f                    Plan   Plan  Times per week: 5-6  Times per day: Daily  Plan weeks: 1 wk, pt eager to return home ASAP  Specific instructions for Next Treatment: car t/f  Current Treatment Recommendations: Strengthening,Endurance Training,Patient/Caregiver Education & Training,Balance Training,Pain Management,Functional Mobility Training,Safety Education & Training,Self-Care / ADL,Equipment Evaluation, Education, & procurement,Home Management Training    Goals  Short term goals  Time Frame for Short term goals: 1 week  Short term goal 1: Pt will be mod I with functional transfers  Short term goal 2: Pt will be mod I with functional mobility  Short term goal 3: Pt will be mod I with toileting  Short term goal 4: Pt will be mod I with bathing and dressing  Short term goal 5: Pt will be mod I with bed mobility  Long term goals  Time Frame for Long term goals : STG = LTG  Patient Goals   Patient goals : Pt's goal is to return home with her grandsons as soon as possible.        Therapy Time   Individual Concurrent Group PM-treatment   Time In 1030      1430   Time Out 1115      1515   Minutes 45      45   Timed Code Treatment Minutes: [address], [place] [address]

## 2022-03-24 NOTE — PROGRESS NOTES
Hematology Oncology Daily Progress Note    Admit Date: 3/22/2022  Hospital day several    Subjective:     Patient has complaints of stable LBP and left hip pain--denies sob/cp. Medication side effects: none    Scheduled Meds:   anastrozole  1 mg Oral Daily    sodium chloride flush  5-40 mL IntraVENous 2 times per day    enoxaparin  40 mg SubCUTAneous Daily    sennosides-docusate sodium  1 tablet Oral BID    famotidine  20 mg Oral BID    insulin lispro  0-3 Units SubCUTAneous Nightly    insulin lispro  0-6 Units SubCUTAneous TID WC     Continuous Infusions:   sodium chloride      dextrose       PRN Meds:sodium chloride, ondansetron, oxyCODONE, sodium chloride flush, acetaminophen, magnesium hydroxide, polyethylene glycol, calcium carbonate, dextrose, dextrose, glucagon (rDNA), glucose, ondansetron, bisacodyl    Review of Systems  Pertinent items are noted in HPI. REVIEW OF SYSTEMS:         · Constitutional: Denies fever, sweats, weight loss     · Eyes: No visual changes or diplopia. No scleral icterus. · ENT: No Headaches, hearing loss or vertigo. No mouth sores or sore throat. · Cardiovascular: No chest pain, dyspnea on exertion, palpitations or loss of consciousness. · Respiratory: No cough or wheezing, no sputum production. No hemoptysis. .    · Gastrointestinal: No abdominal pain, appetite loss, blood in stools. No change in bowel habits. · Genitourinary: No dysuria, trouble voiding, or hematuria. · Musculoskeletal:  Generalized weakness. No joint complaints. · Integumentary: No rash or pruritis. · Neurological: No headache, diplopia. No change in gait, balance, or coordination. No paresthesias. · Endocrine: No temperature intolerance. No excessive thirst, fluid intake, or urination. · Hematologic/Lymphatic: No abnormal bruising or ecchymoses, blood clots or swollen lymph nodes. · Allergic/Immunologic: No nasal congestion or hives.    ·     Objective:     Patient Vitals for the past 8 hrs:   BP Temp Temp src Pulse Resp SpO2 Weight   03/24/22 0815 (!) 134/98 97.4 °F (36.3 °C) Oral 78 18 -- --   03/24/22 0600 -- -- -- -- -- -- 236 lb 1.8 oz (107.1 kg)   03/24/22 0557 (!) 159/86 97.6 °F (36.4 °C) Oral 78 18 98 % --     I/O last 3 completed shifts:   In: 1000 [P.O.:1000]  Out: 100 [Emesis/NG output:100]  I/O this shift:  In: 280 [P.O.:280]  Out: -     BP (!) 134/98   Pulse 78   Temp 97.4 °F (36.3 °C) (Oral)   Resp 18   Ht 5' 5\" (1.651 m)   Wt 236 lb 1.8 oz (107.1 kg)   SpO2 98%   BMI 39.29 kg/m²     General Appearance:    Alert, cooperative, no distress, appears stated age   Head:    Normocephalic, without obvious abnormality, atraumatic   Eyes:    PERRL, conjunctiva/corneas clear, EOM's intact, fundi     benign, both eyes        Ears:    Normal TM's and external ear canals, both ears   Nose:   Nares normal, septum midline, mucosa normal, no drainage    or sinus tenderness   Throat:   Lips, mucosa, and tongue normal; teeth and gums normal   Neck:   Supple, symmetrical, trachea midline, no adenopathy;        thyroid:  No enlargement/tenderness/nodules; no carotid    bruit or JVD   Back:     Symmetric, no curvature, ROM normal, no CVA tenderness   Lungs:     Clear to auscultation bilaterally, respirations unlabored   Chest wall:    No tenderness or deformity   Heart:    Regular rate and rhythm, S1 and S2 normal, no murmur, rub   or gallop   Abdomen:     Soft, non-tender, bowel sounds active all four quadrants,     no masses, no organomegaly           Extremities:   Extremities normal, atraumatic, no cyanosis or edema   Pulses:   2+ and symmetric all extremities   Skin:   Skin color, texture, turgor normal, no rashes or lesions   Lymph nodes:   Cervical, supraclavicular, and axillary nodes normal   Neurologic:   Stable         Data Review  CBC:   Lab Results   Component Value Date    WBC 6.8 03/24/2022    RBC 3.51 03/24/2022       Assessment:     Principal Problem:    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 *      Plan:     1. Stage IV breast cancer. I reviewed the biopsy in great detail. She understands that this is not curable but treatable. Since her cancer is ER positive, I will start Arimidex now. I explained potential side effects including hot flashes, body aches, bone density loss, and fractures. I will add in [place] as an outpatient. She will need radiation therapy to her spine and left hip upon discharge. I will consult radiation oncology to get the ball rolling on that.         Electronically signed by Mary Gagnon MD on 3/24/2022 at 8:31 AM

## 2022-03-24 NOTE — PLAN OF CARE
Problem: Nutrition  Goal: Optimal nutrition therapy  Outcome: Ongoing     Nutrition Problem #1: Moderate malnutrition  Intervention: Food and/or Nutrient Delivery: Continue Current Diet,Continue Oral Nutrition Supplement  Nutritional Goals: PO intake greater than 50%

## 2022-03-24 NOTE — PLAN OF CARE
Problem: Skin Integrity:  Goal: Will show no infection signs and symptoms  Description: Will show no infection signs and symptoms  3/24/2022 1051 by Harsh Castaneda RN  Note: Skin assessment completed on admission and every shift. Barrier wipes used in the event of incontinence. Pressure relief techniques used as needed while in chair and in bed. Position changes encouraged at least every two hours while in bed.    3/23/2022 2144 by Kenneth Francisco RN  Outcome: Ongoing  Goal: Absence of new skin breakdown  Description: Absence of new skin breakdown  3/23/2022 2144 by Kenneth Francisco RN  Outcome: Ongoing     Problem: Falls - Risk of:  Goal: Will remain free from falls  Description: Will remain free from falls  3/24/2022 1051 by Harsh aCstaneda RN  Note: Fall risk band on patient. Orange light on outside of room. Non skid footwear in place. Alarms used appropriately. Patient instructed to call and wait for staff before getting up. Rounding done to anticipate needs. Appropriate safety devices used for transfers. 3/23/2022 2144 by Kenneth Francisco RN  Outcome: Ongoing  Goal: Absence of physical injury  Description: Absence of physical injury  3/23/2022 2144 by Kenneth Francisco RN  Outcome: Ongoing     Problem: Pain:  Goal: Pain level will decrease  Description: Pain level will decrease  3/24/2022 1051 by Harsh Castaneda RN  Note: Patient able to express pain and rate pain using pain scale. Medicate as needed per orders. Reassess patient pain level within one hour after oral pain medication/intervention to assure patient has reduced pain sensation and document outcome. Non pharmaceutical interventions as appropriate.    3/23/2022 2144 by Kenneth Francisco RN  Outcome: Ongoing  Goal: Control of acute pain  Description: Control of acute pain  3/23/2022 2144 by Kenneth Francisco RN  Outcome: Ongoing  Goal: Control of chronic pain  Description: Control of chronic pain  3/23/2022 2144 by Kylah Leung RN  Outcome: Ongoing

## 2022-03-24 NOTE — CARE COORDINATION
Team Conference held today. Team reviewed progress, barriers, DME needs and DC Date. Team recommends continued stay on ARU Unit to further her progress in personal care and ambulation needs. Team recommends DC to home on Thursday, 3- with home care orders for SN/pt/ot. DME recs:   wh walker, shower chair, 3 piece hip kit and RTS. Met with patient to review. She reports she has spoken with her daughter and dgtr needs to return to work on Monday as so she will need to return home on Monday. She is open to Home Care services:  Interim or Stay well home care. The Plan for Transition of Care is related to the following treatment goals: To continue her progress in personal care, ambulation needs in home setting. The Patient  was provided with a choice of provider and agrees   with the discharge plan. [x] Yes [] No    Freedom of choice list was provided with basic dialogue that supports the patient's individualized plan of care/goals, treatment preferences and shares the quality data associated with the providers. [x] Yes [] No  Discussion held regarding asking any friends, neighbors or relatives to assist at home for a while. She reports she does not  Have any resources. Suggested calling the grand kids DDS . She reports they will not have any services for them. She states she will need to leave on Monday. She also states she starts radiation treatments tomorrow. She hopes she will have a ride from daughter but will need to get a schedule for the treatments. Informed her of resources such as [employer], [address], [facility] for assistance with transport. Discussion held regarding putting plans in place for the care of her grand kids, if her health continues to make changes, so that plans can be gradual and not all at once. She states she hopes to make her daughter the Legal Guardian of the grandkids.   I confirmed these are her children but they were taken away and now she has legal guardianship. She confirmed this as true. She reports her daughter's situation has changed and she will work with CPS to get a plan in place.   Nikhil Jeffery, [place]     Case Management   [phone]    3/24/2022  4:41 PM

## 2022-03-24 NOTE — PROGRESS NOTES
Patient admitted to rehab with  Lt Hip metsatatic lytic bone lesion - s/p ORIF. A/Ox 4. Transfers with walker x 1 . Mobility restrictions: none. On regular diet, tolerating well. Medications taken whole with thin liquids. On lovenox for DVT prophylaxis. Skin: collouses to heels. Oxygen: room air. LDA: none. Has been continent of bowel and continent of bladder. LB 3/23. Chair/bed alarms in use and call light in reach. Will monitor for safety.

## 2022-03-24 NOTE — PROGRESS NOTES
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 Patient seen this afternoon sitting up in gym. She is feeling more overwhelmed today. Provided reassurance. Labs reviewed. ROS: No f/c, n/v, cp     Objective:  Patient Vitals for the past 24 hrs:   BP Temp Temp src Pulse Resp SpO2 Height Weight   22 1158 -- -- -- -- -- -- 5' 5\" (1.651 m) --   22 0815 (!) 134/98 97.4 °F (36.3 °C) Oral 78 18 -- -- --   22 0600 -- -- -- -- -- -- -- 236 lb 1.8 oz (107.1 kg)   22 0557 (!) 159/86 97.6 °F (36.4 °C) Oral 78 18 98 % -- --   22 1956 115/81 98 °F (36.7 °C) Oral 88 16 96 % -- --   22 1513 (!) 143/66 97.6 °F (36.4 °C) Oral 89 13 95 % -- --     Const: Alert. No distress, pleasant. HEENT: Normocephalic, atraumatic. Normal sclera/conjunctiva. MMM. CV: Regular rate and rhythm. Resp: No respiratory distress. Lungs CTAB. Abd: Soft, nontender, nondistended, NABS+   Ext: trace edema (L>R). MSK: Decreased left>right hip ROM, post op swelling left thigh  Neuro: Alert, oriented, appropriately interactive. Psych: Cooperative, appropriate mood and affect    Laboratory data: Available via EMR.    Last 24 hour lab  Recent Results (from the past 24 hour(s))   POCT Glucose    Collection Time: 22  4:11 PM   Result Value Ref Range    POC Glucose 121 (H) 70 - 99 mg/dl    Performed on ACCU-CHEK    POCT Glucose    Collection Time: 22  8:05 PM   Result Value Ref Range    POC Glucose 136 (H) 70 - 99 mg/dl    Performed on ACCU-CHEK    CBC with Auto Differential    Collection Time: 22  6:11 AM   Result Value Ref Range    WBC 6.8 4.0 - 11.0 K/uL    RBC 3.51 (L) 4.00 - 5.20 M/uL    Hemoglobin 10.3 (L) 12.0 - 16.0 g/dL    Hematocrit 30.3 (L) 36.0 - 48.0 %    MCV 86.4 80.0 - 100.0 fL    MCH 29.5 26.0 - 34.0 pg    MCHC 34.1 31.0 - 36.0 g/dL    RDW 15.5 (H) 12.4 - 15.4 %    Platelets 300 472 - 528 K/uL    MPV 8.0 5.0 - 10.5 fL    PLATELET SLIDE REVIEW Adequate     SLIDE REVIEW see below     Neutrophils % 60.0 %    Lymphocytes % 24.0 %    Monocytes % 4.0 %    Eosinophils % 0.0 %    Basophils % 0.0 %    Neutrophils Absolute 4.9 1.7 - 7.7 K/uL    Lymphocytes Absolute 1.6 1.0 - 5.1 K/uL    Monocytes Absolute 0.3 0.0 - 1.3 K/uL    Eosinophils Absolute 0.0 0.0 - 0.6 K/uL    Basophils Absolute 0.0 0.0 - 0.2 K/uL    Bands Relative 11 (H) 0 - 7 %    Myelocyte Percent 1 (A) %    nRBC 1 (A) /100 WBC    Anisocytosis Occasional (A)    Comprehensive Metabolic Panel    Collection Time: 03/24/22  6:11 AM   Result Value Ref Range    Sodium 136 136 - 145 mmol/L    Potassium 3.7 3.5 - 5.1 mmol/L    Chloride 100 99 - 110 mmol/L    CO2 22 21 - 32 mmol/L    Anion Gap 14 3 - 16    Glucose 103 (H) 70 - 99 mg/dL    BUN 13 7 - 20 mg/dL    CREATININE <0.5 (L) 0.6 - 1.2 mg/dL    GFR Non-African American >60 >60    GFR African American >60 >60    Calcium 8.4 8.3 - 10.6 mg/dL    Total Protein 7.0 6.4 - 8.2 g/dL    Albumin 3.1 (L) 3.4 - 5.0 g/dL    Albumin/Globulin Ratio 0.8 (L) 1.1 - 2.2    Total Bilirubin 1.0 0.0 - 1.0 mg/dL    Alkaline Phosphatase 318 (H) 40 - 129 U/L     (H) 10 - 40 U/L     (H) 15 - 37 U/L   Magnesium    Collection Time: 03/24/22  6:11 AM   Result Value Ref Range    Magnesium 2.60 (H) 1.80 - 2.40 mg/dL   Protime-INR    Collection Time: 03/24/22  6:11 AM   Result Value Ref Range    Protime 12.2 9.9 - 12.7 sec    INR 1.08 0.88 - 1.12   POCT Glucose    Collection Time: 03/24/22  7:15 AM   Result Value Ref Range    POC Glucose 118 (H) 70 - 99 mg/dl    Performed on ACCU-CHEK    POCT Glucose    Collection Time: 03/24/22 12:18 PM   Result Value Ref Range    POC Glucose 124 (H) 70 - 99 mg/dl    Performed on ACCU-CHEK        Therapy progress:  PT  Position Activity Restriction  Other position/activity restrictions: WBAT L LE  Objective     Sit to Stand: Contact guard assistance  Stand to sit: Contact guard assistance  Bed to Chair: Contact guard assistance (with use of the walker)  Device: Rolling Walker  Assistance: Contact guard assistance  Distance: 77' x 2  OT  PT Equipment Recommendations  Equipment Needed: Yes  Mobility Devices: Tristen Sheikh: Rolling  Other: Anticipate need for FWW at d/c, Pt. has rollator but this will likely be unsafe for Pt. at d/c  Toilet - Technique: Ambulating  Equipment Used: Grab bars  Toilet Transfers Comments: RW to GB  Assessment        SLP          Body mass index is 39.29 kg/m². Rehabilitation Diagnosis:   Orthopedic, 8.9, Other Orthopedic        Assessment and Plan:     Impairments: decreased L>R hip ROM, decreased balance, endurance     Left hip metastatic lytic bone lesion   -s/p ORIF (3/19 with Dr. Chris Thomas)  -Wound care  -WBAT  -PT/OT     Stage IV breast cancer with diffuse bony lesions   -Recurrent (previously treated 2007)  -Oncology (Dr. Ashly Tompkisn) following, appreciate input  -Arimidex started, plan for Ibrance as outpatient  -Plan was for radiation as outpatient but Rad Onc now recommending starting on sacral region this week.      Hypercalcemia  -Resolved s/p Zometa  -Monitor     Acute liver injury/elevated LFTs  -Etiology unclear  -GI following -- recommending liver biopsy with IR  -Serologies ordered  -Monitor -- remain elevated but now trending down     DM2  -ISS     Depression  -Not on treatment, monitor mood     Bladder   -High risk retention   -Monitor PVRs, SC prn >300cc     Bowel   -Constipation   -senna+colace BID, PRN miralax, MoM, and bisacodyl supp.  -Had BM with enema 3/23     Safety   -fall precautions     Pain control  -oxycodone prn     PPx  -DVT: lovenox  -GI: famotidine    Rehab Progress: Interdisciplinary team conference was held today with entire rehab treatment team including PT, OT, SLP, Dietician, RN, and SW. Discussion focused on progress toward rehab goals and discharge planning. Making progress. Working on functional mobility, balance, compensatory strategies. Separate conference then held with patient/family (if available), questions answered and concerns addressed. Total treatment time >35 min with greater than 50% spent in care coordination. Anticipated Dispo: home with 3 grandsons: 22 yo (special needs, requires total care, uses lift equipment), 13 yo, 7 yo (autism); daughter temporarily staying  Services: Sanjiv Chowdary PT, OT, RN  DME: rolling walker  ELOS: 3/31      [address]  Faiza Mejía MD 3/24/2022, 12:58 PM

## 2022-03-24 NOTE — PLAN OF CARE
Problem: Skin Integrity:  Goal: Will show no infection signs and symptoms  Description: Will show no infection signs and symptoms  Outcome: Ongoing  Note: Skin assessment performed each shift per protocol. Surgical sites to left hip and outer knee with 3 small mepilex's intact, upper mepilex noted with small amount of old bloody drainage. Skin with scattered bruising. No s/s of infection noted. Problem: Falls - Risk of:  Goal: Will remain free from falls  Description: Will remain free from falls  Outcome: Ongoing  Note: Patient free from falls this shift. Fall precautions in place at all times. Bed in lowest position with two side rails up and wheels locked. Call light within reach. Patient able and agreeable to contact for safety appropriately. Patient up with stedy x 1 tolerates fairly well. Problem: Pain:  Description: Pain management should include both nonpharmacologic and pharmacologic interventions. Goal: Control of acute pain  Description: Control of acute pain  Outcome: Ongoing  Note: Pt able to express presence/absence of pain and rate pain appropriately using numerical scale. Pain/discomfort being managed with PRN analgesics per MD orders (see MAR). Pain assessed every shift and after interventions.

## 2022-03-24 NOTE — PROGRESS NOTES
Patient ready for soap suds enema at 2230. Patient refuses to lay on left side for enema due to recent surgical intervention to left hip and pain. Was willing to lay on right side with pillow between legs. Castile soap suds enema given per hospital policy, and fluid was instilled to rectum as patient could tolerate. Patient would take in approx. 150ml and hold for about 10mins then release fluid onto bed pad. Patient able to evacuate liquid brown stool with a moderate amount of formed stool as well. Patient up to toilet after approximately 40 min with an additional small amount of formed stool. Patient encouraged to massage abdomen to aide in movement. No stool noted in rectal vault. Will continue to monitor and assist as need.

## 2022-03-24 NOTE — PROGRESS NOTES
Order for liver biopsy received. Patient had Lovenox this morning and has not been NPO to proceed today. Scheduled for tomorrow. NPO after midnight.     Electronically signed by Calvin Becker RN on 3/24/2022 at 9:24 AM

## 2022-03-24 NOTE — PROGRESS NOTES
INPATIENT PROGRESS NOTE        IDENTIFYING DATA/REASON FOR CONSULTATION   PATIENT:  Sofia Horn  MRN:  [id number]  ADMIT DATE: 3/22/2022  TIME OF EVALUATION: 3/24/2022 8:50 AM  HOSPITAL STAY:   LOS: 2 days   CONSULTING PHYSICIAN: Rico Potts MD   REASON FOR CONSULTATION:elevated liver chemistries    Subjective:    Patient seen in follow up. She has no complaints. Denies abdominal pain. MEDICATIONS   SCHEDULED:  anastrozole, 1 mg, Daily  sodium chloride flush, 5-40 mL, 2 times per day  enoxaparin, 40 mg, Daily  sennosides-docusate sodium, 1 tablet, BID  famotidine, 20 mg, BID  insulin lispro, 0-3 Units, Nightly  insulin lispro, 0-6 Units, TID WC      FLUIDS/DRIPS:     sodium chloride      dextrose       PRNs: sodium chloride, 25 mL, PRN  ondansetron, 4 mg, Q6H PRN  oxyCODONE, 10 mg, Q4H PRN  sodium chloride flush, 5-40 mL, PRN  acetaminophen, 650 mg, Q6H PRN  magnesium hydroxide, 30 mL, Daily PRN  polyethylene glycol, 17 g, Daily PRN  calcium carbonate, 500 mg, TID PRN  dextrose, 100 mL/hr, PRN  dextrose, 12.5 g, PRN  glucagon (rDNA), 1 mg, PRN  glucose, 15 g, PRN  ondansetron, 4 mg, Q8H PRN  bisacodyl, 10 mg, Daily PRN      ALLERGIES:    Allergies   Allergen Reactions    Cephalexin          PHYSICAL EXAM   VITALS:  BP (!) 134/98   Pulse 78   Temp 97.4 °F (36.3 °C) (Oral)   Resp 18   Ht 5' 5\" (1.651 m)   Wt 236 lb 1.8 oz (107.1 kg)   SpO2 98%   BMI 39.29 kg/m²   TEMPERATURE:  Current - Temp: 97.4 °F (36.3 °C); Max - Temp  Av.7 °F (36.5 °C)  Min: 97.4 °F (36.3 °C)  Max: 98 °F (36.7 °C)    Physical Exam:  General appearance: alert, cooperative, no distress, appears stated age  Eyes: Anicteric  Head: Normocephalic, without obvious abnormality  Lungs: clear to auscultation bilaterally, Normal Effort  Heart: regular rate and rhythm, normal S1 and S2, no murmurs or rubs  Abdomen: soft, non-distended, non-tender.  Bowel sounds normal.   Extremities: atraumatic, no cyanosis or edema  Skin: warm and dry, no jaundice  Neuro: Grossly intact, A&OX3    LABS AND IMAGING   Laboratory   Recent Labs     03/22/22 0527 03/23/22 0617 03/24/22  0611   WBC 4.4 4.4 6.8   HGB 10.4* 9.6* 10.3*   HCT 29.6* 28.0* 30.3*   MCV 84.4 84.9 86.4   * 151 177     Recent Labs     03/22/22 0527 03/23/22 0617 03/24/22  0611   * 135* 136   K 3.3* 3.4* 3.7   CL 98* 100 100   CO2 21 21 22   BUN 12 10 13   CREATININE <0.5* <0.5* <0.5*     Recent Labs     03/22/22 0527 03/23/22 0617 03/24/22  0611   * 229* 174*   * 742* 595*   BILITOT 0.8 1.0 1.0   ALKPHOS 410* 366* 318*     No results for input(s): LIPASE, AMYLASE in the last 72 hours. Recent Labs     03/22/22 0527 03/23/22 0617 03/24/22  0611   PROTIME 11.9 12.6 12.2   INR 1.05 1.11 1.08         Imaging  IR BIOPSY LIVER PERCUTANEOUS    (Results Pending)       Endoscopy      ASSESSMENT AND RECOMMENDATIONS   Karmen Castorena is a 64 y.o. female with PMH of breast cancer in 2007 which is in remission who presented with left hip pain. CT of the left hip showed metastatic disease throughout the left hemipelvis and proximal left femur with large destructive lesions within the left femoral head. She had a CT of the thoracic spine which showed diffuse bony metastasis and an L1 partial compression fracture. A CT of the abdomen and pelvis showed multiple lytic and sclerotic bone lesions. Underwent ORIF left hip with bone biospy. Path showed metastatic breast carcinoma. GI consulted regarding elevated liver chemistries    1. Elevated liver chemistries: Remains elevated. Primarily hepatocellular pattern. Etiology unclear at this time. May need to consider liver bx. On CT of the abdomen, liver is normal in size.  There is no splenomegaly or hepatomegaly.  INR is preserved. There is no suggestion of portal hypertension. Jordan Levans is no evidence of portal vein, hepatic vein, hepatic artery thrombosis.  Immunoglobulins have been checked and within normal limits.  This could be any number of things at this time and differential is very broad.  She is not currently on any culprit medications. She denies ETOH use. LILIANA and factin normal.  AMA pending. Acute viral hep panel negative. Ferritin significantly elevated, likely reactive, Hemochromatosis gene pending. 2. Metastatic breast carcinoma. RECOMMENDATIONS:    Will order liver biopsy     If you have any questions or need any further information, please feel free to contact us [phone]. Thank you for allowing us to participate in the care of Brent Joseph. The note was completed using Dragon voice recognition transcription. Every effort was made to ensure accuracy; however, inadvertent transcription errors may be present despite my best efforts to edit errors. Gretchen CORREA    Attending physician addendum:    I have personally seen and examined the patient, reviewed the patient's medical record and pertinent labs and clinical imaging. I have personally staffed the case with Gretchen CORREA. I agree with her consultation note, exam findings, assessment and plans  as written above. I have made appropriate modifications and edited her assessment and plan where needed to reflect my impression and plans for this patient. Meena Amin a 63 YO female breast cancer in 2007 which is in remission who presented with left hip pain since November 2021.  She is being seen by multiple other specialties, and has been found to have a left hip lytic lesion which is possibly metastatic. We have been consulted regarding abnormal LFT. Lytic lesions found to be metastatic breast cancer. Serologic work-up has been unremarkable. Ferritin is elevated but suspect this is an acute phase reaction No medication causes. No history of heavy alcohol use. Concern for possible infiltrative process. Recommend liver biopsy and will arrange with IR. Thank you for allowing me to participate in this patient's care.   If there are any questions or concerns regarding this patient, or the plan we have set in place, please feel free to contact me at [phone].      Dain Hobbs MD

## 2022-03-24 NOTE — PROGRESS NOTES
PHYSICAL THERAPY  Progress Note   Second Session    Patient Name: [address] [place] [address] Record Number: [id number]    Treatment Diagnosis: Impaired functional mobility      Chart Reviewed: Yes   Restrictions/Precautions: Weight Bearing,Fall Risk Other position/activity restrictions: WBAT L LE   Additional Pertinent Hx: 63 y/o female admit 3/16/2022 with L Hip Pain, Met Malignant Neoplasm. CT Hip : Osseous Met Disease throughout L Hemipelvis, L Sacrum and Prox L Femur. CT T/L Spine L Compress Fx L1 (uncertain chronicity), Subacute Rib Fxs, Diffuse Bony Mets. 3/19/2022 S/P L Hip ORIF with IM Supa, L Fem Head Biopsy. PMH as noted including Breast Ca/Mastectomy. Subjective: Patient tearful and concerned about being asked to do \"too much\", requested to perform exercises only with her arms. 8/10 pain in the L LE and low back. Just took pain medication about half hour prior to scheduled therapy. Reports pain is worse after prolonged positioning for radiation mapping which begins tomorrow. Patient reports that transfer to [facility] was difficult so requested to not attempt ambulation. Objective  Reassured patient that PT would be supportive of what patient is able to accomplish given her current pain level and state of mind. Transfer sit from [facility] to stand to  walker with min A, transfer with  walker to mat to R with wheeled walker and CGA, tentative with guarded movements. Sit to supine on mat with blue wedge and 2 pillows to R while seated with mod A for assist to BLEs. Supine on blue wedge with 2 pillows performed supine mat exercises for BLEs: ankle pumps, glut sets, quad sets, hamstring sets, hip add sets, and SAQs/TKEs x 15 reps each. Jyay Alex RN, who confirmed that patient is having a liver biopsy tomorrow and radiation treatment will begin tomorrow, however she is unaware of any scheduled times for either procedure.   Patient expects that she will be given a schedule at her first radiation treatment for the remaining treatments. She will share this schedule with therapy so that therapy can be scheduled accordingly. Supine on blue wedge to sit on edge of bed with mod A x 1 - one for BLEs and other for trunk. Sit to stand from mat with min A. Transfer mat to  to  with wheeled walker and CGA once upright. Transitioned care to [place], [place]. Patient significantly limited by pain most notably in her lower back, more so than her leg/hip, and generally tentative with guarded movements. Will continue to need positive reinforcement and encouragement. Per Nohelia Bond PT in AM on 3/24/22 -   Assessment: 63 y/o female admit 3/16/2022 with L Hip Pain, Met Malignant Neoplasm. CT Hip : Osseous Met Disease throughout L Hemipelvis, L Sacrum and Prox L Femur. CT T/L Spine L Compress Fx L1 (uncertain chronicity), Subacute Rib Fxs, Diffuse Bony Mets. 3/19/2022 S/P L Hip ORIF with IM Supa, L Fem Head Biopsy. PMH as noted including Breast Ca/Mastectomy. 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 This date 3/24 Pt. had a rough night with enema, feeling weak and tired but regardless was able to advance gait distance slightly. Heather El Pt. will beneift from skilled PT to improve functional mobility and optimize independence. Safety Device - Type of devices:  [x]  All fall risk precautions in place [] Bed alarm in place  [] Call light within reach [] Chair alarm in place [] Positioning belt [x] Gait belt [] Patient at risk for falls [] Left in bed [x] Left in chair [] Telesitter in use [] Sitter present [] Nurse notified []  None  Transitioned care to [place], [place].     Therapy Time   Individual Co-treatment   Time In 5160     Time Out 1430     Minutes 45         Electronically signed by Dillon Napoles, PT #[id number] on 3/24/2022 at 2:33 PM

## 2022-03-24 NOTE — PROGRESS NOTES
Physical Therapy  Facility/Department: [address] IP REHAB  Daily Treatment Note  NAME: Denys Francois  : 1960  MRN: [id number]    Date of Service: 3/24/2022    Discharge Recommendations:  Continue to assess pending progress,Home with Home health PT,S Level 3,Home with assist PRN,Patient would benefit from continued therapy after discharge   PT Equipment Recommendations  Mobility Devices: Cindra Hong Konger: Rolling  Other: Anticipate need for FWW at d/c, Pt. has rollator but this will likely be unsafe for Pt. at d/c    Assessment   Assessment: 65 y/o female admit 3/16/2022 with L Hip Pain, Met Malignant Neoplasm. CT Hip : Osseous Met Disease throughout L Hemipelvis, L Sacrum and Prox L Femur. CT T/L Spine L Compress Fx L1 (uncertain chronicity), Subacute Rib Fxs, Diffuse Bony Mets. 3/19/2022 S/P L Hip ORIF with IM Supa, L Fem Head Biopsy. PMH as noted including Breast Ca/Mastectomy. 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 This date 3/24 Pt. had a rough night with enema, feeling weak and tired but regardless was able to advance gait distance slightly. Heddy  Pt. will beneift from skilled PT to improve functional mobility and optimize independence. Prognosis: Good;Guarded  Barriers to Learning: None. Activity Tolerance  Activity Tolerance: Patient limited by endurance  Activity Tolerance: Needs increased rest breaks between activities, had a bad evening     Patient Diagnosis(es): There were no encounter diagnoses. has a past medical history of Cancer ([facility] [address] Depression, Hyperthyroidism, and Nephrolithiasis. has a past surgical history that includes Cholecystectomy; Hysterectomy; Mastectomy; and Femur fracture surgery (Left, 3/19/2022).     Restrictions  Restrictions/Precautions  Restrictions/Precautions: Weight Bearing,Fall Risk  Lower Extremity Weight Bearing Restrictions  Left Lower Extremity Weight Bearing: Weight Bearing As Tolerated  Subjective   General  Chart Reviewed: Yes  Additional Pertinent Hx: 63 y/o female admit 3/16/2022 with L Hip Pain, Met Malignant Neoplasm. CT Hip : Osseous Met Disease throughout L Hemipelvis, L Sacrum and Prox L Femur. CT T/L Spine L Compress Fx L1 (uncertain chronicity), Subacute Rib Fxs, Diffuse Bony Mets. 3/19/2022 S/P L Hip ORIF with IM Supa, L Fem Head Biopsy. PMH as noted including Breast Ca/Mastectomy. Response To Previous Treatment: Patient reporting fatigue but able to participate. (up late with an enema)  Family / Caregiver Present: No  Subjective  Subjective: 3/10 pain in the L LE and low back. General Comment  Comments: Pt. a bit tearful. Biopsy explained to Pt. yesterday.   Grasping the depth of Metastatic CA          Orientation  Orientation  Overall Orientation Status: Within Normal Limits  Cognition      Objective   Bed mobility  Rolling to Left: Unable to assess (up in chair pre/post session)  Rolling to Right: Unable to assess (up in chair pre/post session)  Supine to Sit: Unable to assess (up in chair pre/post session)  Sit to Supine: Unable to assess (up in chair pre/post session)  Transfers  Sit to Stand: Contact guard assistance  Stand to sit: Contact guard assistance  Comment: Reminder of hand placment  Ambulation  Ambulation?: Yes  Ambulation 1  Surface: level tile  Device: Rolling Walker  Assistance: Contact guard assistance  Quality of Gait: step through pattern with good heel contact and good weight bearing at quiet pace  Gait Deviations: Slow Ngozi;Decreased step length;Decreased step height  Distance: 77' x 2  Stairs/Curb  Stairs?: No        Exercises  Hip Flexion: x 15 B LE with assist for the L LE x 2  Knee Long Arc Quad: x 15 B LE x 2  Ankle Pumps: x 20 B LE Goals  Short term goals  Time Frame for Short term goals: 10 days  Short term goal 1: Supine to/from sit wtih Mod I  Short term goal 2: Sit to/from stand with Mod I  Short term goal 3: Up and down 4 steps with B rails and Supervision  Short term goal 4: Up and down curb step with Supervision  Short term goal 5: Car transfer with Supervision  Long term goals  Time Frame for Long term goals : LTG == STG's  Patient Goals   Patient goals : Return home.     Plan    Plan  Times per week: Pt. to be seen 90 minutes/day, 5 out of the 7 days/wk  Current Treatment Recommendations: Strengthening,Functional Mobility Training,Transfer [employer] Training,Safety Education & Training,Patient/Caregiver Education & Training,ADL/Self-care Training,Positioning,Modalities,Neuromuscular Re-education,Endurance Training,Balance Training,Stair training,Home Exercise Program,Equipment Evaluation, Education, & procurement  Safety Devices  Type of devices:  (Left in gym with OT)  Restraints  Initially in place: No     Therapy Time   Individual Concurrent Group Co-treatment   Time In 0945         Time Out 1030         Minutes 45         Timed Code Treatment Minutes: [address], [place], [id number]

## 2022-03-25 ENCOUNTER — APPOINTMENT (OUTPATIENT)
Dept: CT IMAGING | Age: 62
DRG: 308 | End: 2022-03-25
Attending: PHYSICAL MEDICINE & REHABILITATION
Payer: COMMERCIAL

## 2022-03-25 ENCOUNTER — APPOINTMENT (OUTPATIENT)
Dept: ULTRASOUND IMAGING | Age: 62
DRG: 308 | End: 2022-03-25
Attending: PHYSICAL MEDICINE & REHABILITATION
Payer: COMMERCIAL

## 2022-03-25 PROBLEM — C50.919 BREAST CANCER METASTASIZED TO BONE (HCC): Status: ACTIVE | Noted: 2022-03-25

## 2022-03-25 PROBLEM — C79.51 BREAST CANCER METASTASIZED TO BONE (HCC): Status: ACTIVE | Noted: 2022-03-25

## 2022-03-25 LAB
A/G RATIO: 1.3 (ref 1.1–2.2)
ALBUMIN SERPL-MCNC: 3.3 G/DL (ref 3.4–5)
ALP BLD-CCNC: 276 U/L (ref 40–129)
ALT SERPL-CCNC: 490 U/L (ref 10–40)
ANION GAP SERPL CALCULATED.3IONS-SCNC: 13 MMOL/L (ref 3–16)
ANISOCYTOSIS: ABNORMAL
AST SERPL-CCNC: 152 U/L (ref 15–37)
ATYPICAL LYMPHOCYTE RELATIVE PERCENT: 2 % (ref 0–6)
BANDED NEUTROPHILS RELATIVE PERCENT: 7 % (ref 0–7)
BASOPHILS ABSOLUTE: 0 K/UL (ref 0–0.2)
BASOPHILS RELATIVE PERCENT: 1 %
BILIRUB SERPL-MCNC: 0.9 MG/DL (ref 0–1)
BUN BLDV-MCNC: 14 MG/DL (ref 7–20)
CALCIUM SERPL-MCNC: 8.3 MG/DL (ref 8.3–10.6)
CHLORIDE BLD-SCNC: 99 MMOL/L (ref 99–110)
CO2: 25 MMOL/L (ref 21–32)
CREAT SERPL-MCNC: 0.6 MG/DL (ref 0.6–1.2)
EOSINOPHILS ABSOLUTE: 0.2 K/UL (ref 0–0.6)
EOSINOPHILS RELATIVE PERCENT: 4 %
GFR AFRICAN AMERICAN: >60
GFR NON-AFRICAN AMERICAN: >60
GLUCOSE BLD-MCNC: 129 MG/DL (ref 70–99)
GLUCOSE BLD-MCNC: 146 MG/DL (ref 70–99)
GLUCOSE BLD-MCNC: 171 MG/DL (ref 70–99)
GLUCOSE BLD-MCNC: 96 MG/DL (ref 70–99)
HCT VFR BLD CALC: 27.2 % (ref 36–48)
HEMOGLOBIN: 9.2 G/DL (ref 12–16)
LYMPHOCYTES ABSOLUTE: 1.3 K/UL (ref 1–5.1)
LYMPHOCYTES RELATIVE PERCENT: 26 %
MAGNESIUM: 2.4 MG/DL (ref 1.8–2.4)
MCH RBC QN AUTO: 29.2 PG (ref 26–34)
MCHC RBC AUTO-ENTMCNC: 34 G/DL (ref 31–36)
MCV RBC AUTO: 85.8 FL (ref 80–100)
METAMYELOCYTES RELATIVE PERCENT: 1 %
MONOCYTES ABSOLUTE: 0.1 K/UL (ref 0–1.3)
MONOCYTES RELATIVE PERCENT: 2 %
NEUTROPHILS ABSOLUTE: 3 K/UL (ref 1.7–7.7)
NEUTROPHILS RELATIVE PERCENT: 57 %
PDW BLD-RTO: 15.4 % (ref 12.4–15.4)
PERFORMED ON: ABNORMAL
PLATELET # BLD: 190 K/UL (ref 135–450)
PLATELET SLIDE REVIEW: ADEQUATE
PMV BLD AUTO: 7.2 FL (ref 5–10.5)
POLYCHROMASIA: ABNORMAL
POTASSIUM SERPL-SCNC: 4.1 MMOL/L (ref 3.5–5.1)
RBC # BLD: 3.16 M/UL (ref 4–5.2)
SLIDE REVIEW: ABNORMAL
SODIUM BLD-SCNC: 137 MMOL/L (ref 136–145)
TOTAL PROTEIN: 5.9 G/DL (ref 6.4–8.2)
WBC # BLD: 4.6 K/UL (ref 4–11)

## 2022-03-25 PROCEDURE — 36415 COLL VENOUS BLD VENIPUNCTURE: CPT

## 2022-03-25 PROCEDURE — 97110 THERAPEUTIC EXERCISES: CPT

## 2022-03-25 PROCEDURE — 6370000000 HC RX 637 (ALT 250 FOR IP): Performed by: PHYSICAL MEDICINE & REHABILITATION

## 2022-03-25 PROCEDURE — 77012 CT SCAN FOR NEEDLE BIOPSY: CPT

## 2022-03-25 PROCEDURE — 97535 SELF CARE MNGMENT TRAINING: CPT

## 2022-03-25 PROCEDURE — 85025 COMPLETE CBC W/AUTO DIFF WBC: CPT

## 2022-03-25 PROCEDURE — 2709999900 US BIOPSY LIVER PERCUTANEOUS

## 2022-03-25 PROCEDURE — 88313 SPECIAL STAINS GROUP 2: CPT

## 2022-03-25 PROCEDURE — 9990000010 HC NO CHARGE VISIT

## 2022-03-25 PROCEDURE — 88341 IMHCHEM/IMCYTCHM EA ADD ANTB: CPT

## 2022-03-25 PROCEDURE — 88307 TISSUE EXAM BY PATHOLOGIST: CPT

## 2022-03-25 PROCEDURE — 88312 SPECIAL STAINS GROUP 1: CPT

## 2022-03-25 PROCEDURE — 0FB00ZX EXCISION OF LIVER, OPEN APPROACH, DIAGNOSTIC: ICD-10-PCS | Performed by: RADIOLOGY

## 2022-03-25 PROCEDURE — 88342 IMHCHEM/IMCYTCHM 1ST ANTB: CPT

## 2022-03-25 PROCEDURE — 1280000000 HC REHAB R&B

## 2022-03-25 PROCEDURE — 6360000002 HC RX W HCPCS: Performed by: RADIOLOGY

## 2022-03-25 PROCEDURE — 80053 COMPREHEN METABOLIC PANEL: CPT

## 2022-03-25 PROCEDURE — 2580000003 HC RX 258: Performed by: PHYSICAL MEDICINE & REHABILITATION

## 2022-03-25 PROCEDURE — 83735 ASSAY OF MAGNESIUM: CPT

## 2022-03-25 PROCEDURE — 6370000000 HC RX 637 (ALT 250 FOR IP): Performed by: RADIOLOGY

## 2022-03-25 PROCEDURE — 6370000000 HC RX 637 (ALT 250 FOR IP): Performed by: INTERNAL MEDICINE

## 2022-03-25 RX ORDER — FENTANYL CITRATE 50 UG/ML
INJECTION, SOLUTION INTRAMUSCULAR; INTRAVENOUS DAILY PRN
Status: COMPLETED | OUTPATIENT
Start: 2022-03-25 | End: 2022-03-25

## 2022-03-25 RX ORDER — MIDAZOLAM HYDROCHLORIDE 1 MG/ML
INJECTION INTRAMUSCULAR; INTRAVENOUS DAILY PRN
Status: COMPLETED | OUTPATIENT
Start: 2022-03-25 | End: 2022-03-25

## 2022-03-25 RX ORDER — ASPIRIN 81 MG/1
81 TABLET ORAL 2 TIMES DAILY
Qty: 60 TABLET | Refills: 0 | Status: CANCELLED | OUTPATIENT
Start: 2022-03-25 | End: 2022-04-24

## 2022-03-25 RX ORDER — ACETAMINOPHEN 325 MG/1
650 TABLET ORAL EVERY 4 HOURS PRN
Status: DISCONTINUED | OUTPATIENT
Start: 2022-03-25 | End: 2022-03-31 | Stop reason: HOSPADM

## 2022-03-25 RX ADMIN — SODIUM CHLORIDE, PRESERVATIVE FREE 10 ML: 5 INJECTION INTRAVENOUS at 21:00

## 2022-03-25 RX ADMIN — INSULIN LISPRO 1 UNITS: 100 INJECTION, SOLUTION INTRAVENOUS; SUBCUTANEOUS at 11:52

## 2022-03-25 RX ADMIN — MIDAZOLAM 1 MG: 1 INJECTION INTRAMUSCULAR; INTRAVENOUS at 08:23

## 2022-03-25 RX ADMIN — SENNOSIDES AND DOCUSATE SODIUM 1 TABLET: 50; 8.6 TABLET ORAL at 21:11

## 2022-03-25 RX ADMIN — FAMOTIDINE 20 MG: 20 TABLET ORAL at 21:11

## 2022-03-25 RX ADMIN — ACETAMINOPHEN 650 MG: 325 TABLET ORAL at 21:11

## 2022-03-25 RX ADMIN — SODIUM CHLORIDE, PRESERVATIVE FREE 10 ML: 5 INJECTION INTRAVENOUS at 10:07

## 2022-03-25 RX ADMIN — FAMOTIDINE 20 MG: 20 TABLET ORAL at 10:02

## 2022-03-25 RX ADMIN — INSULIN LISPRO 1 UNITS: 100 INJECTION, SOLUTION INTRAVENOUS; SUBCUTANEOUS at 17:06

## 2022-03-25 RX ADMIN — MAGNESIUM HYDROXIDE 30 ML: 400 SUSPENSION ORAL at 18:31

## 2022-03-25 RX ADMIN — SENNOSIDES AND DOCUSATE SODIUM 1 TABLET: 50; 8.6 TABLET ORAL at 10:02

## 2022-03-25 RX ADMIN — MIDAZOLAM 1 MG: 1 INJECTION INTRAMUSCULAR; INTRAVENOUS at 08:32

## 2022-03-25 RX ADMIN — FENTANYL CITRATE 50 MCG: 50 INJECTION INTRAMUSCULAR; INTRAVENOUS at 08:32

## 2022-03-25 RX ADMIN — FENTANYL CITRATE 50 MCG: 50 INJECTION INTRAMUSCULAR; INTRAVENOUS at 08:23

## 2022-03-25 RX ADMIN — ANASTROZOLE 1 MG: 1 TABLET ORAL at 10:06

## 2022-03-25 ASSESSMENT — PAIN SCALES - GENERAL
PAINLEVEL_OUTOF10: 7
PAINLEVEL_OUTOF10: 2
PAINLEVEL_OUTOF10: 1

## 2022-03-25 ASSESSMENT — PAIN DESCRIPTION - FREQUENCY
FREQUENCY: CONTINUOUS
FREQUENCY: CONTINUOUS

## 2022-03-25 ASSESSMENT — PAIN DESCRIPTION - ONSET
ONSET: ON-GOING
ONSET: ON-GOING

## 2022-03-25 ASSESSMENT — PAIN DESCRIPTION - PAIN TYPE
TYPE: CHRONIC PAIN
TYPE: ACUTE PAIN

## 2022-03-25 ASSESSMENT — PAIN DESCRIPTION - LOCATION
LOCATION: ABDOMEN;HIP
LOCATION: ABDOMEN;BACK

## 2022-03-25 ASSESSMENT — PAIN DESCRIPTION - DESCRIPTORS
DESCRIPTORS: ACHING
DESCRIPTORS: ACHING;CONSTANT

## 2022-03-25 ASSESSMENT — PAIN DESCRIPTION - ORIENTATION
ORIENTATION: RIGHT
ORIENTATION: RIGHT

## 2022-03-25 ASSESSMENT — PAIN DESCRIPTION - PROGRESSION
CLINICAL_PROGRESSION: NOT CHANGED
CLINICAL_PROGRESSION: NOT CHANGED

## 2022-03-25 ASSESSMENT — PAIN SCALES - WONG BAKER: WONGBAKER_NUMERICALRESPONSE: 2

## 2022-03-25 NOTE — PROGRESS NOTES
LE    Subjective:  Pt resting in recliner upon arrival and reports a 5/10 L hip pain. Pt reports that she is willing to participate in therapy, but is not able to get out of the recliner due to fatigue. Had liver biopsy in AM, allowed gentle activities in room per Dr Mirtha Reeder report    Objective:  Pt.completed UE strengthening exercises with 2 lb weights x10 for each exercise working on elbow flexion, elbow adduction, elbow extension, wrist flexion/extension, and was only able to do R  shoulder flexion exercises due to soreness from IV's on L. Pt was educated on LB dressing AE; sock aid, long handled shoehorn, reacher, and leg . Pt asked to explain how to use AE to repeat back to S/TRACEY and was able to demonstrate comprehension of AE education. Pt picked up paper towels on the floor with reacher on R side and threw them into the trash can on L side and was educated on how to use reacher with RW.  Pt. Was educated on shower chair and what kind would fit in her bathtub so that she can transfer safely at home. Pt reports shower tub bench does not fit in bathtub at home. Assessment:   Pt was able to verbally demonstrate knowledge of hip kit  AE, when they should use it, and how they should use it. Pt reports wanting to make sure they know how to use equipment well before returning home. Pt reports that they will be returning home on Monday. Pt left in recliner at end of session. Continue POC.     Safety Device - Type of devices:  [x]  All fall risk precautions in place [] Bed alarm in place  [x] Call light within reach [x] Chair alarm in place [] Positioning belt [] Gait belt [x] Patient at risk for falls [] Left in bed [x] Left in chair [] Telesitter in use [] Sitter present [x] Nurse notified []  None      Therapy Time   Individual Co-treatment   Time In 1515     Time Out 1600     Minutes 45       Electronically signed by January Santoro on 3/25/2022 at 4:05 PM   I attest that I was present for and made a skilled and mindful clinical judgement during the treatment of this patient 3/25/2022   BRANDIE Morales/L #[id number] provided direct supervision to student and concur with PN

## 2022-03-25 NOTE — PROGRESS NOTES
Physical Therapy  Facility/Department: [address] IP REHAB  Daily Treatment Note  NAME: Kenna Young  : 1960  MRN: [id number]    Date of Service: 3/25/2022    Discharge Recommendations:  Continue to assess pending progress,Home with Home health PT,S Level 3,Home with assist PRN,Patient would benefit from continued therapy after discharge   PT Equipment Recommendations  Walker: Rolling  Other: Anticipate need for FWW at d/c, Pt. has rollator but this will likely be unsafe for Pt. at d/c    Assessment   Assessment: 65 y/o female admit 3/16/2022 with L Hip Pain, Met Malignant Neoplasm. CT Hip : Osseous Met Disease throughout L Hemipelvis, L Sacrum and Prox L Femur. CT T/L Spine L Compress Fx L1 (uncertain chronicity), Subacute Rib Fxs, Diffuse Bony Mets. 3/19/2022 S/P L Hip ORIF with IM Supa, L Fem Head Biopsy. PMH as noted including Breast Ca/Mastectomy. 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 This date 3/25 Pt had a rough day as she had a biopsy of liver, she is exhausted and her L hip and low back are painful. Pt is emotional throughout session, explaining to PT the struggle she has been in her past few weeks and she talks about her grandchildren she cares for. Pt agrees to complete LE therex while seated in WC, she fatigues quickly with these interventions and LLE is weaker than RLE. Pt elects to stay seated in chair, she is too fatigued to stand or ambulate. Pt. will benefit from skilled PT to improve functional mobility and optimize independence. Session ends with pt seated in recliner, call light in reach, and chair alarm on. Prognosis: Good;Guarded  PT Education: Goals;PT Role;Plan of Care;Transfer Training;Gait Training;Functional Mobility Training;Weight-bearing Education  Barriers to Learning: None.   Activity (assistance needed to move LLE through full range), knee extension x10B, heel/toe rocks x10B, knee flexion isometric x10B, hip abduction isometric x10B, hip adduction x10B. Pt requires increased time for all of these interventions and takes intermittent rest, they also increase pain in L hip      Goals  Short term goals  Time Frame for Short term goals: 10 days  Short term goal 1: Supine to/from sit wtih Mod I  Short term goal 2: Sit to/from stand with Mod I  Short term goal 3: Up and down 4 steps with B rails and Supervision  Short term goal 4: Up and down curb step with Supervision  Short term goal 5: Car transfer with Supervision  Long term goals  Time Frame for Long term goals : LTG == STG's  Patient Goals   Patient goals : Return home. Plan    Plan  Times per week: Pt. to be seen 90 minutes/day, 5 out of the 7 days/wk  Current Treatment Recommendations: Strengthening,Functional Mobility Training,Transfer Training,Gait Training,Safety Education & Training,Patient/Caregiver Education & Training,ADL/Self-care Training,Positioning,Modalities,Neuromuscular Re-education,Endurance Training,Balance Training,Stair training,Home Exercise Program,Equipment Evaluation, Education, & procurement  Plan Comment: Pt wants to D/C this upcoming Monday 3/28  Safety Devices  Type of devices: Left in chair,All fall risk precautions in place,Call light within reach,Chair alarm in place,Patient at risk for falls (pt did not move from chair throughout session)  Restraints  Initially in place: No     Therapy Time   Individual Concurrent Group Co-treatment   Time In 1430         Time Out 1501         Minutes 31            Variance: 14 (pt too fatigued to continue, she has had a rough day)  Reason: Med Hold    Electronically signed by Debora ARREGUIN on 3/25/2022 at 3:16 PM  Therapist was present, directed the patient's care, made skilled judgement, and was responsible for assessment and treatment of the patient.       Suzan Nassar [facility], [id number]

## 2022-03-25 NOTE — PROGRESS NOTES
Physical Therapy  Attempt to Treat Note   Physical therapy attempted to treat patient this morning, patient is ordered by MD to bed rest for 3 hours following a CT assisted liver biopsy this morning. Plan to continue with therapy this PM as allowed and as medically safe to address deficits and progress towards PLOF. Therapy attempted to treat patient at 0900    Electronically signed by Maria Alejandra ARREGUIN on 3/25/2022 at 1:02 PM  Therapist was present, directed the patient's care, made skilled judgement, and was responsible for assessment and treatment of the patient.       Tami Abdul, [id number]    Therapy Time   Individual Co-treatment   Time In 0900     Time Out 0900     Minutes 0     Variance: 45  Reason: Med Hold

## 2022-03-25 NOTE — DISCHARGE INSTR - COC
Continuity of Care Form    Patient Name: Mir Setting   :  1960  MRN:  [id number]    Admit date:  3/22/2022  Discharge date:  3/31/2022    Code Status Order: Full Code   Advance Directives:      Admitting Physician:  Conchis Parker MD  PCP: Day Genao MD    Discharging Nurse: [facility] Unit/Room#: [facility] Unit Phone Number: [phone]    Emergency Contact:   Extended Emergency Contact Information  Primary Emergency Contact: CORETTA Sena [address] Phone: [phone]  Mobile Phone: [phone]  Relation: Child  Secondary Emergency Contact: Sandie Rodrigez"  Home Phone: [phone]  Relation: Child  Preferred language: English   needed? No    Past Surgical History:  Past Surgical History:   Procedure Laterality Date    CHOLECYSTECTOMY      FEMUR FRACTURE SURGERY Left 3/19/2022    OPEN REDUCTION INTERNAL FIXATION LEFT HIP WITH INTRAMEDULLARY JAMISON; LEFT FEMORAL HEAD BONE BIOPSY INTRAOPERATIVELY performed by [place] Mulligan, [place] at [address] LIVER BIOPSY PERCUTANEOUS  3/25/2022    US GUIDED LIVER BIOPSY PERCUTANEOUS 3/25/2022 [facility] ULTRASOUND       Immunization History: There is no immunization history on file for this patient.     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) C79.51, C80.1    Moderate malnutrition ([address] E44.0 Isolation/Infection:   Isolation            No Isolation          Patient Infection Status       None to display            Nurse Assessment:  Last Vital Signs: /87   Pulse 84   Temp 98.4 °F (36.9 °C) (Oral)   Resp 16   Ht 5' 5\" (1.651 m)   Wt 236 lb 1.8 oz (107.1 kg)   SpO2 96%   BMI 39.29 kg/m²     Last documented pain score (0-10 scale): Pain Level: 7  Last Weight:   Wt Readings from Last 1 Encounters:   03/25/22 236 lb 1.8 oz (107.1 kg)     Mental Status:  oriented and alert    IV Access:  - None    Nursing Mobility/ADLs:  Walking   Assisted  Transfer  Assisted  Bathing  Assisted  Dressing  Assisted  Toileting  Assisted  Feeding  Independent  Med Admin  Assisted  Med Delivery   whole    Wound Care Documentation and Therapy:        Elimination:  Continence: Bowel: Yes  Bladder: Yes  Urinary Catheter: None   Colostomy/Ileostomy/Ileal Conduit: No       Date of Last BM: 3/31/2022    Intake/Output Summary (Last 24 hours) at 3/25/2022 1537  Last data filed at 3/24/2022 2150  Gross per 24 hour   Intake 240 ml   Output --   Net 240 ml     I/O last 3 completed shifts: In: 1000 [P.O.:1000]  Out: -     Safety Concerns: At Risk for Falls    Impairments/Disabilities:      Weak legs with ambulation    Nutrition Therapy:  Current Nutrition Therapy:   - Oral Diet:  General    Routes of Feeding: Oral  Liquids: Thin Liquids  Daily Fluid Restriction: no  Last Modified Barium Swallow with Video (Video Swallowing Test): not done    Treatments at the Time of Hospital Discharge:   Respiratory Treatments: none  Oxygen Therapy:  is not on home oxygen therapy.   Ventilator:    - No ventilator support    Rehab Therapies: Physical Therapy, Occupational Therapy, and [address] and [address] [facility] Amarjit Perkins Nurse  Weight Bearing Status/Restrictions: No weight bearing restrictions  Other Medical Equipment (for information only, NOT a DME order):  walker and raised toilet seat  Other Treatments: left hip incision dressing change. Patient's personal belongings (please select all that are sent with patient):  Glasses, cell phone and     RN SIGNATURE:  Electronically signed by Carmen Castillo RN on 3/31/22 at 1:53 PM EDT    CASE MANAGEMENT/SOCIAL WORK SECTION    Inpatient Status Date: 3-    Readmission Risk Assessment Score:  Readmission Risk              Risk of Unplanned Readmission:  13       Discharging to Facility/ Agency   Name: Attila Burgos  Address:  [address] [place] [address]   Phone:  [phone]  Fax:  [phone]        / signature: [place], [place]     Case Management   [phone]    3/31/2022  9:04 AM      PHYSICIAN SECTION    Prognosis: Good    Condition at Discharge: Stable    Rehab Potential (if transferring to Rehab): Good    Recommended Labs or Other Treatments After Discharge:   Home care PT, OT, RN  Plan for ongoing palliative radiation to sacral region, then femur  Follow-up with PCP, Oncology, Radiation Oncology, Ortho    Physician Certification: I certify the above information and transfer of Alia Esquivel  is necessary for the continuing treatment of the diagnosis listed and that she requires [address] for less 30 days. Update Admission H&P: No change in H&P    PHYSICIAN SIGNATURE:  Electronically signed by Marya Chaparro.  Faiza Mejía MD 3/30/2022, 4:37 PM

## 2022-03-25 NOTE — PROGRESS NOTES
Up to BR with walker, gait stedy. Dressings changed to hip. Proximal incision with serosagious drainage. All 3 incisions well approximated with staples in place. Sitting up on bedside chair. [facility] called and she will start radiation tx on Monday, patient aware and in agreement.

## 2022-03-25 NOTE — PRE SEDATION
Sedation Pre-Procedure Note    Patient Name: Denys Francois   YOB: 1960  Room/Bed: Q9R-6416/6444-52  Medical Record Number: [id number]  Date: 3/25/2022   Time: 8:41 AM       Indication:  Elevated liver enzymes here for random liver biopsy. Consent: I have discussed with the patient and/or the patient representative the indication, alternatives, and the possible risks and/or complications of the planned procedure and the anesthesia methods. The patient and/or patient representative appear to understand and agree to proceed. Vital Signs:   Vitals:    03/25/22 0839   BP: 121/61   Pulse: 88   Resp: 24   Temp:    SpO2: 100%       Past Medical History:   has a past medical history of Cancer ([address] Depression, Hyperthyroidism, and Nephrolithiasis. Past Surgical History:   has a past surgical history that includes Cholecystectomy; Hysterectomy; Mastectomy; and Femur fracture surgery (Left, 3/19/2022). Medications:   Scheduled Meds:    anastrozole  1 mg Oral Daily    sodium chloride flush  5-40 mL IntraVENous 2 times per day    [Held by provider] enoxaparin  40 mg SubCUTAneous Daily    sennosides-docusate sodium  1 tablet Oral BID    famotidine  20 mg Oral BID    insulin lispro  0-3 Units SubCUTAneous Nightly    insulin lispro  0-6 Units SubCUTAneous TID WC     Continuous Infusions:    sodium chloride      dextrose       PRN Meds: midazolam, fentanNYL, sodium chloride, ondansetron, oxyCODONE, sodium chloride flush, acetaminophen, magnesium hydroxide, polyethylene glycol, calcium carbonate, dextrose, dextrose, glucagon (rDNA), glucose, ondansetron, bisacodyl  Home Meds:   Prior to Admission medications    Medication Sig Start Date End Date Taking?  Authorizing Provider   sennosides-docusate sodium (SENOKOT-S) 8.6-50 MG tablet Take 1 tablet by mouth daily 3/23/22  Yes Historical Provider, MD   calcium carbonate (TUMS) 500 MG chewable tablet Take 1 tablet by mouth 3 times daily as needed for Heartburn 3/22/22 4/21/22  Laura Blount MD   famotidine (PEPCID) 20 MG tablet Take 1 tablet by mouth 2 times daily 3/22/22   Laura Blount MD   oxyCODONE HCl (OXY-IR) 10 MG immediate release tablet Take 1 tablet by mouth every 4 hours as needed for Pain for up to 5 days. 3/20/22 3/25/22  GRACIA Da Silva CNP   enoxaparin (LOVENOX) 40 MG/0.4ML injection Inject 0.4 mLs into the skin daily 3/21/22 4/20/22  GRACIA Da Silva - CNP   Breast Prosthesis MISC RIGHT MASTECTOMY BRA  RIGHT BREAST PROTHESIS 5/18/16   Rupert Warner DO   albuterol (PROVENTIL HFA;VENTOLIN HFA) 108 (90 BASE) MCG/ACT inhaler Inhale 2 puffs into the lungs every 4 hours as needed. Patient not taking: Reported on 3/22/2022 9/24/10   Historical Provider, MD     Coumadin Use Last 7 Days:  no  Antiplatelet drug therapy use last 7 days: no  Other anticoagulant use last 7 days: no  Additional Medication Information:  n/a      Pre-Sedation Documentation and Exam:   I have reviewed the patient's history and review of systems.     Mallampati Airway Assessment:  Mallampati Class II - (soft palate, fauces & uvula are visible)    Prior History of Anesthesia Complications:   none    ASA Classification:  Class 2 - A normal healthy patient with mild systemic disease    Sedation/ Anesthesia Plan:   intravenous sedation    Medications Planned:   midazolam (Versed) intravenously and fentanyl intravenously    Patient is an appropriate candidate for plan of sedation: yes    Electronically signed by Misa Pierre MD on 3/25/2022 at 8:41 AM

## 2022-03-25 NOTE — PROGRESS NOTES
INPATIENT PROGRESS NOTE        IDENTIFYING DATA/REASON FOR CONSULTATION   PATIENT:  Mir Wilkerson  MRN:  [id number]  ADMIT DATE: 3/22/2022  TIME OF EVALUATION: 3/25/2022 9:39 AM  HOSPITAL STAY:   LOS: 3 days   CONSULTING PHYSICIAN: Conchis Parker MD   REASON FOR CONSULTATION:elevated liver chemistries    Subjective:    Patient seen in follow up. She has no complaints. Denies abdominal pain. MEDICATIONS   SCHEDULED:  anastrozole, 1 mg, Daily  sodium chloride flush, 5-40 mL, 2 times per day  [Held by provider] enoxaparin, 40 mg, Daily  sennosides-docusate sodium, 1 tablet, BID  famotidine, 20 mg, BID  insulin lispro, 0-3 Units, Nightly  insulin lispro, 0-6 Units, TID WC      FLUIDS/DRIPS:     sodium chloride      dextrose       PRNs: acetaminophen, 650 mg, Q4H PRN  sodium chloride, 25 mL, PRN  ondansetron, 4 mg, Q6H PRN  oxyCODONE, 10 mg, Q4H PRN  sodium chloride flush, 5-40 mL, PRN  magnesium hydroxide, 30 mL, Daily PRN  polyethylene glycol, 17 g, Daily PRN  calcium carbonate, 500 mg, TID PRN  dextrose, 100 mL/hr, PRN  dextrose, 12.5 g, PRN  glucagon (rDNA), 1 mg, PRN  glucose, 15 g, PRN  ondansetron, 4 mg, Q8H PRN  bisacodyl, 10 mg, Daily PRN      ALLERGIES:    Allergies   Allergen Reactions    Cephalexin          PHYSICAL EXAM   VITALS:  BP (!) 144/72   Pulse 86   Temp 98.3 °F (36.8 °C) (Oral)   Resp 20   Ht 5' 5\" (1.651 m)   Wt 236 lb 1.8 oz (107.1 kg)   SpO2 94%   BMI 39.29 kg/m²   TEMPERATURE:  Current - Temp: 98.3 °F (36.8 °C); Max - Temp  Av.9 °F (36.6 °C)  Min: 97.4 °F (36.3 °C)  Max: 98.3 °F (36.8 °C)    Physical Exam:  General appearance: alert, cooperative, no distress, appears stated age  Eyes: Anicteric  Head: Normocephalic, without obvious abnormality  Lungs: clear to auscultation bilaterally, Normal Effort  Heart: regular rate and rhythm, normal S1 and S2, no murmurs or rubs  Abdomen: soft, non-distended, non-tender.  Bowel sounds normal.   Extremities: atraumatic, no cyanosis or edema  Skin: warm and dry, no jaundice  Neuro: Grossly intact, A&OX3    LABS AND IMAGING   Laboratory   Recent Labs     03/23/22  0617 03/24/22  0611 03/25/22 0618   WBC 4.4 6.8 4.6   HGB 9.6* 10.3* 9.2*   HCT 28.0* 30.3* 27.2*   MCV 84.9 86.4 85.8    177 190     Recent Labs     03/23/22  0617 03/24/22  0611 03/25/22 0618   * 136 137   K 3.4* 3.7 4.1    100 99   CO2 21 22 25   BUN 10 13 14   CREATININE <0.5* <0.5* 0.6     Recent Labs     03/23/22 0617 03/24/22 0611 03/25/22 0618   * 174* 152*   * 595* 490*   BILITOT 1.0 1.0 0.9   ALKPHOS 366* 318* 276*     No results for input(s): LIPASE, AMYLASE in the last 72 hours. Recent Labs     03/23/22 0617 03/24/22 0611   PROTIME 12.6 12.2   INR 1.11 1.08         Imaging  US BIOPSY LIVER PERCUTANEOUS   Final Result   Successful image guided random liver biopsy. CT GUIDED NEEDLE PLACEMENT   Final Result   Successful image guided random liver biopsy. Endoscopy      ASSESSMENT AND RECOMMENDATIONS   Socorro Naqvi is a 64 y.o. female with PMH of breast cancer in 2007 which is in remission who presented with left hip pain. CT of the left hip showed metastatic disease throughout the left hemipelvis and proximal left femur with large destructive lesions within the left femoral head. She had a CT of the thoracic spine which showed diffuse bony metastasis and an L1 partial compression fracture. A CT of the abdomen and pelvis showed multiple lytic and sclerotic bone lesions. Underwent ORIF left hip with bone biospy. Path showed metastatic breast carcinoma. GI consulted regarding elevated liver chemistries    1. Elevated liver chemistries: Remains elevated. Primarily hepatocellular pattern. Etiology unclear at this time. Underwent liver biopsy today. Path report pending. On CT of the abdomen, liver is normal in size.  There is no splenomegaly or hepatomegaly.  INR is preserved. There is no suggestion of portal hypertension. Ismael Sterling is no evidence of portal vein, hepatic vein, hepatic artery thrombosis.  Immunoglobulins have been checked and within normal limits.  This could be any number of things at this time and differential is very broad.  She is not currently on any culprit medications. She denies ETOH use. LILIANA and factin normal.  AMA pending. Acute viral hep panel negative. Ferritin significantly elevated, likely reactive, Hemochromatosis gene pending. 2. Metastatic breast carcinoma. RECOMMENDATIONS:    Follow up liver bx    If you have any questions or need any further information, please feel free to contact us [phone]. Thank you for allowing us to participate in the care of Kim Sanford. The note was completed using Dragon voice recognition transcription. Every effort was made to ensure accuracy; however, inadvertent transcription errors may be present despite my best efforts to edit errors. Janeth CORREA    Attending physician addendum:    I have personally seen and examined the patient, reviewed the patient's medical record and pertinent labs and clinical imaging. I have personally staffed the case with Janeth CORREA. I agree with her consultation note, exam findings, assessment and plans  as written above. I have made appropriate modifications and edited her assessment and plan where needed to reflect my impression and plans for this patient. Ursula Jean-Baptiste a 63 YO female breast cancer in 2007 which is in remission who presented with left hip pain since November 2021.  She is being seen by multiple other specialties, and has been found to have a left hip lytic lesion which is possibly metastatic. We have been consulted regarding abnormal LFT. Lytic lesions found to be metastatic breast cancer. Serologic work-up has been unremarkable. Ferritin is elevated but suspect this is an acute phase reaction No medication causes. No history of heavy alcohol use.  Liver biopsy today.     Thank you for allowing me to participate in this patient's care. If there are any questions or concerns regarding this patient, or the plan we have set in place, please feel free to contact me at [phone].      Telma Ohara MD

## 2022-03-25 NOTE — BRIEF OP NOTE
Brief Postoperative Note    Brent Joseph  YOB: 1960  [id number]    Pre-operative Diagnosis: Elevated liver enzymes    Post-operative Diagnosis: Same    Procedure: US and CT-guided random liver biopsy    Anesthesia: Moderate Sedation    Surgeons: Snehal Clarke MD    Estimated Blood Loss: Less than 5 mL    Complications: None    Specimens: Was Obtained: 3 18g cores    Findings: Successful imaged-guided random liver biopsy.     Electronically signed by Snehal Clarke MD on 3/25/2022 at 8:42 AM

## 2022-03-25 NOTE — PROGRESS NOTES
Patient resting in bed without complaints. Small amount of bloody drainage noted to bandage on RUQ.  VSS.

## 2022-03-25 NOTE — PROGRESS NOTES
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 Patient seen this afternoon resting in bed. Reports feeling well s/p liver biopsy. She would like to go home 3/28 due to not having childcare in place for grandsons when her daughter returns to work on Monday. I informed her of my concerns for her safety, returning home quickly, managing transport to/from radiation, and trying to assist grandsons while she has ongoing functional deficits. I continue to recommend discharge 3/31. At this point, patient indicates no other options for . Labs reviewed.      ROS: No f/c, n/v, cp     Objective:  Patient Vitals for the past 24 hrs:   BP Temp Temp src Pulse Resp SpO2 Weight   22 1230 138/87 98.4 °F (36.9 °C) Oral 84 16 96 % --   22 1155 130/81 97.6 °F (36.4 °C) Oral 82 16 96 % --   22 1125 131/71 98.3 °F (36.8 °C) Oral 81 18 96 % --   22 1055 (!) 186/79 98.1 °F (36.7 °C) Oral 85 18 97 % --   22 1025 132/74 98.1 °F (36.7 °C) Oral 85 18 94 % --   22 0955 (!) 142/84 98.1 °F (36.7 °C) Oral 84 18 94 % --   22 0940 (!) 144/80 98.1 °F (36.7 °C) Oral 80 18 95 % --   22 0925 (!) 144/72 98.3 °F (36.8 °C) Oral 86 20 94 % --   22 0910 (!) 150/91 97.4 °F (36.3 °C) Oral 83 20 96 % --   22 0855 (!) 157/89 97.8 °F (36.6 °C) Oral 88 20 97 % --   22 0839 121/61 -- -- 88 24 100 % --   22 0835 133/67 -- -- 82 18 97 % --   22 0832 134/71 -- -- 83 20 98 % --   22 0826 (!) 147/67 -- -- 81 20 98 % --   22 0822 (!) 149/76 -- -- 84 22 99 % --   22 0730 (!) 141/65 97.8 °F (36.6 °C) Oral 84 16 97 % --   22 0620 127/75 98.1 °F (36.7 °C) Oral 89 16 98 % 236 lb 1.8 oz (107.1 kg)   22 191 129/76 98.2 °F (36.8 °C) Oral 86 16 99 % --   03/24/22 1715 (!) 141/85 98 °F (36.7 °C) Oral 87 16 -- --     Const: Alert. No distress, pleasant. HEENT: Normocephalic, atraumatic. Normal sclera/conjunctiva. MMM. CV: Regular rate and rhythm. Resp: No respiratory distress. Lungs CTAB. Abd: Soft, nontender, nondistended, NABS+   Ext: trace edema (L>R). MSK: Decreased left>right hip ROM, post op swelling left thigh  Neuro: Alert, oriented, appropriately interactive. Psych: Cooperative, appropriate mood and affect    Laboratory data: Available via EMR.    Last 24 hour lab  Recent Results (from the past 24 hour(s))   POCT Glucose    Collection Time: 03/24/22  4:14 PM   Result Value Ref Range    POC Glucose 134 (H) 70 - 99 mg/dl    Performed on ACCU-CHEK    POCT Glucose    Collection Time: 03/24/22  8:21 PM   Result Value Ref Range    POC Glucose 124 (H) 70 - 99 mg/dl    Performed on ACCU-CHEK    CBC with Auto Differential    Collection Time: 03/25/22  6:18 AM   Result Value Ref Range    WBC 4.6 4.0 - 11.0 K/uL    RBC 3.16 (L) 4.00 - 5.20 M/uL    Hemoglobin 9.2 (L) 12.0 - 16.0 g/dL    Hematocrit 27.2 (L) 36.0 - 48.0 %    MCV 85.8 80.0 - 100.0 fL    MCH 29.2 26.0 - 34.0 pg    MCHC 34.0 31.0 - 36.0 g/dL    RDW 15.4 12.4 - 15.4 %    Platelets 062 893 - 694 K/uL    MPV 7.2 5.0 - 10.5 fL    PLATELET SLIDE REVIEW Adequate     SLIDE REVIEW see below     Neutrophils % 57.0 %    Lymphocytes % 26.0 %    Monocytes % 2.0 %    Eosinophils % 4.0 %    Basophils % 1.0 %    Neutrophils Absolute 3.0 1.7 - 7.7 K/uL    Lymphocytes Absolute 1.3 1.0 - 5.1 K/uL    Monocytes Absolute 0.1 0.0 - 1.3 K/uL    Eosinophils Absolute 0.2 0.0 - 0.6 K/uL    Basophils Absolute 0.0 0.0 - 0.2 K/uL    Bands Relative 7 0 - 7 %    Atypical Lymphocytes Relative 2 0 - 6 %    Metamyelocytes Relative 1 (A) %    Anisocytosis 1+ (A)     Polychromasia Occasional (A)    Comprehensive Metabolic Panel    Collection Time: 03/25/22  6:18 AM   Result Value Ref Range Sodium 137 136 - 145 mmol/L    Potassium 4.1 3.5 - 5.1 mmol/L    Chloride 99 99 - 110 mmol/L    CO2 25 21 - 32 mmol/L    Anion Gap 13 3 - 16    Glucose 96 70 - 99 mg/dL    BUN 14 7 - 20 mg/dL    CREATININE 0.6 0.6 - 1.2 mg/dL    GFR Non-African American >60 >60    GFR African American >60 >60    Calcium 8.3 8.3 - 10.6 mg/dL    Total Protein 5.9 (L) 6.4 - 8.2 g/dL    Albumin 3.3 (L) 3.4 - 5.0 g/dL    Albumin/Globulin Ratio 1.3 1.1 - 2.2    Total Bilirubin 0.9 0.0 - 1.0 mg/dL    Alkaline Phosphatase 276 (H) 40 - 129 U/L     (H) 10 - 40 U/L     (H) 15 - 37 U/L   Magnesium    Collection Time: 03/25/22  6:18 AM   Result Value Ref Range    Magnesium 2.40 1.80 - 2.40 mg/dL   POCT Glucose    Collection Time: 03/25/22 11:44 AM   Result Value Ref Range    POC Glucose 146 (H) 70 - 99 mg/dl    Performed on ACCU-CHEK        Therapy progress:  PT  Position Activity Restriction  Other position/activity restrictions: WBAT L LE  Objective     Sit to Stand: Contact guard assistance  Stand to sit: Contact guard assistance  Bed to Chair: Contact guard assistance (with use of the walker)  Device: Rolling Walker  Assistance: Contact guard assistance  Distance: 77' x 2  OT  PT Equipment Recommendations  Equipment Needed: Yes  Mobility Devices: Karil Moreno: Rolling  Other: Anticipate need for FWW at d/c, Pt. has rollator but this will likely be unsafe for Pt. at d/c  Toilet - Technique: Ambulating  Equipment Used: Grab bars  Toilet Transfers Comments: RW to GB  Assessment        SLP          Body mass index is 39.29 kg/m².     Rehabilitation Diagnosis:   Orthopedic, 8.9, Other Orthopedic        Assessment and Plan:     Impairments: decreased L>R hip ROM, decreased balance, endurance     Left hip metastatic lytic bone lesion   -s/p ORIF (3/19 with Dr. Vale Tolentino)  -Wound care  -WBAT  -PT/OT     Stage IV breast cancer with diffuse bony lesions   -Recurrent (previously treated 2007)  -Confirmed on bone biopsy (3/19)  -Oncology (Dr. Krishna Gaytan) following, appreciate input  -Arimidex started, plan for Ibrance as outpatient  -Palliative radiation - now plan to start treatment to sacral region next week    Hypercalcemia  -Resolved s/p Zometa  -Monitor     Acute liver injury/elevated LFTs  -Etiology unclear  -GI following  -Serologic work-up unremarkable  -s/p Liver biopsy with IR (3/25)  -Monitor -- remain elevated but now trending down     DM2  -ISS     Depression  -Not on treatment, monitor mood     Bladder   -High risk retention   -Monitor PVRs, SC prn >300cc     Bowel   -Constipation   -senna+colace BID, PRN miralax, MoM, and bisacodyl supp.     Safety   -fall precautions     Pain control  -oxycodone prn     PPx  -DVT: lovenox  -GI: famotidine    Rehab Progress: Making progress. Working on functional mobility, balance, compensatory strategies. Anticipated Dispo: home with 3 grandsons: 22 yo (special needs, requires total care, uses lift equipment), [address] Sukhwinder yo, 5 yo (autism); daughter temporarily staying  Services: Sanjiv Chowdary PT, OT, RN  DME: rolling walker  ELOS: 3/31 recommended -- patient may decide to leave 3/28 as she currently does not have care in place for grandsons starting that day. Marisol Ochoa.  Niurka Mauro MD 3/25/2022, 1:24 PM

## 2022-03-25 NOTE — PROGRESS NOTES
Patient admitted to rehab with left hip metastatic lytic bone lesion-S/P ORIF. A/Ox4. Transfers with one assist using gait belt with front wheeled walker. Mobility restrictions: WBAT. On regular diet, tolerating well. Medications taken whole with fluids. On Lovenox for DVT prophylaxis. Skin: Surgical incision to right hip, 3 incisions with staples in place. Small amount of serosangious drainage. New dressings applied. Oxygen: room air. LDA: IV in right AC. Has been continent of  bladder. LBM 3/23. Bowel sounds present in all four quads. Chair/bed alarms in use and call light in reach. Will continue to monitor status and safety. She did participate in therapy this afternoon and tolerated well. Abdominal dressing in RUQ with small amount of drainage. Offers no complaints.

## 2022-03-25 NOTE — PROGRESS NOTES
Patient returned from CT per stretcher, 4 person assist to the bed. She c/o abdominal pain up to right arm. Dressing in place to RUQ with small amount of bloody drainage. Ice chips given. After resting and assessment, states feels much better.

## 2022-03-25 NOTE — PROGRESS NOTES
Patient admitted to rehab with L Hip metsatatic lytic bone lesion - s/p ORIF. A/Ox 4. Transfers with walker x 1 . Mobility restrictions: none. On regular diet, tolerating well. Medications taken whole with thin liquids. On lovenox for DVT prophylaxis . Skin: incision x 3 to L hip, callouses to heels. Oxygen: room air. LDA: none. Has been continent of bowel and bladder. [facility] 3/23. Chair/bed alarms in use and call light in reach. Will monitor for safety.

## 2022-03-25 NOTE — PROGRESS NOTES
Hematology Oncology Daily Progress Note    Admit Date: 3/22/2022  Hospital day several    Subjective:     Patient has complaints of stable hip and back pain--denies sob/cp. Medication side effects: none    Scheduled Meds:   anastrozole  1 mg Oral Daily    sodium chloride flush  5-40 mL IntraVENous 2 times per day    [Held by provider] enoxaparin  40 mg SubCUTAneous Daily    sennosides-docusate sodium  1 tablet Oral BID    famotidine  20 mg Oral BID    insulin lispro  0-3 Units SubCUTAneous Nightly    insulin lispro  0-6 Units SubCUTAneous TID WC     Continuous Infusions:   sodium chloride      dextrose       PRN Meds:acetaminophen, sodium chloride, ondansetron, oxyCODONE, sodium chloride flush, magnesium hydroxide, polyethylene glycol, calcium carbonate, dextrose, dextrose, glucagon (rDNA), glucose, ondansetron, bisacodyl    Review of Systems  Pertinent items are noted in HPI. REVIEW OF SYSTEMS:         · Constitutional: Denies fever, sweats, weight loss     · Eyes: No visual changes or diplopia. No scleral icterus. · ENT: No Headaches, hearing loss or vertigo. No mouth sores or sore throat. · Cardiovascular: No chest pain, dyspnea on exertion, palpitations or loss of consciousness. · Respiratory: No cough or wheezing, no sputum production. No hemoptysis. .    · Gastrointestinal: No abdominal pain, appetite loss, blood in stools. No change in bowel habits. · Genitourinary: No dysuria, trouble voiding, or hematuria. · Musculoskeletal:  Generalized weakness. No joint complaints. · Integumentary: No rash or pruritis. · Neurological: No headache, diplopia. No change in gait, balance, or coordination. No paresthesias. · Endocrine: No temperature intolerance. No excessive thirst, fluid intake, or urination. · Hematologic/Lymphatic: No abnormal bruising or ecchymoses, blood clots or swollen lymph nodes. · Allergic/Immunologic: No nasal congestion or hives.    ·     Objective:     Patient Vitals for the past 8 hrs:   BP Temp Temp src Pulse Resp SpO2 Weight   03/25/22 0839 121/61 -- -- 88 24 100 % --   03/25/22 0835 133/67 -- -- 82 18 97 % --   03/25/22 0832 134/71 -- -- 83 20 98 % --   03/25/22 0826 (!) 147/67 -- -- 81 20 98 % --   03/25/22 0822 (!) 149/76 -- -- 84 22 99 % --   03/25/22 0730 (!) 141/65 97.8 °F (36.6 °C) Oral 84 16 97 % --   03/25/22 0620 127/75 98.1 °F (36.7 °C) Oral 89 16 98 % 236 lb 1.8 oz (107.1 kg)     I/O last 3 completed shifts: In: 1000 [P.O.:1000]  Out: -   No intake/output data recorded. /61   Pulse 88   Temp 97.8 °F (36.6 °C) (Oral)   Resp 24   Ht 5' 5\" (1.651 m)   Wt 236 lb 1.8 oz (107.1 kg)   SpO2 100%   BMI 39.29 kg/m²     General Appearance:    Alert, cooperative, no distress, appears stated age   Head:    Normocephalic, without obvious abnormality, atraumatic   Eyes:    PERRL, conjunctiva/corneas clear, EOM's intact, fundi     benign, both eyes        Ears:    Normal TM's and external ear canals, both ears   Nose:   Nares normal, septum midline, mucosa normal, no drainage    or sinus tenderness   Throat:   Lips, mucosa, and tongue normal; teeth and gums normal   Neck:   Supple, symmetrical, trachea midline, no adenopathy;        thyroid:  No enlargement/tenderness/nodules; no carotid    bruit or JVD   Back:     Symmetric, no curvature, ROM normal, no CVA tenderness   Lungs:     Clear to auscultation bilaterally, respirations unlabored   Chest wall:    No tenderness or deformity   Heart:    Regular rate and rhythm, S1 and S2 normal, no murmur, rub   or gallop   Abdomen:     Soft, non-tender, bowel sounds active all four quadrants,     no masses, no organomegaly           Extremities:   Extremities normal, atraumatic, no cyanosis or edema   Pulses:   2+ and symmetric all extremities   Skin:   Skin color, texture, turgor normal, no rashes or lesions   Lymph nodes:   Cervical, supraclavicular, and axillary nodes normal   Neurologic:   CNII-XII intact.  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 *      Plan:     1. Mets breast cancer--on arimidex. She will start palliative radiation to the spine today. 2. Increased LFTs--liver biopsy today.     3. ID--AF        Electronically signed by Venus Mccullough MD on 3/25/2022 at 9:04 AM

## 2022-03-26 LAB
A/G RATIO: 1.1 (ref 1.1–2.2)
ALBUMIN SERPL-MCNC: 3.1 G/DL (ref 3.4–5)
ALP BLD-CCNC: 434 U/L (ref 40–129)
ALT SERPL-CCNC: 490 U/L (ref 10–40)
ANION GAP SERPL CALCULATED.3IONS-SCNC: 10 MMOL/L (ref 3–16)
ANISOCYTOSIS: ABNORMAL
AST SERPL-CCNC: 213 U/L (ref 15–37)
ATYPICAL LYMPHOCYTE RELATIVE PERCENT: 2 % (ref 0–6)
BANDED NEUTROPHILS RELATIVE PERCENT: 2 % (ref 0–7)
BASOPHILS ABSOLUTE: 0.1 K/UL (ref 0–0.2)
BASOPHILS RELATIVE PERCENT: 1 %
BILIRUB SERPL-MCNC: 1 MG/DL (ref 0–1)
BUN BLDV-MCNC: 16 MG/DL (ref 7–20)
CALCIUM SERPL-MCNC: 8.4 MG/DL (ref 8.3–10.6)
CHLORIDE BLD-SCNC: 101 MMOL/L (ref 99–110)
CO2: 26 MMOL/L (ref 21–32)
CREAT SERPL-MCNC: 0.5 MG/DL (ref 0.6–1.2)
EOSINOPHILS ABSOLUTE: 0.1 K/UL (ref 0–0.6)
EOSINOPHILS RELATIVE PERCENT: 1 %
GFR AFRICAN AMERICAN: >60
GFR NON-AFRICAN AMERICAN: >60
GLUCOSE BLD-MCNC: 103 MG/DL (ref 70–99)
GLUCOSE BLD-MCNC: 106 MG/DL (ref 70–99)
GLUCOSE BLD-MCNC: 112 MG/DL (ref 70–99)
GLUCOSE BLD-MCNC: 122 MG/DL (ref 70–99)
GLUCOSE BLD-MCNC: 125 MG/DL (ref 70–99)
GLUCOSE BLD-MCNC: 144 MG/DL (ref 70–99)
HCT VFR BLD CALC: 26.6 % (ref 36–48)
HEMOGLOBIN: 9 G/DL (ref 12–16)
LYMPHOCYTES ABSOLUTE: 1.2 K/UL (ref 1–5.1)
LYMPHOCYTES RELATIVE PERCENT: 20 %
MAGNESIUM: 2.4 MG/DL (ref 1.8–2.4)
MCH RBC QN AUTO: 29.1 PG (ref 26–34)
MCHC RBC AUTO-ENTMCNC: 33.9 G/DL (ref 31–36)
MCV RBC AUTO: 85.7 FL (ref 80–100)
METAMYELOCYTES RELATIVE PERCENT: 1 %
MONOCYTES ABSOLUTE: 0.3 K/UL (ref 0–1.3)
MONOCYTES RELATIVE PERCENT: 5 %
MYELOCYTE PERCENT: 1 %
NEUTROPHILS ABSOLUTE: 3.8 K/UL (ref 1.7–7.7)
NEUTROPHILS RELATIVE PERCENT: 67 %
NUCLEATED RED BLOOD CELLS: 1 /100 WBC
PDW BLD-RTO: 15.2 % (ref 12.4–15.4)
PERFORMED ON: ABNORMAL
PLATELET # BLD: 182 K/UL (ref 135–450)
PLATELET SLIDE REVIEW: ADEQUATE
PMV BLD AUTO: 7.6 FL (ref 5–10.5)
POLYCHROMASIA: ABNORMAL
POTASSIUM SERPL-SCNC: 4.1 MMOL/L (ref 3.5–5.1)
RBC # BLD: 3.11 M/UL (ref 4–5.2)
SLIDE REVIEW: ABNORMAL
SODIUM BLD-SCNC: 137 MMOL/L (ref 136–145)
TOTAL PROTEIN: 6 G/DL (ref 6.4–8.2)
WBC # BLD: 5.4 K/UL (ref 4–11)

## 2022-03-26 PROCEDURE — 80053 COMPREHEN METABOLIC PANEL: CPT

## 2022-03-26 PROCEDURE — 97110 THERAPEUTIC EXERCISES: CPT

## 2022-03-26 PROCEDURE — 97530 THERAPEUTIC ACTIVITIES: CPT

## 2022-03-26 PROCEDURE — 97535 SELF CARE MNGMENT TRAINING: CPT

## 2022-03-26 PROCEDURE — 85025 COMPLETE CBC W/AUTO DIFF WBC: CPT

## 2022-03-26 PROCEDURE — 6370000000 HC RX 637 (ALT 250 FOR IP): Performed by: INTERNAL MEDICINE

## 2022-03-26 PROCEDURE — 2580000003 HC RX 258: Performed by: PHYSICAL MEDICINE & REHABILITATION

## 2022-03-26 PROCEDURE — 1280000000 HC REHAB R&B

## 2022-03-26 PROCEDURE — 83735 ASSAY OF MAGNESIUM: CPT

## 2022-03-26 PROCEDURE — 94761 N-INVAS EAR/PLS OXIMETRY MLT: CPT

## 2022-03-26 PROCEDURE — 36415 COLL VENOUS BLD VENIPUNCTURE: CPT

## 2022-03-26 PROCEDURE — 97116 GAIT TRAINING THERAPY: CPT

## 2022-03-26 PROCEDURE — 6370000000 HC RX 637 (ALT 250 FOR IP): Performed by: PHYSICAL MEDICINE & REHABILITATION

## 2022-03-26 RX ADMIN — ANTACID TABLETS 500 MG: 500 TABLET, CHEWABLE ORAL at 14:49

## 2022-03-26 RX ADMIN — SODIUM CHLORIDE, PRESERVATIVE FREE 10 ML: 5 INJECTION INTRAVENOUS at 08:17

## 2022-03-26 RX ADMIN — OXYCODONE HYDROCHLORIDE 10 MG: 10 TABLET ORAL at 10:53

## 2022-03-26 RX ADMIN — SENNOSIDES AND DOCUSATE SODIUM 1 TABLET: 50; 8.6 TABLET ORAL at 08:10

## 2022-03-26 RX ADMIN — INSULIN LISPRO 1 UNITS: 100 INJECTION, SOLUTION INTRAVENOUS; SUBCUTANEOUS at 11:25

## 2022-03-26 RX ADMIN — BISACODYL 10 MG: 10 SUPPOSITORY RECTAL at 10:48

## 2022-03-26 RX ADMIN — FAMOTIDINE 20 MG: 20 TABLET ORAL at 08:10

## 2022-03-26 RX ADMIN — ANASTROZOLE 1 MG: 1 TABLET ORAL at 08:10

## 2022-03-26 RX ADMIN — OXYCODONE HYDROCHLORIDE 10 MG: 10 TABLET ORAL at 14:49

## 2022-03-26 RX ADMIN — MAGNESIUM HYDROXIDE 30 ML: 400 SUSPENSION ORAL at 20:43

## 2022-03-26 RX ADMIN — SODIUM CHLORIDE, PRESERVATIVE FREE 10 ML: 5 INJECTION INTRAVENOUS at 20:43

## 2022-03-26 RX ADMIN — FAMOTIDINE 20 MG: 20 TABLET ORAL at 20:43

## 2022-03-26 RX ADMIN — OXYCODONE HYDROCHLORIDE 10 MG: 10 TABLET ORAL at 06:11

## 2022-03-26 RX ADMIN — OXYCODONE HYDROCHLORIDE 10 MG: 10 TABLET ORAL at 18:44

## 2022-03-26 RX ADMIN — ONDANSETRON HYDROCHLORIDE 4 MG: 4 TABLET, FILM COATED ORAL at 19:29

## 2022-03-26 RX ADMIN — SENNOSIDES AND DOCUSATE SODIUM 1 TABLET: 50; 8.6 TABLET ORAL at 20:43

## 2022-03-26 ASSESSMENT — PAIN DESCRIPTION - ORIENTATION
ORIENTATION: LEFT
ORIENTATION: LEFT
ORIENTATION: LOWER
ORIENTATION: LEFT

## 2022-03-26 ASSESSMENT — PAIN DESCRIPTION - ONSET
ONSET: ON-GOING

## 2022-03-26 ASSESSMENT — PAIN DESCRIPTION - LOCATION
LOCATION: BACK
LOCATION: RIB CAGE;HIP
LOCATION: HIP;RIB CAGE
LOCATION: RIB CAGE

## 2022-03-26 ASSESSMENT — PAIN DESCRIPTION - DESCRIPTORS
DESCRIPTORS: ACHING
DESCRIPTORS: OTHER (COMMENT);ACHING
DESCRIPTORS: ACHING
DESCRIPTORS: ACHING

## 2022-03-26 ASSESSMENT — PAIN DESCRIPTION - FREQUENCY
FREQUENCY: CONTINUOUS

## 2022-03-26 ASSESSMENT — PAIN DESCRIPTION - PROGRESSION
CLINICAL_PROGRESSION: NOT CHANGED

## 2022-03-26 ASSESSMENT — PAIN SCALES - GENERAL
PAINLEVEL_OUTOF10: 5
PAINLEVEL_OUTOF10: 4
PAINLEVEL_OUTOF10: 7
PAINLEVEL_OUTOF10: 4
PAINLEVEL_OUTOF10: 8
PAINLEVEL_OUTOF10: 9
PAINLEVEL_OUTOF10: 6
PAINLEVEL_OUTOF10: 7

## 2022-03-26 ASSESSMENT — PAIN DESCRIPTION - PAIN TYPE
TYPE: CHRONIC PAIN

## 2022-03-26 ASSESSMENT — PAIN - FUNCTIONAL ASSESSMENT
PAIN_FUNCTIONAL_ASSESSMENT: PREVENTS OR INTERFERES SOME ACTIVE ACTIVITIES AND ADLS

## 2022-03-26 NOTE — PROGRESS NOTES
Physical Therapy  Facility/Department: [address] IP REHAB  Daily Treatment Note  NAME: Antoni Tilley  : 1960  MRN: [id number]    Date of Service: 3/26/2022    Discharge Recommendations:  Continue to assess pending progress,Home with Home health PT,S Level 3,Home with assist PRN,Patient would benefit from continued therapy after discharge   PT Equipment Recommendations  Walker: Rolling  Other: Anticipate need for FWW at d/c, Pt. has rollator but this will likely be unsafe for Pt. at d/c    Assessment   Assessment: 65 y/o female admit 3/16/2022 with L Hip Pain, Met Malignant Neoplasm. CT Hip : Osseous Met Disease throughout L Hemipelvis, L Sacrum and Prox L Femur. CT T/L Spine L Compress Fx L1 (uncertain chronicity), Subacute Rib Fxs, Diffuse Bony Mets. 3/19/2022 S/P L Hip ORIF with IM Supa, L Fem Head Biopsy. PMH as noted including Breast Ca/Mastectomy. 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 Today, 3/16, pt continues to be limited in functional mobility due to L hip pain. Pt also reporting L trunk pain that appears associated with increased hip hiking. Pt able to amb with RW and CGA short distances, and transfers with CGA. Pt ascended/descended 4, 6\" steps with BL HRs CGA-Tiffany. Pt does complain of pain with all L lower extremity movements, vbut does appear to show improving strength at her hip and knee. Pt will benefit from skilled PT to improve functional mobility and optimize independence. Prognosis: Good;Guarded  PT Education: Goals;PT Role;Plan of Care;Transfer Training;Gait Training;Functional Mobility Training;Weight-bearing Education  Patient Education: activity pacing  Barriers to Learning: None. Activity Tolerance  Activity Tolerance: Patient limited by fatigue;Patient limited by pain; Patient limited by endurance     Patient Diagnosis(es): The primary encounter diagnosis was Carcinoma of breast metastatic to bone, unspecified laterality ([facility] [address] 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) were also pertinent to this visit. has a past medical history of Cancer ([facility] [address] Depression, Hyperthyroidism, and Nephrolithiasis. has a past surgical history that includes Cholecystectomy; Hysterectomy; Mastectomy; Femur fracture surgery (Left, 3/19/2022); and US BIOPSY LIVER PERCUTANEOUS (3/25/2022). Restrictions  Restrictions/Precautions  Restrictions/Precautions: Weight Bearing,Fall Risk  Lower Extremity Weight Bearing Restrictions  Left Lower Extremity Weight Bearing: Weight Bearing As Tolerated  Position Activity Restriction  Other position/activity restrictions: WBAT L LE     Subjective   General  Chart Reviewed: Yes  Additional Pertinent Hx: 65 y/o female admit 3/16/2022 with L Hip Pain, Met Malignant Neoplasm. CT Hip : Osseous Met Disease throughout L Hemipelvis, L Sacrum and Prox L Femur. CT T/L Spine L Compress Fx L1 (uncertain chronicity), Subacute Rib Fxs, Diffuse Bony Mets. 3/19/2022 S/P L Hip ORIF with IM Supa, L Fem Head Biopsy. PMH as noted including Breast Ca/Mastectomy. Response To Previous Treatment: Patient reporting fatigue but able to participate. Family / Caregiver Present: No  Referring Practitioner: Dr. Tamara Tompkins  Subjective  Subjective: Pt supine in bed upon arriva\l. Reporting 8-8.5/10 L buttock and L trunk pain with spasm. Social/Functional History  Lives With: Family (Dtr (temp staying), 3 Grandsons (19 (total care), 12, 8 yr). )  Type of Home: House  Home Layout: Laundry in basement,Able to Live on Main level with bedroom/bathroom,Two level ([place] house, 2 stories and basement)  Home Access: Stairs to enter with rails,Ramped entrance (1 step to enter.)  Entrance Stairs - Number of Steps: 1 (uses a suitcase ramp)  Bathroom Shower/Tub: Tub/Shower unit,Curtain (Overhead lift system for jax (bed to/from w/c, w/c to/from tub/shower). Walk in shower in basement)  Bathroom Toilet: Standard  Bathroom Accessibility: Accessible  Home Equipment: 4 wheeled walker  ADL Assistance: Independent (Increasing difficulty. 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   Additional Comments: Dtr (works fulltime), staying within home to assist while pt in hospital. Pt. had been ambulating only short distances prior to admission d/t hip pain L LE    Objective   Bed mobility  Supine to Sit: Moderate assistance (Assist for L>R LE and at trunk)  Sit to Supine: Unable to assess  Scooting: Contact guard assistance  Transfers  Sit to Stand: Contact guard assistance  Stand to sit: Contact guard assistance  Comment: Reminder of hand placment  Ambulation  Ambulation?: Yes  Ambulation 1  Surface: level tile  Device: Rolling Walker  Assistance: Contact guard assistance  Quality of Gait: step through pattern with good heel contact, good weight bearing at quiet pace  Distance: short distances in room, 32' with two turns  Stairs  # Steps : 4  Stairs Height: 6\"  Rails: Bilateral  Device: No Device  Assistance: Minimal assistance;Contact guard assistance  Comment: Step to pattern leading with the R LE ascending; without any knee buckling throughout, mildly unsteady but with any LOB        Exercises  Gluteal Sets: 3 sec hold x10  Hip Flexion: x 10 B LE  Hip Abduction: x10 BL hip abd/add  Knee Long Arc Quad: x10 BLE  Ankle Pumps: x 20 B LE  Comments: Seated in WC for above ther ex  Other exercises  Other exercises 1: Nustep with BL UE and LE, seated and arms at 11, load 2, 6 minutes, 150 steps         Comment: Assisted pt to put on BL compression socks with totalA. Pt sat EOb to brush her hair with Supervision. At end of session, pt requesting tto sit on toilet for a little. pt CGA to manage pants and sit to toilet with BL railings              G-Code     Outcomes Score                                                     AM-PAC Score             Goals  Short term goals  Time Frame for Short term goals: 10 days  Short term goal 1: Supine to/from sit wtih Mod I  Short term goal 2: Sit to/from stand with Mod I  Short term goal 3: Up and down 4 steps with B rails and Supervision  Short term goal 4: Up and down curb step with Supervision  Short term goal 5: Car transfer with Supervision  Long term goals  Time Frame for Long term goals : LTG == STG's  Patient Goals   Patient goals : Return home. Plan    Plan  Times per week: Pt. to be seen 90 minutes/day, 5 out of the 7 days/wk  Current Treatment Recommendations: Strengthening,Functional Mobility Training,Transfer [employer] Training,Safety Education & Training,Patient/Caregiver Education & Training,ADL/Self-care Training,Positioning,Modalities,Neuromuscular Re-education,Endurance Training,Balance Training,Stair training,Home Exercise Program,Equipment Evaluation, Education, & procurement  Plan Comment: Pt wants to D/C this upcoming Monday 3/28  Safety Devices  Type of devices: All fall risk precautions in place,Call light within reach,Patient at risk for falls,Gait belt (left on toilet with call light nearby.  RN notified and pt voiced understanding to call RN when ready to get off of toilet.)  Restraints  Initially in place: No     Therapy Time   Individual Concurrent Group Co-treatment   Time In 0910         Time Out 1040         Minutes 90               Electronically signed by Livia Pate, PT [id number] on 3/26/2022 at 10:54 AM

## 2022-03-26 NOTE — PROGRESS NOTES
Patient admitted to rehab with left hip metastatic lytic bone lesion s/p ORIF. A/Ox 4. Transfers with RW, gaitbelt and 1 staff assist. Mobility restrictions: none. On general diet, tolerating well. Medications taken whole with thin liquids. On lovenox for DVT prophylaxis however it is on hold at present r/t previous liver biopsy on 3/25. Skin: surgical incision left hip qith staples remaining in place with edges well approximated, no new drainage noted. Oxygen: R/A. Has been continent of bowel and continent of bladder. LBM 3/23; prn sup given per pt request with no results thus far. Chair and bed alarms in use. Call light and needed items within reach. Continue with intentional rounding.

## 2022-03-26 NOTE — PROGRESS NOTES
unspecified laterality ([address] 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 has a past medical history of Cancer ([address] Depression, Hyperthyroidism, and Nephrolithiasis. has a past surgical history that includes Cholecystectomy; Hysterectomy; Mastectomy; Femur fracture surgery (Left, 3/19/2022); and US BIOPSY LIVER PERCUTANEOUS (3/25/2022). Restrictions  Restrictions/Precautions  Restrictions/Precautions: Weight Bearing,Fall Risk  Lower Extremity Weight Bearing Restrictions  Left Lower Extremity Weight Bearing: Weight Bearing As Tolerated  Position Activity Restriction  Other position/activity restrictions: WBAT L LE  Subjective   General  Chart Reviewed: Yes,Progress Notes,Imaging  Patient assessed for rehabilitation services?: Yes  Additional Pertinent Hx: Per Dr. Katrina Resendez, \"Patient is a 65 yo F with pmh R breast cancer (s/p R mastectomy 2007, chemo, 5 years anti-estrogen treatment, then lost follow-up with Dr. Pierce Valles), DM2, hyperthyroidism, and depression who initially presented  3/16/2022 with left hip pain. Had been progressing since 11/2021. Imaging revealed diffuse metastatic disease/lytic bone lesions involving the chest, thoracic spine, lumbar spine, left hemipelvis and proximal left femur. Ortho consulted and patient deemed high risk for pathologic fracture given large destructive lesions centered around the left femoral head. Therefore underwent ORIF with IM kelvin and bone biopsy (3/19 with Dr. Alonso Raymond). Now WBAT. Biopsy is pending and primary malignancy remains unclear. Per Oncology, plan will be for radiation in few weeks. Course complicated by hypercalcemia, acute liver injury, constipation. Now presents to ARU with impaired mobility and self-care below her baseline.  Currently, patient reports moderate pain in the left groin and lateral hip with radiation into the anterolateral thigh. She also has mild-moderate pain in the low back and right hip. She denies tingling/numbness or focal weakness. She is motivated to start inpatient program.\"  Family / Caregiver Present: No  Referring Practitioner: Stephon Aranda  Diagnosis: L hip fx s/p ORIF  Subjective  Subjective: Pt met BS, in recliner and agreeable to OT. Pt reports pain 4/10 at rest to 7/10 with mob, Lhip. Pt stating \"I have no feeling\" to know if needng to use BR. Pt reports was given a suppository earlier. General Comment  Comments: . Orientation  Orientation  Overall Orientation Status: Within Normal Limits  Objective    ADL  Equipment Provided: Reacher;Sock aid;Long-handled shoe horn;Long-handled sponge  Grooming: Setup (seated from w/c at sink to brush hair, rinse mouth w/mouthwash (c/o nausea and declined brushing teeth)  UE Bathing: Setup (from shower chair, using [address] [place] [address] to rinse)  LE Bathing: Minimal assistance (LHS for feet. Min A for buttocks, for thoroughness.)  UE Dressing: Setup (seated from shower chair)  LE Dressing: Moderate assistance;Verbal cueing; Increased time to complete (with AE to don briefs, pants and footies. Dep for art hose(d/t increased c/o pain, and nausea). plastic sock aid used as pt reported difficulty manipulating soft SA.)  Toileting: Contact guard assistance  Additional Comments: Pt showered from chair. LUE covered w/plastic. Towel on floor for non-skid surface. Pt c/o nausea at end of shower. Standing Balance  Activity: ADL's, CGA with pt holding grab bars and at walker. Comment: Pt amb short distances(4-5 ft or less) with RW, between transfers d/t c/o pain and nausea (at end of tx). Toilet Transfers  Toilet - Technique: Ambulating  Equipment Used: Grab bars  Toilet Transfers Comments: RW, amb short distance from w/c to commode in [employer] - Transfer From: Gilberto Burkitt - Transfer Type: To and From  Shower - Transfer To:  Shower seat with back  Shower - Technique: Ambulating (short distance)  Shower Transfers: Contact Guard;Minimal assistance  Shower Transfers Comments: RW. CGA to sit onto chair, with more Min A to stand. Cues for hand placement  Wheelchair Bed Transfers  Wheelchair/Bed - Technique: Ambulating (short distance)  Equipment Used: Wheelchair (recliner)  Level of Asssistance: Contact guard assistance  Wheelchair Transfers Comments: RW, cues for hand placement            Cognition  Overall Cognitive Status: [facility]      Plan   Plan  Times per week: 5-6  Times per day: Daily  Plan weeks: 1 wk, pt agreeing to stay until Thurs, 3-31-22. Specific instructions for Next Treatment: car t/f  Current Treatment Recommendations: Strengthening,Endurance Training,Patient/Caregiver Education & Training,Balance Training,Pain Management,Functional Mobility Training,Safety Education & Training,Self-Care / ADL,Equipment Evaluation, Education, & procurement,Home Management Training    Goals  Short term goals  Time Frame for Short term goals: 1 week  Short term goal 1: Pt will be mod I with functional transfers  Short term goal 2: Pt will be mod I with functional mobility  Short term goal 3: Pt will be mod I with toileting  Short term goal 4: Pt will be mod I with bathing and dressing  Short term goal 5: Pt will be mod I with bed mobility  Long term goals  Time Frame for Long term goals : STG = LTG  Patient Goals   Patient goals : Pt's goal is to return home with her grandsons as soon as possible.        Therapy Time   Individual Concurrent Group Co-treatment   Time In 1225         Time Out 1355         Minutes Sur[address]ARDEN/L,515

## 2022-03-26 NOTE — PROGRESS NOTES
INPATIENT CONSULTATION:    IDENTIFYING DATA/REASON FOR CONSULTATION   PATIENT:  Zuleima Vogel  MRN:  [id number]  ADMIT DATE: 3/22/2022  TIME OF EVALUATION: 3/26/2022 8:15 AM  HOSPITAL STAY:   LOS: 4 days   CONSULTING PHYSICIAN:  REASON FOR CONSULTATION:    Subjective:    -No pain s/p liver biopsy     MEDICATIONS   SCHEDULED:  anastrozole, 1 mg, Daily  sodium chloride flush, 5-40 mL, 2 times per day  [Held by provider] enoxaparin, 40 mg, Daily  sennosides-docusate sodium, 1 tablet, BID  famotidine, 20 mg, BID  insulin lispro, 0-3 Units, Nightly  insulin lispro, 0-6 Units, TID WC      FLUIDS/DRIPS:     sodium chloride      dextrose       PRNs: acetaminophen, 650 mg, Q4H PRN  sodium chloride, 25 mL, PRN  ondansetron, 4 mg, Q6H PRN  oxyCODONE, 10 mg, Q4H PRN  sodium chloride flush, 5-40 mL, PRN  magnesium hydroxide, 30 mL, Daily PRN  polyethylene glycol, 17 g, Daily PRN  calcium carbonate, 500 mg, TID PRN  dextrose, 100 mL/hr, PRN  dextrose, 12.5 g, PRN  glucagon (rDNA), 1 mg, PRN  glucose, 15 g, PRN  ondansetron, 4 mg, Q8H PRN  bisacodyl, 10 mg, Daily PRN      ALLERGIES:  She [unfilled]      PHYSICAL EXAM   [unfilled]   I/O last 3 completed shifts:   In: 360 [P.O.:360]  Out: -   Oxygen Therapy:  @[id number])@  @[id number])@  @[id number])@    Physical Exam:  Gen: Resting in bed, NAD   CV: RRR no MRG   Pul: CTAB   Abd: Good bowel sounds throughout, no scars, soft, NT/ND, no masses, no HSM   Ext: No edema   Neuro: No asterixis   Skin: No jaundice, spider angiomas, owens erythema     LABS AND IMAGING     Recent Results (from the past 24 hour(s))   POCT Glucose    Collection Time: 03/25/22 11:44 AM   Result Value Ref Range    POC Glucose 146 (H) 70 - 99 mg/dl    Performed on ACCU-CHEK    POCT Glucose    Collection Time: 03/25/22  5:03 PM   Result Value Ref Range    POC Glucose 171 (H) 70 - 99 mg/dl    Performed on ACCU-CHEK    POCT Glucose    Collection Time: 03/25/22  7:24 PM Result Value Ref Range    POC Glucose 129 (H) 70 - 99 mg/dl    Performed on ACCU-CHEK    CBC with Auto Differential    Collection Time: 03/26/22  5:20 AM   Result Value Ref Range    WBC 5.4 4.0 - 11.0 K/uL    RBC 3.11 (L) 4.00 - 5.20 M/uL    Hemoglobin 9.0 (L) 12.0 - 16.0 g/dL    Hematocrit 26.6 (L) 36.0 - 48.0 %    MCV 85.7 80.0 - 100.0 fL    MCH 29.1 26.0 - 34.0 pg    MCHC 33.9 31.0 - 36.0 g/dL    RDW 15.2 12.4 - 15.4 %    Platelets 609 078 - 289 K/uL    MPV 7.6 5.0 - 10.5 fL    PLATELET SLIDE REVIEW Adequate     SLIDE REVIEW see below     Neutrophils % 67.0 %    Lymphocytes % 20.0 %    Monocytes % 5.0 %    Eosinophils % 1.0 %    Basophils % 1.0 %    Neutrophils Absolute 3.8 1.7 - 7.7 K/uL    Lymphocytes Absolute 1.2 1.0 - 5.1 K/uL    Monocytes Absolute 0.3 0.0 - 1.3 K/uL    Eosinophils Absolute 0.1 0.0 - 0.6 K/uL    Basophils Absolute 0.1 0.0 - 0.2 K/uL    Bands Relative 2 0 - 7 %    Atypical Lymphocytes Relative 2 0 - 6 %    Metamyelocytes Relative 1 (A) %    Myelocyte Percent 1 (A) %    nRBC 1 (A) /100 WBC    Anisocytosis Occasional (A)     Polychromasia Occasional (A)    Comprehensive Metabolic Panel    Collection Time: 03/26/22  5:20 AM   Result Value Ref Range    Sodium 137 136 - 145 mmol/L    Potassium 4.1 3.5 - 5.1 mmol/L    Chloride 101 99 - 110 mmol/L    CO2 26 21 - 32 mmol/L    Anion Gap 10 3 - 16    Glucose 103 (H) 70 - 99 mg/dL    BUN 16 7 - 20 mg/dL    CREATININE 0.5 (L) 0.6 - 1.2 mg/dL    GFR Non-African American >60 >60    GFR African American >60 >60    Calcium 8.4 8.3 - 10.6 mg/dL    Total Protein 6.0 (L) 6.4 - 8.2 g/dL    Albumin 3.1 (L) 3.4 - 5.0 g/dL    Albumin/Globulin Ratio 1.1 1.1 - 2.2    Total Bilirubin 1.0 0.0 - 1.0 mg/dL    Alkaline Phosphatase 434 (H) 40 - 129 U/L     (H) 10 - 40 U/L     (H) 15 - 37 U/L   Magnesium    Collection Time: 03/26/22  5:20 AM   Result Value Ref Range    Magnesium 2.40 1.80 - 2.40 mg/dL   POCT Glucose    Collection Time: 03/26/22  7:18 AM Result Value Ref Range    POC Glucose 122 (H) 70 - 99 mg/dl    Performed on ACCU-CHEK      Other Labs    Imaging    ASSESSMENT AND RECOMMENDATIONS   [facility] is a 64 y.o. female with PMH of breast cancer in 2007 which is in remission who presented with left hip pain. CT of the left hip showed metastatic disease throughout the left hemipelvis and proximal left femur with large destructive lesions within the left femoral head.  She had a CT of the thoracic spine which showed diffuse bony metastasis and an L1 partial compression fracture.  A CT of the abdomen and pelvis showed multiple lytic and sclerotic bone lesions. Underwent ORIF left hip with bone biospy. Path showed metastatic breast carcinoma. GI consulted regarding elevated liver chemistries     1. Elevated liver chemistries: Remains elevated. Primarily hepatocellular pattern. Etiology unclear at this time. Underwent liver biopsy on 3/25. Path report pending. On CT of the abdomen, liver is normal in size.  There is no splenomegaly or hepatomegaly. Alfreda Cook is preserved. There is no suggestion of portal hypertension. Madonna Flaquita is no evidence of portal vein, hepatic vein, hepatic artery thrombosis.  Immunoglobulins have been checked and within normal limits.  This could be any number of things at this time and differential is very broad.  She is not currently on any culprit medications. She denies ETOH use. LILIANA and factin normal.  AMA pending. Acute viral hep panel negative. Ferritin significantly elevated, likely reactive, Hemochromatosis gene pending.       2. Metastatic breast carcinoma.       RECOMMENDATIONS:  -Patient is s/p liver biopsy. No pain. We will await results. If you have any questions or need any further information, please feel free to contact anyone on our consult team.  Thank you for allowing us to participate in the care of [facility].     Russ Marvin MD  [address] [facility]

## 2022-03-26 NOTE — PLAN OF CARE
Problem: Falls - Risk of:  Goal: Will remain free from falls  Description: Will remain free from falls  3/26/2022 0736 by Ricardo Zamora RN  Outcome: Ongoing  Note: Risk assessment complete with interventions in place and effective at present. Compliant with staff assist and call light use.   3/26/2022 0206 by Rebekah Ponce RN  Outcome: Ongoing  Note: Fall risk band on patient. Orange light on outside of room. Non skid footwear in place. Alarms used appropriately. Patient instructed to call and wait for staff before getting up. Rounding done to anticipate needs. Appropriate safety devices used for transfers. Problem: Pain:  Goal: Control of acute pain  Description: Control of acute pain  3/26/2022 0736 by Ricardo Zamora RN  Outcome: Ongoing  Note: Continue with prn pain med per pt request and MD orders; position for comfort.    3/26/2022 0206 by Rebekah Ponce RN  Outcome: Ongoing

## 2022-03-26 NOTE — PROGRESS NOTES
Patient admitted to rehab with L Hip metsatatic lytic bone lesion - s/p ORIF.  A/Ox 4. Transfers with walker x 1 . Mobility restrictions: none. On regular diet, tolerating well. Medications taken whole with thin liquids. On lovenox for DVT prophylaxis .  Skin: surgical incision x 3 to L hip, surgical incision to RUQ (biopsy). Oxygen: room air. LDA: PIV RAC. Has been continent of bowel and bladder. LBM 3/23. Chair/bed alarms in use and call light in reach. Will monitor for safety.

## 2022-03-27 LAB
A/G RATIO: 1.1 (ref 1.1–2.2)
ALBUMIN SERPL-MCNC: 3.5 G/DL (ref 3.4–5)
ALP BLD-CCNC: 426 U/L (ref 40–129)
ALT SERPL-CCNC: 491 U/L (ref 10–40)
ANION GAP SERPL CALCULATED.3IONS-SCNC: 14 MMOL/L (ref 3–16)
ANISOCYTOSIS: ABNORMAL
AST SERPL-CCNC: 180 U/L (ref 15–37)
BANDED NEUTROPHILS RELATIVE PERCENT: 11 % (ref 0–7)
BASOPHILS ABSOLUTE: 0.1 K/UL (ref 0–0.2)
BASOPHILS RELATIVE PERCENT: 1 %
BILIRUB SERPL-MCNC: 0.9 MG/DL (ref 0–1)
BUN BLDV-MCNC: 13 MG/DL (ref 7–20)
C282Y HEMOCHROMATOSIS MUT: NEGATIVE
CALCIUM SERPL-MCNC: 8.8 MG/DL (ref 8.3–10.6)
CHLORIDE BLD-SCNC: 98 MMOL/L (ref 99–110)
CO2: 24 MMOL/L (ref 21–32)
CREAT SERPL-MCNC: 0.6 MG/DL (ref 0.6–1.2)
EOSINOPHILS ABSOLUTE: 0 K/UL (ref 0–0.6)
EOSINOPHILS RELATIVE PERCENT: 0 %
GFR AFRICAN AMERICAN: >60
GFR NON-AFRICAN AMERICAN: >60
GLUCOSE BLD-MCNC: 115 MG/DL (ref 70–99)
GLUCOSE BLD-MCNC: 117 MG/DL (ref 70–99)
GLUCOSE BLD-MCNC: 118 MG/DL (ref 70–99)
GLUCOSE BLD-MCNC: 127 MG/DL (ref 70–99)
GLUCOSE BLD-MCNC: 135 MG/DL (ref 70–99)
H63D HEMOCHROMATOSIS MUT: NORMAL
HCT VFR BLD CALC: 30 % (ref 36–48)
HEMOCHROMATOSIS GENE ANALYSIS: NORMAL
HEMOGLOBIN: 10.1 G/DL (ref 12–16)
HFE PCR SPECIMEN: NORMAL
LYMPHOCYTES ABSOLUTE: 1.9 K/UL (ref 1–5.1)
LYMPHOCYTES RELATIVE PERCENT: 32 %
MAGNESIUM: 2.4 MG/DL (ref 1.8–2.4)
MCH RBC QN AUTO: 29.1 PG (ref 26–34)
MCHC RBC AUTO-ENTMCNC: 33.6 G/DL (ref 31–36)
MCV RBC AUTO: 86.9 FL (ref 80–100)
METAMYELOCYTES RELATIVE PERCENT: 1 %
MONOCYTES ABSOLUTE: 0.1 K/UL (ref 0–1.3)
MONOCYTES RELATIVE PERCENT: 2 %
MYELOCYTE PERCENT: 1 %
NEUTROPHILS ABSOLUTE: 3.8 K/UL (ref 1.7–7.7)
NEUTROPHILS RELATIVE PERCENT: 52 %
NUCLEATED RED BLOOD CELLS: 3 /100 WBC
OVALOCYTES: ABNORMAL
PDW BLD-RTO: 15.3 % (ref 12.4–15.4)
PERFORMED ON: ABNORMAL
PLATELET # BLD: 204 K/UL (ref 135–450)
PLATELET SLIDE REVIEW: ADEQUATE
PMV BLD AUTO: 7.6 FL (ref 5–10.5)
POLYCHROMASIA: ABNORMAL
POTASSIUM SERPL-SCNC: 4.7 MMOL/L (ref 3.5–5.1)
RBC # BLD: 3.45 M/UL (ref 4–5.2)
S65C HEMOCHROMATOSIS MUT: NEGATIVE
SLIDE REVIEW: ABNORMAL
SODIUM BLD-SCNC: 136 MMOL/L (ref 136–145)
STOMATOCYTES: ABNORMAL
TOTAL PROTEIN: 6.6 G/DL (ref 6.4–8.2)
WBC # BLD: 5.8 K/UL (ref 4–11)

## 2022-03-27 PROCEDURE — 36415 COLL VENOUS BLD VENIPUNCTURE: CPT

## 2022-03-27 PROCEDURE — 85025 COMPLETE CBC W/AUTO DIFF WBC: CPT

## 2022-03-27 PROCEDURE — 6370000000 HC RX 637 (ALT 250 FOR IP): Performed by: PHYSICAL MEDICINE & REHABILITATION

## 2022-03-27 PROCEDURE — 6370000000 HC RX 637 (ALT 250 FOR IP): Performed by: NURSE PRACTITIONER

## 2022-03-27 PROCEDURE — 80053 COMPREHEN METABOLIC PANEL: CPT

## 2022-03-27 PROCEDURE — 1280000000 HC REHAB R&B

## 2022-03-27 PROCEDURE — 51798 US URINE CAPACITY MEASURE: CPT

## 2022-03-27 PROCEDURE — 94761 N-INVAS EAR/PLS OXIMETRY MLT: CPT

## 2022-03-27 PROCEDURE — 6370000000 HC RX 637 (ALT 250 FOR IP): Performed by: INTERNAL MEDICINE

## 2022-03-27 PROCEDURE — 6370000000 HC RX 637 (ALT 250 FOR IP): Performed by: RADIOLOGY

## 2022-03-27 PROCEDURE — 83735 ASSAY OF MAGNESIUM: CPT

## 2022-03-27 PROCEDURE — 2580000003 HC RX 258: Performed by: PHYSICAL MEDICINE & REHABILITATION

## 2022-03-27 RX ADMIN — SENNOSIDES AND DOCUSATE SODIUM 1 TABLET: 50; 8.6 TABLET ORAL at 19:34

## 2022-03-27 RX ADMIN — SODIUM CHLORIDE, PRESERVATIVE FREE 10 ML: 5 INJECTION INTRAVENOUS at 08:05

## 2022-03-27 RX ADMIN — BISACODYL 10 MG: 5 TABLET, COATED ORAL at 12:02

## 2022-03-27 RX ADMIN — ACETAMINOPHEN 650 MG: 325 TABLET ORAL at 08:04

## 2022-03-27 RX ADMIN — FAMOTIDINE 20 MG: 20 TABLET ORAL at 08:04

## 2022-03-27 RX ADMIN — SODIUM CHLORIDE, PRESERVATIVE FREE 10 ML: 5 INJECTION INTRAVENOUS at 19:34

## 2022-03-27 RX ADMIN — ANASTROZOLE 1 MG: 1 TABLET ORAL at 08:04

## 2022-03-27 RX ADMIN — BISACODYL 10 MG: 10 SUPPOSITORY RECTAL at 18:12

## 2022-03-27 RX ADMIN — ACETAMINOPHEN 650 MG: 325 TABLET ORAL at 19:34

## 2022-03-27 RX ADMIN — FAMOTIDINE 20 MG: 20 TABLET ORAL at 19:33

## 2022-03-27 RX ADMIN — SENNOSIDES AND DOCUSATE SODIUM 1 TABLET: 50; 8.6 TABLET ORAL at 08:04

## 2022-03-27 ASSESSMENT — PAIN DESCRIPTION - PAIN TYPE
TYPE: CHRONIC PAIN
TYPE_2: ACUTE PAIN;SURGICAL
TYPE: ACUTE PAIN

## 2022-03-27 ASSESSMENT — PAIN DESCRIPTION - DESCRIPTORS
DESCRIPTORS: DISCOMFORT;SORE
DESCRIPTORS: ACHING;BURNING
DESCRIPTORS_2: DISCOMFORT

## 2022-03-27 ASSESSMENT — PAIN DESCRIPTION - ORIENTATION
ORIENTATION: LEFT
ORIENTATION_2: LEFT

## 2022-03-27 ASSESSMENT — PAIN DESCRIPTION - DURATION: DURATION_2: INTERMITTENT

## 2022-03-27 ASSESSMENT — PAIN DESCRIPTION - FREQUENCY
FREQUENCY: CONTINUOUS
FREQUENCY: CONTINUOUS

## 2022-03-27 ASSESSMENT — PAIN DESCRIPTION - LOCATION
LOCATION: BACK;RIB CAGE
LOCATION_2: HIP
LOCATION: CHEST

## 2022-03-27 ASSESSMENT — PAIN DESCRIPTION - DIRECTION
RADIATING_TOWARDS_2: KNEE
RADIATING_TOWARDS: RIGHT ARM

## 2022-03-27 ASSESSMENT — PAIN DESCRIPTION - ONSET
ONSET_2: ON-GOING
ONSET: ON-GOING
ONSET: ON-GOING

## 2022-03-27 ASSESSMENT — PAIN DESCRIPTION - PROGRESSION
CLINICAL_PROGRESSION_2: GRADUALLY IMPROVING
CLINICAL_PROGRESSION: NOT CHANGED
CLINICAL_PROGRESSION: NOT CHANGED

## 2022-03-27 ASSESSMENT — PAIN DESCRIPTION - INTENSITY: RATING_2: 2

## 2022-03-27 ASSESSMENT — PAIN SCALES - GENERAL
PAINLEVEL_OUTOF10: 7
PAINLEVEL_OUTOF10: 5
PAINLEVEL_OUTOF10: 5
PAINLEVEL_OUTOF10: 3

## 2022-03-27 NOTE — PLAN OF CARE
Problem: Falls - Risk of:  Goal: Will remain free from falls  Description: Will remain free from falls  3/27/2022 0843 by Eli Rice RN  Outcome: Ongoing  Note: Risk assessment complete with interventions in place; compliant with call light use and staff assist.   3/27/2022 0724 by Rafiq Saravia RN  Outcome: Ongoing     Problem: IP BOWEL ELIMINATION  Goal: LTG - patient will have regular and routine bowel evacuation  3/27/2022 0843 by Eli Rice RN  Outcome: Ongoing  Note: Continues with c/o constipation despite multiple interventions and some bowel movement. Will continue to assess and intervene as ordered. Comfortable at present.    3/27/2022 0724 by Rafiq Saravia RN  Outcome: Ongoing

## 2022-03-27 NOTE — PROGRESS NOTES
Patient is currently sleeping in recliner chair with legs elevated. Patient admitted to ARU on 3/22 S/P ORIF to left hip. Patient is AAO x 4, using call light for needs and assistance when wanting to get up. Patient with complaints of left hip pain, chest pain, and generally feeling bad. Refused pain medication stating it does not help. Asked patient if she would like me to call doctor to get something a little stronger and patient stated no. Patient happy with ice to chest. Described pain as muscular or possibly bone pain. Lungs clear, with fine crackles to bilateral bases, denies SOB and cough. Patient encouraged to use IS and to cough/and deep breath. Bowel sounds are hypoactive to all four quadrants. Patient with complaints of intermittent abdominal cramping and feeling constipated. Last satisfying BM per patient was 3/15, she did have a small/med BM on 3/22 after receiving soap suds enema. Patient had another small BM this am after receiving MOM last evening. Patient feels she is packed with stool. Will request x-ray from MD on days today. +1 edema noted to left hip and knee area. Ice packs provided for pain to hip and chest.  Fall precautions are in place. Call light and personal belongings are within reach. Will continue to monitor and assist as needed.

## 2022-03-27 NOTE — PROGRESS NOTES
Patient admitted to rehab with left hip metastatic lytic bone lesion s/p ORIF. A/Ox4. Transfers with stedy x 1 staff assist with [address]. Mobility restrictions: WBAT. On general diet, tolerating well. Medications taken whole with thin liquids. Lovenox for DVT prophylaxis remains on hold s/p liver biopsy. Skin: biopsy site RUQ with dressing in place; incision left hip with dressing in place; bruising left thigh and scattered. Oxygen: R/A. Has been continent of bowel and continent of bladder. LBM 3/23 with dulcolax tab given per orders. Chair/bed alarms in use. Call light and needed items within reach. Continue with intentional rounding.

## 2022-03-28 LAB
A/G RATIO: 0.7 (ref 1.1–2.2)
ALBUMIN SERPL-MCNC: 3 G/DL (ref 3.4–5)
ALP BLD-CCNC: 368 U/L (ref 40–129)
ALT SERPL-CCNC: 445 U/L (ref 10–40)
ANION GAP SERPL CALCULATED.3IONS-SCNC: 14 MMOL/L (ref 3–16)
ANISOCYTOSIS: ABNORMAL
AST SERPL-CCNC: 156 U/L (ref 15–37)
BANDED NEUTROPHILS RELATIVE PERCENT: 14 % (ref 0–7)
BASOPHILS ABSOLUTE: 0 K/UL (ref 0–0.2)
BASOPHILS RELATIVE PERCENT: 0 %
BILIRUB SERPL-MCNC: 0.8 MG/DL (ref 0–1)
BUN BLDV-MCNC: 15 MG/DL (ref 7–20)
CALCIUM SERPL-MCNC: 8.7 MG/DL (ref 8.3–10.6)
CHLORIDE BLD-SCNC: 101 MMOL/L (ref 99–110)
CO2: 19 MMOL/L (ref 21–32)
CREAT SERPL-MCNC: 0.5 MG/DL (ref 0.6–1.2)
EOSINOPHILS ABSOLUTE: 0 K/UL (ref 0–0.6)
EOSINOPHILS RELATIVE PERCENT: 0 %
GFR AFRICAN AMERICAN: >60
GFR NON-AFRICAN AMERICAN: >60
GLUCOSE BLD-MCNC: 103 MG/DL (ref 70–99)
GLUCOSE BLD-MCNC: 110 MG/DL (ref 70–99)
GLUCOSE BLD-MCNC: 123 MG/DL (ref 70–99)
GLUCOSE BLD-MCNC: 132 MG/DL (ref 70–99)
GLUCOSE BLD-MCNC: 139 MG/DL (ref 70–99)
HCT VFR BLD CALC: 29.8 % (ref 36–48)
HEMOGLOBIN: 9.9 G/DL (ref 12–16)
LYMPHOCYTES ABSOLUTE: 0.8 K/UL (ref 1–5.1)
LYMPHOCYTES RELATIVE PERCENT: 14 %
MAGNESIUM: 2.6 MG/DL (ref 1.8–2.4)
MCH RBC QN AUTO: 29.1 PG (ref 26–34)
MCHC RBC AUTO-ENTMCNC: 33.3 G/DL (ref 31–36)
MCV RBC AUTO: 87.2 FL (ref 80–100)
METAMYELOCYTES RELATIVE PERCENT: 1 %
MONOCYTES ABSOLUTE: 0.3 K/UL (ref 0–1.3)
MONOCYTES RELATIVE PERCENT: 6 %
NEUTROPHILS ABSOLUTE: 4.5 K/UL (ref 1.7–7.7)
NEUTROPHILS RELATIVE PERCENT: 65 %
NUCLEATED RED BLOOD CELLS: 2 /100 WBC
PDW BLD-RTO: 15.9 % (ref 12.4–15.4)
PERFORMED ON: ABNORMAL
PLATELET # BLD: 195 K/UL (ref 135–450)
PLATELET SLIDE REVIEW: ADEQUATE
PMV BLD AUTO: 7.3 FL (ref 5–10.5)
POTASSIUM SERPL-SCNC: 4 MMOL/L (ref 3.5–5.1)
RBC # BLD: 3.41 M/UL (ref 4–5.2)
REASON FOR REJECTION: NORMAL
REJECTED TEST: NORMAL
SLIDE REVIEW: ABNORMAL
SODIUM BLD-SCNC: 134 MMOL/L (ref 136–145)
TOTAL PROTEIN: 7.1 G/DL (ref 6.4–8.2)
WBC # BLD: 5.6 K/UL (ref 4–11)

## 2022-03-28 PROCEDURE — 2580000003 HC RX 258: Performed by: INTERNAL MEDICINE

## 2022-03-28 PROCEDURE — 94760 N-INVAS EAR/PLS OXIMETRY 1: CPT

## 2022-03-28 PROCEDURE — 83735 ASSAY OF MAGNESIUM: CPT

## 2022-03-28 PROCEDURE — 97530 THERAPEUTIC ACTIVITIES: CPT

## 2022-03-28 PROCEDURE — 6370000000 HC RX 637 (ALT 250 FOR IP): Performed by: PHYSICAL MEDICINE & REHABILITATION

## 2022-03-28 PROCEDURE — 36415 COLL VENOUS BLD VENIPUNCTURE: CPT

## 2022-03-28 PROCEDURE — 6370000000 HC RX 637 (ALT 250 FOR IP): Performed by: INTERNAL MEDICINE

## 2022-03-28 PROCEDURE — 80053 COMPREHEN METABOLIC PANEL: CPT

## 2022-03-28 PROCEDURE — 1280000000 HC REHAB R&B

## 2022-03-28 PROCEDURE — 97110 THERAPEUTIC EXERCISES: CPT

## 2022-03-28 PROCEDURE — 85025 COMPLETE CBC W/AUTO DIFF WBC: CPT

## 2022-03-28 PROCEDURE — 2580000003 HC RX 258: Performed by: PHYSICAL MEDICINE & REHABILITATION

## 2022-03-28 PROCEDURE — 97535 SELF CARE MNGMENT TRAINING: CPT

## 2022-03-28 RX ORDER — LIDOCAINE 4 G/G
1 PATCH TOPICAL DAILY
Status: DISCONTINUED | OUTPATIENT
Start: 2022-03-28 | End: 2022-03-31 | Stop reason: HOSPADM

## 2022-03-28 RX ORDER — SODIUM CHLORIDE 9 MG/ML
INJECTION, SOLUTION INTRAVENOUS CONTINUOUS
Status: DISCONTINUED | OUTPATIENT
Start: 2022-03-28 | End: 2022-03-28

## 2022-03-28 RX ADMIN — SODIUM CHLORIDE: 9 INJECTION, SOLUTION INTRAVENOUS at 08:40

## 2022-03-28 RX ADMIN — SENNOSIDES AND DOCUSATE SODIUM 1 TABLET: 50; 8.6 TABLET ORAL at 21:30

## 2022-03-28 RX ADMIN — FAMOTIDINE 20 MG: 20 TABLET ORAL at 08:27

## 2022-03-28 RX ADMIN — ANTACID TABLETS 500 MG: 500 TABLET, CHEWABLE ORAL at 15:38

## 2022-03-28 RX ADMIN — ANASTROZOLE 1 MG: 1 TABLET ORAL at 08:34

## 2022-03-28 RX ADMIN — OXYCODONE HYDROCHLORIDE 10 MG: 10 TABLET ORAL at 21:31

## 2022-03-28 RX ADMIN — SODIUM CHLORIDE, PRESERVATIVE FREE 10 ML: 5 INJECTION INTRAVENOUS at 08:43

## 2022-03-28 RX ADMIN — OXYCODONE HYDROCHLORIDE 10 MG: 10 TABLET ORAL at 08:27

## 2022-03-28 RX ADMIN — OXYCODONE HYDROCHLORIDE 10 MG: 10 TABLET ORAL at 16:29

## 2022-03-28 RX ADMIN — SENNOSIDES AND DOCUSATE SODIUM 1 TABLET: 50; 8.6 TABLET ORAL at 08:27

## 2022-03-28 RX ADMIN — FAMOTIDINE 20 MG: 20 TABLET ORAL at 21:31

## 2022-03-28 ASSESSMENT — PAIN DESCRIPTION - ONSET
ONSET: ON-GOING

## 2022-03-28 ASSESSMENT — PAIN DESCRIPTION - ORIENTATION
ORIENTATION: LEFT

## 2022-03-28 ASSESSMENT — PAIN DESCRIPTION - LOCATION
LOCATION: RIB CAGE
LOCATION: HIP
LOCATION: HIP

## 2022-03-28 ASSESSMENT — PAIN DESCRIPTION - FREQUENCY
FREQUENCY: CONTINUOUS

## 2022-03-28 ASSESSMENT — PAIN DESCRIPTION - DESCRIPTORS
DESCRIPTORS: ACHING

## 2022-03-28 ASSESSMENT — PAIN DESCRIPTION - PAIN TYPE
TYPE: ACUTE PAIN
TYPE: SURGICAL PAIN
TYPE: SURGICAL PAIN

## 2022-03-28 ASSESSMENT — PAIN DESCRIPTION - PROGRESSION
CLINICAL_PROGRESSION: NOT CHANGED

## 2022-03-28 ASSESSMENT — PAIN SCALES - GENERAL
PAINLEVEL_OUTOF10: 7
PAINLEVEL_OUTOF10: 4
PAINLEVEL_OUTOF10: 6
PAINLEVEL_OUTOF10: 6
PAINLEVEL_OUTOF10: 4

## 2022-03-28 ASSESSMENT — PAIN - FUNCTIONAL ASSESSMENT: PAIN_FUNCTIONAL_ASSESSMENT: PREVENTS OR INTERFERES SOME ACTIVE ACTIVITIES AND ADLS

## 2022-03-28 NOTE — PLAN OF CARE
Problem: Falls - Risk of:  Goal: Will remain free from falls  Description: Will remain free from falls  3/28/2022 1347 by Emerald Garcia RN  Outcome: Ongoing  Note: Risk assessment complete with interventions in place; compliant with staff assist and call light use.   3/28/2022 0220 by Micheal Mcleod RN  Outcome: Ongoing     Problem: Pain:  Goal: Control of acute pain  Description: Control of acute pain  3/28/2022 1347 by Emerald Garcia RN  Outcome: Ongoing  Note: Continues with c/o pain; position for comfort and medicate with prn pain med per pt request and MD orders.    3/28/2022 0220 by Micheal Mcleod RN  Outcome: Ongoing

## 2022-03-28 NOTE — PROGRESS NOTES
Occupational Therapy  Facility/Department: [address] IP REHAB  Daily Treatment Note  NAME: Juliane Tsang  : 1960  MRN: [id number]    Date of Service: 3/28/2022    Discharge Recommendations:  Home with assist PRN,Continue to assess pending progress,Patient would benefit from continued therapy after discharge  OT Equipment Recommendations  Equipment Needed: Yes  Mobility Devices: Reina Mix: Rolling  ADL Assistive Devices: Karely Budd Basket;Long-handled Sponge;Long-handled Shoe Horn;Sock-Aid Hard;Reacher; Shower Chair with back  Other: Pt reports she purchased hip kit from [facility] and should arrive 3-27-22 to her home. She will need RW, RTS + arms, will wait to get a wide shower chair (so home OT can measure for fit; she already [employer]" on one which doesn't fit in bathrm)    Assessment   Performance deficits / Impairments: Decreased functional mobility ; Decreased safe awareness;Decreased balance;Decreased ADL status; Decreased endurance;Decreased high-level IADLs;Decreased strength  Assessment: pt is limited this AM by R shamika thru chest pain (did not have this pain last wk). Pt had 1st round of radiation tx to her lower back. She tolerated gentle ROM to R UE, while L UE used 2 lb wts for 1 set of 10. OT encouraged pt to report this finding to Dr Micki Cuellar (RN aware). Pt needed CGA for sit<>stand transitions d/t non use of R UE; she rocked forward to get up; used RW into bathrm & completed toilet t/f using TSF w/ CGA--she did not think it would fit in her bathrm at home (has standard toilet). May need RTS + arms. Cont to explore need for DME she is planning to return home on Thurs w/ [facility] services.   Treatment Diagnosis: impaired ADL/fxl mobility  Prognosis: Good  Decision Making: Medium Complexity  History: see above  Exam: mobility, self care  Assistance / Modification: assist of 1, RW  OT Education: Equipment;ADL Adaptive Strategies;Transfer Training  Patient Education: discussed use of RTS + arms, wide shower chair --she already purchased 3 piece hip kit; she will  need a RW (discussed w/c)  REQUIRES OT FOLLOW UP: Yes  Activity Tolerance  Activity Tolerance: Patient limited by fatigue;Patient Tolerated treatment well  Activity Tolerance: pt reports fatigue but able to participate in fxl act; has R shld thru upper chest pain--avoided weights but completed gentle ROM  Safety Devices  Safety Devices in place: Yes  Type of devices: Gait belt     PM session: met in room, pt is reporting nausea; OT contacted RN for medication. Pt required CGA for sit<>stand from recliner using RW, she ambulated a few feet but is limping & having increased pain thru R UE/chest when bearing down on handle of RW. She was able to use reacher to grab tissue off floor & place into trash can. Pt used footstool to scoot herself back into recliner (she could lift herself w/ UEs). Explored DME needs: her daughter measured inside tub @ 14\"--this will not accommodate a shower chair. Pt shown other options including flipdown seat or board seat--she does not think either option will work --recommend home OT to address. OT showed pt different styles of RTS w/ and w/o arms--pt will need one w/ arms. She is inquiring about 1/2 bed rail--PT aware & will have pt practice during tomorrow's session. Pt will need RW, discussed \"tight fit\" to use into bathrm, recommended she turn it sideways & side stepping in/out. Tx time: 45 min, cont w/ POC Luz Maria Villanueva, OTR/L #[id number]    Patient Diagnosis(es): The primary encounter diagnosis was Carcinoma of breast metastatic to bone, unspecified laterality ([facility]). 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 has a past medical history of Cancer ([facility]), Depression, Hyperthyroidism, and Nephrolithiasis. has a past surgical history that includes Cholecystectomy;  Hysterectomy; Mastectomy; Femur fracture surgery (Left, 3/19/2022); and US BIOPSY LIVER PERCUTANEOUS (3/25/2022). Restrictions  Restrictions/Precautions  Restrictions/Precautions: Weight Bearing,Fall Risk  Lower Extremity Weight Bearing Restrictions  Left Lower Extremity Weight Bearing: Weight Bearing As Tolerated  Position Activity Restriction  Other position/activity restrictions: WBAT L LE  Subjective   General  Chart Reviewed: Yes,Progress Notes,Imaging  Patient assessed for rehabilitation services?: Yes  Additional Pertinent Hx: Per Dr. Teresa Ragland, \"Patient is a 65 yo F with pmh R breast cancer (s/p R mastectomy 2007, chemo, 5 years anti-estrogen treatment, then lost follow-up with Dr. Tatyana Arredondo), DM2, hyperthyroidism, and depression who initially presented  3/16/2022 with left hip pain. Had been progressing since 11/2021. Imaging revealed diffuse metastatic disease/lytic bone lesions involving the chest, thoracic spine, lumbar spine, left hemipelvis and proximal left femur. Ortho consulted and patient deemed high risk for pathologic fracture given large destructive lesions centered around the left femoral head. Therefore underwent ORIF with IM kelvin and bone biopsy (3/19 with Dr. Kathie Diaz). Now WBAT. Biopsy is pending and primary malignancy remains unclear. Per Oncology, plan will be for radiation in few weeks. Course complicated by hypercalcemia, acute liver injury, constipation. Now presents to ARU with impaired mobility and self-care below her baseline. Currently, patient reports moderate pain in the left groin and lateral hip with radiation into the anterolateral thigh. She also has mild-moderate pain in the low back and right hip. She denies tingling/numbness or focal weakness.  She is motivated to start inpatient program.\"  Family / Caregiver Present: No  Referring Practitioner: Luis Felipe Ken  Diagnosis: L hip fx s/p ORIF  Subjective  Subjective: met in therapy dept, she is not reporting any pain; already had medications; also had 1st round of radiation to her back this AM  General Comment  Comments: agreeable for UE strengthening, household t/f      Orientation  Orientation  Overall Orientation Status: Within Normal Limits  Objective    ADL  Equipment Provided: Reacher;Sock aid;Long-handled shoe horn;Long-handled sponge  Grooming: Setup (stood at sink to wash hands, leans forearms on counter)  Toileting: Contact guard assistance (close SB/CGA to manage clothing, having increased R shld thru chest pain)  Additional Comments: pt does not think TSF will fit; has standard toilet; may want RTS + arms        Balance  Sitting Balance: Stand by assistance  Standing Balance: Contact guard assistance  Standing Balance  Time: ~3 minutes  Activity: toileting, CGA with pt holding onto TSFand at walker.   Comment: static stance close SB but during clothing management CGA  Functional Mobility  Functional - Mobility Device: Rolling Walker  Activity: To/from bathroom  Assist Level: Contact guard assistance  Functional Mobility Comments: ambulated from w/c in/out of bathroom & completed toilet t/f, ambulated over to sink to wash hands, slow to move, limited pressure thru R UE onto [employer] Transfers  Toilet - Technique: Ambulating  Equipment Used: Grab bars  Toilet Transfer: Contact guard assistance  Toilet Transfers Comments: RW, amb short distance from w/c to commode using TSF  Tub Transfers  Tub Transfers Comments: shown wide shower chair inside tub/shower (pt owns TTB but \"doesn't fit)  Wheelchair Bed Transfers  Wheelchair/Bed - Technique: Ambulating  Equipment Used: Wheelchair  Level of Asssistance: Contact guard assistance  Wheelchair Transfers Comments: RW, cues for hand placement, struggles to use R UE d/t pain (appears to be pect muscle)     Transfers  Sit to stand: Contact guard assistance  Stand to sit: Contact guard assistance  Transfer Comments: using RW                       Cognition  Overall Cognitive Status: WNL                    Type of ROM/Therapeutic Exercise  Comment: using 2 lb wts in L hand only for the following strengthening exercises to be IND w/ sit<>stand transitions, performed gentle ROM to R UE only d/t complaints of pain  Exercises  Shoulder Elevation: did not tolerate shld shrugs d/t R shld/pect pain  Horizontal ABduction: x10  Horizontal ADduction: x10  Elbow Flexion: x10  Elbow Extension: x10  Supination: x10  Pronation: x10  Wrist Flexion: x 20 no wts  Wrist Extension: x 20 no wts  Finger Extension: x 20 no wts  Grasp/Release: 2 sets of 20 using 3 lb gripper  Other: x15 abdominal squeezes for core strengthening to support balance during t/f                    Plan   Plan  Times per week: 5-6  Times per day: Daily  Plan weeks: 1 wk, pt agreeing to stay until Thurs, 3-31-22. Specific instructions for Next Treatment: car t/f  Current Treatment Recommendations: Strengthening,Endurance Training,Patient/Caregiver Education & Training,Balance Training,Pain Management,Functional Mobility Training,Safety Education & Training,Self-Care / ADL,Equipment Evaluation, Education, & procurement,Home Management Training    Goals  Short term goals  Time Frame for Short term goals: 1 week  Short term goal 1: Pt will be mod I with functional transfers  Short term goal 2: Pt will be mod I with functional mobility  Short term goal 3: Pt will be mod I with toileting  Short term goal 4: Pt will be mod I with bathing and dressing  Short term goal 5: Pt will be mod I with bed mobility  Long term goals  Time Frame for Long term goals : STG = LTG  Patient Goals   Patient goals : Pt's goal is to return home with her grandsons as soon as possible.        Therapy Time   Individual Concurrent Group PM-treatment   Time In 1115      1515   Time Out 1200      1600   Minutes 45     45   Timed Code Treatment Minutes: [address] [place], [place] #[id number]

## 2022-03-28 NOTE — PROGRESS NOTES
Physical Therapy  Facility/Department: [address] IP REHAB  Daily Treatment Note  NAME: Zehra De  : 1960  MRN: [id number]    Date of Service: 3/28/2022    Discharge Recommendations:  Continue to assess pending progress,Home with Home health PT,S Level 3,Home with assist PRN,Patient would benefit from continued therapy after discharge   PT Equipment Recommendations  Walker: Rolling  Other: Anticipate need for FWW at d/c, Pt. has rollator but this will likely be unsafe for Pt. at d/c    Assessment   Assessment: 63 y/o female admit 3/16/2022 with L Hip Pain, Met Malignant Neoplasm. CT Hip : Osseous Met Disease throughout L Hemipelvis, L Sacrum and Prox L Femur. CT T/L Spine L Compress Fx L1 (uncertain chronicity), Subacute Rib Fxs, Diffuse Bony Mets. 3/19/2022 S/P L Hip ORIF with IM Supa, L Fem Head Biopsy. PMH as noted including Breast Ca/Mastectomy. PTA pt living in multi level home with 3 grandsons (pt is paid caregiver for 22 y/o (dependent all care))  with ramp 1st floor bed/bath; independent self care and functional mobility (becoming more difficult/painful recently). Today, 3/28, pt continues to be limited in functional mobility due to L hip pain. Pt also reporting pain across her upper chest when she moves her RUE overhead or pushes up with RUE, Dr. Elvira Vidal is aware of this new pain and discusses with pt during the session. Pt able to amb with RW and CGA short distances (20' and 30'), and transfers sit<>stand with CGA. Pt politely declines to practice stair training this session due to increase in pain and fatigue. Pt also completes sated therex. Increased time needed for all functional mobility and transfers throughout session, pt very fatigued this afternoon and requires frequent rest breaks which is her greatest limiting factor today. Pt will benefit from skilled PT to improve functional mobility and optimize independence.  Session ends with pt seated in [facility], therapist present, awaiting return to room. Prognosis: Good;Guarded  PT Education: Goals;PT Role;Plan of Care;Transfer Training;Gait Training;Functional Mobility Training;Weight-bearing Education  Barriers to Learning: None. Activity Tolerance  Activity Tolerance: Patient limited by fatigue;Patient limited by pain; Patient limited by endurance  Activity Tolerance: Pt with poor activity tolerancedue to fatigue. She also complains of chest pain that limits her ability to participate. Pt fatigues quickly with all activity. Patient Diagnosis(es): The primary encounter diagnosis was Carcinoma of breast metastatic to bone, unspecified laterality ([facility] 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 has a [facility] Depression, Hyperthyroidism, and Nephrolithiasis. has a past surgical history that includes Cholecystectomy; Hysterectomy; Mastectomy; Femur fracture surgery (Left, 3/19/2022); and US BIOPSY LIVER PERCUTANEOUS (3/25/2022). Restrictions  Restrictions/Precautions  Restrictions/Precautions: Weight Bearing,Fall Risk  Lower Extremity Weight Bearing Restrictions  Left Lower Extremity Weight Bearing: Weight Bearing As Tolerated  Position Activity Restriction  Other position/activity restrictions: WBAT L LE    Subjective   General  Chart Reviewed: Yes  Additional Pertinent Hx: 65 y/o female admit 3/16/2022 with L Hip Pain, Met Malignant Neoplasm. CT Hip : Osseous Met Disease throughout L Hemipelvis, L Sacrum and Prox L Femur. CT T/L Spine L Compress Fx L1 (uncertain chronicity), Subacute Rib Fxs, Diffuse Bony Mets. 3/19/2022 S/P L Hip ORIF with IM Supa, L Fem Head Biopsy. PMH as noted including Breast Ca/Mastectomy. Response To Previous Treatment: Patient with no complaints from previous session.   Family / Caregiver Present: No  Referring Practitioner:  [facility]  Subjective  Subjective: Pt seated in recliner upon arrival, she agrees to therapy treatment. She states she had a long morning, she denies pain at L hip but states she has new burning pain across shest when she uses her RUE. Dr. Felix Young aware of this new pain    Orientation  Orientation  Overall Orientation Status: Within Normal Limits    Objective   Transfers  Sit to Stand: Contact guard assistance  Stand to sit: Contact guard assistance  Comment: Reminder of hand placement    Ambulation  Ambulation?: Yes  WB Status: WBAT LLE  Ambulation 1  Surface: level tile  Device: Rolling Walker  Other Apparatus: Wheelchair follow  Assistance: Contact guard assistance  Quality of Gait: step through pattern with good heel contact, good weight bearing. Pt with decreased indu and step length, she fatigues quickly and is only able to walk short distances before needing to rest due to fatigue. Gait Deviations: Slow Indu;Decreased step length;Decreased step height  Distance: 20', 30' with a rest break between bouts    Stairs/Curb  Stairs?: No  Other exercises  Other exercises?: Yes  Other exercises 1: Therex completed while seated in WC: march x15B, knee extension x15B, heel/toe rocks x15B, knee flexion isometric x10B, hip abduction isometric x10B, hip adduction x10B. Pt requires increased time for all of these interventions and takes intermittent rest because she is fatigued     Goals  Short term goals  Time Frame for Short term goals: 10 days  Short term goal 1: Supine to/from sit wtih Mod I  Short term goal 2: Sit to/from stand with Mod I  Short term goal 3: Up and down 4 steps with B rails and Supervision  Short term goal 4: Up and down curb step with Supervision  Short term goal 5: Car transfer with Supervision  Long term goals  Time Frame for Long term goals : LTG == STG's  Patient Goals   Patient goals : Return home.     Plan    Plan  Times per week: Pt. to be seen 90 minutes/day, 5 out of the 7 days/wk  Current Treatment Recommendations: Strengthening,Functional Mobility Training,Transfer [employer] Training,Safety Education & Training,Patient/Caregiver Education & Training,ADL/Self-care Training,Positioning,Modalities,Neuromuscular Re-education,Endurance Training,Balance Training,Stair training,Home Exercise Program,Equipment Evaluation, Education, & procurement  Plan Comment: Plan for DC this upcoming Thursday 3/31  Safety Devices  Type of devices: All fall risk precautions in place,Patient at risk for falls,Gait belt,Left in chair  Restraints  Initially in place: No     Therapy Time   Individual Concurrent Group Co-treatment   Time In 1300         Time Out 1345         Minutes 45           Electronically signed by Esperanza ARREGUIN on 3/28/2022 at 4:09 PM  Therapist was present, directed the patient's care, made skilled judgement, and was responsible for assessment and treatment of the patient.       Luis Cuevas, [id number]

## 2022-03-28 NOTE — PROGRESS NOTES
Physical Therapy  Treatment Attempt Note   Physical therapy attempted to treat pt this morning at 0900, but pt not present in room due to off unit for radiation treatment. Plan to continue with current POC during next therapy session this afternoon to address deficits and progress towards PLOF. Will obtain radiation schedule to avoid any further conflicts. Variance: 45 (pt not present on rehab unit due to radiation treatment this morning)  Reason: Procedure    Electronically signed by Sebastián ARREGUIN on 3/28/2022 at 9:19 AM  Therapist was present, directed the patient's care, made skilled judgement, and was responsible for assessment and treatment of the patient.       Leila Majano, [id number]

## 2022-03-28 NOTE — PROGRESS NOTES
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 Patient seen this afternoon working with PT. Having right chest wall pain. Worse with weight bearing through RUE. She does have TTP. Will trial lidoderm patch. Labs reviewed. ROS: No f/c, n/v, cp     Objective:  Patient Vitals for the past 24 hrs:   BP Temp Temp src Pulse Resp SpO2 Weight   22 0800 -- -- -- -- -- 95 % --   22 0417 127/68 97.6 °F (36.4 °C) Oral 91 16 96 % 235 lb 10.8 oz (106.9 kg)   22 2043 127/63 98 °F (36.7 °C) Oral 80 16 100 % --   22 1540 126/69 98.1 °F (36.7 °C) Oral 84 16 96 % --     Const: Alert. No distress, pleasant. HEENT: Normocephalic, atraumatic. Normal sclera/conjunctiva. MMM. CV: Regular rate and rhythm. Resp: No respiratory distress. Lungs CTAB. Abd: Soft, nontender, nondistended, NABS+   Ext: trace edema (L>R). MSK: Decreased left>right hip ROM, post op swelling left thigh  Neuro: Alert, oriented, appropriately interactive. Psych: Cooperative, appropriate mood and affect    Laboratory data: Available via EMR.    Last 24 hour lab  Recent Results (from the past 24 hour(s))   POCT Glucose    Collection Time: 22  4:10 PM   Result Value Ref Range    POC Glucose 117 (H) 70 - 99 mg/dl    Performed on ACCU-CHEK    POCT Glucose    Collection Time: 22  8:33 PM   Result Value Ref Range    POC Glucose 118 (H) 70 - 99 mg/dl    Performed on ACCU-CHEK    Comprehensive Metabolic Panel    Collection Time: 22  5:59 AM   Result Value Ref Range    Sodium 134 (L) 136 - 145 mmol/L    Potassium 4.0 3.5 - 5.1 mmol/L    Chloride 101 99 - 110 mmol/L    CO2 19 (L) 21 - 32 mmol/L    Anion Gap 14 3 - 16    Glucose 103 (H) 70 - 99 mg/dL    BUN 15 7 - 20 mg/dL CREATININE 0.5 (L) 0.6 - 1.2 mg/dL    GFR Non-African American >60 >60    GFR African American >60 >60    Calcium 8.7 8.3 - 10.6 mg/dL    Total Protein 7.1 6.4 - 8.2 g/dL    Albumin 3.0 (L) 3.4 - 5.0 g/dL    Albumin/Globulin Ratio 0.7 (L) 1.1 - 2.2    Total Bilirubin 0.8 0.0 - 1.0 mg/dL    Alkaline Phosphatase 368 (H) 40 - 129 U/L     (H) 10 - 40 U/L     (H) 15 - 37 U/L   Magnesium    Collection Time: 03/28/22  5:59 AM   Result Value Ref Range    Magnesium 2.60 (H) 1.80 - 2.40 mg/dL   SPECIMEN REJECTION    Collection Time: 03/28/22  6:35 AM   Result Value Ref Range    Rejected Test CBCWD     Reason for Rejection see below    POCT Glucose    Collection Time: 03/28/22  7:04 AM   Result Value Ref Range    POC Glucose 110 (H) 70 - 99 mg/dl    Performed on ACCU-CHEK    CBC with Auto Differential    Collection Time: 03/28/22  7:48 AM   Result Value Ref Range    WBC 5.6 4.0 - 11.0 K/uL    RBC 3.41 (L) 4.00 - 5.20 M/uL    Hemoglobin 9.9 (L) 12.0 - 16.0 g/dL    Hematocrit 29.8 (L) 36.0 - 48.0 %    MCV 87.2 80.0 - 100.0 fL    MCH 29.1 26.0 - 34.0 pg    MCHC 33.3 31.0 - 36.0 g/dL    RDW 15.9 (H) 12.4 - 15.4 %    Platelets 155 464 - 387 K/uL    MPV 7.3 5.0 - 10.5 fL    PLATELET SLIDE REVIEW Adequate     SLIDE REVIEW see below     Neutrophils % 65.0 %    Lymphocytes % 14.0 %    Monocytes % 6.0 %    Eosinophils % 0.0 %    Basophils % 0.0 %    Neutrophils Absolute 4.5 1.7 - 7.7 K/uL    Lymphocytes Absolute 0.8 (L) 1.0 - 5.1 K/uL    Monocytes Absolute 0.3 0.0 - 1.3 K/uL    Eosinophils Absolute 0.0 0.0 - 0.6 K/uL    Basophils Absolute 0.0 0.0 - 0.2 K/uL    Bands Relative 14 (H) 0 - 7 %    Metamyelocytes Relative 1 (A) %    nRBC 2 (A) /100 WBC    Anisocytosis 1+ (A)    POCT Glucose    Collection Time: 03/28/22 10:55 AM   Result Value Ref Range    POC Glucose 139 (H) 70 - 99 mg/dl    Performed on ACCU-CHEK        Therapy progress:  PT  Position Activity Restriction  Other position/activity restrictions: WBAT L LE  Objective     Sit to Stand: Contact guard assistance  Stand to sit: Contact guard assistance  Bed to Chair: Contact guard assistance (with use of the walker)  Device: Rolling Walker  Assistance: Contact guard assistance  Distance: short distances in room, 32' with two turns  OT  PT Equipment Recommendations  Equipment Needed: Yes  Mobility Devices: Sharda Se: Rolling  Other: Anticipate need for FWW at d/c, Pt. has rollator but this will likely be unsafe for Pt. at d/c  Toilet - Technique: Ambulating  Equipment Used: Grab bars  Toilet Transfers Comments: RW, amb short distance from w/c to commode in BR  Assessment        SLP          Body mass index is 39.22 kg/m². Rehabilitation Diagnosis:   Orthopedic, 8.9, Other Orthopedic        Assessment and Plan:     Impairments: decreased L>R hip ROM, decreased balance, endurance     Left hip metastatic lytic bone lesion   -s/p ORIF (3/19 with Dr. Miroslava Lynch)  -Wound care  -WBAT  -PT/OT     Stage IV breast cancer with diffuse bony lesions   -Recurrent (previously treated 2007)  -Confirmed on bone biopsy (3/19)  -Oncology (Dr. Brandt Harding) following, appreciate input  -Arimidex started, plan for Ibrance as outpatient  -Palliative radiation for sacral region started (3/28)     Hypercalcemia  -Resolved s/p Zometa  -Monitor     Acute liver injury/elevated LFTs  -Etiology unclear  -GI following  -Serologic work-up unremarkable  -s/p Liver biopsy with IR (3/25) -- results pending  -Monitor -- remain elevated but now trending down     DM2  -ISS     Depression  -Not on treatment, monitor mood     Bladder   -High risk retention   -Monitor PVRs, SC prn >300cc     Bowel   -Constipation   -senna+colace BID, PRN miralax, MoM, and bisacodyl supp.     Safety   -fall precautions     Pain control  -oxycodone prn     PPx  -DVT: lovenox  -GI: famotidine    Rehab Progress: Making progress. Working on functional mobility, balance, compensatory strategies.    Anticipated Dispo: home with 3 grandsons: 24 yo (special needs, requires total care, uses lift equipment), 13 yo, 7 yo (autism); daughter temporarily staying  Services: Located within [facility] PT, OT, RN  DME: rolling walker  ELOS: 3/31      [address] MD 3/28/2022, 12:08 PM

## 2022-03-28 NOTE — PROGRESS NOTES
Patient is currently sleeping in bed with legs and arms supported by pillows. She was admitted to ARU 3/22 s/p ORIF to left hip. Patient is AAO x 4 and can voice for needs and assistance. She stated left hip pain 2 on 1-10 pain scale, and pain to chest area 7 on 1-10 pain scale. Medicated with tylenol for with sufficient pain relief. Patient likes ice packs to hip and chest for pain relief as well. Patient appears better and brighter this evening compared to last evening. Lungs clear this evening, denies SOB and respirations are normal. Reminded to continue to use IS hourly when awake. Bowel sounds are active and last medium BM was 3/27, patient very happy that bowels moved this evening. Fall precautions are in place. Call light and personal belonings are within reach. Will continue to monitor and assist as needed.

## 2022-03-28 NOTE — PROGRESS NOTES
INPATIENT CONSULTATION:    IDENTIFYING DATA/REASON FOR CONSULTATION   PATIENT:  Norm Orellana  MRN:  [id number]  ADMIT DATE: 3/22/2022  TIME OF EVALUATION: 3/28/2022 8:14 AM  HOSPITAL STAY:   LOS: 6 days   CONSULTING PHYSICIAN: Courtney Ball MD   REASON FOR CONSULTATION: elevated liver chemistries     Subjective:    -Notes she is feeling dehydrated  -No ab pain, confusion   -No issues post liver biopsy. MEDICATIONS   SCHEDULED:  anastrozole, 1 mg, Daily  sodium chloride flush, 5-40 mL, 2 times per day  [Held by provider] enoxaparin, 40 mg, Daily  sennosides-docusate sodium, 1 tablet, BID  famotidine, 20 mg, BID  insulin lispro, 0-3 Units, Nightly  insulin lispro, 0-6 Units, TID WC      FLUIDS/DRIPS:     sodium chloride      sodium chloride      dextrose       PRNs: acetaminophen, 650 mg, Q4H PRN  sodium chloride, 25 mL, PRN  ondansetron, 4 mg, Q6H PRN  oxyCODONE, 10 mg, Q4H PRN  sodium chloride flush, 5-40 mL, PRN  magnesium hydroxide, 30 mL, Daily PRN  polyethylene glycol, 17 g, Daily PRN  calcium carbonate, 500 mg, TID PRN  dextrose, 100 mL/hr, PRN  dextrose, 12.5 g, PRN  glucagon (rDNA), 1 mg, PRN  glucose, 15 g, PRN  ondansetron, 4 mg, Q8H PRN  bisacodyl, 10 mg, Daily PRN      ALLERGIES:  She [unfilled]      PHYSICAL EXAM     Wt Readings from Last 3 Encounters:   03/28/22 235 lb 10.8 oz (106.9 kg)   03/22/22 242 lb 15.2 oz (110.2 kg)   09/02/16 233 lb (105.7 kg)     Temp Readings from Last 3 Encounters:   03/28/22 97.6 °F (36.4 °C) (Oral)   03/22/22 98.4 °F (36.9 °C) (Oral)   03/19/22 95.9 °F (35.5 °C)     BP Readings from Last 3 Encounters:   03/28/22 127/68   03/22/22 110/75   03/19/22 (!) 93/50     Pulse Readings from Last 3 Encounters:   03/28/22 91   03/22/22 94   02/08/19 77      I/O last 3 completed shifts:   In: 5 [P.O.:840]  Out: -     Physical Exam:  Gen: Resting in bed, NAD   CV: RRR no MRG   Pul: CTAB   Abd: Soft, NT/ND, no masses, no HSM   Ext: No edema   Neuro: No asterixis, A&O X 3  Skin: No jaundice. LABS AND IMAGING     Recent Results (from the past 24 hour(s))   POCT Glucose    Collection Time: 03/27/22 11:18 AM   Result Value Ref Range    POC Glucose 135 (H) 70 - 99 mg/dl    Performed on ACCU-CHEK    POCT Glucose    Collection Time: 03/27/22  4:10 PM   Result Value Ref Range    POC Glucose 117 (H) 70 - 99 mg/dl    Performed on ACCU-CHEK    POCT Glucose    Collection Time: 03/27/22  8:33 PM   Result Value Ref Range    POC Glucose 118 (H) 70 - 99 mg/dl    Performed on ACCU-CHEK    Comprehensive Metabolic Panel    Collection Time: 03/28/22  5:59 AM   Result Value Ref Range    Sodium 134 (L) 136 - 145 mmol/L    Potassium 4.0 3.5 - 5.1 mmol/L    Chloride 101 99 - 110 mmol/L    CO2 19 (L) 21 - 32 mmol/L    Anion Gap 14 3 - 16    Glucose 103 (H) 70 - 99 mg/dL    BUN 15 7 - 20 mg/dL    CREATININE 0.5 (L) 0.6 - 1.2 mg/dL    GFR Non-African American >60 >60    GFR African American >60 >60    Calcium 8.7 8.3 - 10.6 mg/dL    Total Protein 7.1 6.4 - 8.2 g/dL    Albumin 3.0 (L) 3.4 - 5.0 g/dL    Albumin/Globulin Ratio 0.7 (L) 1.1 - 2.2    Total Bilirubin 0.8 0.0 - 1.0 mg/dL    Alkaline Phosphatase 368 (H) 40 - 129 U/L     (H) 10 - 40 U/L     (H) 15 - 37 U/L   Magnesium    Collection Time: 03/28/22  5:59 AM   Result Value Ref Range    Magnesium 2.60 (H) 1.80 - 2.40 mg/dL   SPECIMEN REJECTION    Collection Time: 03/28/22  6:35 AM   Result Value Ref Range    Rejected Test CBCWD     Reason for Rejection see below    POCT Glucose    Collection Time: 03/28/22  7:04 AM   Result Value Ref Range    POC Glucose 110 (H) 70 - 99 mg/dl    Performed on ACCU-CHEK      Other Labs    Imaging  US BIOPSY LIVER PERCUTANEOUS   Final Result   Successful image guided random liver biopsy. CT GUIDED NEEDLE PLACEMENT   Final Result   Successful image guided random liver biopsy.             ASSESSMENT AND RECOMMENDATIONS   Nader Villareral is a 64 y.o. female with PMH of breast cancer in 2007 which is in remission who presented with left hip pain. CT of the left hip showed metastatic disease throughout the left hemipelvis and proximal left femur with large destructive lesions within the left femoral head.  She had a CT of the thoracic spine which showed diffuse bony metastasis and an L1 partial compression fracture.  A CT of the abdomen and pelvis showed multiple lytic and sclerotic bone lesions. Underwent ORIF left hip with bone biospy. Path showed metastatic breast carcinoma. GI consulted regarding elevated liver chemistries     1. Elevated liver chemistries: Remains elevated. Primarily hepatocellular pattern. Etiology unclear at this time. Underwent liver biopsy on 3/25. Path report pending. On CT of the abdomen, liver is normal in size.  There is no splenomegaly or hepatomegaly. Collene Gala is preserved. There is no suggestion of portal hypertension. Dewitte Peto is no evidence of portal vein, hepatic vein, hepatic artery thrombosis.  Immunoglobulins have been checked and within normal limits.  This could be any number of things at this time and differential is very broad.  She is not currently on any culprit medications. She denies ETOH use. LILIANA and factin normal.  AMA pending. Acute viral hep panel negative. Ferritin significantly elevated, likely reactive, Hemochromatosis gene pending.       2. Metastatic breast carcinoma.       RECOMMENDATIONS:  -Awaiting liver biopsy results. Will follow up after these return. If you have any questions or need any further information, please feel free to contact anyone on our consult team.  Thank you for allowing us to participate in the care of Nader Villarreal.     Roosevelt Medina MD   [address] [facility]

## 2022-03-29 LAB
A/G RATIO: 0.9 (ref 1.1–2.2)
ALBUMIN SERPL-MCNC: 3.2 G/DL (ref 3.4–5)
ALP BLD-CCNC: 326 U/L (ref 40–129)
ALT SERPL-CCNC: 445 U/L (ref 10–40)
ANION GAP SERPL CALCULATED.3IONS-SCNC: 18 MMOL/L (ref 3–16)
AST SERPL-CCNC: 168 U/L (ref 15–37)
BASOPHILS ABSOLUTE: 0 K/UL (ref 0–0.2)
BASOPHILS RELATIVE PERCENT: 0.6 %
BILIRUB SERPL-MCNC: 0.7 MG/DL (ref 0–1)
BUN BLDV-MCNC: 13 MG/DL (ref 7–20)
CALCIUM SERPL-MCNC: 8.7 MG/DL (ref 8.3–10.6)
CHLORIDE BLD-SCNC: 103 MMOL/L (ref 99–110)
CO2: 20 MMOL/L (ref 21–32)
CREAT SERPL-MCNC: 0.6 MG/DL (ref 0.6–1.2)
EOSINOPHILS ABSOLUTE: 0 K/UL (ref 0–0.6)
EOSINOPHILS RELATIVE PERCENT: 0.8 %
GFR AFRICAN AMERICAN: >60
GFR NON-AFRICAN AMERICAN: >60
GLUCOSE BLD-MCNC: 107 MG/DL (ref 70–99)
GLUCOSE BLD-MCNC: 108 MG/DL (ref 70–99)
GLUCOSE BLD-MCNC: 118 MG/DL (ref 70–99)
GLUCOSE BLD-MCNC: 121 MG/DL (ref 70–99)
GLUCOSE BLD-MCNC: 133 MG/DL (ref 70–99)
HCT VFR BLD CALC: 32.8 % (ref 36–48)
HEMOGLOBIN: 10.8 G/DL (ref 12–16)
LYMPHOCYTES ABSOLUTE: 1.2 K/UL (ref 1–5.1)
LYMPHOCYTES RELATIVE PERCENT: 23.4 %
MAGNESIUM: 2.4 MG/DL (ref 1.8–2.4)
MCH RBC QN AUTO: 28.7 PG (ref 26–34)
MCHC RBC AUTO-ENTMCNC: 33 G/DL (ref 31–36)
MCV RBC AUTO: 87.1 FL (ref 80–100)
MONOCYTES ABSOLUTE: 0.2 K/UL (ref 0–1.3)
MONOCYTES RELATIVE PERCENT: 4.5 %
NEUTROPHILS ABSOLUTE: 3.7 K/UL (ref 1.7–7.7)
NEUTROPHILS RELATIVE PERCENT: 70.7 %
PDW BLD-RTO: 15.8 % (ref 12.4–15.4)
PERFORMED ON: ABNORMAL
PLATELET # BLD: 185 K/UL (ref 135–450)
PMV BLD AUTO: 7.5 FL (ref 5–10.5)
POTASSIUM SERPL-SCNC: 4.2 MMOL/L (ref 3.5–5.1)
RBC # BLD: 3.76 M/UL (ref 4–5.2)
REASON FOR REJECTION: NORMAL
REJECTED TEST: NORMAL
SODIUM BLD-SCNC: 141 MMOL/L (ref 136–145)
TOTAL PROTEIN: 6.6 G/DL (ref 6.4–8.2)
WBC # BLD: 5.2 K/UL (ref 4–11)

## 2022-03-29 PROCEDURE — 6370000000 HC RX 637 (ALT 250 FOR IP): Performed by: PHYSICAL MEDICINE & REHABILITATION

## 2022-03-29 PROCEDURE — 85025 COMPLETE CBC W/AUTO DIFF WBC: CPT

## 2022-03-29 PROCEDURE — 36415 COLL VENOUS BLD VENIPUNCTURE: CPT

## 2022-03-29 PROCEDURE — 97530 THERAPEUTIC ACTIVITIES: CPT

## 2022-03-29 PROCEDURE — 97535 SELF CARE MNGMENT TRAINING: CPT

## 2022-03-29 PROCEDURE — 1280000000 HC REHAB R&B

## 2022-03-29 PROCEDURE — 6370000000 HC RX 637 (ALT 250 FOR IP): Performed by: INTERNAL MEDICINE

## 2022-03-29 PROCEDURE — 94761 N-INVAS EAR/PLS OXIMETRY MLT: CPT

## 2022-03-29 PROCEDURE — 97116 GAIT TRAINING THERAPY: CPT

## 2022-03-29 PROCEDURE — 83735 ASSAY OF MAGNESIUM: CPT

## 2022-03-29 PROCEDURE — 80053 COMPREHEN METABOLIC PANEL: CPT

## 2022-03-29 RX ADMIN — FAMOTIDINE 20 MG: 20 TABLET ORAL at 21:08

## 2022-03-29 RX ADMIN — ONDANSETRON HYDROCHLORIDE 4 MG: 4 TABLET, FILM COATED ORAL at 08:07

## 2022-03-29 RX ADMIN — ANASTROZOLE 1 MG: 1 TABLET ORAL at 08:09

## 2022-03-29 RX ADMIN — SENNOSIDES AND DOCUSATE SODIUM 1 TABLET: 50; 8.6 TABLET ORAL at 21:08

## 2022-03-29 RX ADMIN — FAMOTIDINE 20 MG: 20 TABLET ORAL at 07:54

## 2022-03-29 RX ADMIN — OXYCODONE HYDROCHLORIDE 10 MG: 10 TABLET ORAL at 08:07

## 2022-03-29 RX ADMIN — SENNOSIDES AND DOCUSATE SODIUM 1 TABLET: 50; 8.6 TABLET ORAL at 07:54

## 2022-03-29 ASSESSMENT — PAIN DESCRIPTION - PAIN TYPE: TYPE: SURGICAL PAIN

## 2022-03-29 ASSESSMENT — PAIN DESCRIPTION - DESCRIPTORS: DESCRIPTORS: ACHING

## 2022-03-29 ASSESSMENT — PAIN SCALES - GENERAL
PAINLEVEL_OUTOF10: 0
PAINLEVEL_OUTOF10: 6
PAINLEVEL_OUTOF10: 4

## 2022-03-29 ASSESSMENT — PAIN DESCRIPTION - ONSET: ONSET: ON-GOING

## 2022-03-29 ASSESSMENT — PAIN DESCRIPTION - PROGRESSION: CLINICAL_PROGRESSION: NOT CHANGED

## 2022-03-29 ASSESSMENT — PAIN DESCRIPTION - ORIENTATION: ORIENTATION: LEFT

## 2022-03-29 ASSESSMENT — PAIN DESCRIPTION - FREQUENCY: FREQUENCY: CONTINUOUS

## 2022-03-29 ASSESSMENT — PAIN DESCRIPTION - LOCATION: LOCATION: HIP

## 2022-03-29 ASSESSMENT — PAIN - FUNCTIONAL ASSESSMENT: PAIN_FUNCTIONAL_ASSESSMENT: PREVENTS OR INTERFERES SOME ACTIVE ACTIVITIES AND ADLS

## 2022-03-29 NOTE — PLAN OF CARE
Problem: Falls - Risk of:  Goal: Will remain free from falls  Description: Will remain free from falls  3/28/2022 2213 by Tiffanie Oropeza RN  Outcome: Ongoing  Note: Fall risk band on patient. Orange light on outside of room. Non skid footwear in place. Alarms used appropriately. Patient instructed to call and wait for staff before getting up. Rounding done to anticipate needs. Appropriate safety devices used for transfers.

## 2022-03-29 NOTE — PLAN OF CARE
Problem: Falls - Risk of:  Goal: Will remain free from falls  Description: Will remain free from falls  3/29/2022 1931 by Caroleen Halsted, RN  Outcome: Ongoing  Note: Fall risk band on patient. Orange light on outside of room. Non skid footwear in place. Alarms used appropriately. Patient instructed to call and wait for staff before getting up. Rounding done to anticipate needs. Appropriate safety devices used for transfers.

## 2022-03-29 NOTE — PROGRESS NOTES
Patient admitted to rehab with left hip metastatic lytic bone lesion s/p REINA. A/Ox4. Transfers with stedy x 1 staff assist with gait belt. Mobility restrictions: WBAT. On general diet, tolerating well but experiencing some nausea at times. Medications taken whole with thin liquids. Lovenox on hold now but will resume for DVT prophylaxis prior to discharge. Skin: biopsy site RUQ with dressing in place; incision left hip with dressing in place. No Oxygen needed at this time. Has been continent of bowel and bladder. LBM 3/28. Chair/bed alarms in use and call light in reach. Will monitor for safety.

## 2022-03-29 NOTE — PROGRESS NOTES
Department of Physical Medicine & Rehabilitation  Progress Note    Patient Identification:  Patric Jimenez  [id number]  : 1960  Admit date: 3/22/2022    Chief Complaint: 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 [facility])    Subjective:   No acute events overnight. Patient seen this afternoon working with OT. Lidoderm patch helping chest pain. However per RN And therapy staff, pain still seems to be a barrier. Will increase oxycodone dose to 5 or 7.5 mg based on pain level. Labs reviewed. ROS: No f/c, n/v, cp     Objective:  Patient Vitals for the past 24 hrs:   BP Temp Temp src Pulse Resp SpO2   22 0803 -- -- -- -- -- 99 %   22 0715 120/66 97.6 °F (36.4 °C) Oral 77 18 --   22 0506 106/71 97.9 °F (36.6 °C) Oral 74 18 97 %   22 2036 (!) 144/81 98 °F (36.7 °C) Oral 82 18 98 %     Const: Alert. No distress, pleasant. HEENT: Normocephalic, atraumatic. Normal sclera/conjunctiva. MMM. CV: Regular rate and rhythm. Resp: No respiratory distress. Lungs CTAB. Abd: Soft, nontender, nondistended, NABS+   Ext: trace edema (L>R). MSK: Decreased left>right hip ROM, post op swelling left thigh  Neuro: Alert, oriented, appropriately interactive. Psych: Cooperative, appropriate mood and affect    Laboratory data: Available via EMR.    Last 24 hour lab  Recent Results (from the past 24 hour(s))   POCT Glucose    Collection Time: 22  4:12 PM   Result Value Ref Range    POC Glucose 132 (H) 70 - 99 mg/dl    Performed on ACCU-CHEK    POCT Glucose    Collection Time: 22  8:34 PM   Result Value Ref Range    POC Glucose 123 (H) 70 - 99 mg/dl    Performed on ACCU-CHEK    Comprehensive Metabolic Panel    Collection Time: 22  7:11 AM   Result Value Ref Range    Sodium 141 136 - 145 mmol/L    Potassium 4.2 3.5 - 5.1 mmol/L    Chloride 103 99 - 110 mmol/L    CO2 20 (L) 21 - 32 mmol/L    Anion Gap 18 (H) 3 - 16    Glucose 108 (H) 70 - 99 mg/dL    BUN 13 7 - 20 mg/dL    CREATININE 0.6 0.6 - 1.2 mg/dL    GFR Non-African American >60 >60    GFR African American >60 >60    Calcium 8.7 8.3 - 10.6 mg/dL    Total Protein 6.6 6.4 - 8.2 g/dL    Albumin 3.2 (L) 3.4 - 5.0 g/dL    Albumin/Globulin Ratio 0.9 (L) 1.1 - 2.2    Total Bilirubin 0.7 0.0 - 1.0 mg/dL    Alkaline Phosphatase 326 (H) 40 - 129 U/L     (H) 10 - 40 U/L     (H) 15 - 37 U/L   Magnesium    Collection Time: 03/29/22  7:11 AM   Result Value Ref Range    Magnesium 2.40 1.80 - 2.40 mg/dL   POCT Glucose    Collection Time: 03/29/22  7:24 AM   Result Value Ref Range    POC Glucose 107 (H) 70 - 99 mg/dl    Performed on ACCU-CHEK    SPECIMEN REJECTION    Collection Time: 03/29/22  8:21 AM   Result Value Ref Range    Rejected Test CBCWD     Reason for Rejection see below    CBC with Auto Differential    Collection Time: 03/29/22 11:32 AM   Result Value Ref Range    WBC 5.2 4.0 - 11.0 K/uL    RBC 3.76 (L) 4.00 - 5.20 M/uL    Hemoglobin 10.8 (L) 12.0 - 16.0 g/dL    Hematocrit 32.8 (L) 36.0 - 48.0 %    MCV 87.1 80.0 - 100.0 fL    MCH 28.7 26.0 - 34.0 pg    MCHC 33.0 31.0 - 36.0 g/dL    RDW 15.8 (H) 12.4 - 15.4 %    Platelets 001 852 - 391 K/uL    MPV 7.5 5.0 - 10.5 fL    Neutrophils % 70.7 %    Lymphocytes % 23.4 %    Monocytes % 4.5 %    Eosinophils % 0.8 %    Basophils % 0.6 %    Neutrophils Absolute 3.7 1.7 - 7.7 K/uL    Lymphocytes Absolute 1.2 1.0 - 5.1 K/uL    Monocytes Absolute 0.2 0.0 - 1.3 K/uL    Eosinophils Absolute 0.0 0.0 - 0.6 K/uL    Basophils Absolute 0.0 0.0 - 0.2 K/uL   POCT Glucose    Collection Time: 03/29/22 11:34 AM   Result Value Ref Range    POC Glucose 133 (H) 70 - 99 mg/dl    Performed on ACCU-CHEK        Therapy progress:  PT  Position Activity Restriction  Other position/activity restrictions: WBAT L LE, undergoing radiation tx  Objective     Sit to Stand: Contact guard assistance,Stand by assistance  Stand to sit: Contact guard assistance,Stand by assistance  Bed to Chair: Contact (autism); daughter temporarily staying  Services: Sanjiv Chowdary PT, OT, RN  DME: rolling walker  ELOS: 3/31      [address] MD 3/29/2022, 2:33 PM

## 2022-03-29 NOTE — PLAN OF CARE
Problem: Skin Integrity:  Goal: Will show no infection signs and symptoms  Description: Will show no infection signs and symptoms  Outcome: Ongoing  Goal: Absence of new skin breakdown  Description: Absence of new skin breakdown  Outcome: Ongoing     Problem: Falls - Risk of:  Goal: Will remain free from falls  Description: Will remain free from falls  Outcome: Ongoing  Goal: Absence of physical injury  Description: Absence of physical injury  Outcome: Ongoing     Problem: Pain:  Goal: Pain level will decrease  Description: Pain level will decrease  Outcome: Ongoing  Goal: Control of acute pain  Description: Control of acute pain  Outcome: Ongoing  Goal: Control of chronic pain  Description: Control of chronic pain  Outcome: Ongoing     Problem: Infection:  Goal: Will remain free from infection  Description: Will remain free from infection  Outcome: Ongoing     Problem: Daily Care:  Goal: Daily care needs are met  Description: Daily care needs are met  Outcome: Ongoing     Problem: Nutritional:  Goal: Maintenance of adequate nutrition will improve  Description: Maintenance of adequate nutrition will improve  Outcome: Ongoing     Problem: IP BOWEL ELIMINATION  Goal: LTG - patient will have regular and routine bowel evacuation  Outcome: Ongoing  Goal: STG - patient will be accident free  Outcome: Ongoing  Goal: STG - Patient will verbalize signs and symptoms of constipation and how to prevent/alleviate  Outcome: Ongoing     Problem: Nutrition  Goal: Optimal nutrition therapy  Outcome: Ongoing

## 2022-03-29 NOTE — PROGRESS NOTES
Call in to dietician office, pt requesting other nutritional supplement, does not like the one she is currently receiving, which is 4 oz standard oral supplement breakfast and dinner. Left message with all return information provided.  Electronically signed by Gissell Núñez RN on 3/29/2022 at 4:34 PM

## 2022-03-29 NOTE — PROGRESS NOTES
Occupational Therapy  Facility/Department: [address] IP REHAB  Daily Treatment Note  NAME: Isabella Guillory  : 1960  MRN: [id number]    Date of Service: 3/29/2022    Discharge Recommendations:  Home with assist PRN,S Level 1,Home with Home health OT  OT Equipment Recommendations  Walker: Rolling  ADL Assistive Devices: Walker Basket;Long-handled Sponge;Long-handled Shoe Horn;Sock-Aid Hard;Reacher  Other: she purchased hip kit from [employer] and should arrive 3-27-22 to her home. She will need RW, RTS + arms, will wait to get a shower chair (so home OT can measure for fit; she already [employer]" on one which doesn't fit in bathrm) inquiring about 1/2 bed rail    Assessment   Performance deficits / Impairments: Decreased functional mobility ; Decreased safe awareness;Decreased balance;Decreased ADL status; Decreased endurance;Decreased high-level IADLs;Decreased strength  Assessment: pt is limited this AM by nausea, fatigue &  R shld thru chest pain (did not have this pain last wk). Pt had 1st round of radiation tx  yesterday & is scheduled @ 9:30am everyday. Pt needed Close SBA for sit<>stand transitions d/t non use of R UE; she rocked forward to get up, OT allowed her to place R forearm onto handle of RW; used RW into bathrm & completed recliner & stretcher t/f w/ close SBA. She was able to sponge bathe this AM w/ SB/set up & extra time; good recall of how to use A/E to manage LB dressing. May need RTS + arms. Cont to explore need for DME she is planning to return home on Thurs w/ [facility] services.   Treatment Diagnosis: impaired ADL/fxl mobility  Prognosis: Good  Decision Making: Medium Complexity  History: see above  Exam: mobility, self care  Assistance / Modification: assist of 1, RW  OT Education: ADL Adaptive Strategies;Transfer Training  Patient Education: reviewed use of A/E to manage LB ADLs, cues for safety during t/f  REQUIRES OT FOLLOW UP: Yes  Activity Tolerance  Activity Tolerance: Patient limited by fatigue;Treatment limited secondary to medical complications (free text)  Activity Tolerance: fatigue, pain & nausea impacted her performances  Safety Devices  Safety Devices in place: Yes  Type of devices: Gait belt     PM session: met in therapy dept, reporting 4/10 L hip pain. She is reporting some nausea, had radiation tx in the AM (nausea is a side effect of tx). She is agreeable for kitchen task. Pt has been teaching her 13 y/o grandson how to use the air fryer and microwave. Pt able to complete kitchen mobility using RW, given instruction how to position herself close to fridge, able to grab bread & place into basket; able to cart over to counter & place into toaster. Pt fatigued and dragging R foot, close SBA + cues to correct. Dr Niurka Painting in to visit pt--discussed nausea & poor appetite issues. She recommended pt try Magic Cup or Juice flavored Ensure. Pt used RW from w/c to recliner x 10 ft w/ close SBA, no LOB but fatigue noted. OT filled pt's pitcher w/ ice & water. Tx time: 55 minutes, cont w/ POC Nara Edward, OTR/L #[id number]    Patient Diagnosis(es): The primary encounter diagnosis was Carcinoma of breast metastatic to bone, unspecified laterality ([facility] [address] 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 has a past medical history of Cancer ([address] Depression, Hyperthyroidism, and Nephrolithiasis. has a past surgical history that includes Cholecystectomy; Hysterectomy; Mastectomy; Femur fracture surgery (Left, 3/19/2022); and US BIOPSY LIVER PERCUTANEOUS (3/25/2022).     Restrictions  Restrictions/Precautions  Restrictions/Precautions: Weight Bearing,Fall Risk  Lower Extremity Weight Bearing Restrictions  Left Lower Extremity Weight Bearing: Weight Bearing As Tolerated  Position Activity Restriction  Other position/activity restrictions: WBAT L LE, undergoing radiation tx  Subjective   General  Chart Reviewed: Yes,Progress Notes,Imaging  Patient assessed for rehabilitation services?: Yes  Additional Pertinent Hx: Per Dr. Cooper Elijah is a 63 yo F with pmh R breast cancer (s/p R mastectomy 2007, chemo, 5 years anti-estrogen treatment, then lost follow-up with Dr. Maikol Rivera), DM2, hyperthyroidism, and depression who initially presented  3/16/2022 with left hip pain. Had been progressing since 11/2021. Imaging revealed diffuse metastatic disease/lytic bone lesions involving the chest, thoracic spine, lumbar spine, left hemipelvis and proximal left femur. Ortho consulted and patient deemed high risk for pathologic fracture given large destructive lesions centered around the left femoral head. Therefore underwent ORIF with IM kelvin and bone biopsy (3/19 with Dr. Keira Dye). Now WBAT. Biopsy is pending and primary malignancy remains unclear. Per Oncology, plan will be for radiation in few weeks. Course complicated by hypercalcemia, acute liver injury, constipation. Now presents to ARU with impaired mobility and self-care below her baseline. Currently, patient reports moderate pain in the left groin and lateral hip with radiation into the anterolateral thigh. She also has mild-moderate pain in the low back and right hip. She denies tingling/numbness or focal weakness.  She is motivated to start inpatient program.\"  Family / Caregiver Present: No  Referring Practitioner: Barbara Garcia  Diagnosis: L hip fx s/p ORIF  Subjective  Subjective: met in room, pt sitting up in recliner, only took a few bites of breakfast d/t having nausea ; reports 8/10 R upper chest pain, RN already gave meds  General Comment  Comments: offered shower but only wanted to sponge bathe      Orientation  Orientation  Overall Orientation Status: Within Normal Limits  Objective    ADL  Equipment Provided: Reacher;Sock aid;Long-handled shoe horn;Long-handled sponge  Feeding: Independent (limited appetite d/t nausea)  Grooming: Setup (provided set up to comb hair, apply deodorant & wash face; unable to complete oral care d/t time constraints (transportation arrived early for radiation tx))  UE Bathing: Setup (OT provided set up of basin & washcloths, she was able to sponge bathe UB IND'ly after set up)  LE Bathing: Stand by assistance (pt sponge bathed LB using LH sponge to reach shins<>feet, cues to dry by wrapping towel @ sponge; stood w/SBA to wash deirdre areas, slow to move d/t R upper chest pain when using R arm, cues for thoroughness)  UE Dressing: Setup (able to doff/don shirt w/ set up while seated in recliner)  LE Dressing: Stand by assistance;Setup;Verbal cueing (used reacher to doff/don clothing, slow to place L foot into pants, stood w/ close SBA to manage clothing over hips; used SA to don socks w/ cues & set up)  Additional Comments: pt does not think TSF will fit; has standard toilet; may want RTS + arms        Balance  Sitting Balance: Modified independent  (difficulty bending over to reach feet but able to use A/E for LB dressing)  Standing Balance: Stand by assistance  Standing Balance  Time: @ 2 minutes  Activity: during LB ADLs, fxl mob  Comment: SBA during clothing management, used support of RW  Functional Mobility  Functional - Mobility Device: Rolling Walker  Activity: Other  Assist Level: Stand by assistance  Functional Mobility Comments: used RW from recliner to stretcher x 10 ft w/ SBA, slow to move, struggles to place pressure thru R UE d/t upper chest pain  Wheelchair Bed Transfers  Wheelchair/Bed - Technique: Ambulating  Equipment Used: Other;Bed  Level of Asssistance: Stand by assistance  Wheelchair Transfers Comments: RW, cues for hand placement, struggles to use R UE d/t pain (appears to be pect muscle)  Bed mobility  Sit to Supine:  Moderate assistance (OT assisted B LEs onto stretcher, used footstool to push herself back onto stretcher)  Comment: she declined to use leg   Transfers  Sit to stand: Stand by assistance  Stand to sit: Stand by assistance  Transfer Comments: used RW, allowed her to place R forearm on handle of RW as she cannot push w/ it d/t R upper chest pain; cues throughout                    Vision  Patient Visual Report: No visual complaint reported. Vision Comment: distance glasses  Cognition  Overall Cognitive Status: WNL  Cognition Comment: good recall of how to use A/E prn                                         Plan   Plan  Times per week: 5-6  Times per day: Daily  Plan weeks: 1 wk, pt agreeing to stay until Thurs, 3-31-22. Specific instructions for Next Treatment: car t/f, kitchen  Current Treatment Recommendations: Strengthening,Endurance Training,Patient/Caregiver Education & Training,Balance Training,Pain Management,Functional Mobility Training,Safety Education & Training,Self-Care / ADL,Equipment Evaluation, Education, & procurement,Home Management Training    Goals  Short term goals  Time Frame for Short term goals: 1 week  Short term goal 1: Pt will be mod I with functional transfers  Short term goal 2: Pt will be mod I with functional mobility  Short term goal 3: Pt will be mod I with toileting  Short term goal 4: Pt will be mod I with bathing and dressing  Short term goal 5: Pt will be mod I with bed mobility  Long term goals  Time Frame for Long term goals : STG = LTG  Patient Goals   Patient goals : Pt's goal is to return home with her grandsons as soon as possible.        Therapy Time   Individual Concurrent Group PM-treatment   Time In 1169 1556   Time Out 0908      1610   Minutes 53      55   Timed Code Treatment Minutes: [address], [place] [address][id number]

## 2022-03-29 NOTE — PROGRESS NOTES
Physical Therapy  Facility/Department: [address] IP REHAB  Daily Treatment Note  NAME: Aiyana Bustos  : 1960  MRN: [id number]    Date of Service: 3/29/2022    Discharge Recommendations:  Continue to assess pending progress,Home with Home health PT,S Level 3,Home with assist PRN,Patient would benefit from continued therapy after discharge   PT Equipment Recommendations  Walker: Rolling  Other: Anticipate need for FWW at d/c, Pt. has rollator but this will likely be unsafe for Pt. at d/c    Assessment   Assessment: 63 y/o female admit 3/16/2022 with L Hip Pain, Met Malignant Neoplasm. CT Hip : Osseous Met Disease throughout L Hemipelvis, L Sacrum and Prox L Femur. CT T/L Spine L Compress Fx L1 (uncertain chronicity), Subacute Rib Fxs, Diffuse Bony Mets. 3/19/2022 S/P L Hip ORIF with IM Supa, L Fem Head Biopsy. PMH as noted including Breast Ca/Mastectomy. 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 Today, 3/29, pt continues to be limited in functional mobility due to L hip pain and fatigue. However pt demonstrates great improvement in activity tolerance today, she completes stairs, curb step, and ambulation with only short rest breaks needed throughout  Pt able to amb with RW and supervision assistance short distances (50' and 40'), and transfers sit<>stand with CGA/SBA. Pt completes curb step with RW and stairs with B HHA on railings with CGA, she needs no VC for sequencing and has no knee buckling. Increased time needed for all functional mobility and transfers throughout session, but pt feeling much better overall this morning compared to yesterday. Pt will benefit from skilled PT to improve functional mobility and optimize independence.  Session ends with pt seated in [facility], therapist present, awaiting return to room.  Prognosis: Good;Guarded  PT Education: Goals;PT Role;Plan of Care;Transfer Training;Gait Training;Functional Mobility Training;Weight-bearing Education  Barriers to Learning: None. Activity Tolerance  Activity Tolerance: Patient limited by fatigue;Patient limited by pain; Patient limited by endurance  Activity Tolerance: Pt with improved activity tolerance this morning, though she does need frequent rest due to fatigue. Patient Diagnosis(es): The primary encounter diagnosis was Carcinoma of breast metastatic to bone, unspecified laterality ([facility] [address] 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) were also pertinent to this visit. has a past medical history of Cancer ([facility] Depression, Hyperthyroidism, and Nephrolithiasis. has a past surgical history that includes Cholecystectomy; Hysterectomy; Mastectomy; Femur fracture surgery (Left, 3/19/2022); and US BIOPSY LIVER PERCUTANEOUS (3/25/2022). Restrictions  Restrictions/Precautions  Restrictions/Precautions: Weight Bearing,Fall Risk  Lower Extremity Weight Bearing Restrictions  Left Lower Extremity Weight Bearing: Weight Bearing As Tolerated  Position Activity Restriction  Other position/activity restrictions: WBAT L LE, undergoing radiation tx    Subjective   General  Chart Reviewed: Yes  Additional Pertinent Hx: 63 y/o female admit 3/16/2022 with L Hip Pain, Met Malignant Neoplasm. CT Hip : Osseous Met Disease throughout L Hemipelvis, L Sacrum and Prox L Femur. CT T/L Spine L Compress Fx L1 (uncertain chronicity), Subacute Rib Fxs, Diffuse Bony Mets. 3/19/2022 S/P L Hip ORIF with IM Supa, L Fem Head Biopsy. PMH as noted including Breast Ca/Mastectomy. Response To Previous Treatment: Patient with no complaints from previous session.   Family / Caregiver Present: No  Referring Practitioner: Dr. Thomas Fuentes  Subjective  Subjective: Pt seated [facility] upon arrival, she agrees to therapy treatment. She states she is having amuch better morning, she is in no pain at rest and she slept well. Radiation went better this morning    Orientation  Orientation  Overall Orientation Status: Within Normal Limits    Objective   Transfers  Sit to Stand: Contact guard assistance;Stand by assistance  Stand to sit: Contact guard assistance;Stand by assistance  Comment: Pt able to use reacher device while standing at RW to bend down and  pen from ground with CGA. She uses reacher in R hand with L hand on RW for support. Ambulation  Ambulation?: Yes  WB Status: WBAT LLE  More Ambulation?: No  Ambulation 1  Surface: level tile  Device: Rolling Walker  Other Apparatus: Wheelchair follow  Assistance: Supervision  Quality of Gait: step through pattern with good heel contact, good weight bearing through BLE but decreased stance time on LLE. Pt with decreased indu and step length, she fatigues quickly but shows improved tolerance to ambulation today and is able to walk for longer distances before needing to sit. Gait Deviations: Slow Indu;Decreased step length;Decreased step height  Distance: 50', 40' with a rest break between bouts  Stairs/Curb  Stairs?: No  Stairs  # Steps : 4  Stairs Height: 6\"  Rails: Bilateral  Curbs: 6\"  Device: No Device (RW on curb step. No AD on stairs)  Assistance: Contact guard assistance  Comment: Step to pattern leading with the R LE ascending LLE descending; without any knee buckling throughout, pt steady and without LOB. She requires increased time and is fatigued by the end, but she is able to independently lift BLE onto next step while using BUE on railings for support. Pt CGA for curb step with RW, minor VC needed for sequencing. Pt steady throughout without LOB, but she feels anxious about stepping up onto step because she is afraid of the RW slipping.      Balance  Posture: Fair  Sitting - Static: Good  Sitting - Dynamic: Good  Standing - Static: Good  Standing - Dynamic: Good;-    Afternoon Session  Pt seated in recliner upon arrival to room. She denies pain but states she feels \"icky\" after having blood drawn over her lunch break. Pt transfers recliner>WC via stand pivot with RW, SBA required. Pt transfers sit<>stand with RW SBA throughout. Pt practices bed mobility on standard flat bed in ADL suite. Serge needed for sit<>supine and SBA for rolling B. Pt needs increased time for al bed mobility due to increase in pain and weakness. Assistance needed to lift legs into bed during sit>supine and to bring her trunk into upright sitting during supine>sit. It was recommended to try use of leg  to assist legs in and out of bed, but patient declined this. Pt would benefit from hand rail to push and pull on during bed mobility. Pt unable to perform effective bridging despite cuing. Pt states she thinks she will sleep in a recliner at home upon initial return. Pt practices car tx next. She needs modA for this transfer, with assistance to lift RLE into car as well as assistance from standing from car seat to RW. Pt requires only minor VC for  Sequencing. Car transfer causes increase in pain in R hip, pt grimaces in pain throughout transfer and requires increased time to complete, she takes multiple short breaks due to increase in pain and fatigue. Again reviewed benefits of leg , and patient did attempt to use, but stated it caused increased pain in left LE and could not complete use of leg . Pt requires increased time for all functional mobility this afternoon. Session ends with pt seated in [facility], therapist present, awaiting return to room. Pt planning to D/C home this Thursday, 3/31.        Safety Device - Type of devices:  [x]  All fall risk precautions in place [] Bed alarm in place  [] Call light within reach [] Chair alarm in place [] Positioning belt [x] Gait belt [x] Patient at risk for falls [] Left in bed [x] Left in chair [] Telesitter in use [] Sitter present [] Nurse notified []  None    Goals  Short term goals  Time Frame for Short term goals: 10 days  Short term goal 1: Supine to/from sit wtih Mod I  Short term goal 2: Sit to/from stand with Mod I  Short term goal 3: Up and down 4 steps with B rails and Supervision  Short term goal 4: Up and down curb step with Supervision  Short term goal 5: Car transfer with Supervision  Long term goals  Time Frame for Long term goals : LTG == STG's  Patient Goals   Patient goals : Return home. Plan    Plan  Times per week: Pt. to be seen 90 minutes/day, 5 out of the 7 days/wk  Current Treatment Recommendations: Strengthening,Functional Mobility Training,Transfer [employer] Training,Safety Education & Training,Patient/Caregiver Education & Training,ADL/Self-care Training,Positioning,Modalities,Neuromuscular Re-education,Endurance Training,Balance Training,Stair training,Home Exercise Program,Equipment Evaluation, Education, & procurement  Plan Comment: Plan for DC this upcoming Thursday 3/31  Safety Devices  Type of devices: All fall risk precautions in place,Patient at risk for falls,Gait belt,Left in chair  Restraints  Initially in place: No     Therapy Time   Individual Concurrent Group Co-treatment   Time In 1030         Time Out 1115         Minutes 45             Second Session Therapy Time     Individual Co-treatment   Time In 1300     Time Out 1345     Minutes 45       Electronically signed by Kellee ARREGUIN on 3/29/2022 at 12:01 PM   Therapist was present, directed the patient's care, made skilled judgement, and was responsible for assessment and treatment of the patient.       Ricky Shelley, [id number]

## 2022-03-30 LAB
GLUCOSE BLD-MCNC: 108 MG/DL (ref 70–99)
GLUCOSE BLD-MCNC: 111 MG/DL (ref 70–99)
GLUCOSE BLD-MCNC: 113 MG/DL (ref 70–99)
GLUCOSE BLD-MCNC: 117 MG/DL (ref 70–99)
PERFORMED ON: ABNORMAL

## 2022-03-30 PROCEDURE — 97535 SELF CARE MNGMENT TRAINING: CPT

## 2022-03-30 PROCEDURE — 1280000000 HC REHAB R&B

## 2022-03-30 PROCEDURE — 97530 THERAPEUTIC ACTIVITIES: CPT

## 2022-03-30 PROCEDURE — 6370000000 HC RX 637 (ALT 250 FOR IP): Performed by: INTERNAL MEDICINE

## 2022-03-30 PROCEDURE — 94761 N-INVAS EAR/PLS OXIMETRY MLT: CPT

## 2022-03-30 PROCEDURE — 6370000000 HC RX 637 (ALT 250 FOR IP): Performed by: PHYSICAL MEDICINE & REHABILITATION

## 2022-03-30 PROCEDURE — 97116 GAIT TRAINING THERAPY: CPT

## 2022-03-30 PROCEDURE — 97110 THERAPEUTIC EXERCISES: CPT

## 2022-03-30 RX ORDER — LIDOCAINE 4 G/G
1 PATCH TOPICAL DAILY
Qty: 30 PATCH | Refills: 0 | Status: SHIPPED | OUTPATIENT
Start: 2022-03-31 | End: 2022-04-30

## 2022-03-30 RX ORDER — FAMOTIDINE 20 MG/1
20 TABLET, FILM COATED ORAL 2 TIMES DAILY
Qty: 60 TABLET | Refills: 0 | Status: SHIPPED | OUTPATIENT
Start: 2022-03-30

## 2022-03-30 RX ORDER — ANASTROZOLE 1 MG/1
1 TABLET ORAL DAILY
Qty: 30 TABLET | Refills: 0 | Status: SHIPPED | OUTPATIENT
Start: 2022-03-30

## 2022-03-30 RX ORDER — ONDANSETRON 4 MG/1
4 TABLET, FILM COATED ORAL EVERY 8 HOURS PRN
Qty: 20 TABLET | Refills: 0 | Status: SHIPPED | OUTPATIENT
Start: 2022-03-30

## 2022-03-30 RX ORDER — OXYCODONE HYDROCHLORIDE 5 MG/1
5-10 TABLET ORAL EVERY 8 HOURS PRN
Qty: 40 TABLET | Refills: 0 | Status: SHIPPED | OUTPATIENT
Start: 2022-03-30 | End: 2022-04-11 | Stop reason: SDUPTHER

## 2022-03-30 RX ADMIN — ANASTROZOLE 1 MG: 1 TABLET ORAL at 08:27

## 2022-03-30 RX ADMIN — ONDANSETRON HYDROCHLORIDE 4 MG: 4 TABLET, FILM COATED ORAL at 11:57

## 2022-03-30 RX ADMIN — OXYCODONE HYDROCHLORIDE 10 MG: 10 TABLET ORAL at 14:15

## 2022-03-30 RX ADMIN — OXYCODONE HYDROCHLORIDE 10 MG: 10 TABLET ORAL at 08:19

## 2022-03-30 RX ADMIN — FAMOTIDINE 20 MG: 20 TABLET ORAL at 08:19

## 2022-03-30 RX ADMIN — SENNOSIDES AND DOCUSATE SODIUM 1 TABLET: 50; 8.6 TABLET ORAL at 20:03

## 2022-03-30 RX ADMIN — SENNOSIDES AND DOCUSATE SODIUM 1 TABLET: 50; 8.6 TABLET ORAL at 08:19

## 2022-03-30 RX ADMIN — FAMOTIDINE 20 MG: 20 TABLET ORAL at 20:03

## 2022-03-30 ASSESSMENT — PAIN DESCRIPTION - PROGRESSION
CLINICAL_PROGRESSION: NOT CHANGED
CLINICAL_PROGRESSION_2: NOT CHANGED
CLINICAL_PROGRESSION: NOT CHANGED
CLINICAL_PROGRESSION_2: GRADUALLY IMPROVING
CLINICAL_PROGRESSION: NOT CHANGED

## 2022-03-30 ASSESSMENT — PAIN DESCRIPTION - DESCRIPTORS
DESCRIPTORS_2: ACHING;DISCOMFORT
DESCRIPTORS: ACHING;DISCOMFORT
DESCRIPTORS: ACHING;DISCOMFORT
DESCRIPTORS_2: ACHING;DISCOMFORT
DESCRIPTORS: ACHING

## 2022-03-30 ASSESSMENT — PAIN DESCRIPTION - PAIN TYPE
TYPE: SURGICAL PAIN
TYPE_2: SURGICAL
TYPE: SURGICAL PAIN
TYPE_2: SURGICAL

## 2022-03-30 ASSESSMENT — PAIN DESCRIPTION - LOCATION
LOCATION_2: HIP
LOCATION: CHEST
LOCATION: LEG;CHEST
LOCATION_2: HIP
LOCATION: CHEST

## 2022-03-30 ASSESSMENT — PAIN DESCRIPTION - FREQUENCY
FREQUENCY: CONTINUOUS

## 2022-03-30 ASSESSMENT — PAIN DESCRIPTION - ORIENTATION
ORIENTATION_2: LEFT
ORIENTATION_2: LEFT
ORIENTATION: RIGHT;UPPER
ORIENTATION: RIGHT;UPPER
ORIENTATION: LEFT;UPPER

## 2022-03-30 ASSESSMENT — PAIN DESCRIPTION - ONSET
ONSET: ON-GOING
ONSET_2: ON-GOING
ONSET: ON-GOING
ONSET: ON-GOING
ONSET_2: ON-GOING

## 2022-03-30 ASSESSMENT — PAIN SCALES - GENERAL
PAINLEVEL_OUTOF10: 5
PAINLEVEL_OUTOF10: 4
PAINLEVEL_OUTOF10: 5
PAINLEVEL_OUTOF10: 3
PAINLEVEL_OUTOF10: 9

## 2022-03-30 ASSESSMENT — PAIN - FUNCTIONAL ASSESSMENT
PAIN_FUNCTIONAL_ASSESSMENT: ACTIVITIES ARE NOT PREVENTED
PAIN_FUNCTIONAL_ASSESSMENT: ACTIVITIES ARE NOT PREVENTED
PAIN_FUNCTIONAL_ASSESSMENT: PREVENTS OR INTERFERES SOME ACTIVE ACTIVITIES AND ADLS

## 2022-03-30 ASSESSMENT — PAIN DESCRIPTION - INTENSITY
RATING_2: 9
RATING_2: 4
RATING_2: 5
RATING_2: 3

## 2022-03-30 ASSESSMENT — PAIN DESCRIPTION - DURATION
DURATION_2: INTERMITTENT
DURATION_2: INTERMITTENT

## 2022-03-30 NOTE — PROGRESS NOTES
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)    Subjective:   No acute events overnight. Patient seen this afternoon sitting up in gym. She reports looking forward to discharge home tomorrow. We discussed plans for home care, medications, and follow-ups. Labs reviewed. ROS: No f/c, n/v, cp     Objective:  Patient Vitals for the past 24 hrs:   BP Temp Temp src Pulse Resp SpO2 Height Weight   22 1519 116/71 98 °F (36.7 °C) Oral 83 17 96 % -- --   22 0904 -- -- -- -- -- -- 5' 5\" (1.651 m) --   22 0818 -- -- -- -- -- 97 % -- --   22 0817 127/61 97.5 °F (36.4 °C) Oral 85 16 -- -- --   22 0814 -- -- -- -- 16 95 % -- --   22 0500 105/72 97.6 °F (36.4 °C) Oral 72 16 96 % -- 235 lb 10.8 oz (106.9 kg)   22 120/71 98.6 °F (37 °C) Oral 81 18 98 % -- --     Const: Alert. No distress, pleasant. HEENT: Normocephalic, atraumatic. Normal sclera/conjunctiva. MMM. CV: Regular rate and rhythm. Resp: No respiratory distress. Lungs CTAB. Abd: Soft, nontender, nondistended, NABS+   Ext: trace edema (L>R). MSK: Decreased left>right hip ROM, post op swelling left thigh  Neuro: Alert, oriented, appropriately interactive. Psych: Cooperative, appropriate mood and affect    Laboratory data: Available via EMR.    Last 24 hour lab  Recent Results (from the past 24 hour(s))   POCT Glucose    Collection Time: 22  8:04 PM   Result Value Ref Range    POC Glucose 118 (H) 70 - 99 mg/dl    Performed on ACCU-CHEK    POCT Glucose    Collection Time: 22  7:18 AM   Result Value Ref Range    POC Glucose 108 (H) 70 - 99 mg/dl    Performed on ACCU-CHEK    POCT Glucose    Collection Time: 22 12:12 PM   Result Value Ref Range    POC Glucose 117 (H) 70 - 99 mg/dl    Performed on ACCU-CHEK        Therapy progress:  PT  Position Activity Restriction  Other position/activity restrictions: WBAT L LE, undergoing radiation tx  Objective     Sit to Stand: Modified independent (pt pushes with LLE off of arm rest with R hand on walker for stability)  Stand to sit: Modified independent  Bed to Chair: Contact guard assistance (with use of the walker)  Device: Rolling Walker  Other Apparatus: Wheelchair follow  Assistance: Supervision  Distance: 35' beofre needing rest  OT  PT Equipment Recommendations  Equipment Needed: Yes  Mobility Devices: Mayme Dynes: Rolling  Other: Anticipate need for FWW at d/c, Pt. has rollator but this will likely be unsafe for Pt. at d/c  Toilet - Technique: Ambulating  Equipment Used: Grab bars  Toilet Transfers Comments: RW, amb short distance from w/c to commode using TSF  Assessment        SLP          Body mass index is 39.22 kg/m².     Rehabilitation Diagnosis:   Orthopedic, 8.9, Other Orthopedic        Assessment and Plan:     Impairments: decreased L>R hip ROM, decreased balance, endurance     Left hip metastatic lytic bone lesion   -s/p ORIF (3/19 with Dr. Rogers Kathleen)  -Wound care  -WBAT  -PT/OT     Stage IV breast cancer with diffuse bony lesions   -Recurrent (previously treated 2007)  -Confirmed on bone biopsy (3/19)  -Oncology (Dr. Yoav Rutledge) following, appreciate input  -Arimidex started, plan for Ibrance as outpatient  -Palliative radiation for sacral region started (3/28), plan to start radiation to femur 2 weeks post surgery    Hypercalcemia  -Resolved s/p Zometa  -Monitor     Acute liver injury/elevated LFTs  -GI following  -Serologic/viral work-up unremarkable  -s/p Liver biopsy with IR (3/25) -- +circulating tumor cells present within sinusoidal spaces showing an immunohistochemical profile compatible with known breast adenocarcinoma primary  -Monitor -- remain elevated but now trending down     DM2  -ISS while inpatient     Depression  -Not on treatment, monitor mood     Bladder   -High risk retention   -Monitor PVRs, SC prn >300cc     Bowel   -Constipation   -senna+colace BID, PRN miralax, MoM, and bisacodyl supp.     Safety   -fall precautions     Pain control  -oxycodone prn     PPx  -DVT: lovenox x 28 days post op per Ortho  -GI: famotidine    Rehab Progress: Making progress. Working on functional mobility, balance, compensatory strategies. Anticipated Dispo: home with 3 grandsons: 24 yo (special needs, requires total care, uses lift equipment), 13 yo, 5 yo (autism); daughter temporarily staying  Services: [facility] PT, OT, RN  DME: rolling walker  ELOS: 3/31      [address] Awad MD 3/30/2022, 4:30 PM

## 2022-03-30 NOTE — PROGRESS NOTES
Patient admitted to rehab with left hip metastatic lytic bone lesion s/p ORIF. A/Ox4. Transfers with stedyx1. Mobility restrictions: WBAT. On general diet, tolerating well . Medications taken whole with thin liquids. On lovenox for DVT prophylaxis (on hold s/p liver biopsy). Skin: incision left hip, biopsy site RUQ. Oxygen: RA. LDA: none. Has been continent of bowel and continent of bladder. LB 3/28. Chair/bed alarms in use and call light in reach. Will monitor for safety.

## 2022-03-30 NOTE — PROGRESS NOTES
Physical Therapy  Facility/Department: [address] REHAB  Daily Treatment Note/Discharge Note   NAME: Norm Orellana  : 1960  MRN: [id number]    Date of Service: 3/30/2022    Discharge Recommendations:  Continue to assess pending progress,Home with Home health PT,S Level 3,Home with assist PRN,Patient would benefit from continued therapy after discharge   PT Equipment Recommendations  Walker: Rolling  Other: Anticipate need for FWW at d/c, Pt. has rollator but this will likely be unsafe for Pt. at d/c    Assessment   Assessment: 65 y/o female admit 3/16/2022 with L Hip Pain, Met Malignant Neoplasm. CT Hip : Osseous Met Disease throughout L Hemipelvis, L Sacrum and Prox L Femur. CT T/L Spine L Compress Fx L1 (uncertain chronicity), Subacute Rib Fxs, Diffuse Bony Mets. 3/19/2022 S/P L Hip ORIF with IM Supa, L Fem Head Biopsy. PMH as noted including Breast Ca/Mastectomy. 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 Today, 3/30, pt continues to be limited in functional mobility due to L hip pain and fatigue. However pt demonstrates great improvement in activity tolerance today, she completes stairs, curb step, car transfer and ambulation this morning with only short rest breaks needed throughout  Pt able to amb with RW and supervision assistance short distances (35'), and transfers sit<>stand with RW mod I. Pt completes curb step with RW and stairs with B HHA on railings with supervision assistance, she needs no VC for sequencing and has no knee buckling. ModA needed for car transfer, pt needs assistance lifting legs into car even when using leg , and she needs assisstance standing to RW from car seat. Pt uses reacher device to  pen from ground mod I with L HHA on RW. Plan to assess bed mobility this afternoon. Increased time needed for all functional mobility and transfers throughout session. Pt will benefit from skilled PT once home to improve functional mobility and optimize independence. Pt states she feels prepared and safe to return home tomorrow, however she is anxious and nervous. Pt demontrates improvement in all functional mobility and transfers, she is safe to D/C home tomorrow 3/31. She scores 15/15 on BIMS. 3/5 goals met. Session ends with pt seated in [facility], therapist present, awaiting OT session. Prognosis: Good;Guarded  PT Education: Goals;PT Role;Plan of Care;Transfer Training;Gait Training;Functional Mobility Training;Weight-bearing Education  Barriers to Learning: None. Activity Tolerance  Activity Tolerance: Patient limited by fatigue;Patient limited by pain; Patient limited by endurance  Activity Tolerance: Pt with improved activity tolerance this morning, though she does need frequent rest due to fatigue. Patient Diagnosis(es): The primary encounter diagnosis was Carcinoma of breast metastatic to bone, unspecified laterality ([facility] 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 has a past medical history of Cancer ([facility] Depression, Hyperthyroidism, and Nephrolithiasis. has a past surgical history that includes Cholecystectomy; Hysterectomy; Mastectomy; Femur fracture surgery (Left, 3/19/2022); and US BIOPSY LIVER PERCUTANEOUS (3/25/2022). Restrictions  Restrictions/Precautions  Restrictions/Precautions: Weight Bearing,Fall Risk  Lower Extremity Weight Bearing Restrictions  Left Lower Extremity Weight Bearing: Weight Bearing As Tolerated  Position Activity Restriction  Other position/activity restrictions: WBAT L LE, undergoing radiation tx    Subjective   General  Chart Reviewed:  Yes  Additional Pertinent Hx: 63 y/o female admit 3/16/2022 with L Hip Pain, Met Malignant Neoplasm. CT Hip : Osseous Met Disease throughout L Hemipelvis, L Sacrum and Prox L Femur. CT T/L Spine L Compress Fx L1 (uncertain chronicity), Subacute Rib Fxs, Diffuse Bony Mets. 3/19/2022 S/P L Hip ORIF with IM Supa, L Fem Head Biopsy. PMH as noted including Breast Ca/Mastectomy. Response To Previous Treatment: Patient with no complaints from previous session. Family / Caregiver Present: No  Referring Practitioner: Dr. Lizbeth Warner  Subjective  Subjective: Pt seated [facility] upon arrival, she agrees to therapy treatment. She states she is having \"a little discomfort in L hip and back\" but denies any real pain. She had radiation this morning and is very tired, but she is excited to D/C tomorrow. Orientation  Orientation  Overall Orientation Status: Within Normal Limits    Objective   Transfers  Sit to Stand: Modified independent (pt pushes with LLE off of arm rest with R hand on walker for stability)  Stand to sit: Modified independent  Car Transfer: Moderate Assistance (pt needed assistance bring RLE into car even when using leg  for assistance, as well as standing to RW from seat of car)  Comment: Pt able to use reacher device while standing at RW to bend down and  pen from ground with mod I. She uses reacher in R hand with L hand on RW for support. Ambulation  Ambulation?: Yes  WB Status: WBAT LLE  More Ambulation?: No  Ambulation 1  Surface: level tile  Device: Rolling Walker  Other Apparatus: Wheelchair follow  Assistance: Supervision  Quality of Gait: step through pattern with good heel contact, good weight bearing through BLE but decreased stance time on LLE due to increase in pain. Pt with decreased indu and step length, she fatigues quickly and needs to sit after short bouts for rest. Pt states that ambulation causes increase in pain in LLE, and that she does not want to push it too much.   Gait Deviations: Slow Indu;Decreased step length;Decreased step height  Distance: 35' beofre needing rest    Stairs/Curb  Stairs?: Yes  Stairs  # Steps : 4  Stairs Height: 6\"  Rails: Bilateral  Curbs: 6\"  Device: No Device (RW on curb step. No AD on stairs)  Assistance: Supervision  Comment: Step to pattern leading with the R LE ascending LLE descending; without any knee buckling throughout, pt steady and without LOB. She requries increased time and is fatigued by the end, but she is able to independently lift BLE onto next step while using BUE on railings for support. Pt supervision for curb step with RW, no VC needed for sequencing. Pt steady throughout without LOB, pt more ocnfident during curb step and stairs today. Balance  Posture: Fair  Sitting - Static: Good  Sitting - Dynamic: Good  Standing - Static: Good  Standing - Dynamic: Good;-    Afternoon Session   Pt seated in  at the beginning of the session. She states she is in a lot more pain this afternoon, 9.5/10 pain that is worst at the L knee. She has an ice pack on her L knee and across her chest. Pt states that she thinks she overdid it this morning. Pt participates in therex while seated in [facility], however she is only able to complete march x10B before requesting to stop therex due to intense pain. Pt states she \"does not know if she will be able to do anything this afternoon. \" Therapist contacts RN to infer about pain medication, RN administers pain medication during session. Pt elects not to participate in any seated intervention while she is wating for her pain medication to arrive she states she is in too much pain. Dr. Zahida Diaz in to talk with patient during session and discuss D/C plans for tomorrow. Pt reiterates that she is prepared to return home and is excited, however she is still nervous about her return home. Pt politely declines participation in bed mobility this afternoon secondary to her elevated pain levels.  Last time the pt completed bed mobility, yesterday 3/29, she needed SBA for rolling B and Tiffany for supine<>sit to help control BUE onto bed and trunk into sitting position. Sitting HEP administered to pt, pt verbalizes understanding of all interventions. Pt states she feels prepared to return home. Pt safe to return home tomorrow with home PT, bu tit is not recommended that she return to providing care to dependent grandchild secondary to her limited endurance, reduced strength, and intermittent severe pain. She met 3/5 goals and is continuing to show good progress when she is not limited by pain. Session ends with pt seated in [facility], therapist present, awaiting return to room. Safety Device - Type of devices:  [x]  All fall risk precautions in place [] Bed alarm in place  [] Call light within reach [] Chair alarm in place [] Positioning belt [x] Gait belt [x] Patient at risk for falls [] Left in bed [x] Left in chair [] Telesitter in use [] Sitter present [] Nurse notified []  None    Goals  Short term goals  Time Frame for Short term goals: 10 days  Short term goal 1: Supine to/from sit wtih Mod I  Short term goal 2: Sit to/from stand with Mod I- met 3/30  Short term goal 3: Up and down 4 steps with B rails and Supervision- met 3/30  Short term goal 4: Up and down curb step with Supervision- met 3/30  Short term goal 5: Car transfer with Supervision- not met  Long term goals  Time Frame for Long term goals : LTG == STG's  Patient Goals   Patient goals : Return home.     Plan    Plan  Times per week: Pt. to be seen 90 minutes/day, 5 out of the 7 days/wk  Current Treatment Recommendations: Strengthening,Functional Mobility Training,Transfer [employer] Training,Safety Education & Training,Patient/Caregiver Education & Training,ADL/Self-care Training,Positioning,Modalities,Neuromuscular Re-education,Endurance Training,Balance Training,Stair training,Home Exercise Program,Equipment Evaluation, Education, & procurement  Plan Comment: Plan for DC this upcoming Thursday 3/31  Safety Devices  Type of devices: All fall risk precautions in place,Patient at risk for falls,Gait belt,Left in chair  Restraints  Initially in place: No     Therapy Time   Individual Concurrent Group Co-treatment   Time In 1030         Time Out 1115         Minutes 45            Second Session Therapy Time     Individual Co-treatment   Time In 1345     Time Out 1430     Minutes 45         Electronically signed by Jewels ARREGUIN on 3/30/2022 at 1:48 PM  Therapist was present, directed the patient's care, made skilled judgement, and was responsible for assessment and treatment of the patient.       Brittni Holley, [id number]

## 2022-03-30 NOTE — PROGRESS NOTES
Occupational Therapy  Facility/Department: [address] REHAB  Daily Treatment Note  NAME: Andrew Osborne  : 1960  MRN: [id number]    Date of Service: 3/30/2022    Discharge Recommendations:  Home with assist PRNS Level 1,Home with Home health OT  OT Equipment Recommendations  Walker: Rolling  ADL Assistive Devices: Walker Basket;Long-handled Sponge;Long-handled Shoe Horn;Sock-Aid Hard;Reacher  Other: she purchased hip kit from [employer] and should arrive 3-27-22 to her home. She will need RW, RTS + arms, will wait to get a shower chair (so home OT can measure for fit; she already [employer]" on one which doesn't fit in bathrm) inquiring about 1/2 bed rail    Assessment   Performance deficits / Impairments: Decreased functional mobility ; Decreased safe awareness;Decreased balance;Decreased ADL status; Decreased endurance;Decreased high-level IADLs;Decreased strength  Assessment: pt is limited this AM by nausea, fatigue & L hip pain, had 3rd round of radiation tx  @ 9:30am today. Pt declined any transfers or mobility due to feeling \"terrible. \" Pt tolerated seated UE strengthening exercises using 2 lb wts, continues to have R upper chest/pect pain, using pain patch w/ good results. OT showed pt how to use shower chair & to sit on it & swing LEs in however she declined to practice d/t pain & fatigue (her tub width at home is @ 20\"). she [facility] however does not appear medically ready  Treatment Diagnosis: impaired ADL/fxl mobility  Prognosis: Good  Decision Making: Medium Complexity  History: see above  OT Education: Equipment  Patient Education: OT measured shower chair to fit in her tub at home which is 20'' wide; pt declined to practiced d/t severe nausea & fatigue  REQUIRES OT FOLLOW UP: Yes  Activity Tolerance  Activity Tolerance: Treatment limited secondary to medical complications (free text); Patient limited by fatigue;Patient limited by pain  Activity Tolerance: pt refused to perform transfer or bathrm mobility d/t nausea, fatigue & L hip pain--she was returned to her room, RN aware; emesis bag next to pt (had radiation tx this AM)  Safety Devices  Safety Devices in place: Yes  Type of devices: Gait belt;Call light within reach;Nurse notified; Left in chair       PM session: Met pt in room, reporting 9/10 L knee pain. She is using ice pack to L knee and R upper chest. Discussed having her Will  & Advance Directives in order; she is concerned about her grandsons and \"losing the house\" which is in her name. She is thinking about a \"trust\" and having her daughter's name placed on the mortgage. Pt does have a life insurance policy however she & the grandchildren are on medicaid/[employer]. Pt aware she will not be able to drive & will rely on her daughter to take her to upcoming radiation tx. Pt is comfortable using A/E to manage LB ADls, aware she has a shower scheduled in the AM. Pt reports the medication for nausea has been helpful; appetite remains poor. Pt's new RW and RTS + arms were delivered to her room; pt denies any questions or concerns about home. Tx time: 45 min, cont w/ POC Bryan Felix, OTR/L #[id number]  Patient Diagnosis(es): The primary encounter diagnosis was Carcinoma of breast metastatic to bone, unspecified laterality ([facility] 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 has a past medical history of Cancer ([address] Depression, Hyperthyroidism, and Nephrolithiasis. has a past surgical history that includes Cholecystectomy; Hysterectomy; Mastectomy; Femur fracture surgery (Left, 3/19/2022); and US BIOPSY LIVER PERCUTANEOUS (3/25/2022).     Restrictions  Restrictions/Precautions  Restrictions/Precautions: Weight Bearing,Fall Risk  Lower Extremity Weight Bearing Restrictions  Left Lower Extremity Weight Bearing: Weight Bearing As Tolerated  Position Activity Restriction  Other position/activity restrictions: WBAT L LE, undergoing radiation tx  Subjective   General  Chart Reviewed: Yes,Progress Notes,Imaging  Patient assessed for rehabilitation services?: Yes  Additional Pertinent Hx: Per Dr. Zohra Manuel is a 65 yo F with pmh R breast cancer (s/p R mastectomy 2007, chemo, 5 years anti-estrogen treatment, then lost follow-up with Dr. Valeria Pollock), DM2, hyperthyroidism, and depression who initially presented  3/16/2022 with left hip pain. Had been progressing since 11/2021. Imaging revealed diffuse metastatic disease/lytic bone lesions involving the chest, thoracic spine, lumbar spine, left hemipelvis and proximal left femur. Ortho consulted and patient deemed high risk for pathologic fracture given large destructive lesions centered around the left femoral head. Therefore underwent ORIF with IM kelvin and bone biopsy (3/19 with Dr. Alejandra Phillips). Now WBAT. Biopsy is pending and primary malignancy remains unclear. Per Oncology, plan will be for radiation in few weeks. Course complicated by hypercalcemia, acute liver injury, constipation. Now presents to ARU with impaired mobility and self-care below her baseline. Currently, patient reports moderate pain in the left groin and lateral hip with radiation into the anterolateral thigh. She also has mild-moderate pain in the low back and right hip. She denies tingling/numbness or focal weakness.  She is motivated to start inpatient program.\"  Family / Caregiver Present: No  Referring Practitioner: Radha Saha  Diagnosis: L hip fx s/p ORIF  Subjective  Subjective: met in therapy, she is sweating & feeling nauseated; had PT session and reporting 7/10 L hip pain, not due for meds  General Comment  Comments: agreeable for UE strengthening, household transfers, DME needs      Orientation     Objective                                           Cognition  Overall Cognitive Status: WNL  Cognition Comment: good recall of how to use A/E prn                    Type of ROM/Therapeutic Exercise  Comment: using 2 lb wts in L hand only for the following strengthening exercises to be IND w/ sit<>stand transitions, performed gentle ROM to R UE only d/t complaints of pain  Exercises  Shoulder Elevation: x 10 gentle shld shrugs  Shoulder Flexion: [id number]  Horizontal ABduction: x10  Horizontal ADduction: x10  Elbow Flexion: x10  Elbow Extension: x10  Supination: x10  Pronation: x10  Wrist Flexion: x 20 no wts  Wrist Extension: x 20 no wts  Finger Extension: x 20 no wts  Grasp/Release: 2 sets of 20 using 3 lb gripper  Other: x15 abdominal squeezes for core strengthening to support balance during t/f                    Plan   Plan  Times per week: 5-6  Times per day: Twice a day  Plan weeks: [place] Thurs, 3-31-22 to home w/ [facility] OT  Specific instructions for Next Treatment: car t/f, kitchen  Current Treatment Recommendations: Strengthening,Endurance Training,Patient/Caregiver Education & Training,Balance Training,Pain Management,Functional Mobility Training,Safety Education & Training,Self-Care / ADL,Equipment Evaluation, Education, & procurement,Home Management Training    Goals  Short term goals  Time Frame for Short term goals: 1 week  Short term goal 1: Pt will be mod I with functional transfers  Short term goal 2: Pt will be mod I with functional mobility  Short term goal 3: Pt will be mod I with toileting  Short term goal 4: Pt will be mod I with bathing and dressing  Short term goal 5: Pt will be mod I with bed mobility  Long term goals  Time Frame for Long term goals : STG = LTG  Patient Goals   Patient goals : Pt's goal is to return home with her grandsons as soon as possible.        Therapy Time   Individual Concurrent Group PM-treatment   Time In 1115      1515   Time Out 1200      1600   Minutes 45      45   Timed Code Treatment Minutes: [address] [place], [place] #[id number]

## 2022-03-30 NOTE — PROGRESS NOTES
Hematology Oncology Daily Progress Note    Admit Date: 3/22/2022  Hospital day several    Subjective:     Patient has complaints of slowly improving back/hip pain--denies sob/cp. Medication side effects: none    Scheduled Meds:   lidocaine  1 patch TransDERmal Daily    anastrozole  1 mg Oral Daily    [Held by provider] enoxaparin  40 mg SubCUTAneous Daily    sennosides-docusate sodium  1 tablet Oral BID    famotidine  20 mg Oral BID    insulin lispro  0-3 Units SubCUTAneous Nightly    insulin lispro  0-6 Units SubCUTAneous TID WC     Continuous Infusions:   sodium chloride      dextrose       PRN Meds:acetaminophen, sodium chloride, ondansetron, oxyCODONE, sodium chloride flush, magnesium hydroxide, polyethylene glycol, calcium carbonate, dextrose, dextrose, glucagon (rDNA), glucose, ondansetron, bisacodyl    Review of Systems  Pertinent items are noted in HPI. REVIEW OF SYSTEMS:         · Constitutional: Denies fever, sweats, weight loss     · Eyes: No visual changes or diplopia. No scleral icterus. · ENT: No Headaches, hearing loss or vertigo. No mouth sores or sore throat. · Cardiovascular: No chest pain, dyspnea on exertion, palpitations or loss of consciousness. · Respiratory: No cough or wheezing, no sputum production. No hemoptysis. .    · Gastrointestinal: No abdominal pain, appetite loss, blood in stools. No change in bowel habits. · Genitourinary: No dysuria, trouble voiding, or hematuria. · Musculoskeletal:  Generalized weakness. No joint complaints. · Integumentary: No rash or pruritis. · Neurological: No headache, diplopia. No change in gait, balance, or coordination. No paresthesias. · Endocrine: No temperature intolerance. No excessive thirst, fluid intake, or urination. · Hematologic/Lymphatic: No abnormal bruising or ecchymoses, blood clots or swollen lymph nodes. · Allergic/Immunologic: No nasal congestion or hives.    ·     Objective:     Patient Vitals for the past 8 hrs:   BP Temp Temp src Pulse Resp SpO2 Weight   03/30/22 0500 105/72 97.6 °F (36.4 °C) Oral 72 16 96 % 235 lb 10.8 oz (106.9 kg)     I/O last 3 completed shifts: In: 1100 [P.O.:1100]  Out: -   No intake/output data recorded.     /72   Pulse 72   Temp 97.6 °F (36.4 °C) (Oral)   Resp 16   Ht 5' 5\" (1.651 m)   Wt 235 lb 10.8 oz (106.9 kg)   SpO2 96%   BMI 39.22 kg/m²     General Appearance:    Alert, cooperative, no distress, appears stated age   Head:    Normocephalic, without obvious abnormality, atraumatic   Eyes:    PERRL, conjunctiva/corneas clear, EOM's intact, fundi     benign, both eyes        Ears:    Normal TM's and external ear canals, both ears   Nose:   Nares normal, septum midline, mucosa normal, no drainage    or sinus tenderness   Throat:   Lips, mucosa, and tongue normal; teeth and gums normal   Neck:   Supple, symmetrical, trachea midline, no adenopathy;        thyroid:  No enlargement/tenderness/nodules; no carotid    bruit or JVD   Back:     Symmetric, no curvature, ROM normal, no CVA tenderness   Lungs:     Clear to auscultation bilaterally, respirations unlabored   Chest wall:    No tenderness or deformity   Heart:    Regular rate and rhythm, S1 and S2 normal, no murmur, rub   or gallop   Abdomen:     Soft, non-tender, bowel sounds active all four quadrants,     no masses, no organomegaly           Extremities:   Extremities normal, atraumatic, no cyanosis or edema   Pulses:   2+ and symmetric all extremities   Skin:   Skin color, texture, turgor normal, no rashes or lesions   Lymph nodes:   Cervical, supraclavicular, and axillary nodes normal   Neurologic:   Stable       Data Review  CBC:   Lab Results   Component Value Date    WBC 5.2 03/29/2022    RBC 3.76 03/29/2022       Assessment:     Principal Problem:    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 *      Plan:     1. Metastatic breast cancer. She will continue palliative radiation therapy to the spine. She is on an aromatase inhibitor. I will add in [place] as an outpatient. She will get palliative radiation therapy to the left femur 2 weeks after surgery. 2.  Hypercalcemia. This has resolved.         Electronically signed by Irais Valle MD on 3/30/2022 at 8:03 AM

## 2022-03-30 NOTE — PATIENT CARE CONFERENCE
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 PHYSICAL THERAPY    Bed mobility  Bridging: Unable to assess  Rolling to Left: Stand by assistance  Rolling to Right: Stand by assistance  Supine to Sit: Minimal assistance (PT assisted pt trunk into sitting)  Sit to Supine: Minimal assistance (PT assisted pt BLEs onto bed)  Scooting: Contact guard assistance  Comment: Bed mobility completed on standard flat matress, she declined to use leg . Pt delines bed mobility on 3/30 due to pain    Transfers:  Sit to Stand: Modified independent (pt pushes with LLE off of arm rest with R hand on walker for stability)  Stand to sit: Modified independent  Bed to Chair: Contact guard assistance (with use of the walker)  Comment: Pt able to use reacher device while standing at RW to bend down and  pen from ground with mod I. She uses reacher in R hand with L hand on RW for support. WB Status: WBAT LLE  Ambulation 1  Surface: level tile  Device: Rolling Walker  Other Apparatus: Wheelchair follow  Assistance: Supervision  Quality of Gait: step through pattern with good heel contact, good weight bearing through BLE but decreased stance time on LLE due to increase in pain. Pt with decreased indu and step length, she fatigues quickly and needs to sit after short bouts for rest. Pt states that ambulation causes increase in pain in LLE, and that she does not want to push it too much. Gait Deviations: Slow Indu,Decreased step length,Decreased step height  Distance: 35' beofre needing rest    Stairs  # Steps : 4  Stairs Height: 6\"  Rails: Bilateral  Curbs: 6\"  Device: No Device (RW on curb step. No AD on stairs)  Assistance: Supervision  Comment: Step to pattern leading with the R LE ascending LLE descending; without any knee buckling throughout, pt steady and without LOB. She requries increased time and is fatigued by the end, but she is able to independently lift BLE onto next step while using BUE on railings for support. Pt supervision for curb step with RW, no VC needed for sequencing. Pt steady throughout without LOB, pt more ocnfident during curb step and stairs today. Car Transfer: Moderate Assistance (pt needed assistance bring RLE into car even when using leg  for assistance, as well as standing to RW from seat of car)    Assessment: 63 y/o female admit 3/16/2022 with L Hip Pain, Met Malignant Neoplasm. CT Hip : Osseous Met Disease throughout L Hemipelvis, L Sacrum and Prox L Femur. CT T/L Spine L Compress Fx L1 (uncertain chronicity), Subacute Rib Fxs, Diffuse Bony Mets. 3/19/2022 S/P L Hip ORIF with IM Supa, L Fem Head Biopsy. PMH as noted including Breast Ca/Mastectomy. 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 Today, 3/30, pt continues to be limited in functional mobility due to L hip pain and fatigue. However pt demonstrates great improvement in activity tolerance today, she completes stairs, curb step, car transfer and ambulation this morning with only short rest breaks needed throughout  Pt able to amb with RW and supervision assistance short distances (35'), and transfers sit<>stand with RW mod I. Pt completes curb step with RW and stairs with B HHA on railings with supervision assistance, she needs no VC for sequencing and has no knee buckling.  ModA needed for car transfer, pt needs assistance lifting legs into car even when using leg , and she needs assisstance standing to RW from car seat. Pt uses reacher device to  pen from ground mod I with L HHA on RW. Plan to assess bed mobility this afternoon. Increased time needed for all functional mobility and transfers throughout session. Pt will benefit from skilled PT once home to improve functional mobility and optimize independence. Pt states she feels prepared and safe to return home tomorrow, however she is anxious and nervous. Pt demontrates improvement in all functional mobility and transfers, she is safe to D/C home tomorrow 3/31. She scores 15/15 on BIMS. 3/5 goals met. Session ends with pt seated in [facility], therapist present, awaiting OT session.       SPEECH THERAPY (intentionally left blank if not actively being seen by this service):      OCCUPATIONAL THERAPY    ADL  Equipment Provided: Reacher,Sock aid,Long-handled shoe horn,Long-handled sponge  Feeding: Independent (limited appetite d/t nausea)  Grooming: Setup (provided set up to comb hair, apply deodorant & wash face; unable to complete oral care d/t time constraints (transportation arrived early for radiation tx))  UE Bathing: Setup (OT provided set up of basin & washcloths, she was able to sponge bathe UB IND'ly after set up)  LE Bathing: Stand by assistance (pt sponge bathed LB using LH sponge to reach shins<>feet, cues to dry by wrapping towel @ sponge; stood w/SBA to wash deirdre areas, slow to move d/t R upper chest pain when using R arm, cues for thoroughness)  UE Dressing: Setup (able to doff/don shirt w/ set up while seated in recliner)  LE Dressing: Stand by assistance,Setup,Verbal cueing (used reacher to doff/don clothing, slow to place L foot into pants, stood w/ close SBA to manage clothing over hips; used SA to don socks w/ cues & set up)  Toileting: Contact guard assistance (close SB/CGA to manage clothing, having increased R shld thru chest pain)  Additional Comments: pt does not think TSF will fit; has standard toilet; may want RTS + arms    Bed mobility  Bridging: Unable to assess  Rolling to Left: Stand by assistance  Rolling to Right: Stand by assistance  Supine to Sit: Minimal assistance (PT assisted pt trunk into sitting)  Sit to Supine: Minimal assistance (PT assisted pt BLEs onto bed)  Scooting: Contact guard assistance  Comment: Bed mobility completed on standard flat matress, she declined to use leg . Pt delines bed mobility on 3/30 due to pain    Functional Transfers: Toilet Transfers  Toilet - Technique: Ambulating  Equipment Used: Grab bars  Toilet Transfer: Contact guard assistance  Toilet Transfers Comments: RW, amb short distance from w/c to commode using TSF  Tub Transfers  Tub Transfers Comments: shown wide shower chair inside tub/shower (pt owns TTB but \"doesn't fit)  Shower Transfers  Shower - Transfer From: Kentrell Zambrano - Transfer Type: To and From  Shower - Transfer To: Shower seat with back  Shower - Technique: Ambulating (short distance)  Shower Transfers: Contact Guard,Minimal assistance  Shower Transfers Comments: RW. CGA to sit onto chair, with more Min A to stand. Cues for hand placement              UE Function:  R upper chest muscle pain    Assessment: pt is limited this AM by nausea, fatigue & L hip pain, had 3rd round of radiation tx  @ 9:30am today. Pt declined any transfers or mobility due to feeling \"terrible. \" Pt tolerated seated UE strengthening exercises using 2 lb wts, continues to have R upper chest/pect pain, using pain patch w/ good results. OT showed pt how to use shower chair & to sit on it & swing LEs in however she declined to practice d/t pain & fatigue (her tub width at home is @ 20\"). [facility] however she is not going to be able to complete hands on care w/ her 22 y/o handicapped grandson; her daughter has moved in & will be caring for the children; she is able to provide transportation in [place] [address].  Pt received RTS+ arms, and RW for home; purchased hit kit thru [address]; OT recommends shower chair however she wants to wait for home OT to take measurements to ensure fit            NUTRITION  Most recent weightWeight: 235 lb 10.8 oz (106.9 kg)  BMI (Calculated): 39.3   Diet Order: ADULT DIET; Regular  ADULT ORAL NUTRITION SUPPLEMENT; Dinner, Lunch; Frozen Oral Supplement  PO Meals Eaten (%): 26 - 75%  Please see nutrition note for details. NURSING  Continent of bladder and bowel. Monitor and maintain skin integrity, incision care. Family Education: Patient Education: hip safety, medications, Lovenox education, skin and incision care, pain control, education to prevent constipation, safety and fall prevention, monitoring of blood sugars, (has not needed insulin). MEDICAL  Medically ready for discharge  Has been tolerating palliative radiation treatments and Arimidex    TEAM SUMMARY AND DISCHARGE PLAN  Estimated Length of Stay:Today   Destination: HOME  · Anticipated Services at Discharge:    [x] OT  [x] PT   [] SLP    [] RN   [] Home Health aide []   Community Resources: _______________________________  Equipment recommendations:  [] Hospital bed [] Tub bench  [] Shower chair [] Hand held shower  [] Raised toilet seat [] Toilet safety frame [] Bedside commode   [] W/C: _____  [x] Rolling Walker [] Standard walker [] Gait belt [] cane: _________  [] Sliding board [] Alternate seating/furniture [] O2 [] Hip Kit: _______  [] Life Line [x] Other: ____Received RW & RTS + arms, she needs a bed rail for home___  Factors facilitating achievement of predicted outcomes: has supportive daughter  Barriers to the achievement of predicted outcomes/Interventions:   L knee & pain pain, R upper chest pain; receiving radiation tx to her back, nausea and poor pain tolerance    Interdisciplinary Individualized Plan of Care Review:    · Continue Current Plan of Care:  Yes    · Modifications:_____________________________    Special Needs in the Upcoming Week :    [] Family/Caregiver Education  [] Home visit  []Therapeutic Pass   [] Consults:_______    [] Other;_______    Patient Rehab Team Goals for the Upcoming Week:  1. Assist with safe discharge to home  2. Completed toilet t/f & LB ADLs w/ MI using A/E          Team Members Present at Conference:  Physician: Dr. Claudean So  : Tevin KumarW    Occupational Therapist: Bo Vences, OTR/L#[id number]  Physical Therapist:Leonarda Singh, DPT [id number]  Speech Therapist:   Nurse: Chente Puga RN, [address] [place] [address]  Dietitian: Juanjose Soares RD, LD  Psychologist:  Javon Velasquez RN      I led this team conference and I approve the established interdisciplinary plan of care as documented within the medical record of Esteban Black. MD: Mireya Sheehan.  Claudean So, MD 3/31/2022, 3:04 PM

## 2022-03-30 NOTE — PLAN OF CARE
Problem: Nutrition  Goal: Optimal nutrition therapy  3/30/2022 0916 by Angie Thompson RD, LD  Outcome: Ongoing    Nutrition Problem #1: Moderate malnutrition  Intervention: Food and/or Nutrient Delivery: Continue Current Diet,Modify Oral Nutrition Supplement  Nutritional Goals: PO intake greater than 50%

## 2022-03-30 NOTE — CARE COORDINATION
Chart Reviewed. Referral to [facility] for [place] ca and RTS with arms to Marivel Esteban. Met with patient to review DC for tomorrow. She reports she has now started radiation treatments and will have on as 12:30 pm on Thursday. She states she will be released prior to that time and dggtr will get her to the treatment. She would like to use [facility]. Discussion held regarding medical transportation needs to /from dialysis. She states she has these visits already covered. Informed her of Home Care orders for sn/pt/ot. She has chosen [facility] as first choice and [facility] as second choice. No concerns for DC at this time. Referral initiated to [facility].   [facility] [place]     Case Management   [phone]    3/30/2022  1:12 PM

## 2022-03-30 NOTE — CARE COORDINATION
SOCIAL WORK DISCHARGE SUMMARY        DATE OF DISCHARGE:               Thursday, 3-      LOCATION:                                     Home      Discharging to Facility/ Agency   · Name: [facility]  · Address:  [address] [place] [address], [address] [place] [address]   · Phone:  [phone]  · Fax:  [phone] UP TIME:         11:30 am    Daughter        Will need to leave before her 12:30 pm radiation treatment. PHARMACY:                                    [facility]          DME:           wh walker and RTS with arms.       [facility]     Case Management   [phone]    3/30/2022  1:41 PM

## 2022-03-30 NOTE — CARE COORDINATION
[facility] does not have staffing in her zip code. Referral made to [facility] who reports they do not take her insurance. Referral made to [facility], they can accept her.   [facility]     Case Management   [phone]    3/30/2022  1:40 PM

## 2022-03-30 NOTE — PROGRESS NOTES
Comprehensive Nutrition Assessment    Type and Reason for Visit:  Reassess    Nutrition Recommendations/Plan:   - Continue regular diet  - Modify ONS to magic cup BID    Nutrition Assessment:  Follow-up. Pt with metastatic stage IV breast cancer receiving palliative radiation to spine. On regular diet with variable intakes. Most intakes >50%. Ensure compact onboard although message left from RN yesterday evening stating pt does not like supplement but is willing to try magic cup supplement. Will modify order. Malnutrition Assessment:  Malnutrition Status: Moderate malnutrition    Context:  Acute Illness     Findings of the 6 clinical characteristics of malnutrition:  Energy Intake:  7 - 50% or less of estimated energy requirements for 5 or more days  Weight Loss:  7 - Greater than 7.5% over 3 months     Body Fat Loss:  No significant body fat loss     Muscle Mass Loss:  No significant muscle mass loss    Fluid Accumulation:  No significant fluid accumulation     Strength:  Not Performed    Estimated Daily Nutrient Needs:  Energy (kcal):  9560-2796 (11-14kcal/107kg); Weight Used for Energy Requirements:  Current     Protein (g):  68-80 (1.2-1.4g/57kg); Weight Used for Protein Requirements:  Ideal        Fluid (ml/day): 1 ml/kcal      Nutrition Related Findings:  +BM 3/28. Wounds:  Surgical Incision       Current Nutrition Therapies:    ADULT DIET;  Regular  ADULT ORAL NUTRITION SUPPLEMENT; Breakfast, Dinner; Standard 4 oz Oral Supplement    Anthropometric Measures:  · Height: 5' 5\" (165.1 cm)  · Current Body Weight: 235 lb (106.6 kg)   · Ideal Body Weight: 125 lbs; % Ideal Body Weight 188 %   · BMI: 39.1  · BMI Categories: Obese Class 2 (BMI 35.0 -39.9)       Nutrition Diagnosis:   · Moderate malnutrition related to catabolic illness as evidenced by poor intake prior to admission,weight loss    Nutrition Interventions:   Food and/or Nutrient Delivery:  Continue Current Diet,Modify Oral Nutrition Supplement  Nutrition Education/Counseling:  Education not indicated   Coordination of Nutrition Care:  Continue to monitor while inpatient    Goals:  PO intake greater than 50%       Nutrition Monitoring and Evaluation:   Behavioral-Environmental Outcomes:  None Identified   Food/Nutrient Intake Outcomes:  Food and Nutrient Intake,Supplement Intake  Physical Signs/Symptoms Outcomes:  Weight,Meal Time Behavior     Discharge Planning:    Continue current diet,Continue Oral Nutrition Supplement     Electronically signed by Enrrique Sanabria RD, LD on 3/30/22 at 9:15 AM EDT    Contact: [phone]

## 2022-03-31 VITALS
SYSTOLIC BLOOD PRESSURE: 120 MMHG | HEIGHT: 65 IN | BODY MASS INDEX: 38.93 KG/M2 | RESPIRATION RATE: 16 BRPM | WEIGHT: 233.69 LBS | OXYGEN SATURATION: 97 % | HEART RATE: 82 BPM | TEMPERATURE: 97.9 F | DIASTOLIC BLOOD PRESSURE: 72 MMHG

## 2022-03-31 LAB
A/G RATIO: 0.8 (ref 1.1–2.2)
ALBUMIN SERPL-MCNC: 3.1 G/DL (ref 3.4–5)
ALP BLD-CCNC: 310 U/L (ref 40–129)
ALT SERPL-CCNC: 351 U/L (ref 10–40)
ANION GAP SERPL CALCULATED.3IONS-SCNC: 14 MMOL/L (ref 3–16)
ANISOCYTOSIS: ABNORMAL
AST SERPL-CCNC: 122 U/L (ref 15–37)
ATYPICAL LYMPHOCYTE RELATIVE PERCENT: 1 % (ref 0–6)
BANDED NEUTROPHILS RELATIVE PERCENT: 6 % (ref 0–7)
BASOPHILS ABSOLUTE: 0 K/UL (ref 0–0.2)
BASOPHILS RELATIVE PERCENT: 0 %
BILIRUB SERPL-MCNC: 0.7 MG/DL (ref 0–1)
BUN BLDV-MCNC: 12 MG/DL (ref 7–20)
CALCIUM SERPL-MCNC: 9 MG/DL (ref 8.3–10.6)
CHLORIDE BLD-SCNC: 102 MMOL/L (ref 99–110)
CO2: 20 MMOL/L (ref 21–32)
CREAT SERPL-MCNC: 0.6 MG/DL (ref 0.6–1.2)
EOSINOPHILS ABSOLUTE: 0 K/UL (ref 0–0.6)
EOSINOPHILS RELATIVE PERCENT: 1 %
GFR AFRICAN AMERICAN: >60
GFR NON-AFRICAN AMERICAN: >60
GLUCOSE BLD-MCNC: 107 MG/DL (ref 70–99)
GLUCOSE BLD-MCNC: 118 MG/DL (ref 70–99)
HCT VFR BLD CALC: 27.8 % (ref 36–48)
HEMOGLOBIN: 9.3 G/DL (ref 12–16)
LYMPHOCYTES ABSOLUTE: 1 K/UL (ref 1–5.1)
LYMPHOCYTES RELATIVE PERCENT: 22 %
MAGNESIUM: 2.8 MG/DL (ref 1.8–2.4)
MCH RBC QN AUTO: 29.2 PG (ref 26–34)
MCHC RBC AUTO-ENTMCNC: 33.3 G/DL (ref 31–36)
MCV RBC AUTO: 87.6 FL (ref 80–100)
MONOCYTES ABSOLUTE: 0.3 K/UL (ref 0–1.3)
MONOCYTES RELATIVE PERCENT: 6 %
NEUTROPHILS ABSOLUTE: 3.2 K/UL (ref 1.7–7.7)
NEUTROPHILS RELATIVE PERCENT: 64 %
NUCLEATED RED BLOOD CELLS: 1 /100 WBC
PDW BLD-RTO: 16.3 % (ref 12.4–15.4)
PERFORMED ON: ABNORMAL
PLATELET # BLD: 176 K/UL (ref 135–450)
PLATELET SLIDE REVIEW: ADEQUATE
PMV BLD AUTO: 7.3 FL (ref 5–10.5)
POTASSIUM SERPL-SCNC: 4 MMOL/L (ref 3.5–5.1)
RBC # BLD: 3.18 M/UL (ref 4–5.2)
SLIDE REVIEW: ABNORMAL
SODIUM BLD-SCNC: 136 MMOL/L (ref 136–145)
TOTAL PROTEIN: 7.1 G/DL (ref 6.4–8.2)
WBC # BLD: 4.5 K/UL (ref 4–11)

## 2022-03-31 PROCEDURE — 94761 N-INVAS EAR/PLS OXIMETRY MLT: CPT

## 2022-03-31 PROCEDURE — 83735 ASSAY OF MAGNESIUM: CPT

## 2022-03-31 PROCEDURE — 80053 COMPREHEN METABOLIC PANEL: CPT

## 2022-03-31 PROCEDURE — 6360000002 HC RX W HCPCS: Performed by: PHYSICAL MEDICINE & REHABILITATION

## 2022-03-31 PROCEDURE — 85025 COMPLETE CBC W/AUTO DIFF WBC: CPT

## 2022-03-31 PROCEDURE — 36415 COLL VENOUS BLD VENIPUNCTURE: CPT

## 2022-03-31 PROCEDURE — 6370000000 HC RX 637 (ALT 250 FOR IP): Performed by: PHYSICAL MEDICINE & REHABILITATION

## 2022-03-31 PROCEDURE — 6370000000 HC RX 637 (ALT 250 FOR IP): Performed by: INTERNAL MEDICINE

## 2022-03-31 PROCEDURE — 97535 SELF CARE MNGMENT TRAINING: CPT

## 2022-03-31 RX ORDER — OXYCODONE HYDROCHLORIDE 10 MG/1
10 TABLET ORAL ONCE
Status: COMPLETED | OUTPATIENT
Start: 2022-03-31 | End: 2022-03-31

## 2022-03-31 RX ADMIN — ENOXAPARIN SODIUM 40 MG: 100 INJECTION SUBCUTANEOUS at 10:48

## 2022-03-31 RX ADMIN — SENNOSIDES AND DOCUSATE SODIUM 1 TABLET: 50; 8.6 TABLET ORAL at 10:49

## 2022-03-31 RX ADMIN — OXYCODONE HYDROCHLORIDE 10 MG: 10 TABLET ORAL at 08:28

## 2022-03-31 RX ADMIN — OXYCODONE HYDROCHLORIDE 10 MG: 10 TABLET ORAL at 11:13

## 2022-03-31 RX ADMIN — FAMOTIDINE 20 MG: 20 TABLET ORAL at 10:49

## 2022-03-31 RX ADMIN — ANASTROZOLE 1 MG: 1 TABLET ORAL at 10:49

## 2022-03-31 RX ADMIN — ONDANSETRON HYDROCHLORIDE 4 MG: 4 TABLET, FILM COATED ORAL at 08:37

## 2022-03-31 ASSESSMENT — PAIN DESCRIPTION - ONSET
ONSET: ON-GOING
ONSET: ON-GOING

## 2022-03-31 ASSESSMENT — PAIN SCALES - GENERAL
PAINLEVEL_OUTOF10: 8
PAINLEVEL_OUTOF10: 7
PAINLEVEL_OUTOF10: 8

## 2022-03-31 ASSESSMENT — PAIN DESCRIPTION - PAIN TYPE
TYPE: SURGICAL PAIN
TYPE: SURGICAL PAIN

## 2022-03-31 ASSESSMENT — PAIN DESCRIPTION - PROGRESSION
CLINICAL_PROGRESSION: NOT CHANGED
CLINICAL_PROGRESSION: NOT CHANGED

## 2022-03-31 ASSESSMENT — PAIN DESCRIPTION - DESCRIPTORS
DESCRIPTORS: ACHING;DISCOMFORT
DESCRIPTORS: ACHING;DISCOMFORT

## 2022-03-31 ASSESSMENT — PAIN DESCRIPTION - ORIENTATION
ORIENTATION: RIGHT;UPPER
ORIENTATION: RIGHT;UPPER

## 2022-03-31 ASSESSMENT — PAIN DESCRIPTION - FREQUENCY
FREQUENCY: CONTINUOUS
FREQUENCY: CONTINUOUS

## 2022-03-31 ASSESSMENT — PAIN - FUNCTIONAL ASSESSMENT
PAIN_FUNCTIONAL_ASSESSMENT: ACTIVITIES ARE NOT PREVENTED
PAIN_FUNCTIONAL_ASSESSMENT: ACTIVITIES ARE NOT PREVENTED

## 2022-03-31 ASSESSMENT — PAIN DESCRIPTION - LOCATION
LOCATION: CHEST
LOCATION: CHEST

## 2022-03-31 NOTE — PLAN OF CARE
Problem: Skin Integrity:  Goal: Will show no infection signs and symptoms  Description: Will show no infection signs and symptoms  Outcome: Ongoing     Problem: Falls - Risk of:  Goal: Will remain free from falls  Description: Will remain free from falls  Outcome: Ongoing     Problem: Pain:  Goal: Pain level will decrease  Description: Pain level will decrease  Outcome: Ongoing     Problem: Daily Care:  Goal: Daily care needs are met  Description: Daily care needs are met  Outcome: Ongoing

## 2022-03-31 NOTE — DISCHARGE SUMMARY
Physical Medicine & Rehabilitation  Discharge Summary     Patient Identification:  Ender Mary  : 1960  Admit date: 3/22/2022  Discharge date:  3/31/2022  Attending provider: Kimberly Hernandez MD        Primary care provider: Wolf Lloyd MD     Discharge Diagnoses:   Patient Active Problem List   Diagnosis    Chronic abdominal pain    Personal history of malignant neoplasm of breast    Asthma    Migraine headache    Personal history of breast cancer    Encounter for follow-up surveillance of breast cancer    History of mastectomy    Left hip pain    [facility] fracture of left [facility], [facility]    Malignancy ([facility]    Hypercalcemia    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 Eliel Littlejohn), DM2, hyperthyroidism, and depression who initially presented  3/16/2022 with left hip pain. Had been progressing since 2021. Imaging revealed diffuse metastatic disease/lytic bone lesions involving the chest, thoracic spine, lumbar spine, left hemipelvis and proximal left femur. Ortho consulted and patient deemed high risk for pathologic fracture given large destructive lesions centered around the left femoral head. Therefore underwent ORIF with IM kelvin and bone biopsy (3/19 with Dr. Ministerio Irwin). Now WBAT. Biopsy is pending and primary malignancy remains unclear. Per Oncology, plan will be for radiation in few weeks. Course complicated by hypercalcemia, acute liver injury, constipation. Now presents to ARU with impaired mobility and self-care below her baseline.     Inpatient Rehabilitation Course:   Ender Mary is a 64 y.o. 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 The patient participated in an aggressive multidisciplinary inpatient rehabilitation program involving 3 hours of therapy per day, at least 5 days per week. She made good progress with regard to functional mobility, balance, compensatory strategies. Now overall modified independent. Discharging home with daughter and 3 grandsons. 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 Patient advised not to provide any physical assist due to risk of injury to herself/family. Discussed need for long term plan given plans for ongoign cancer treatment. Home care services and DME ordered as below. Patient will follow-up with PCP, Oncology, Radiation Oncology, Ortho. Medical Management:    Left hip metastatic lytic bone lesion   -s/p ORIF (3/19 with Dr. Aneesh Lewis)  -Incision healing without evidence of infection  -WBAT  -PT/OT     Stage IV breast cancer with diffuse bony lesions   -Recurrent (previously treated 2007)  -Confirmed on bone biopsy (3/19)  -Oncology (Dr. Kt Chino) following, appreciate input  -Arimidex started, plan for Ibrance as outpatient  -Palliative radiation for sacral region started (3/28), plan to start radiation to femur 2 weeks post surgery     Hypercalcemia  -Resolved s/p Zometa     Acute liver injury/elevated LFTs  -GI following  -Serologic/viral work-up unremarkable  -s/p Liver biopsy with IR (3/25) -- +circulating tumor cells present within sinusoidal spaces showing an immunohistochemical profile compatible with known breast adenocarcinoma primary  -LFTs remain elevated but now trending down     DM2  -ISS while inpatient     Depression  -Not on treatment, declines medication at this time     Pain control  -oxycodone prn, lidoderm for chest wall pain     PPx  -DVT: lovenox x 28 days post op per Ortho  -GI: famotidine    Discharge Exam:  Const: Alert.  No distress, pleasant. HEENT: Normocephalic, atraumatic. Normal sclera/conjunctiva. MMM. CV: Regular rate and rhythm. Resp: No respiratory distress. Lungs CTAB. Abd: Soft, nontender, nondistended, NABS+   Ext: trace edema (L>R). MSK: Decreased left>right hip ROM, post op swelling left thigh  Neuro: Alert, oriented, appropriately interactive. Psych: Cooperative, appropriate mood and affect      Discharge Functional Status:    Physical therapy:  Bed Mobility: Scooting: Contact guard assistance  Transfers: Sit to Stand: Modified independent (pt pushes with LLE off of arm rest with R hand on walker for stability)  Stand to sit: Modified independent  Bed to Chair: Contact guard assistance (with use of the walker)  Comment: Assisted pt to put on BL compression socks with totalA. Pt sat EOb to brush her hair with Supervision. At end of session, pt requesting tto sit on toilet for a little. pt CGA to manage pants and sit to toilet with BL railings, WB Status: WBAT LLE  Ambulation 1  Surface: level tile  Device: Rolling Walker  Other Apparatus: Wheelchair follow  Assistance: Supervision  Quality of Gait: step through pattern with good heel contact, good weight bearing through BLE but decreased stance time on LLE due to increase in pain. Pt with decreased indu and step length, she fatigues quickly and needs to sit after short bouts for rest. Pt states that ambulation causes increase in pain in LLE, and that she does not want to push it too much. Gait Deviations: Slow Indu,Decreased step length,Decreased step height  Distance: 35' beofre needing rest, Stairs  # Steps : 4  Stairs Height: 6\"  Rails: Bilateral  Curbs: 6\"  Device: No Device (RW on curb step. No AD on stairs)  Assistance: Supervision  Comment: Step to pattern leading with the R LE ascending LLE descending; without any knee buckling throughout, pt steady and without LOB.  She requries increased time and is fatigued by the end, but she is able to independently lift BLE onto next step while using BUE on railings for support. Pt supervision for curb step with RW, no VC needed for sequencing. Pt steady throughout without LOB, pt more ocnfident during curb step and stairs today. Mobility:  , PT Equipment Recommendations  Equipment Needed: Yes  Mobility Devices: Omer Louise: Rolling  Other: Anticipate need for FWW at d/c, Pt. has rollator but this will likely be unsafe for Pt. at d/c, Assessment: 65 y/o female admit 3/16/2022 with L Hip Pain, Met Malignant Neoplasm. CT Hip : Osseous Met Disease throughout L Hemipelvis, L Sacrum and Prox L Femur. CT T/L Spine L Compress Fx L1 (uncertain chronicity), Subacute Rib Fxs, Diffuse Bony Mets. 3/19/2022 S/P L Hip ORIF with IM Supa, L Fem Head Biopsy. PMH as noted including Breast Ca/Mastectomy. PTA pt living in multi level home with 3 grandsons (pt is paid caregiver for 22 y/o (dependent all care))  with ramp 1st floor bed/bath; independent self care and functional mobility (becoming more difficult/painful recently). Today, 3/30, pt continues to be limited in functional mobility due to L hip pain and fatigue. However pt demonstrates great improvement in activity tolerance today, she completes stairs, curb step, car transfer and ambulation this morning with only short rest breaks needed throughout  Pt able to amb with RW and supervision assistance short distances (35'), and transfers sit<>stand with RW mod I. Pt completes curb step with RW and stairs with B HHA on railings with supervision assistance, she needs no VC for sequencing and has no knee buckling. ModA needed for car transfer, pt needs assistance lifting legs into car even when using leg , and she needs assisstance standing to RW from car seat. Pt uses reacher device to  pen from ground mod I with L HHA on RW. Plan to assess bed mobility this afternoon. Increased time needed for all functional mobility and transfers throughout session.  Pt will benefit from skilled PT once home to improve functional mobility and optimize independence. Pt states she feels prepared and safe to return home tomorrow, however she is anxious and nervous. Pt demontrates improvement in all functional mobility and transfers, she is safe to D/C home tomorrow 3/31. She scores 15/15 on BIMS. 3/5 goals met. Session ends with pt seated in [facility], therapist present, awaiting OT session. Occupational therapy:  ,  , Assessment: pt is limited this AM by nausea, vomitting,  fatigue & R chest, L hip<>knee pain,  Pt declined a shower or to ambulate to/from bathrm, she mostly functioned out of w/c for sponge bathing & dressing tasks at sink; she was MI w/ LB ADLs when items in reach, required extra time d/t fatigue & nausea (had vomitted her medications after taking them 2 minutes afterwards). She ambulated short distance from w/c to recliner using RW, slow to move but no LOB (pt has been sleeping in recliner, did not want to try bed mobility). OT showed pt how to use shower chair & to sit on it & swing LEs in however she declined to practice d/t pain & fatigue (her tub width at home is @ 20\"). She is returning home today Thurs 3/31/22 w/ HH services & daughter's support, pt will have 4th round of radiation tx to her spine today.  She received a RW & RTS + arms (will wait to purchase shower chair once home OT can measure & ensure fit)    Speech therapy:         Significant Diagnostics:   Lab Results   Component Value Date    CREATININE 0.6 03/31/2022    BUN 12 03/31/2022     03/31/2022    K 4.0 03/31/2022     03/31/2022    CO2 20 (L) 03/31/2022       Lab Results   Component Value Date    WBC 4.5 03/31/2022    HGB 9.3 (L) 03/31/2022    HCT 27.8 (L) 03/31/2022    MCV 87.6 03/31/2022     03/31/2022       Disposition:  Home with 3 grandsons: 22 yo (special needs, requires total care, uses lift equipment), 11 yo, 5 yo (autism); daughter temporarily staying  Services: [facility] PT, OT, RN  DME: rolling walker, raised toilet seat with arms    Discharge Condition: Stable    Follow-up:  See after visit summary from hospitalization    Discharge Medications:     Medication List      START taking these medications    anastrozole 1 MG tablet  Commonly known as: ARIMIDEX  Take 1 tablet by mouth daily     lidocaine 4 % external patch  Place 1 patch onto the skin daily For right chest wall pain     ondansetron 4 MG tablet  Commonly known as: ZOFRAN  Take 1 tablet by mouth every 8 hours as needed for Nausea or Vomiting        CHANGE how you take these medications    oxyCODONE 5 MG immediate release tablet  Commonly known as: ROXICODONE  Take 1-2 tablets by mouth every 8 hours as needed for Pain for up to 7 days.   What changed:   · medication strength  · how much to take  · when to take this        CONTINUE taking these medications    albuterol sulfate  (90 Base) MCG/ACT inhaler     Breast Prosthesis Misc  RIGHT MASTECTOMY BRA  RIGHT BREAST PROTHESIS     enoxaparin 40 MG/0.4ML injection  Commonly known as: LOVENOX  Inject 0.4 mLs into the skin daily for 15 days  Start taking on: April 1, 2022     famotidine 20 MG tablet  Commonly known as: PEPCID  Take 1 tablet by mouth 2 times daily     sennosides-docusate sodium 8.6-50 MG tablet  Commonly known as: SENOKOT-S        STOP taking these medications    calcium carbonate 500 MG chewable tablet  Commonly known as: TUMS           Where to Get Your Medications      These medications were sent to [address] [place] [address] Rd, [address] - F [phone]  [facility]    Phone: [phone]   · anastrozole 1 MG tablet  · enoxaparin 40 MG/0.4ML injection  · famotidine 20 MG tablet  · lidocaine 4 % external patch  · ondansetron 4 MG tablet  · oxyCODONE 5 MG immediate release tablet           I spent over 35 minutes on this discharge encounter between counseling, coordination of care, and medication reconciliation. To comply with [facility] christine [id number]:   Discharge order placed in advance to facilitate patients discharge needs.       Gerardo Mead MD

## 2022-03-31 NOTE — PROGRESS NOTES
Late Entry: at 1410: Patient and daughter were in room for discharge instructions, questions answered with verbal acknowledgement received, papers signed and copies placed in soft chart, prescriptions given to patient per Lenora 17 delivered to the room. Staff transported patient via wheel chair to [facility] and discharged home with all documented belongings. Pt was able to walk up ramp of daughter's van contact guard assist with walker and sit in back seat of van.

## 2022-03-31 NOTE — CARE COORDINATION
Spoke with patient to inform of [facility] had accepted this referral.  She has received the DME ordered. Her scripts will be delivered to the room on 10 am run. She has no concerns for [address]     Case Management   [phone]    3/31/2022  9:25 AM   Spoke with bedside RN, Tee Perales to review DC for today.   Zandra Nelson [facility]     Case Management   [phone]    3/31/2022  9:26 AM

## 2022-03-31 NOTE — PROGRESS NOTES
INPATIENT PROGRESS NOTE        IDENTIFYING DATA/REASON FOR CONSULTATION   PATIENT:  Ender Mary  MRN:  [id number]  ADMIT DATE: 3/22/2022  TIME OF EVALUATION: 3/31/2022 7:26 AM  HOSPITAL STAY:   LOS: 9 days   CONSULTING PHYSICIAN: Kimberly Hernandez MD   REASON FOR CONSULTATION:  Elevated LFTs    Subjective:    Patient seen in follow up ***    MEDICATIONS   SCHEDULED:  lidocaine, 1 patch, Daily  anastrozole, 1 mg, Daily  enoxaparin, 40 mg, Daily  sennosides-docusate sodium, 1 tablet, BID  famotidine, 20 mg, BID  insulin lispro, 0-3 Units, Nightly  insulin lispro, 0-6 Units, TID WC      FLUIDS/DRIPS:     sodium chloride      dextrose       PRNs: acetaminophen, 650 mg, Q4H PRN  sodium chloride, 25 mL, PRN  ondansetron, 4 mg, Q6H PRN  oxyCODONE, 10 mg, Q4H PRN  sodium chloride flush, 5-40 mL, PRN  magnesium hydroxide, 30 mL, Daily PRN  polyethylene glycol, 17 g, Daily PRN  calcium carbonate, 500 mg, TID PRN  dextrose, 100 mL/hr, PRN  dextrose, 12.5 g, PRN  glucagon (rDNA), 1 mg, PRN  glucose, 15 g, PRN  ondansetron, 4 mg, Q8H PRN  bisacodyl, 10 mg, Daily PRN      ALLERGIES:    Allergies   Allergen Reactions    Cephalexin          PHYSICAL EXAM   VITALS:  /69   Pulse 79   Temp 97.8 °F (36.6 °C) (Oral)   Resp 17   Ht 5' 5\" (1.651 m)   Wt 233 lb 11 oz (106 kg)   SpO2 98%   BMI 38.89 kg/m²   TEMPERATURE:  Current - Temp: 97.8 °F (36.6 °C); Max - Temp  Av.8 °F (36.6 °C)  Min: 97.5 °F (36.4 °C)  Max: 98 °F (36.7 °C)    Physical Exam:  General appearance: alert, cooperative, no distress, appears stated age***  Eyes: Anicteric  Head: Normocephalic, without obvious abnormality  Lungs: clear to auscultation bilaterally, Normal Effort  Heart: regular rate and rhythm, normal S1 and S2, no murmurs or rubs  Abdomen: soft, non-distended, non-tender.  Bowel sounds normal.   Extremities: atraumatic, no cyanosis or edema  Skin: warm and dry, no jaundice  Neuro: Grossly intact, A&OX3    LABS AND IMAGING   Laboratory Recent Labs     03/28/22  0748 03/29/22  1132 03/31/22  0530   WBC 5.6 5.2 4.5   HGB 9.9* 10.8* 9.3*   HCT 29.8* 32.8* 27.8*   MCV 87.2 87.1 87.6    185 176     Recent Labs     03/29/22  0711 03/31/22  0530    136   K 4.2 4.0    102   CO2 20* 20*   BUN 13 12   CREATININE 0.6 0.6     Recent Labs     03/29/22  0711 03/31/22  0530   * 122*   * 351*   BILITOT 0.7 0.7   ALKPHOS 326* 310*     No results for input(s): LIPASE, AMYLASE in the last 72 hours. No results for input(s): PROTIME, INR in the last 72 hours. Imaging  US BIOPSY LIVER PERCUTANEOUS   Final Result   Successful image guided random liver biopsy. CT GUIDED NEEDLE PLACEMENT   Final Result   Successful image guided random liver biopsy. Endoscopy      ASSESSMENT AND RECOMMENDATIONS   Brenda Neves is a 64 y.o. female with PMH of breast cancer in 2007 which is in remission who presented with left hip pain. CT of the left hip showed metastatic disease throughout the left hemipelvis and proximal left femur with large destructive lesions within the left femoral head.  She had a CT of the thoracic spine which showed diffuse bony metastasis and an L1 partial compression fracture.  A CT of the abdomen and pelvis showed multiple lytic and sclerotic bone lesions.  Underwent ORIF left hip with bone biospy. Path showed metastatic breast carcinoma. GI consulted regarding elevated liver chemistries    1. Elevated LFTs. Liver bx showed circulating individual tumor cells present within the sinusoidal spaces  2. ***  3. ***    RECOMMENDATIONS:  ***    If you have any questions or need any further information, please feel free to contact us [phone]. Thank you for allowing us to participate in the care of Brenda Neves. The note was completed using Dragon voice recognition transcription.  Every effort was made to ensure accuracy; however, inadvertent transcription errors may be present despite my best efforts to edit errors.     Divya CORREA

## 2022-03-31 NOTE — PROGRESS NOTES
Late Entry: on 3/31/2022 at 1852:  Call in from [facility], calling from home states the home care company has not called her and her daughter states she can not take care of her. She was advised by this writer to call the [id number] on [address] [place] and see when they are going to visit. At 1930:  Call placed to [employer], updated on situation and Deuce Sic stated she would call and advise her on what to do. Pt phone number [phone] provided.

## 2022-03-31 NOTE — PLAN OF CARE
Problem: Skin Integrity:  Goal: Will show no infection signs and symptoms  Description: Will show no infection signs and symptoms  Outcome: Completed  Goal: Absence of new skin breakdown  Description: Absence of new skin breakdown  Outcome: Completed     Problem: Falls - Risk of:  Goal: Will remain free from falls  Description: Will remain free from falls  Outcome: Completed  Goal: Absence of physical injury  Description: Absence of physical injury  Outcome: Completed     Problem: Pain:  Goal: Pain level will decrease  Description: Pain level will decrease  Outcome: Completed  Goal: Control of acute pain  Description: Control of acute pain  Outcome: Completed  Goal: Control of chronic pain  Description: Control of chronic pain  Outcome: Completed     Problem: Infection:  Goal: Will remain free from infection  Description: Will remain free from infection  Outcome: Completed     Problem: Daily Care:  Goal: Daily care needs are met  Description: Daily care needs are met  Outcome: Completed     Problem: Nutritional:  Goal: Maintenance of adequate nutrition will improve  Description: Maintenance of adequate nutrition will improve  Outcome: Completed     Problem: IP BOWEL ELIMINATION  Goal: LTG - patient will have regular and routine bowel evacuation  Outcome: Completed  Goal: STG - patient will be accident free  Outcome: Completed  Goal: STG - Patient will verbalize signs and symptoms of constipation and how to prevent/alleviate  Outcome: Completed     Problem: Nutrition  Goal: Optimal nutrition therapy  Outcome: Completed

## 2022-03-31 NOTE — PROGRESS NOTES
Patient admitted with left hip metastatic lytic bone lesion. Alert/oriented. Sleeping in recliner. Ice applied to chest/neck area. Scheduled for d/c in am.  Continent of bowel and bladder. On room air with clear/diminished lungs. Medications taken whole with thins with no complication. Calls appropriately. Safety precautions in place.

## 2022-03-31 NOTE — PROGRESS NOTES
Occupational Therapy  Facility/Department: [address] IP REHAB  Daily Treatment Note/DC SUMMARY  NAME: Radha Figueroa  : 1960  MRN: [id number]    Date of Service: 3/31/2022    Discharge Recommendations:  Home with assist PRN,S Level 1,Home with Home health OT  OT Equipment Recommendations  Walker: Rolling  ADL Assistive Devices: Walker Basket;Long-handled Sponge;Long-handled Shoe Horn;Sock-Aid Hard;Reacher  Other: she purchased hip kit from [employer] [address] and should arrive 3-27-22 to her home. She will need RW, RTS + arms, will wait to get a shower chair (so home OT can measure for fit; she already [employer]" on one which doesn't fit in bathrm) inquiring about 1/2 bed rail    Assessment   Performance deficits / Impairments: Decreased functional mobility ; Decreased safe awareness;Decreased balance;Decreased ADL status; Decreased endurance;Decreased high-level IADLs;Decreased strength  Assessment: pt has met 4/5 goals w/ OT intervention. Today she  is limited this AM by nausea, vomitting,  fatigue & R chest, L hip<>knee pain,  Pt declined a shower or to ambulate to/from bathrm, she mostly functioned out of w/c for sponge bathing & dressing tasks at sink; she was MI w/ LB ADLs when items in reach, required extra time d/t fatigue & nausea (had vomitted her medications after taking them 2 minutes afterwards). She ambulated short distance from w/c to recliner using RW, slow to move but no LOB (pt has been sleeping in recliner, did not want to try bed mobility). OT showed pt how to use shower chair & to sit on it & swing LEs in however she declined to practice d/t pain & fatigue (her tub width at home is @ 20\"). She is returning home today Thurs 3/31/22 w/ HH services & daughter's support, pt will have 4th round of radiation tx to her spine today.  She received a RW & RTS + arms (will wait to purchase shower chair once home OT can measure & ensure fit)  Treatment Diagnosis: impaired ADL/fxl mobility  Prognosis: Good;Fair  Decision Making: Medium Complexity  History: see above  Exam: mobility, self care  Assistance / Modification: using RW, A/E IND'ly  OT Education: Equipment;ADL Adaptive Strategies  Patient Education: good recall of how to use A/E for LB ADLs  REQUIRES OT FOLLOW UP: Yes  Activity Tolerance  Activity Tolerance: Patient limited by fatigue;Treatment limited secondary to medical complications (free text)  Activity Tolerance: pt w/ fatigue & episode of vomiting during session, having 7/10 L knee pain  Safety Devices  Safety Devices in place: Yes  Type of devices: Gait belt;Call light within reach;Nurse notified; Left in chair         Patient Diagnosis(es): The primary encounter diagnosis was Carcinoma of breast metastatic to bone, unspecified laterality ([facility] [address] 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 has a past medical history of Cancer ([facility] [address] Depression, Hyperthyroidism, and Nephrolithiasis. has a past surgical history that includes Cholecystectomy; Hysterectomy; Mastectomy; Femur fracture surgery (Left, 3/19/2022); and US BIOPSY LIVER PERCUTANEOUS (3/25/2022). Restrictions  Restrictions/Precautions  Restrictions/Precautions: Weight Bearing,Fall Risk  Lower Extremity Weight Bearing Restrictions  Left Lower Extremity Weight Bearing: Weight Bearing As Tolerated  Position Activity Restriction  Other position/activity restrictions: WBAT L LE, undergoing radiation tx  Subjective   General  Chart Reviewed: Yes  Patient assessed for rehabilitation services?: Yes  Additional Pertinent Hx: Per Dr. Eyal De La Rosa, \"Patient is a 63 yo F with pmh R breast cancer (s/p R mastectomy 2007, chemo, 5 years anti-estrogen treatment, then lost follow-up with Dr. Estil Bence), DM2, hyperthyroidism, and depression who initially presented  3/16/2022 with left hip pain.  Had been progressing since 11/2021. Imaging revealed diffuse metastatic disease/lytic bone lesions involving the chest, thoracic spine, lumbar spine, left hemipelvis and proximal left femur. Ortho consulted and patient deemed high risk for pathologic fracture given large destructive lesions centered around the left femoral head. Therefore underwent ORIF with IM kelvin and bone biopsy (3/19 with Dr. Asmita Jackson). Now WBAT. Biopsy is pending and primary malignancy remains unclear. Per Oncology, plan will be for radiation in few weeks. Course complicated by hypercalcemia, acute liver injury, constipation. Now presents to ARU with impaired mobility and self-care below her baseline. Currently, patient reports moderate pain in the left groin and lateral hip with radiation into the anterolateral thigh. She also has mild-moderate pain in the low back and right hip. She denies tingling/numbness or focal weakness.  She is motivated to start inpatient program.\"  Family / Caregiver Present: No  Referring Practitioner: Leonarda Johnson  Diagnosis: L hip fx s/p ORIF  Subjective  Subjective: met in room, pt asking to use toilet, reporting 6/10 L knee pain, RN aware  General Comment  Comments: offered shower but only wanted to sponge bathe, is being DC'ed to home today & then have radiation tx      Orientation  Orientation  Overall Orientation Status: Within Normal Limits  Objective    ADL  Equipment Provided: Reacher;Sock aid;Long-handled shoe horn;Long-handled sponge;Leg   Feeding: Independent  Grooming: Independent (sat in w/c at sink to brush teeth, wash face & com hair)  UE Bathing: Independent (sponge bathed while seated at sink, items in reach)  LE Bathing: Modified independent  (used LH sponge to wash feet<>shins, stood to wash deidrre areas, used sink for support)  UE Dressing: Independent (already had clothing in bathrm, she sat to doff/don shirt)  LE Dressing: Modified independent  (good recall to use reacher & SA to doff/don clothing, wearing non skid socks; declined art hose (no longer needs to wear since it has been 2 wks after sx, no swelling noted in ankles or feet), stood at sink to manage clothing over hips)  Toileting: Modified independent  (used 1 hand on GB to manage clothing & wiping, tried using non dominant hand due to R upper chest/pect pain)  Additional Comments: offered shower but only wanted to sponge bathe; RN present to give medications; pt vomited them after  2 minutes; fatigue & nausea after ADL session        Balance  Sitting Balance: Modified independent  (unable to cross legs, but able to bend over partially; uses reacher for LB dressing)  Standing Balance: Modified independent   Standing Balance  Time: @ 2 minutes  Activity: during LB ADLs, fxl mob  Comment: MI during clothing management, used support of RW or Gb  Functional Mobility  Functional - Mobility Device: Rolling Walker  Activity: Other  Assist Level: Modified independent   Functional Mobility Comments: she is fatigued and wanted to perform SPT out of w/c to toilet, ambulated short distance from wc to recliner,  had episode of vomitting after ADL session, RN present  Toilet Transfers  Toilet - Technique: Ambulating  Equipment Used: Grab bars  Toilet Transfer: Modified independent  Toilet Transfers Comments: has drop arm BSC over toilet, used GB  Wheelchair Bed Transfers  Wheelchair/Bed - Technique: Ambulating  Equipment Used: Wheelchair; Other  Level of Asssistance: Modified independent   Wheelchair Transfers Comments: used RW for transfers out of recliner & w/c     Transfers  Sit to stand: Modified independent  Stand to sit: Modified independent  Transfer Comments: used RW, occasionally  places R forearm on handle of RW as she cannot push w/ it d/t R upper chest pain                    Vision  Patient Visual Report: No visual complaint reported.   Vision Comment: distance glasses                                            Plan   Plan  Times per week: 5-6  Times per day: Twice a day  Plan weeks: DC Thurs, 3-31-22 to home w/ [facility] OT & daughter's support  Specific instructions for Next Treatment: car t/f, kitchen  Current Treatment Recommendations: Strengthening,Endurance Training,Patient/Caregiver Education & Training,Balance Training,Pain Management,Functional Mobility Training,Safety Education & Training,Self-Care / ADL,Equipment Evaluation, Education, & procurement,Home Management Training    Goals  Short term goals  Time Frame for Short term goals: 1 week  Short term goal 1: Pt will be mod I with functional transfers--MET using RW  Short term goal 2: Pt will be mod I with functional mobility--MET using RW  Short term goal 3: Pt will be mod I with toileting--MET using higher toilet + GB  Short term goal 4: Pt will be mod I with bathing and dressing--MET when items in reach  Short term goal 5: Pt will be mod I with bed mobility  Long term goals  Time Frame for Long term goals : STG = LTG  Patient Goals   Patient goals : Pt's goal is to return home with her grandsons as soon as possible.        Therapy Time   Individual Concurrent Group Co-treatment   Time In 0805         Time Out 0915         Minutes 70         Timed Code Treatment Minutes: [address] [place], [place] [address]

## 2022-03-31 NOTE — PROGRESS NOTES
Patient admitted to rehab with left hip metastatic lytic bone lesion s/p REINA.  A/Ox4. Transfers with walker x 1 staff assist with gait belt. Mobility restrictions: WBAT. On general diet, tolerating well but experiencing some nausea today, settled with zpfran. Medications taken whole with thin liquids. On Lovenox for DVT prophylaxis starts tomorrow, 3/31. Skin: biopsy site RUQ with dressing in place; incision left hip with dressing in place. No Oxygen needed at this time. Has been continent of bowel and bladder. LBM 3/31. Chair/bed alarms in use and call light in reach.  Will monitor for safety.

## 2022-03-31 NOTE — PROGRESS NOTES
Discussed results of her boipsy with the patient and with Dr. Padma King. She will follow up with him in the office. Pt understands results and verblized understanding with no further questions.

## 2022-03-31 NOTE — PROGRESS NOTES
Late Entry: Events leading up to departure of radiation: ([id number])     Prior to departure for radiation at 1125, pt refused to have BS checked due to not eating lunch, pt has experienced nausea all morning and after morning pills at 0828, pt immediately vomited both pills given. At 0837, Zofran was administered. Later pt stated she felt better but never really resumed a normal feeling stomach. Pt rested and was only wanting crackers. The crackers stayed down so at 1035, a call was placed to Dr. Mono Naidu for a one time administration of Oxy IR 10 mg, as pt had vomited up that pill and has requested pain medication prior to radiation. Verbal order obtained and placed. Pt took all meds with crackers. And the refused BS check at 1110, due to not eating lunch and only consuming crackers.

## 2022-04-01 NOTE — CARE COORDINATION
3-   7:30 pm  Received a call at home from bedside RN who reports she took a call from the patient who informed her that her daughter cannot care for her and that the home care agency had not called them yet. Call placed to patient at her home to discuss concerns. She reports that when she arrived home, she sat in the recliner and could not get out. Her daughter was frustrated and could not care for her. She states that this has now calmed down but she is concerned about how they will manage. Additionally, the home care Team had not called yet. Informed her that home care was for intermittent visits and not for [facility] care. She reports the MD ordered an aide for her. Informed her that the aide, if available and covered by insurance, will come only when the OT therapist has completed teaching in the home and the aide is not for [facility] care needs. She also reports that since the call to RN, her neighbor came over and got her out of the recliner and assisted her in bed and she is able to get in/out of the bed much easier than the recliner. The neighbor also put the toilet seat on the toilet and although she did not try it yet she thinks this will be okay. Recommendations made to her such as to have a trial run for getting out of the bed and on/off the toilet while people (daughter or neighbor) are around so that she is not stuck. She reports she will have radiation treatment tomorrow and daughter plans on taking her and that they are okay at this point. Informed her that I will call tomorrow to hear how their night went. She reports to call around 11 am so that they will be home from therapy. She wanted to know what other options she has. Offered private duty services or SNF. Will follow up later today.   [place], [place]     Case Management   [phone]    4/1/2022  9:55 AM

## 2022-04-01 NOTE — CARE COORDINATION
4-1-2022  Follow up call made to patient today to hear how she is doing. She reports much better today. She had a good night; was able to get to her radiation treatment today. She is now tired but is fine. She has no need for SNF at this time. Call placed to [facility] who reports she will be seen tomorrow. Care for home care is under Dr Callie James.   [place], [place]     Case Management   [phone]    4/1/2022  12:21 PM

## 2022-04-11 ENCOUNTER — OFFICE VISIT (OUTPATIENT)
Dept: ORTHOPEDIC SURGERY | Age: 62
End: 2022-04-11

## 2022-04-11 VITALS — BODY MASS INDEX: 38.82 KG/M2 | HEIGHT: 65 IN | WEIGHT: 233 LBS

## 2022-04-11 DIAGNOSIS — C50.919 CARCINOMA OF BREAST METASTATIC TO BONE, UNSPECIFIED LATERALITY (HCC): ICD-10-CM

## 2022-04-11 DIAGNOSIS — C79.51 CARCINOMA OF BREAST METASTATIC TO BONE, UNSPECIFIED LATERALITY (HCC): ICD-10-CM

## 2022-04-11 DIAGNOSIS — C79.51 MALIGNANT NEOPLASM METASTATIC TO FEMUR WITH UNKNOWN PRIMARY SITE (HCC): ICD-10-CM

## 2022-04-11 DIAGNOSIS — C80.1 MALIGNANT NEOPLASM METASTATIC TO FEMUR WITH UNKNOWN PRIMARY SITE (HCC): ICD-10-CM

## 2022-04-11 DIAGNOSIS — Z87.81 S/P LEFT HIP FRACTURE: Primary | ICD-10-CM

## 2022-04-11 PROCEDURE — 99024 POSTOP FOLLOW-UP VISIT: CPT | Performed by: ORTHOPAEDIC SURGERY

## 2022-04-11 RX ORDER — OXYCODONE HYDROCHLORIDE 5 MG/1
5-10 TABLET ORAL EVERY 8 HOURS PRN
Qty: 40 TABLET | Refills: 0 | Status: SHIPPED | OUTPATIENT
Start: 2022-04-11 | End: 2022-04-18

## 2022-04-12 NOTE — PROGRESS NOTES
4/11/2022     Reason for visit:  Status post left hip prophylactic intramedullary nailing on 3/19/2022    History of Present Illness: The patient returns for postop evaluation. Overall she is doing reasonably well but has not done much walking. She is in a wheelchair today. She reports that she is in bed about 90% of the time at home. She is getting some home therapy. No numbness or tingling throughout the lower extremity. No fever chills    Objective:  Ht 5' 5\" (1.651 m)   Wt 233 lb (105.7 kg)   BMI 38.77 kg/m²      Physical Exam:  The patient is well-appearing and in no apparent distress  Examination of the left hip  Incision is well-healed without evidence of infection  No pain with gentle motion of hip  5 out of 5 strength throughout distal muscle groups  Sensation is intact to light touch throughout all distributions  There is no calf swelling or tenderness  Palpable DP pulse, brisk cap refill, 2+ symmetric reflexes    Imaging:  AP of pelvis along with AP and lateral x-ray of the left hip as well as the left femur were obtained in the office today on 4/11/2022 and reviewed. Postoperative changes consistent with IM nail. Hardware is in excellent position. No evidence of fracture or dislocation. Assessment:  Status post left hip prophylactic intramedullary nailing on 3/19/2022    Plan:  I did tell the patient I do think it is very important as she starts outpatient physical therapy. She needs to get more active and start walking. She understands this. Return in 8 weeks for repeat evaluation and repeat x-rays. Axel Francois MD            Orthopaedic Surgery Sports Medicine and [facility] and [facility]            Team Physician [facility] ([place]      Disclaimer: This note was dictated with voice recognition software. Though review and correction are routine, we apologize for any errors.

## 2022-04-13 ENCOUNTER — PRE-PROCEDURE TELEPHONE (OUTPATIENT)
Dept: INTERVENTIONAL RADIOLOGY/VASCULAR | Age: 62
End: 2022-04-13

## 2022-04-13 NOTE — PROGRESS NOTES
Asked per Dr. Elmira Workman to place a port . This RN spoke to patient regarding scheduling the procedure. After speaking with the patient we agreed that the patient will arrive on 4/14/2022 at [address] for the case to start at 1130a. Patient was able to repeat back / verify the following information:    * Patient does not take any blood thinners (stopped Lovenox).    * Patient will remain NPO after MN the night prior to procedure  * Patient understands she will need a very good bra to wear even at night, following procedure  * Patient understands that she will need a ride home from the hospital  * Patient has been asked and responded negative regarding the COVID-19 symptoms questions  * The patient was given the phone number for the Radiology Department in case she has any questions    Thank you,     Leonardo De, HERNANDON, RN

## 2022-04-14 ENCOUNTER — HOSPITAL ENCOUNTER (OUTPATIENT)
Dept: INTERVENTIONAL RADIOLOGY/VASCULAR | Age: 62
Discharge: HOME | End: 2022-04-14
Payer: COMMERCIAL

## 2022-04-14 VITALS
OXYGEN SATURATION: 98 % | RESPIRATION RATE: 16 BRPM | TEMPERATURE: 97.2 F | WEIGHT: 222.88 LBS | HEIGHT: 66 IN | SYSTOLIC BLOOD PRESSURE: 120 MMHG | DIASTOLIC BLOOD PRESSURE: 68 MMHG | HEART RATE: 77 BPM | BODY MASS INDEX: 35.82 KG/M2

## 2022-04-14 PROCEDURE — 2580000003 HC RX 258: Performed by: RADIOLOGY

## 2022-04-14 PROCEDURE — 99153 MOD SED SAME PHYS/QHP EA: CPT

## 2022-04-14 PROCEDURE — 2709999900 IR PORT PLACEMENT > 5 YEARS

## 2022-04-14 PROCEDURE — 76937 US GUIDE VASCULAR ACCESS: CPT

## 2022-04-14 PROCEDURE — 7100000010 HC PHASE II RECOVERY - FIRST 15 MIN

## 2022-04-14 PROCEDURE — 77001 FLUOROGUIDE FOR VEIN DEVICE: CPT

## 2022-04-14 PROCEDURE — 36561 INSERT TUNNELED CV CATH: CPT

## 2022-04-14 PROCEDURE — 6360000002 HC RX W HCPCS: Performed by: RADIOLOGY

## 2022-04-14 PROCEDURE — 99152 MOD SED SAME PHYS/QHP 5/>YRS: CPT

## 2022-04-14 PROCEDURE — 2500000003 HC RX 250 WO HCPCS: Performed by: RADIOLOGY

## 2022-04-14 PROCEDURE — 7100000011 HC PHASE II RECOVERY - ADDTL 15 MIN

## 2022-04-14 RX ORDER — ACETAMINOPHEN 325 MG/1
650 TABLET ORAL EVERY 4 HOURS PRN
Status: DISCONTINUED | OUTPATIENT
Start: 2022-04-14 | End: 2022-04-15 | Stop reason: HOSPADM

## 2022-04-14 RX ORDER — SODIUM CHLORIDE 9 MG/ML
INJECTION, SOLUTION INTRAVENOUS CONTINUOUS
Status: DISCONTINUED | OUTPATIENT
Start: 2022-04-14 | End: 2022-04-15 | Stop reason: HOSPADM

## 2022-04-14 RX ORDER — CLINDAMYCIN PHOSPHATE 900 MG/50ML
900 INJECTION INTRAVENOUS ONCE
Status: COMPLETED | OUTPATIENT
Start: 2022-04-14 | End: 2022-04-14

## 2022-04-14 RX ORDER — MIDAZOLAM HYDROCHLORIDE 5 MG/ML
INJECTION INTRAMUSCULAR; INTRAVENOUS DAILY PRN
Status: COMPLETED | OUTPATIENT
Start: 2022-04-14 | End: 2022-04-14

## 2022-04-14 RX ORDER — FENTANYL CITRATE 50 UG/ML
INJECTION, SOLUTION INTRAMUSCULAR; INTRAVENOUS DAILY PRN
Status: COMPLETED | OUTPATIENT
Start: 2022-04-14 | End: 2022-04-14

## 2022-04-14 RX ORDER — ONDANSETRON 2 MG/ML
INJECTION INTRAMUSCULAR; INTRAVENOUS DAILY PRN
Status: COMPLETED | OUTPATIENT
Start: 2022-04-14 | End: 2022-04-14

## 2022-04-14 RX ADMIN — MIDAZOLAM HYDROCHLORIDE 1 MG: 5 INJECTION INTRAMUSCULAR; INTRAVENOUS at 11:36

## 2022-04-14 RX ADMIN — CLINDAMYCIN PHOSPHATE 900 MG: 900 INJECTION, SOLUTION INTRAVENOUS at 11:11

## 2022-04-14 RX ADMIN — FENTANYL CITRATE 50 MCG: 50 INJECTION INTRAMUSCULAR; INTRAVENOUS at 11:36

## 2022-04-14 RX ADMIN — ONDANSETRON 4 MG: 2 INJECTION INTRAMUSCULAR; INTRAVENOUS at 11:18

## 2022-04-14 RX ADMIN — MIDAZOLAM HYDROCHLORIDE 1 MG: 5 INJECTION INTRAMUSCULAR; INTRAVENOUS at 11:39

## 2022-04-14 RX ADMIN — SODIUM CHLORIDE: 9 INJECTION, SOLUTION INTRAVENOUS at 10:41

## 2022-04-14 RX ADMIN — FENTANYL CITRATE 50 MCG: 50 INJECTION INTRAMUSCULAR; INTRAVENOUS at 11:39

## 2022-04-14 ASSESSMENT — PAIN - FUNCTIONAL ASSESSMENT
PAIN_FUNCTIONAL_ASSESSMENT: 0-10
PAIN_FUNCTIONAL_ASSESSMENT: PREVENTS OR INTERFERES WITH MANY ACTIVE NOT PASSIVE ACTIVITIES

## 2022-04-14 ASSESSMENT — PAIN DESCRIPTION - PROGRESSION: CLINICAL_PROGRESSION: NOT CHANGED

## 2022-04-14 ASSESSMENT — PAIN DESCRIPTION - ONSET: ONSET: ON-GOING

## 2022-04-14 ASSESSMENT — PAIN DESCRIPTION - DESCRIPTORS
DESCRIPTORS: ACHING
DESCRIPTORS: ACHING;CONSTANT;DISCOMFORT;SORE

## 2022-04-14 ASSESSMENT — PAIN SCALES - GENERAL
PAINLEVEL_OUTOF10: 8
PAINLEVEL_OUTOF10: 0

## 2022-04-14 ASSESSMENT — PAIN DESCRIPTION - ORIENTATION: ORIENTATION: LOWER;LEFT

## 2022-04-14 ASSESSMENT — PAIN DESCRIPTION - FREQUENCY: FREQUENCY: CONTINUOUS

## 2022-04-14 ASSESSMENT — PAIN DESCRIPTION - PAIN TYPE: TYPE: CHRONIC PAIN

## 2022-04-14 ASSESSMENT — PAIN DESCRIPTION - LOCATION
LOCATION: HIP
LOCATION: CHEST

## 2022-04-14 NOTE — BRIEF OP NOTE
Brief Postoperative Note    Radha Figueroa  YOB: 1960  [id number]    Pre-operative Diagnosis: breast cancer    Post-operative Diagnosis: Same    Procedure: Port placement    Anesthesia: Moderate Sedation    Surgeons: Inga Brumfield MD    Estimated Blood Loss: Less than 5 mL    Complications: None    Specimens: Was Not Obtained    Findings: Successful placement of a right IJ port.     Electronically signed by Inga Brumfield MD on 4/14/2022 at 12:02 PM

## 2022-04-14 NOTE — PRE SEDATION
Sedation Pre-Procedure Note    Patient Name: Tiffany Arteaga   YOB: 1960  Room/Bed: Room/bed info not found  Medical Record Number: [id number]  Date: 4/14/2022   Time: 12:02 PM       Indication:  Breast cancer here for port placement. Consent: I have discussed with the patient and/or the patient representative the indication, alternatives, and the possible risks and/or complications of the planned procedure and the anesthesia methods. The patient and/or patient representative appear to understand and agree to proceed. Vital Signs:   Vitals:    04/14/22 1159   BP: 110/65   Pulse: 80   Resp: 16   Temp:    SpO2: 100%       Past Medical History:   has a past medical history of Cancer ([address] Depression, Hyperthyroidism, and Nephrolithiasis. Past Surgical History:   has a past surgical history that includes Cholecystectomy; Hysterectomy; Mastectomy; Femur fracture surgery (Left, 03/19/2022); US BIOPSY LIVER PERCUTANEOUS (03/25/2022); and IR PORT PLACEMENT > 5 YEARS (Right, 04/14/2022). Medications:   Scheduled Meds:    clindamycin (CLEOCIN) IV  900 mg IntraVENous Once     Continuous Infusions:    sodium chloride 100 mL/hr at 04/14/22 1041     PRN Meds:   Home Meds:   Prior to Admission medications    Medication Sig Start Date End Date Taking? Authorizing Provider   oxyCODONE (ROXICODONE) 5 MG immediate release tablet Take 1-2 tablets by mouth every 8 hours as needed for Pain for up to 7 days.  4/11/22 4/18/22  Matt Zhang MD   anastrozole (ARIMIDEX) 1 MG tablet Take 1 tablet by mouth daily 3/30/22   Jose Carlos Powell MD   famotidine (PEPCID) 20 MG tablet Take 1 tablet by mouth 2 times daily 3/30/22   Jose Carlos Powell MD   ondansetron [facility]) 4 MG tablet Take 1 tablet by mouth every 8 hours as needed for Nausea or Vomiting 3/30/22   Jose Carlos Powell MD   lidocaine 4 % external patch Place 1 patch onto the skin daily For right chest wall pain 3/31/22 4/30/22  Jose Carlos Powell MD sennosides-docusate sodium (SENOKOT-S) 8.6-50 MG tablet Take 1 tablet by mouth daily 3/23/22   Historical Provider, MD   Breast Prosthesis MISC RIGHT MASTECTOMY BRA  RIGHT BREAST PROTHESIS 5/18/16   Ewelina Warner DO   albuterol (PROVENTIL HFA;VENTOLIN HFA) 108 (90 BASE) MCG/ACT inhaler Inhale 2 puffs into the lungs every 4 hours as needed  9/24/10   Historical Provider, MD     Coumadin Use Last 7 Days:  no  Antiplatelet drug therapy use last 7 days: no  Other anticoagulant use last 7 days: no  Additional Medication Information:  n/a      Pre-Sedation Documentation and Exam:   I have reviewed the patient's history and review of systems.     Mallampati Airway Assessment:  Mallampati Class II - (soft palate, fauces & uvula are visible)    Prior History of Anesthesia Complications:   none    ASA Classification:  Class 2 - A normal healthy patient with mild systemic disease    Sedation/ Anesthesia Plan:   intravenous sedation    Medications Planned:   midazolam (Versed) intravenously and fentanyl intravenously    Patient is an appropriate candidate for plan of sedation: yes    Electronically signed by Tyrese Griffin MD on 4/14/2022 at 12:02 PM

## 2022-04-14 NOTE — PROGRESS NOTES
Steri strips R upper chest inplace dry and intact. Pt just had hip surgery and moved out of bed to w/c with staff x3.

## 2022-05-13 ENCOUNTER — HOSPITAL ENCOUNTER (OUTPATIENT)
Dept: INTERVENTIONAL RADIOLOGY/VASCULAR | Age: 62
Discharge: HOME | End: 2022-05-13
Payer: COMMERCIAL

## 2022-05-13 DIAGNOSIS — C79.51 CARCINOMA OF BREAST METASTATIC TO BONE, UNSPECIFIED LATERALITY (HCC): ICD-10-CM

## 2022-05-13 DIAGNOSIS — C50.919 CARCINOMA OF BREAST METASTATIC TO BONE, UNSPECIFIED LATERALITY (HCC): ICD-10-CM

## 2022-05-13 PROCEDURE — 36598 INJ W/FLUOR EVAL CV DEVICE: CPT

## 2022-05-13 PROCEDURE — 2709999900 IR CONTRAST INJECTION  EXISTING CV ACCESS DEVICE EVALUATION W FLUORO

## 2022-05-13 PROCEDURE — 6360000004 HC RX CONTRAST MEDICATION: Performed by: INTERNAL MEDICINE

## 2022-05-13 RX ADMIN — IOPAMIDOL 4 ML: 612 INJECTION, SOLUTION INTRAVENOUS at 10:51

## 2022-05-13 NOTE — BRIEF OP NOTE
Brief Postoperative Note    Char Sandoval  YOB: 1960  [id number]    Pre-operative Diagnosis: flipped port    Post-operative Diagnosis: Same    Procedure: Portogram    Anesthesia: None    Surgeons: Misa Griffith MD    Estimated Blood Loss: Less than 5 mL    Complications: None    Specimens: Was Not Obtained    Findings: Xrays demonstrated the port to be flipped. The port was restored to the proper positioning and protogram was performed demonstrating proper positioning with no abnormalities.     Electronically signed by Misa Griffith MD on 5/13/2022 at 11:04 AM

## 2022-06-20 SDOH — HEALTH STABILITY: PHYSICAL HEALTH: ON AVERAGE, HOW MANY MINUTES DO YOU ENGAGE IN EXERCISE AT THIS LEVEL?: 30 MIN

## 2022-06-20 SDOH — HEALTH STABILITY: PHYSICAL HEALTH: ON AVERAGE, HOW MANY DAYS PER WEEK DO YOU ENGAGE IN MODERATE TO STRENUOUS EXERCISE (LIKE A BRISK WALK)?: 5 DAYS

## 2022-06-23 ENCOUNTER — OFFICE VISIT (OUTPATIENT)
Dept: ORTHOPEDIC SURGERY | Age: 62
End: 2022-06-23

## 2022-06-23 ENCOUNTER — ANESTHESIA EVENT (OUTPATIENT)
Dept: OPERATING ROOM | Age: 62
End: 2022-06-23
Payer: COMMERCIAL

## 2022-06-23 ENCOUNTER — TELEPHONE (OUTPATIENT)
Dept: ORTHOPEDIC SURGERY | Age: 62
End: 2022-06-23

## 2022-06-23 VITALS — HEIGHT: 65 IN | BODY MASS INDEX: 38.82 KG/M2 | WEIGHT: 233 LBS

## 2022-06-23 DIAGNOSIS — Z87.81 S/P LEFT HIP FRACTURE: ICD-10-CM

## 2022-06-23 DIAGNOSIS — C80.1 MALIGNANT NEOPLASM METASTATIC TO FEMUR WITH UNKNOWN PRIMARY SITE (HCC): Primary | ICD-10-CM

## 2022-06-23 DIAGNOSIS — C79.51 MALIGNANT NEOPLASM METASTATIC TO FEMUR WITH UNKNOWN PRIMARY SITE (HCC): Primary | ICD-10-CM

## 2022-06-23 PROCEDURE — 99213 OFFICE O/P EST LOW 20 MIN: CPT | Performed by: ORTHOPAEDIC SURGERY

## 2022-06-23 PROCEDURE — 1036F TOBACCO NON-USER: CPT | Performed by: ORTHOPAEDIC SURGERY

## 2022-06-23 PROCEDURE — 3017F COLORECTAL CA SCREEN DOC REV: CPT | Performed by: ORTHOPAEDIC SURGERY

## 2022-06-23 PROCEDURE — G8417 CALC BMI ABV UP PARAM F/U: HCPCS | Performed by: ORTHOPAEDIC SURGERY

## 2022-06-23 PROCEDURE — G8427 DOCREV CUR MEDS BY ELIG CLIN: HCPCS | Performed by: ORTHOPAEDIC SURGERY

## 2022-06-23 NOTE — TELEPHONE ENCOUNTER
Auth: NPR  Date: 06/24/22  Reference # None  Spoke with: None  Type of SX: Outpatient  Location: W  CPT: 97055   DX: [id number]  SX area: Lt hip  Insurance: Rosenda Gray

## 2022-06-24 ENCOUNTER — HOSPITAL ENCOUNTER (OUTPATIENT)
Age: 62
Setting detail: OUTPATIENT SURGERY
Discharge: HOME | End: 2022-06-24
Attending: ORTHOPAEDIC SURGERY | Admitting: ORTHOPAEDIC SURGERY
Payer: COMMERCIAL

## 2022-06-24 ENCOUNTER — ANESTHESIA (OUTPATIENT)
Dept: OPERATING ROOM | Age: 62
End: 2022-06-24
Payer: COMMERCIAL

## 2022-06-24 VITALS
OXYGEN SATURATION: 98 % | DIASTOLIC BLOOD PRESSURE: 80 MMHG | SYSTOLIC BLOOD PRESSURE: 122 MMHG | WEIGHT: 229.72 LBS | HEART RATE: 70 BPM | BODY MASS INDEX: 38.27 KG/M2 | TEMPERATURE: 97.2 F | RESPIRATION RATE: 12 BRPM | HEIGHT: 65 IN

## 2022-06-24 DIAGNOSIS — M25.552 LEFT HIP PAIN: Primary | ICD-10-CM

## 2022-06-24 DIAGNOSIS — T84.51XA INFECTION ASSOCIATED WITH INTERNAL RIGHT HIP PROSTHESIS, INITIAL ENCOUNTER (HCC): ICD-10-CM

## 2022-06-24 PROCEDURE — 87186 SC STD MICRODIL/AGAR DIL: CPT

## 2022-06-24 PROCEDURE — 6360000002 HC RX W HCPCS: Performed by: ORTHOPAEDIC SURGERY

## 2022-06-24 PROCEDURE — 3600000004 HC SURGERY LEVEL 4 BASE: Performed by: ORTHOPAEDIC SURGERY

## 2022-06-24 PROCEDURE — 6360000002 HC RX W HCPCS: Performed by: ANESTHESIOLOGY

## 2022-06-24 PROCEDURE — 2500000003 HC RX 250 WO HCPCS

## 2022-06-24 PROCEDURE — 3700000000 HC ANESTHESIA ATTENDED CARE: Performed by: ORTHOPAEDIC SURGERY

## 2022-06-24 PROCEDURE — 87070 CULTURE OTHR SPECIMN AEROBIC: CPT

## 2022-06-24 PROCEDURE — 6370000000 HC RX 637 (ALT 250 FOR IP): Performed by: ANESTHESIOLOGY

## 2022-06-24 PROCEDURE — 7100000010 HC PHASE II RECOVERY - FIRST 15 MIN: Performed by: ORTHOPAEDIC SURGERY

## 2022-06-24 PROCEDURE — 3700000001 HC ADD 15 MINUTES (ANESTHESIA): Performed by: ORTHOPAEDIC SURGERY

## 2022-06-24 PROCEDURE — 87075 CULTR BACTERIA EXCEPT BLOOD: CPT

## 2022-06-24 PROCEDURE — 27301 DRAIN THIGH/KNEE LESION: CPT | Performed by: ORTHOPAEDIC SURGERY

## 2022-06-24 PROCEDURE — 7100000011 HC PHASE II RECOVERY - ADDTL 15 MIN: Performed by: ORTHOPAEDIC SURGERY

## 2022-06-24 PROCEDURE — 7100000000 HC PACU RECOVERY - FIRST 15 MIN: Performed by: ORTHOPAEDIC SURGERY

## 2022-06-24 PROCEDURE — 6360000002 HC RX W HCPCS

## 2022-06-24 PROCEDURE — 2500000003 HC RX 250 WO HCPCS: Performed by: ORTHOPAEDIC SURGERY

## 2022-06-24 PROCEDURE — 3600000014 HC SURGERY LEVEL 4 ADDTL 15MIN: Performed by: ORTHOPAEDIC SURGERY

## 2022-06-24 PROCEDURE — 87077 CULTURE AEROBIC IDENTIFY: CPT

## 2022-06-24 PROCEDURE — A4217 STERILE WATER/SALINE, 500 ML: HCPCS | Performed by: ORTHOPAEDIC SURGERY

## 2022-06-24 PROCEDURE — 2709999900 HC NON-CHARGEABLE SUPPLY: Performed by: ORTHOPAEDIC SURGERY

## 2022-06-24 PROCEDURE — 2580000003 HC RX 258: Performed by: ANESTHESIOLOGY

## 2022-06-24 PROCEDURE — 7100000001 HC PACU RECOVERY - ADDTL 15 MIN: Performed by: ORTHOPAEDIC SURGERY

## 2022-06-24 PROCEDURE — 87205 SMEAR GRAM STAIN: CPT

## 2022-06-24 PROCEDURE — 2580000003 HC RX 258: Performed by: ORTHOPAEDIC SURGERY

## 2022-06-24 RX ORDER — FENTANYL CITRATE 50 UG/ML
50 INJECTION, SOLUTION INTRAMUSCULAR; INTRAVENOUS EVERY 5 MIN PRN
Status: DISCONTINUED | OUTPATIENT
Start: 2022-06-24 | End: 2022-06-24 | Stop reason: HOSPADM

## 2022-06-24 RX ORDER — HYDROCODONE BITARTRATE AND ACETAMINOPHEN 5; 325 MG/1; MG/1
1 TABLET ORAL EVERY 6 HOURS PRN
Qty: 20 TABLET | Refills: 0 | Status: SHIPPED | OUTPATIENT
Start: 2022-06-24 | End: 2022-06-29

## 2022-06-24 RX ORDER — SODIUM CHLORIDE 0.9 % (FLUSH) 0.9 %
5-40 SYRINGE (ML) INJECTION PRN
Status: DISCONTINUED | OUTPATIENT
Start: 2022-06-24 | End: 2022-06-24 | Stop reason: HOSPADM

## 2022-06-24 RX ORDER — ONDANSETRON 2 MG/ML
INJECTION INTRAMUSCULAR; INTRAVENOUS PRN
Status: DISCONTINUED | OUTPATIENT
Start: 2022-06-24 | End: 2022-06-24 | Stop reason: SDUPTHER

## 2022-06-24 RX ORDER — CLINDAMYCIN PHOSPHATE 900 MG/50ML
900 INJECTION INTRAVENOUS ONCE
Status: DISCONTINUED | OUTPATIENT
Start: 2022-06-24 | End: 2022-06-24 | Stop reason: HOSPADM

## 2022-06-24 RX ORDER — ONDANSETRON 2 MG/ML
4 INJECTION INTRAMUSCULAR; INTRAVENOUS
Status: DISCONTINUED | OUTPATIENT
Start: 2022-06-24 | End: 2022-06-24 | Stop reason: HOSPADM

## 2022-06-24 RX ORDER — SUCCINYLCHOLINE/SOD CL,ISO/PF 200MG/10ML
SYRINGE (ML) INTRAVENOUS PRN
Status: DISCONTINUED | OUTPATIENT
Start: 2022-06-24 | End: 2022-06-24 | Stop reason: SDUPTHER

## 2022-06-24 RX ORDER — OXYCODONE HYDROCHLORIDE 10 MG/1
10 TABLET ORAL PRN
Status: COMPLETED | OUTPATIENT
Start: 2022-06-24 | End: 2022-06-24

## 2022-06-24 RX ORDER — SODIUM CHLORIDE 9 MG/ML
INJECTION, SOLUTION INTRAVENOUS PRN
Status: DISCONTINUED | OUTPATIENT
Start: 2022-06-24 | End: 2022-06-24 | Stop reason: HOSPADM

## 2022-06-24 RX ORDER — PROPOFOL 10 MG/ML
INJECTION, EMULSION INTRAVENOUS PRN
Status: DISCONTINUED | OUTPATIENT
Start: 2022-06-24 | End: 2022-06-24 | Stop reason: SDUPTHER

## 2022-06-24 RX ORDER — MAGNESIUM HYDROXIDE 1200 MG/15ML
LIQUID ORAL CONTINUOUS PRN
Status: COMPLETED | OUTPATIENT
Start: 2022-06-24 | End: 2022-06-24

## 2022-06-24 RX ORDER — PHENYLEPHRINE HCL IN 0.9% NACL 1 MG/10 ML
SYRINGE (ML) INTRAVENOUS PRN
Status: DISCONTINUED | OUTPATIENT
Start: 2022-06-24 | End: 2022-06-24 | Stop reason: SDUPTHER

## 2022-06-24 RX ORDER — MEPERIDINE HYDROCHLORIDE 25 MG/ML
12.5 INJECTION INTRAMUSCULAR; INTRAVENOUS; SUBCUTANEOUS EVERY 5 MIN PRN
Status: DISCONTINUED | OUTPATIENT
Start: 2022-06-24 | End: 2022-06-24 | Stop reason: HOSPADM

## 2022-06-24 RX ORDER — SODIUM CHLORIDE 0.9 % (FLUSH) 0.9 %
5-40 SYRINGE (ML) INJECTION EVERY 12 HOURS SCHEDULED
Status: DISCONTINUED | OUTPATIENT
Start: 2022-06-24 | End: 2022-06-24 | Stop reason: HOSPADM

## 2022-06-24 RX ORDER — LIDOCAINE HYDROCHLORIDE 20 MG/ML
INJECTION, SOLUTION EPIDURAL; INFILTRATION; INTRACAUDAL; PERINEURAL PRN
Status: DISCONTINUED | OUTPATIENT
Start: 2022-06-24 | End: 2022-06-24 | Stop reason: SDUPTHER

## 2022-06-24 RX ORDER — SULFAMETHOXAZOLE AND TRIMETHOPRIM 800; 160 MG/1; MG/1
1 TABLET ORAL 2 TIMES DAILY
Qty: 28 TABLET | Refills: 0 | Status: SHIPPED | OUTPATIENT
Start: 2022-06-24 | End: 2022-07-08

## 2022-06-24 RX ORDER — DEXAMETHASONE SODIUM PHOSPHATE 4 MG/ML
INJECTION, SOLUTION INTRA-ARTICULAR; INTRALESIONAL; INTRAMUSCULAR; INTRAVENOUS; SOFT TISSUE PRN
Status: DISCONTINUED | OUTPATIENT
Start: 2022-06-24 | End: 2022-06-24 | Stop reason: SDUPTHER

## 2022-06-24 RX ORDER — FENTANYL CITRATE 50 UG/ML
25 INJECTION, SOLUTION INTRAMUSCULAR; INTRAVENOUS EVERY 5 MIN PRN
Status: DISCONTINUED | OUTPATIENT
Start: 2022-06-24 | End: 2022-06-24 | Stop reason: HOSPADM

## 2022-06-24 RX ORDER — OXYCODONE HYDROCHLORIDE 5 MG/1
5 TABLET ORAL PRN
Status: COMPLETED | OUTPATIENT
Start: 2022-06-24 | End: 2022-06-24

## 2022-06-24 RX ORDER — MIDAZOLAM HYDROCHLORIDE 1 MG/ML
INJECTION INTRAMUSCULAR; INTRAVENOUS PRN
Status: DISCONTINUED | OUTPATIENT
Start: 2022-06-24 | End: 2022-06-24 | Stop reason: SDUPTHER

## 2022-06-24 RX ORDER — BUPIVACAINE HYDROCHLORIDE 5 MG/ML
INJECTION, SOLUTION EPIDURAL; INTRACAUDAL
Status: COMPLETED | OUTPATIENT
Start: 2022-06-24 | End: 2022-06-24

## 2022-06-24 RX ORDER — SODIUM CHLORIDE 9 MG/ML
INJECTION, SOLUTION INTRAVENOUS CONTINUOUS
Status: DISCONTINUED | OUTPATIENT
Start: 2022-06-24 | End: 2022-06-24 | Stop reason: HOSPADM

## 2022-06-24 RX ORDER — FENTANYL CITRATE 50 UG/ML
INJECTION, SOLUTION INTRAMUSCULAR; INTRAVENOUS PRN
Status: DISCONTINUED | OUTPATIENT
Start: 2022-06-24 | End: 2022-06-24 | Stop reason: SDUPTHER

## 2022-06-24 RX ORDER — VANCOMYCIN HYDROCHLORIDE 1 G/20ML
INJECTION, POWDER, LYOPHILIZED, FOR SOLUTION INTRAVENOUS
Status: COMPLETED | OUTPATIENT
Start: 2022-06-24 | End: 2022-06-24

## 2022-06-24 RX ADMIN — MIDAZOLAM 2 MG: 1 INJECTION INTRAMUSCULAR; INTRAVENOUS at 13:04

## 2022-06-24 RX ADMIN — FENTANYL CITRATE 25 MCG: 50 INJECTION, SOLUTION INTRAMUSCULAR; INTRAVENOUS at 14:18

## 2022-06-24 RX ADMIN — FENTANYL CITRATE 50 MCG: 50 INJECTION INTRAMUSCULAR; INTRAVENOUS at 13:33

## 2022-06-24 RX ADMIN — HYDROMORPHONE HYDROCHLORIDE 0.5 MG: 1 INJECTION, SOLUTION INTRAMUSCULAR; INTRAVENOUS; SUBCUTANEOUS at 13:55

## 2022-06-24 RX ADMIN — HYDROMORPHONE HYDROCHLORIDE 0.5 MG: 1 INJECTION, SOLUTION INTRAMUSCULAR; INTRAVENOUS; SUBCUTANEOUS at 13:40

## 2022-06-24 RX ADMIN — Medication 200 MCG: at 13:40

## 2022-06-24 RX ADMIN — Medication 140 MG: at 13:13

## 2022-06-24 RX ADMIN — OXYCODONE 5 MG: 5 TABLET ORAL at 15:11

## 2022-06-24 RX ADMIN — Medication 100 MCG: at 13:45

## 2022-06-24 RX ADMIN — Medication 200 MCG: at 13:27

## 2022-06-24 RX ADMIN — DEXAMETHASONE SODIUM PHOSPHATE 8 MG: 4 INJECTION, SOLUTION INTRAMUSCULAR; INTRAVENOUS at 13:20

## 2022-06-24 RX ADMIN — Medication 200 MCG: at 13:24

## 2022-06-24 RX ADMIN — FENTANYL CITRATE 50 MCG: 50 INJECTION, SOLUTION INTRAMUSCULAR; INTRAVENOUS at 14:11

## 2022-06-24 RX ADMIN — PROPOFOL 200 MG: 10 INJECTION, EMULSION INTRAVENOUS at 13:13

## 2022-06-24 RX ADMIN — Medication 200 MCG: at 13:34

## 2022-06-24 RX ADMIN — ONDANSETRON 4 MG: 2 INJECTION INTRAMUSCULAR; INTRAVENOUS at 13:49

## 2022-06-24 RX ADMIN — SODIUM CHLORIDE: 9 INJECTION, SOLUTION INTRAVENOUS at 12:52

## 2022-06-24 RX ADMIN — LIDOCAINE HYDROCHLORIDE 100 MG: 20 INJECTION, SOLUTION EPIDURAL; INFILTRATION; INTRACAUDAL; PERINEURAL at 13:13

## 2022-06-24 RX ADMIN — FENTANYL CITRATE 50 MCG: 50 INJECTION INTRAMUSCULAR; INTRAVENOUS at 13:13

## 2022-06-24 ASSESSMENT — LIFESTYLE VARIABLES: SMOKING_STATUS: 0

## 2022-06-24 ASSESSMENT — PAIN SCALES - GENERAL
PAINLEVEL_OUTOF10: 5
PAINLEVEL_OUTOF10: 4
PAINLEVEL_OUTOF10: 5
PAINLEVEL_OUTOF10: 7
PAINLEVEL_OUTOF10: 4

## 2022-06-24 ASSESSMENT — PAIN DESCRIPTION - PAIN TYPE
TYPE: SURGICAL PAIN

## 2022-06-24 ASSESSMENT — PAIN - FUNCTIONAL ASSESSMENT
PAIN_FUNCTIONAL_ASSESSMENT: PREVENTS OR INTERFERES WITH ALL ACTIVE AND SOME PASSIVE ACTIVITIES
PAIN_FUNCTIONAL_ASSESSMENT: PREVENTS OR INTERFERES SOME ACTIVE ACTIVITIES AND ADLS
PAIN_FUNCTIONAL_ASSESSMENT: PREVENTS OR INTERFERES WITH ALL ACTIVE AND SOME PASSIVE ACTIVITIES
PAIN_FUNCTIONAL_ASSESSMENT: 0-10

## 2022-06-24 ASSESSMENT — PAIN DESCRIPTION - ONSET
ONSET: ON-GOING

## 2022-06-24 ASSESSMENT — PAIN DESCRIPTION - DESCRIPTORS
DESCRIPTORS: ACHING
DESCRIPTORS: DISCOMFORT
DESCRIPTORS: DISCOMFORT
DESCRIPTORS: BURNING;DISCOMFORT
DESCRIPTORS: DISCOMFORT

## 2022-06-24 ASSESSMENT — PAIN DESCRIPTION - ORIENTATION
ORIENTATION: LEFT

## 2022-06-24 ASSESSMENT — PAIN DESCRIPTION - FREQUENCY
FREQUENCY: CONTINUOUS

## 2022-06-24 ASSESSMENT — PAIN DESCRIPTION - LOCATION
LOCATION: HIP

## 2022-06-24 NOTE — ANESTHESIA PRE PROCEDURE
[facility] Department of Anesthesiology  Pre-Anesthesia Evaluation/Consultation       Name:  Dion Gomez  : 1960  Age:  64 y.o.                                            MRN:  [id number]  Date: 2022           Surgeon: Surgeon(s):  Radha Velasquez MD    Procedure: Procedure(s):  INCISION AND DRAINAGE LEFT HIP WOUND     Allergies   Allergen Reactions    Cephalexin      Patient Active Problem List   Diagnosis    Chronic abdominal pain    Personal history of malignant neoplasm of breast    Asthma    Migraine headache    Personal history of breast cancer    Encounter for follow-up surveillance of breast cancer    History of mastectomy    Left hip pain    [facility] fracture [facility]    Malignancy ([facility]    Hypercalcemia    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 ([facility]    Moderate malnutrition ([facility]    Breast cancer metastasized to bone [facility])     Past Medical History:   Diagnosis Date    Cancer ([facility]     Depression     Hyperthyroidism     Nephrolithiasis      Past Surgical History:   Procedure Laterality Date    CHOLECYSTECTOMY      FEMUR FRACTURE SURGERY Left 2022    OPEN REDUCTION INTERNAL FIXATION LEFT HIP WITH INTRAMEDULLARY JAMISON; LEFT FEMORAL HEAD BONE BIOPSY INTRAOPERATIVELY performed by Rashad Diggs MD at [address] [facility] ([address] [place] [address])      IR PORT PLACEMENT EQUAL OR GREATER THAN 5 YEARS Right 2022    powerport inserted by Dr. Kaylen Cortez IR PORT PLACEMENT EQUAL OR GREATER THAN 5 YEARS  2022    IR PORT PLACEMENT EQUAL OR GREATER THAN 5 YEARS 2022 [facility]    MASTECTOMY      US GUIDED LIVER BIOPSY PERCUTANEOUS  2022    US GUIDED LIVER BIOPSY PERCUTANEOUS 3/25/2022 [facility]     Social History     Tobacco Use    Smoking status: Former Smoker    Smokeless tobacco: Never Used   Substance Use Topics    Alcohol use: Yes     Comment: rarely    Drug use: No     Medications  Current Facility-Administered Medications on File Prior to Visit   Medication Dose Route Frequency Provider Last Rate Last Admin    0.9 % sodium chloride infusion   IntraVENous Continuous Pete Talley MD        sodium chloride flush 0.9 % injection 5-40 mL  5-40 mL IntraVENous 2 times per day Pete Talley MD        sodium chloride flush 0.9 % injection 5-40 mL  5-40 mL IntraVENous PRN Pete Talley MD        0.9 % sodium chloride infusion   IntraVENous PRN Pete Talley MD        clindamycin (CLEOCIN) 900 mg in dextrose 5 % 50 mL IVPB  900 mg IntraVENous Once Paola Burrows MD         Current Outpatient Medications on File Prior to Visit   Medication Sig Dispense Refill    anastrozole (ARIMIDEX) 1 MG tablet Take 1 tablet by mouth daily 30 tablet 0    famotidine (PEPCID) 20 MG tablet Take 1 tablet by mouth 2 times daily 60 tablet 0    ondansetron (ZOFRAN) 4 MG tablet Take 1 tablet by mouth every 8 hours as needed for Nausea or Vomiting 20 tablet 0    sennosides-docusate sodium (SENOKOT-S) 8.6-50 MG tablet Take 1 tablet by mouth daily      Breast Prosthesis MISC RIGHT MASTECTOMY BRA  RIGHT BREAST PROTHESIS 4 each 0    albuterol (PROVENTIL HFA;VENTOLIN HFA) 108 (90 BASE) MCG/ACT inhaler Inhale 2 puffs into the lungs every 4 hours as needed        No current facility-administered medications for this visit. No current outpatient medications on file.      Facility-Administered Medications Ordered in Other Visits   Medication Dose Route Frequency Provider Last Rate Last Admin    0.9 % sodium chloride infusion   IntraVENous Continuous Pete Talley MD        sodium chloride flush 0.9 % injection 5-40 mL  5-40 mL IntraVENous 2 times per day Pete Talley MD        sodium chloride flush 0.9 % injection 5-40 mL  5-40 mL IntraVENous PRN Pete Talley MD        0.9 % sodium chloride infusion   IntraVENous PRN Pete Talley MD        clindamycin (CLEOCIN) 900 mg in dextrose 5 % 50 mL IVPB  900 mg IntraVENous Once Amanda Fisher MD         Vital Signs (Current)   There were no vitals filed for this visit. Vital Signs Statistics (for past 48 hrs)     Temp  Av.1 °F (36.2 °C)  Min: 97.1 °F (36.2 °C)   Min taken time: 22 1234  Max: 97.1 °F (36.2 °C)   Max taken time: 22 1234  Pulse  Av  Min: 67   Min taken time: 22 1234  Max: 67   Max taken time: 22 1234  Resp  Av  Min: 16   Min taken time: 22 1234  Max: 12   Max taken time: 22 1234  BP  Min: 102/67   Min taken time: 22 1234  Max: 102/67   Max taken time: 22 1234  SpO2  Av %  Min: 98 %   Min taken time: 22 1234  Max: 98 %   Max taken time: 22 1234  BP Readings from Last 3 Encounters:   22 102/67   22 120/68   22 120/72       BMI  There is no height or weight on file to calculate BMI. Estimated body mass index is 38.23 kg/m² as calculated from the following:    Height as of an earlier encounter on 22: 5' 5\" (1.651 m). Weight as of an earlier encounter on 22: 229 lb 11.5 oz (104.2 kg).     CBC   Lab Results   Component Value Date    WBC 4.5 2022    RBC 3.18 2022    HGB 9.3 2022    HCT 27.8 2022    MCV 87.6 2022    RDW 16.3 2022     2022     CMP    Lab Results   Component Value Date     2022    K 4.0 2022    K 3.7 2022     2022    CO2 20 2022    BUN 12 2022    CREATININE 0.6 2022    GFRAA >60 2022    AGRATIO 0.8 2022    LABGLOM >60 2022    GLUCOSE 107 2022    PROT 7.1 2022    CALCIUM 9.0 2022    BILITOT 0.7 2022    ALKPHOS 310 2022     2022     2022     BMP    Lab Results   Component Value Date     2022    K 4.0 2022    K 3.7 2022     2022    CO2 20 2022    BUN 12 2022 CREATININE 0.6 03/31/2022    CALCIUM 9.0 03/31/2022    GFRAA >60 03/31/2022    LABGLOM >60 03/31/2022    GLUCOSE 107 03/31/2022     POCGlucose  No results for input(s): GLUCOSE in the last 72 hours. [facility]    Lab Results   Component Value Date    PROTIME 12.2 03/24/2022    INR 1.08 93/39/1969     HCG (If Applicable) No results found for: PREGTESTUR, PREGSERUM, HCG, HCGQUANT   ABGs No results found for: PHART, PO2ART, PIH3ESJ, JYE4MXZ, BEART, N0VMOANH   Type & Screen (If Applicable)  No results found for: LABABO, LABRH                         BMI: Wt Readings from Last 3 Encounters:       NPO Status:                          Anesthesia Evaluation  Patient summary reviewed no history of anesthetic complications:   Airway: Mallampati: II  TM distance: >3 FB     Mouth opening: > = 3 FB   Dental: normal exam         Pulmonary: breath sounds clear to auscultation  (+) asthma:     (-) COPD, sleep apnea and not a current smoker                           Cardiovascular:        (-) hypertension, past MI, CABG/stent and no hyperlipidemia        Rate: normal                    Neuro/Psych:   (+) headaches:, psychiatric history:depression/anxiety    (-) seizures, TIA and CVA           GI/Hepatic/Renal:        (-) GERD and PUD       Endo/Other:    (+) hyperthyroidism, blood dyscrasia::., malignancy/cancer (breast cancer). Abdominal:             Vascular:     - DVT and PE. Other Findings:             Anesthesia Plan      general     ASA 3       Induction: intravenous. MIPS: Postoperative opioids intended and Prophylactic antiemetics administered. Anesthetic plan and risks discussed with patient. This pre-anesthesia assessment may be used as a history and physical.    DOS STAFF ADDENDUM:    Pt seen and examined, chart reviewed (including anesthesia, drug and allergy history). No interval changes to history and physical examination.   Anesthetic plan, risks, benefits, alternatives, and personnel involved discussed with patient. Patient verbalized an understanding and agrees to proceed.       Luis Felipe Ramirez MD  June 24, 2022  12:55 PM

## 2022-06-24 NOTE — PROGRESS NOTES
[facility]  Office Visit    Chief Complaint: Follow-up for left hip fracture    HPI:  Lucretia Ni is a 64 y.o. who is here in follow-up of left hip pathologic fracture. She has recurrent and metastatic breast cancer and had a pathologic fracture of the left femoral neck in March 2022. She underwent cephalomedullary nail with Dr. Asmita Jackson on March 19, 2022. She reports she has been improving and walking more and hip pain is improving. She was last seen by Dr. Asmita Jackson on April 11, 2022. She rates the pain as 4/10. She has had redness and swelling around her proximal incision for the past 3 weeks and was treated with amoxicillin for 10 days. She is currently off of antibiotics and reports persistent redness and swelling with no drainage at the proximal incision. She is currently receiving chemotherapy. 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 Appearance: sitting in exam room chair, appears to be in no acute distress, awake and alert  Resp: unlabored breathing on room air  Skin: warm, dry and intact with out erythema or significant increased temperature  Neuro: grossly intact both lower extremities. Intact sensation to light touch.  Motor exam 4+ to 5/5 in all major motor groups. LLE: Erythema with induration and fluctuance over the proximal incision. There is no drainage. Motor function and sensation intact distally. Imaging:  AP and lateral views of the left femur were performed and interpreted today. There is a cephalomedullary nail in place. Alignment is anatomic. There are no fractures. Assessment:  Metastatic breast cancer with impending pathologic fracture left femur status post cephalomedullary nail with superficial versus deep infection    Plan:  We discussed the diagnosis and treatment options. She has significant erythema, induration, fluctuance at her incision site. I recommended surgical incision and drainage. We went over the risks and complications of surgery including: bleeding, infection, decreased ROM, continued pain, instability, fracture, dislocation, neurovascular injury, post op cognitive disorder, DVT, pulmonary embolism and need for further surgical procedures. The patient understands these issues and we will see the patient in the operating room. Plan for left leg I&D tomorrow. Total time spent on today's encounter was at least 24 minutes. This time included reviewing prior notes, radiographs, and lab results when available, reviewing history obtained by medical assistant, performing history and physical exam, reviewing tests/radiographs with the patient, counseling the patient, ordering medications or tests, documentation in the electronic health record, and coordination of care. This dictation was done with Dragon dictation and may contain mechanical errors related to translation.

## 2022-06-24 NOTE — ANESTHESIA POSTPROCEDURE EVALUATION
Department of Anesthesiology  Postprocedure Note    Patient: Luanne Colin  MRN: [id number]  YOB: 1960  Date of evaluation: 6/24/2022      Procedure Summary     Date: 06/24/22 Room / Location: [address]    Anesthesia Start: 5206 Anesthesia Stop: 0643    Procedure: INCISION AND DRAINAGE LEFT HIP WOUND (Left Hip) Diagnosis:       Infection associated with internal right hip prosthesis, initial encounter ([facility]      (POST OPERATIVE WOUND INFECTION LEFT HIP)    Surgeons: Mari Rajan MD Responsible Provider: Jin Sheehan MD    Anesthesia Type: general ASA Status: 3          Anesthesia Type: No value filed.     Rose Phase I: Rose Score: 8    Rose Phase II:        Anesthesia Post Evaluation    Patient location during evaluation: PACU  Patient participation: complete - patient participated  Level of consciousness: awake  Pain score: 2  Airway patency: patent  Nausea & Vomiting: no nausea and no vomiting  Complications: no  Cardiovascular status: blood pressure returned to baseline  Respiratory status: acceptable  Hydration status: euvolemic

## 2022-06-24 NOTE — PROGRESS NOTES
CLINICAL PHARMACY NOTE: MEDS TO BEDS    Total # of Prescriptions Filled: 2   The following medications were delivered to the patient:  Current Discharge Medication List      START taking these medications    Details   sulfamethoxazole-trimethoprim (BACTRIM DS;SEPTRA DS) 800-160 MG per tablet Take 1 tablet by mouth 2 times daily for 14 days  Qty: 28 tablet, Refills: 0      HYDROcodone-acetaminophen (NORCO) 5-325 MG per tablet Take 1 tablet by mouth every 6 hours as needed for Pain for up to 5 days. Intended supply: 5 days.  Take lowest dose possible to manage pain  Qty: 20 tablet, Refills: 0    Comments: Reduce doses taken as pain becomes manageable  Associated Diagnoses: Left hip pain         ·   ·     Additional Documentation:

## 2022-06-24 NOTE — H&P
Update History & Physical    The patient's History and Physical of June 24, 2022 was reviewed with the patient and I examined the patient. There was no change. The surgical site was confirmed by the patient and me. Plan: The risks, benefits, expected outcome, and alternative to the recommended procedure have been discussed with the patient. Patient understands and wants to proceed with the procedure.      Electronically signed by Ivet Cardona MD on 6/24/2022 at 1:50 PM

## 2022-06-24 NOTE — OP NOTE
Patient: William Bonilla  YOB: 1960  MRN: [id number]    DATE OF PROCEDURE: 6/24/2022        PREOPERATIVE DIAGNOSIS: Left hip wound infection     POSTOPERATIVE DIAGNOSES:  Same     OPERATION PERFORMED: Incision and drainage of deep abscess of left thigh     SURGEON:  Karrie Osorio MD     ASSISTANTS:  MADELINE Pat     ESTIMATED BLOOD LOSS:  50 mL     SPECIMENS: Swab of the wound was collected and sent to microbiology    INDICATIONS: This is a 64 y.o. female who underwent prophylactic left femur nail with Dr. Andreas Rojas on March 19, 2022 for metastatic breast cancer. She has been healing and having less hip pain but has had increased redness and swelling for the past 3 weeks over her proximal incision. She has completed a course of amoxicillin but continues to have redness, pain, swelling in the hip area. We discussed the diagnosis and treatment options and I recommended  surgical incision and drainage. The operative procedure, alternatives, and risks were discussed in detail with the patient. The risks include but are not limited to: Infection, vessel injury, nerve injury, DVT, pulmonary embolism, implant loosening, need for revision surgery, leg length discrepancy, dislocation, foot drop, intraoperative fracture. Informed consent for surgery was signed by the patient. OPERATIVE PROCEDURE: The patient was seen in the preoperative holding area where the site of surgery was marked and informed consent was confirmed. The patient was brought back to the operating room by OR personnel. General anesthesia was administered. The patient was placed in a semi-lateral position on the operating table. All bony prominences were well-padded. The left lower extremity was then prepped and draped in a standard and sterile fashion. A final and formal timeout was then performed which confirmed the correct patient, correct position, and correct site of surgery.  IV antibiotics were administered within 1 hour of the skin incision. A longitudinal incision was made over the abscess. There was an area of thin skin that was ellipsed out. Dissection was carried down through the subcutaneous tissue. There was return of copious purulence. A swab was collected of the wound at this time and sent to microbiology for culture. The wound cavity was explored and loculations were broken up. The subcutaneous tissue was debrided mechanically. The abscess did not track down past the subcutaneous layer. The wound was then thoroughly irrigated with sterile saline via pulsatile lavage. 1 g of vancomycin powder was left the wound. Closure was then performed with 0 PDS in subcutaneous layer and 2-0 nylon in the skin. A christina wound dressing was applied. The patient was awakened from anesthesia and taken to PACU in stable condition. MADELINE Adair was essential in patient positioning, surgical assistance during the procedure, and in wound closure.     Miriam White MD  6/24/2022

## 2022-06-24 NOTE — PROGRESS NOTES
Nicole po snack and drink well. Med given for c/o postop pain left hip. DC instructions given to patient. Rx x 2 delivered by pharmacy.

## 2022-06-29 LAB
CULTURE, JOINT AEROBIC: ABNORMAL
CULTURE, JOINT ANAEROBIC: ABNORMAL
GRAM STAIN RESULT: ABNORMAL
ORGANISM: ABNORMAL

## 2022-07-11 ENCOUNTER — OFFICE VISIT (OUTPATIENT)
Dept: ORTHOPEDIC SURGERY | Age: 62
End: 2022-07-11

## 2022-07-11 VITALS — BODY MASS INDEX: 37.49 KG/M2 | HEIGHT: 65 IN | WEIGHT: 225 LBS

## 2022-07-11 DIAGNOSIS — S63.639A VOLAR PLATE INJURY OF FINGER, INITIAL ENCOUNTER: ICD-10-CM

## 2022-07-11 DIAGNOSIS — L02.416 ABSCESS OF LEFT THIGH: Primary | ICD-10-CM

## 2022-07-11 PROCEDURE — G8427 DOCREV CUR MEDS BY ELIG CLIN: HCPCS | Performed by: ORTHOPAEDIC SURGERY

## 2022-07-11 PROCEDURE — G8417 CALC BMI ABV UP PARAM F/U: HCPCS | Performed by: ORTHOPAEDIC SURGERY

## 2022-07-11 PROCEDURE — 99213 OFFICE O/P EST LOW 20 MIN: CPT | Performed by: ORTHOPAEDIC SURGERY

## 2022-07-11 PROCEDURE — 3017F COLORECTAL CA SCREEN DOC REV: CPT | Performed by: ORTHOPAEDIC SURGERY

## 2022-07-11 PROCEDURE — 1036F TOBACCO NON-USER: CPT | Performed by: ORTHOPAEDIC SURGERY

## 2022-07-11 PROCEDURE — 26720 TREAT FINGER FRACTURE EACH: CPT | Performed by: ORTHOPAEDIC SURGERY
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
## 2022-08-11 ENCOUNTER — HOSPITAL ENCOUNTER (OUTPATIENT)
Dept: CT IMAGING | Age: 62
Discharge: HOME | End: 2022-08-11
Payer: COMMERCIAL

## 2022-08-11 DIAGNOSIS — C50.919 CARCINOMA OF BREAST METASTATIC TO BONE, UNSPECIFIED LATERALITY (HCC): ICD-10-CM

## 2022-08-11 DIAGNOSIS — C79.51 CARCINOMA OF BREAST METASTATIC TO BONE, UNSPECIFIED LATERALITY (HCC): ICD-10-CM

## 2022-08-11 LAB
GFR AFRICAN AMERICAN: >60
GFR NON-AFRICAN AMERICAN: >60
PERFORMED ON: ABNORMAL
POC CREATININE: 0.5 MG/DL (ref 0.6–1.2)
POC SAMPLE TYPE: ABNORMAL

## 2022-08-11 PROCEDURE — 82565 ASSAY OF CREATININE: CPT

## 2022-08-11 PROCEDURE — 6360000004 HC RX CONTRAST MEDICATION: Performed by: OBSTETRICS & GYNECOLOGY

## 2022-08-11 PROCEDURE — 74177 CT ABD & PELVIS W/CONTRAST: CPT

## 2022-08-11 RX ADMIN — IOPAMIDOL 75 ML: 755 INJECTION, SOLUTION INTRAVENOUS at 10:02

## 2022-08-11 RX ADMIN — IOPAMIDOL 50 ML: 612 INJECTION, SOLUTION INTRAVENOUS at 10:01

## 2022-08-17 ENCOUNTER — TELEPHONE (OUTPATIENT)
Dept: ORTHOPEDIC SURGERY | Age: 62
End: 2022-08-17

## 2022-08-17 DIAGNOSIS — M79.89 CALF SWELLING: Primary | ICD-10-CM

## 2022-08-17 NOTE — TELEPHONE ENCOUNTER
General Question     Subject: LEG SWELLING Terry Lora  Patient and /or Facility Request:   Contact Number:  [phone]    PATIENT IS HAVING SOME SWELLING IN HER LEFT LEG AND WOULD LIKE TO SPEAK WITH SOMEONE ABOUT IT PATIENT WOULD LIKE A CALL AT THE NUMBER LISTED ABOVE

## 2022-08-18 ENCOUNTER — HOSPITAL ENCOUNTER (OUTPATIENT)
Dept: VASCULAR LAB | Age: 62
Discharge: HOME | End: 2022-08-18
Payer: COMMERCIAL

## 2022-08-18 ENCOUNTER — TELEPHONE (OUTPATIENT)
Dept: ORTHOPEDIC SURGERY | Age: 62
End: 2022-08-18

## 2022-08-18 DIAGNOSIS — M79.89 CALF SWELLING: ICD-10-CM

## 2022-08-18 PROCEDURE — 93971 EXTREMITY STUDY: CPT

## 2022-08-18 NOTE — TELEPHONE ENCOUNTER
Venous doppler scheduled for today 8-18, at [facility] at 11:30 / 11:15 arrival.   Pt informed

## 2022-09-07 ENCOUNTER — HOSPITAL ENCOUNTER (OUTPATIENT)
Dept: MRI IMAGING | Age: 62
Discharge: HOME | End: 2022-09-07
Payer: COMMERCIAL

## 2022-09-07 DIAGNOSIS — C79.51 BONE METASTASIS (HCC): ICD-10-CM

## 2022-09-07 PROCEDURE — A9577 INJ MULTIHANCE: HCPCS | Performed by: INTERNAL MEDICINE

## 2022-09-07 PROCEDURE — 72158 MRI LUMBAR SPINE W/O & W/DYE: CPT

## 2022-09-07 PROCEDURE — 6360000004 HC RX CONTRAST MEDICATION: Performed by: INTERNAL MEDICINE

## 2022-09-07 RX ADMIN — GADOBENATE DIMEGLUMINE 20 ML: 529 INJECTION, SOLUTION INTRAVENOUS at 12:35

## 2022-09-22 NOTE — PLAN OF CARE
Problem: Pain:  Goal: Pain level will decrease  Description: Pain level will decrease  3/18/2022 1426 by Joey Morales RN  Outcome: Ongoing     Problem: Pain:  Goal: Control of acute pain  Description: Control of acute pain  3/18/2022 1426 by Joey Morales RN  Outcome: Ongoing     Problem: Pain:  Goal: Control of chronic pain  Description: Control of chronic pain  3/18/2022 1426 by Joey Morales RN  Outcome: Ongoing     Problem: Skin Integrity:  Goal: Will show no infection signs and symptoms  Description: Will show no infection signs and symptoms  3/18/2022 1426 by Joey Morales RN  Outcome: Ongoing     Problem: Skin Integrity:  Goal: Absence of new skin breakdown  Description: Absence of new skin breakdown  3/18/2022 1426 by Joey Morales RN  Outcome: Ongoing     Problem: Falls - Risk of:  Goal: Will remain free from falls  Description: Will remain free from falls  3/18/2022 1426 by Joey Morales RN  Outcome: Ongoing     Problem: Falls - Risk of:  Goal: Absence of physical injury  Description: Absence of physical injury  3/18/2022 1426 by Joey Morales RN  Outcome: Ongoing     Problem: Nutrition  Goal: Optimal nutrition therapy  3/18/2022 1426 by Joey Morales RN  Outcome: Ongoing Yes

## 2022-10-31 ENCOUNTER — HOSPITAL ENCOUNTER (OUTPATIENT)
Dept: CT IMAGING | Age: 62
Discharge: HOME | End: 2022-10-31
Payer: COMMERCIAL

## 2022-10-31 DIAGNOSIS — C79.51 CARCINOMA OF BREAST METASTATIC TO BONE, UNSPECIFIED LATERALITY (HCC): ICD-10-CM

## 2022-10-31 DIAGNOSIS — C50.919 CARCINOMA OF BREAST METASTATIC TO BONE, UNSPECIFIED LATERALITY (HCC): ICD-10-CM

## 2022-10-31 LAB
CREAT SERPL-MCNC: 0.7 MG/DL (ref 0.6–1.2)
GFR SERPL CREATININE-BSD FRML MDRD: >60 ML/MIN/{1.73_M2}

## 2022-10-31 PROCEDURE — 82565 ASSAY OF CREATININE: CPT

## 2022-10-31 PROCEDURE — 6360000004 HC RX CONTRAST MEDICATION: Performed by: INTERNAL MEDICINE

## 2022-10-31 PROCEDURE — 74177 CT ABD & PELVIS W/CONTRAST: CPT

## 2022-10-31 PROCEDURE — 36415 COLL VENOUS BLD VENIPUNCTURE: CPT

## 2022-10-31 RX ADMIN — IOPAMIDOL 100 ML: 755 INJECTION, SOLUTION INTRAVENOUS at 10:27

## 2022-10-31 RX ADMIN — IOPAMIDOL 50 ML: 612 INJECTION, SOLUTION INTRAVENOUS at 10:27

## 2022-11-02 ENCOUNTER — HOSPITAL ENCOUNTER (OUTPATIENT)
Dept: NUCLEAR MEDICINE | Age: 62
Discharge: HOME | End: 2022-11-02
Payer: COMMERCIAL

## 2022-11-02 DIAGNOSIS — C79.51 CARCINOMA OF BREAST METASTATIC TO BONE, UNSPECIFIED LATERALITY (HCC): ICD-10-CM

## 2022-11-02 DIAGNOSIS — C50.919 CARCINOMA OF BREAST METASTATIC TO BONE, UNSPECIFIED LATERALITY (HCC): ICD-10-CM

## 2022-11-02 PROCEDURE — A9503 TC99M MEDRONATE: HCPCS | Performed by: INTERNAL MEDICINE

## 2022-11-02 PROCEDURE — 3430000000 HC RX DIAGNOSTIC RADIOPHARMACEUTICAL: Performed by: INTERNAL MEDICINE

## 2022-11-02 PROCEDURE — 78306 BONE IMAGING WHOLE BODY: CPT | Performed by: INTERNAL MEDICINE

## 2022-11-02 RX ORDER — TC 99M MEDRONATE 20 MG/10ML
25 INJECTION, POWDER, LYOPHILIZED, FOR SOLUTION INTRAVENOUS
Status: COMPLETED | OUTPATIENT
Start: 2022-11-02 | End: 2022-11-02

## 2022-11-02 RX ADMIN — TC 99M MEDRONATE 25 MILLICURIE: 20 INJECTION, POWDER, LYOPHILIZED, FOR SOLUTION INTRAVENOUS at 08:07

## 2023-03-02 ENCOUNTER — HOSPITAL ENCOUNTER (OUTPATIENT)
Dept: NUCLEAR MEDICINE | Age: 63
Discharge: HOME | End: 2023-03-02
Payer: COMMERCIAL

## 2023-03-02 ENCOUNTER — HOSPITAL ENCOUNTER (OUTPATIENT)
Dept: CT IMAGING | Age: 63
Discharge: HOME | End: 2023-03-02
Payer: COMMERCIAL

## 2023-03-02 DIAGNOSIS — C79.51 CARCINOMA OF BREAST METASTATIC TO BONE, UNSPECIFIED LATERALITY (HCC): ICD-10-CM

## 2023-03-02 DIAGNOSIS — C50.919 CARCINOMA OF BREAST METASTATIC TO BONE, UNSPECIFIED LATERALITY (HCC): ICD-10-CM

## 2023-03-02 PROCEDURE — A9503 TC99M MEDRONATE: HCPCS | Performed by: INTERNAL MEDICINE

## 2023-03-02 PROCEDURE — 6360000004 HC RX CONTRAST MEDICATION: Performed by: INTERNAL MEDICINE

## 2023-03-02 PROCEDURE — 3430000000 HC RX DIAGNOSTIC RADIOPHARMACEUTICAL: Performed by: INTERNAL MEDICINE

## 2023-03-02 PROCEDURE — 71260 CT THORAX DX C+: CPT

## 2023-03-02 PROCEDURE — 78306 BONE IMAGING WHOLE BODY: CPT | Performed by: INTERNAL MEDICINE

## 2023-03-02 RX ORDER — TC 99M MEDRONATE 20 MG/10ML
25 INJECTION, POWDER, LYOPHILIZED, FOR SOLUTION INTRAVENOUS
Status: COMPLETED | OUTPATIENT
Start: 2023-03-02 | End: 2023-03-02

## 2023-03-02 RX ADMIN — IOPAMIDOL 50 ML: 612 INJECTION, SOLUTION INTRAVENOUS at 09:24

## 2023-03-02 RX ADMIN — TC 99M MEDRONATE 25 MILLICURIE: 20 INJECTION, POWDER, LYOPHILIZED, FOR SOLUTION INTRAVENOUS at 09:35

## 2023-03-02 RX ADMIN — IOPAMIDOL 75 ML: 755 INJECTION, SOLUTION INTRAVENOUS at 09:24

## 2023-06-09 ENCOUNTER — HOSPITAL ENCOUNTER (OUTPATIENT)
Dept: NUCLEAR MEDICINE | Age: 63
Discharge: HOME | End: 2023-06-09
Payer: COMMERCIAL

## 2023-06-09 ENCOUNTER — HOSPITAL ENCOUNTER (OUTPATIENT)
Dept: CT IMAGING | Age: 63
Discharge: HOME | End: 2023-06-09
Payer: COMMERCIAL

## 2023-06-09 DIAGNOSIS — C79.51 CARCINOMA OF BREAST METASTATIC TO BONE, UNSPECIFIED LATERALITY (HCC): ICD-10-CM

## 2023-06-09 DIAGNOSIS — C50.919 CARCINOMA OF BREAST METASTATIC TO BONE, UNSPECIFIED LATERALITY (HCC): ICD-10-CM

## 2023-06-09 LAB
PERFORMED ON: NORMAL
POC CREATININE: 0.8 MG/DL (ref 0.6–1.2)
POC SAMPLE TYPE: NORMAL

## 2023-06-09 PROCEDURE — 74177 CT ABD & PELVIS W/CONTRAST: CPT

## 2023-06-09 PROCEDURE — 3430000000 HC RX DIAGNOSTIC RADIOPHARMACEUTICAL: Performed by: INTERNAL MEDICINE

## 2023-06-09 PROCEDURE — 82565 ASSAY OF CREATININE: CPT

## 2023-06-09 PROCEDURE — 78306 BONE IMAGING WHOLE BODY: CPT | Performed by: INTERNAL MEDICINE

## 2023-06-09 PROCEDURE — A9503 TC99M MEDRONATE: HCPCS | Performed by: INTERNAL MEDICINE

## 2023-06-09 PROCEDURE — 6360000004 HC RX CONTRAST MEDICATION: Performed by: INTERNAL MEDICINE

## 2023-06-09 RX ORDER — TC 99M MEDRONATE 20 MG/10ML
25 INJECTION, POWDER, LYOPHILIZED, FOR SOLUTION INTRAVENOUS
Status: COMPLETED | OUTPATIENT
Start: 2023-06-09 | End: 2023-06-09

## 2023-06-09 RX ADMIN — IOPAMIDOL 50 ML: 612 INJECTION, SOLUTION INTRAVENOUS at 07:40

## 2023-06-09 RX ADMIN — TC 99M MEDRONATE 25 MILLICURIE: 20 INJECTION, POWDER, LYOPHILIZED, FOR SOLUTION INTRAVENOUS at 07:53

## 2023-06-09 RX ADMIN — IOPAMIDOL 75 ML: 755 INJECTION, SOLUTION INTRAVENOUS at 07:40

## 2023-06-26 ENCOUNTER — HOSPITAL ENCOUNTER (OUTPATIENT)
Dept: MRI IMAGING | Age: 63
Discharge: HOME | End: 2023-06-26
Attending: INTERNAL MEDICINE
Payer: COMMERCIAL

## 2023-06-26 DIAGNOSIS — C50.919 CARCINOMA OF BREAST METASTATIC TO BONE, UNSPECIFIED LATERALITY (HCC): ICD-10-CM

## 2023-06-26 DIAGNOSIS — C79.51 CARCINOMA OF BREAST METASTATIC TO BONE, UNSPECIFIED LATERALITY (HCC): ICD-10-CM

## 2023-06-26 DIAGNOSIS — G90.09 OTHER IDIOPATHIC PERIPHERAL AUTONOMIC NEUROPATHY: ICD-10-CM

## 2023-06-26 DIAGNOSIS — C79.51 METASTASIS TO BONE (HCC): ICD-10-CM

## 2023-06-26 DIAGNOSIS — G89.3 NEOPLASM RELATED PAIN: ICD-10-CM

## 2023-06-26 PROCEDURE — 73720 MRI LWR EXTREMITY W/O&W/DYE: CPT

## 2023-06-26 PROCEDURE — 6360000004 HC RX CONTRAST MEDICATION: Performed by: INTERNAL MEDICINE

## 2023-06-26 PROCEDURE — 72158 MRI LUMBAR SPINE W/O & W/DYE: CPT

## 2023-06-26 PROCEDURE — A9577 INJ MULTIHANCE: HCPCS | Performed by: INTERNAL MEDICINE

## 2023-06-26 RX ADMIN — GADOBENATE DIMEGLUMINE 19 ML: 529 INJECTION, SOLUTION INTRAVENOUS at 09:34

## 2023-10-12 ENCOUNTER — HOSPITAL ENCOUNTER (OUTPATIENT)
Dept: NUCLEAR MEDICINE | Age: 63
Discharge: HOME | End: 2023-10-12
Attending: INTERNAL MEDICINE
Payer: COMMERCIAL

## 2023-10-12 ENCOUNTER — HOSPITAL ENCOUNTER (OUTPATIENT)
Dept: CT IMAGING | Age: 63
Discharge: HOME | End: 2023-10-12
Attending: INTERNAL MEDICINE
Payer: COMMERCIAL

## 2023-10-12 DIAGNOSIS — C50.919 CARCINOMA OF BREAST METASTATIC TO BONE, UNSPECIFIED LATERALITY (HCC): ICD-10-CM

## 2023-10-12 DIAGNOSIS — C79.51 CARCINOMA OF BREAST METASTATIC TO BONE, UNSPECIFIED LATERALITY (HCC): ICD-10-CM

## 2023-10-12 LAB
PERFORMED ON: NORMAL
POC CREATININE: 0.6 MG/DL (ref 0.6–1.2)
POC SAMPLE TYPE: NORMAL

## 2023-10-12 PROCEDURE — A9503 TC99M MEDRONATE: HCPCS | Performed by: INTERNAL MEDICINE

## 2023-10-12 PROCEDURE — 3430000000 HC RX DIAGNOSTIC RADIOPHARMACEUTICAL: Performed by: INTERNAL MEDICINE

## 2023-10-12 PROCEDURE — 6360000004 HC RX CONTRAST MEDICATION: Performed by: INTERNAL MEDICINE

## 2023-10-12 PROCEDURE — 71260 CT THORAX DX C+: CPT

## 2023-10-12 PROCEDURE — 78306 BONE IMAGING WHOLE BODY: CPT | Performed by: INTERNAL MEDICINE

## 2023-10-12 PROCEDURE — 82565 ASSAY OF CREATININE: CPT

## 2023-10-12 RX ORDER — TC 99M MEDRONATE 20 MG/10ML
25 INJECTION, POWDER, LYOPHILIZED, FOR SOLUTION INTRAVENOUS
Status: COMPLETED | OUTPATIENT
Start: 2023-10-12 | End: 2023-10-12

## 2023-10-12 RX ADMIN — TC 99M MEDRONATE 25 MILLICURIE: 20 INJECTION, POWDER, LYOPHILIZED, FOR SOLUTION INTRAVENOUS at 08:16

## 2023-10-12 RX ADMIN — IOPAMIDOL 50 ML: 612 INJECTION, SOLUTION INTRAVENOUS at 08:02

## 2023-10-12 RX ADMIN — IOPAMIDOL 75 ML: 755 INJECTION, SOLUTION INTRAVENOUS at 08:03

## 2024-01-19 SDOH — HEALTH STABILITY: PHYSICAL HEALTH: ON AVERAGE, HOW MANY MINUTES DO YOU ENGAGE IN EXERCISE AT THIS LEVEL?: 150+ MIN

## 2024-01-19 SDOH — HEALTH STABILITY: PHYSICAL HEALTH: ON AVERAGE, HOW MANY DAYS PER WEEK DO YOU ENGAGE IN MODERATE TO STRENUOUS EXERCISE (LIKE A BRISK WALK)?: 7 DAYS

## 2024-01-22 ENCOUNTER — OFFICE VISIT (OUTPATIENT)
Dept: ORTHOPEDIC SURGERY | Age: 64
End: 2024-01-22

## 2024-01-22 VITALS — HEIGHT: 63 IN | WEIGHT: 189.4 LBS | BODY MASS INDEX: 33.56 KG/M2

## 2024-01-22 DIAGNOSIS — C79.51 MALIGNANT NEOPLASM METASTATIC TO FEMUR WITH UNKNOWN PRIMARY SITE (HCC): ICD-10-CM

## 2024-01-22 DIAGNOSIS — C80.1 MALIGNANT NEOPLASM METASTATIC TO FEMUR WITH UNKNOWN PRIMARY SITE (HCC): ICD-10-CM

## 2024-01-22 DIAGNOSIS — M70.62 TROCHANTERIC BURSITIS OF LEFT HIP: ICD-10-CM

## 2024-01-22 DIAGNOSIS — M25.552 LEFT HIP PAIN: Primary | ICD-10-CM

## 2024-01-22 RX ORDER — BUPIVACAINE HYDROCHLORIDE 2.5 MG/ML
2 INJECTION, SOLUTION INFILTRATION; PERINEURAL ONCE
Status: COMPLETED | OUTPATIENT
Start: 2024-01-22 | End: 2024-01-22

## 2024-01-22 RX ORDER — TRIAMCINOLONE ACETONIDE 40 MG/ML
40 INJECTION, SUSPENSION INTRA-ARTICULAR; INTRAMUSCULAR ONCE
Status: COMPLETED | OUTPATIENT
Start: 2024-01-22 | End: 2024-01-22

## 2024-01-22 RX ADMIN — TRIAMCINOLONE ACETONIDE 40 MG: 40 INJECTION, SUSPENSION INTRA-ARTICULAR; INTRAMUSCULAR at 10:21

## 2024-01-22 RX ADMIN — BUPIVACAINE HYDROCHLORIDE 5 MG: 2.5 INJECTION, SOLUTION INFILTRATION; PERINEURAL at 10:21

## 2024-01-24 ENCOUNTER — TELEPHONE (OUTPATIENT)
Dept: ORTHOPEDIC SURGERY | Age: 64
End: 2024-01-24

## 2024-01-24 NOTE — TELEPHONE ENCOUNTER
[facility]  APPROVED - MRI - Right Femur  Approval Facility: [facility]  Approval Dates: 1/23/2024 - 3/23/2024  Auth #: [id number]    I called the patient and left a message

## 2024-01-30 ENCOUNTER — HOSPITAL ENCOUNTER (OUTPATIENT)
Dept: CT IMAGING | Age: 64
Discharge: HOME | End: 2024-01-30
Attending: INTERNAL MEDICINE
Payer: COMMERCIAL

## 2024-01-30 ENCOUNTER — HOSPITAL ENCOUNTER (OUTPATIENT)
Dept: NUCLEAR MEDICINE | Age: 64
Discharge: HOME | End: 2024-01-30
Attending: INTERNAL MEDICINE
Payer: COMMERCIAL

## 2024-01-30 DIAGNOSIS — C50.919 MALIGNANT NEOPLASM OF FEMALE BREAST, UNSPECIFIED ESTROGEN RECEPTOR STATUS, UNSPECIFIED LATERALITY, UNSPECIFIED SITE OF BREAST (HCC): ICD-10-CM

## 2024-01-30 LAB
PERFORMED ON: NORMAL
POC CREATININE: 0.7 MG/DL (ref 0.6–1.2)
POC SAMPLE TYPE: NORMAL

## 2024-01-30 PROCEDURE — A9503 TC99M MEDRONATE: HCPCS | Performed by: INTERNAL MEDICINE

## 2024-01-30 PROCEDURE — 6360000004 HC RX CONTRAST MEDICATION: Performed by: INTERNAL MEDICINE

## 2024-01-30 PROCEDURE — 78306 BONE IMAGING WHOLE BODY: CPT | Performed by: INTERNAL MEDICINE

## 2024-01-30 PROCEDURE — 74177 CT ABD & PELVIS W/CONTRAST: CPT

## 2024-01-30 PROCEDURE — 82565 ASSAY OF CREATININE: CPT

## 2024-01-30 PROCEDURE — 3430000000 HC RX DIAGNOSTIC RADIOPHARMACEUTICAL: Performed by: INTERNAL MEDICINE

## 2024-01-30 RX ORDER — TC 99M MEDRONATE 20 MG/10ML
25 INJECTION, POWDER, LYOPHILIZED, FOR SOLUTION INTRAVENOUS
Status: COMPLETED | OUTPATIENT
Start: 2024-01-30 | End: 2024-01-30

## 2024-01-30 RX ADMIN — IOPAMIDOL 50 ML: 612 INJECTION, SOLUTION INTRAVENOUS at 09:11

## 2024-01-30 RX ADMIN — IOPAMIDOL 75 ML: 755 INJECTION, SOLUTION INTRAVENOUS at 09:13

## 2024-01-30 RX ADMIN — TC 99M MEDRONATE 25 MILLICURIE: 20 INJECTION, POWDER, LYOPHILIZED, FOR SOLUTION INTRAVENOUS at 08:00

## 2024-05-30 ENCOUNTER — HOSPITAL ENCOUNTER (OUTPATIENT)
Dept: CT IMAGING | Age: 64
Discharge: HOME | End: 2024-05-30
Attending: INTERNAL MEDICINE
Payer: COMMERCIAL

## 2024-05-30 ENCOUNTER — HOSPITAL ENCOUNTER (OUTPATIENT)
Dept: NUCLEAR MEDICINE | Age: 64
Discharge: HOME | End: 2024-05-30
Attending: INTERNAL MEDICINE
Payer: COMMERCIAL

## 2024-05-30 DIAGNOSIS — C50.919 MALIGNANT NEOPLASM OF FEMALE BREAST, UNSPECIFIED ESTROGEN RECEPTOR STATUS, UNSPECIFIED LATERALITY, UNSPECIFIED SITE OF BREAST (HCC): ICD-10-CM

## 2024-05-30 LAB
PERFORMED ON: ABNORMAL
POC CREATININE: 0.3 MG/DL (ref 0.6–1.2)
POC SAMPLE TYPE: ABNORMAL

## 2024-05-30 PROCEDURE — 78306 BONE IMAGING WHOLE BODY: CPT | Performed by: INTERNAL MEDICINE

## 2024-05-30 PROCEDURE — A9503 TC99M MEDRONATE: HCPCS | Performed by: INTERNAL MEDICINE

## 2024-05-30 PROCEDURE — 82565 ASSAY OF CREATININE: CPT

## 2024-05-30 PROCEDURE — 6360000004 HC RX CONTRAST MEDICATION: Performed by: INTERNAL MEDICINE

## 2024-05-30 PROCEDURE — 71260 CT THORAX DX C+: CPT

## 2024-05-30 PROCEDURE — 3430000000 HC RX DIAGNOSTIC RADIOPHARMACEUTICAL: Performed by: INTERNAL MEDICINE

## 2024-05-30 RX ORDER — TC 99M MEDRONATE 20 MG/10ML
25 INJECTION, POWDER, LYOPHILIZED, FOR SOLUTION INTRAVENOUS
Status: COMPLETED | OUTPATIENT
Start: 2024-05-30 | End: 2024-05-30

## 2024-05-30 RX ADMIN — IOPAMIDOL 75 ML: 755 INJECTION, SOLUTION INTRAVENOUS at 08:01

## 2024-05-30 RX ADMIN — IOPAMIDOL 50 ML: 612 INJECTION, SOLUTION INTRAVENOUS at 08:01

## 2024-05-30 RX ADMIN — TC 99M MEDRONATE 25 MILLICURIE: 20 INJECTION, POWDER, LYOPHILIZED, FOR SOLUTION INTRAVENOUS at 08:12

## 2024-11-06 ENCOUNTER — HOSPITAL ENCOUNTER (OUTPATIENT)
Dept: CT IMAGING | Age: 64
Discharge: HOME | End: 2024-11-06
Attending: INTERNAL MEDICINE
Payer: COMMERCIAL

## 2024-11-06 DIAGNOSIS — C50.919 MALIGNANT NEOPLASM OF FEMALE BREAST, UNSPECIFIED ESTROGEN RECEPTOR STATUS, UNSPECIFIED LATERALITY, UNSPECIFIED SITE OF BREAST (HCC): ICD-10-CM

## 2024-11-06 LAB
PERFORMED ON: NORMAL
POC CREATININE: 0.6 MG/DL (ref 0.6–1.2)
POC SAMPLE TYPE: NORMAL

## 2024-11-06 PROCEDURE — 6360000004 HC RX CONTRAST MEDICATION: Performed by: INTERNAL MEDICINE

## 2024-11-06 PROCEDURE — 71260 CT THORAX DX C+: CPT

## 2024-11-06 PROCEDURE — 82565 ASSAY OF CREATININE: CPT

## 2024-11-06 RX ORDER — IOPAMIDOL 612 MG/ML
50 INJECTION, SOLUTION INTRAVASCULAR
Status: COMPLETED | OUTPATIENT
Start: 2024-11-06 | End: 2024-11-06

## 2024-11-06 RX ORDER — IOPAMIDOL 755 MG/ML
75 INJECTION, SOLUTION INTRAVASCULAR
Status: COMPLETED | OUTPATIENT
Start: 2024-11-06 | End: 2024-11-06

## 2024-11-06 RX ADMIN — IOPAMIDOL 50 ML: 612 INJECTION, SOLUTION INTRAVENOUS at 07:47

## 2024-11-06 RX ADMIN — IOPAMIDOL 75 ML: 755 INJECTION, SOLUTION INTRAVENOUS at 07:47

## 2025-01-08 ENCOUNTER — HOSPITAL ENCOUNTER (OUTPATIENT)
Dept: CT IMAGING | Age: 65
Discharge: HOME | End: 2025-01-08
Attending: INTERNAL MEDICINE
Payer: COMMERCIAL

## 2025-01-08 ENCOUNTER — HOSPITAL ENCOUNTER (OUTPATIENT)
Dept: NUCLEAR MEDICINE | Age: 65
Discharge: HOME | End: 2025-01-08
Attending: INTERNAL MEDICINE
Payer: COMMERCIAL

## 2025-01-08 DIAGNOSIS — C79.51 CARCINOMA OF BREAST METASTATIC TO BONE, UNSPECIFIED LATERALITY (HCC): ICD-10-CM

## 2025-01-08 DIAGNOSIS — C50.919 CARCINOMA OF BREAST METASTATIC TO BONE, UNSPECIFIED LATERALITY (HCC): ICD-10-CM

## 2025-01-08 LAB
PERFORMED ON: NORMAL
POC CREATININE: 0.7 MG/DL (ref 0.6–1.2)
POC SAMPLE TYPE: NORMAL

## 2025-01-08 PROCEDURE — A9503 TC99M MEDRONATE: HCPCS | Performed by: INTERNAL MEDICINE

## 2025-01-08 PROCEDURE — 82565 ASSAY OF CREATININE: CPT

## 2025-01-08 PROCEDURE — 6360000004 HC RX CONTRAST MEDICATION: Performed by: INTERNAL MEDICINE

## 2025-01-08 PROCEDURE — 78306 BONE IMAGING WHOLE BODY: CPT | Performed by: INTERNAL MEDICINE

## 2025-01-08 PROCEDURE — 71260 CT THORAX DX C+: CPT

## 2025-01-08 PROCEDURE — 3430000000 HC RX DIAGNOSTIC RADIOPHARMACEUTICAL: Performed by: INTERNAL MEDICINE

## 2025-01-08 RX ORDER — IOPAMIDOL 755 MG/ML
75 INJECTION, SOLUTION INTRAVASCULAR
Status: COMPLETED | OUTPATIENT
Start: 2025-01-08 | End: 2025-01-08

## 2025-01-08 RX ORDER — IOPAMIDOL 612 MG/ML
50 INJECTION, SOLUTION INTRAVASCULAR
Status: COMPLETED | OUTPATIENT
Start: 2025-01-08 | End: 2025-01-08

## 2025-01-08 RX ORDER — TC 99M MEDRONATE 20 MG/10ML
25 INJECTION, POWDER, LYOPHILIZED, FOR SOLUTION INTRAVENOUS
Status: COMPLETED | OUTPATIENT
Start: 2025-01-08 | End: 2025-01-08

## 2025-01-08 RX ADMIN — IOPAMIDOL 75 ML: 755 INJECTION, SOLUTION INTRAVENOUS at 07:29

## 2025-01-08 RX ADMIN — TC 99M MEDRONATE 25 MILLICURIE: 20 INJECTION, POWDER, LYOPHILIZED, FOR SOLUTION INTRAVENOUS at 08:38

## 2025-01-08 RX ADMIN — IOPAMIDOL 50 ML: 612 INJECTION, SOLUTION INTRAVENOUS at 07:29

## 2025-04-10 ENCOUNTER — HOSPITAL ENCOUNTER (OUTPATIENT)
Dept: NUCLEAR MEDICINE | Age: 65
Discharge: HOME | End: 2025-04-10
Attending: INTERNAL MEDICINE
Payer: COMMERCIAL

## 2025-04-10 ENCOUNTER — HOSPITAL ENCOUNTER (OUTPATIENT)
Dept: CT IMAGING | Age: 65
Discharge: HOME | End: 2025-04-10
Attending: INTERNAL MEDICINE
Payer: COMMERCIAL

## 2025-04-10 DIAGNOSIS — C50.919 MALIGNANT NEOPLASM OF FEMALE BREAST, UNSPECIFIED ESTROGEN RECEPTOR STATUS, UNSPECIFIED LATERALITY, UNSPECIFIED SITE OF BREAST: ICD-10-CM

## 2025-04-10 LAB
PERFORMED ON: ABNORMAL
POC CREATININE: 0.5 MG/DL (ref 0.6–1.2)
POC SAMPLE TYPE: ABNORMAL

## 2025-04-10 PROCEDURE — 6360000004 HC RX CONTRAST MEDICATION: Performed by: INTERNAL MEDICINE

## 2025-04-10 PROCEDURE — 74177 CT ABD & PELVIS W/CONTRAST: CPT

## 2025-04-10 PROCEDURE — 3430000000 HC RX DIAGNOSTIC RADIOPHARMACEUTICAL: Performed by: INTERNAL MEDICINE

## 2025-04-10 PROCEDURE — 82565 ASSAY OF CREATININE: CPT

## 2025-04-10 PROCEDURE — 78306 BONE IMAGING WHOLE BODY: CPT | Performed by: INTERNAL MEDICINE

## 2025-04-10 PROCEDURE — A9503 TC99M MEDRONATE: HCPCS | Performed by: INTERNAL MEDICINE

## 2025-04-10 RX ORDER — IOPAMIDOL 755 MG/ML
75 INJECTION, SOLUTION INTRAVASCULAR
Status: COMPLETED | OUTPATIENT
Start: 2025-04-10 | End: 2025-04-10

## 2025-04-10 RX ORDER — TC 99M MEDRONATE 20 MG/10ML
25 INJECTION, POWDER, LYOPHILIZED, FOR SOLUTION INTRAVENOUS
Status: COMPLETED | OUTPATIENT
Start: 2025-04-10 | End: 2025-04-10

## 2025-04-10 RX ORDER — IOPAMIDOL 612 MG/ML
50 INJECTION, SOLUTION INTRAVASCULAR
Status: COMPLETED | OUTPATIENT
Start: 2025-04-10 | End: 2025-04-10

## 2025-04-10 RX ADMIN — IOPAMIDOL 75 ML: 755 INJECTION, SOLUTION INTRAVENOUS at 09:03

## 2025-04-10 RX ADMIN — TC 99M MEDRONATE 25 MILLICURIE: 20 INJECTION, POWDER, LYOPHILIZED, FOR SOLUTION INTRAVENOUS at 09:07

## 2025-04-10 RX ADMIN — IOPAMIDOL 50 ML: 612 INJECTION, SOLUTION INTRAVENOUS at 09:03

## 2025-04-22 ENCOUNTER — HOSPITAL ENCOUNTER (OUTPATIENT)
Age: 65
Discharge: HOME | End: 2025-04-24
Attending: INTERNAL MEDICINE
Payer: COMMERCIAL

## 2025-04-22 DIAGNOSIS — I42.9 CARDIOMYOPATHY, UNSPECIFIED TYPE (HCC): ICD-10-CM

## 2025-04-22 DIAGNOSIS — T45.1X5A CHEMOTHERAPY-INDUCED CARDIOMYOPATHY: ICD-10-CM

## 2025-04-22 DIAGNOSIS — I42.7 CHEMOTHERAPY-INDUCED CARDIOMYOPATHY: ICD-10-CM

## 2025-04-22 LAB
ECHO AO ROOT DIAM: 3.3 CM
ECHO AV AREA PEAK VELOCITY: 2.2 CM2
ECHO AV AREA VTI: 2.3 CM2
ECHO AV MEAN GRADIENT: 9 MMHG
ECHO AV MEAN VELOCITY: 1.4 M/S
ECHO AV PEAK GRADIENT: 16 MMHG
ECHO AV PEAK VELOCITY: 2 M/S
ECHO AV VELOCITY RATIO: 0.65
ECHO AV VTI: 41.2 CM
ECHO LA AREA 2C: 23.8 CM2
ECHO LA AREA 4C: 23.4 CM2
ECHO LA MAJOR AXIS: 5.7 CM
ECHO LA MINOR AXIS: 5.8 CM
ECHO LA VOL BP: 77 ML (ref 22–52)
ECHO LA VOL MOD A2C: 77 ML (ref 22–52)
ECHO LA VOL MOD A4C: 75 ML (ref 22–52)
ECHO LV E' LATERAL VELOCITY: 9.77 CM/S
ECHO LV E' SEPTAL VELOCITY: 6.57 CM/S
ECHO LV EDV 3D: 104 ML
ECHO LV EJECTION FRACTION 3D: 60 %
ECHO LV ESV 3D: 41 ML
ECHO LV FRACTIONAL SHORTENING: 31 % (ref 28–44)
ECHO LV GLOBAL LONGITUDINAL STRAIN (GLS): -18.4 %
ECHO LV GLOBAL LONGITUDINAL STRAIN (GLS): -20.9 %
ECHO LV GLOBAL LONGITUDINAL STRAIN (GLS): -21.4 %
ECHO LV GLOBAL LONGITUDINAL STRAIN (GLS): -22.9 %
ECHO LV INTERNAL DIMENSION DIASTOLIC: 4.5 CM (ref 3.9–5.3)
ECHO LV INTERNAL DIMENSION SYSTOLIC: 3.1 CM
ECHO LV IVSD: 1.1 CM (ref 0.6–0.9)
ECHO LV MASS 2D: 164 G (ref 67–162)
ECHO LV MASS 3D: 143 G
ECHO LV POSTERIOR WALL DIASTOLIC: 1 CM (ref 0.6–0.9)
ECHO LV RELATIVE WALL THICKNESS RATIO: 0.44
ECHO LVOT AREA: 3.5 CM2
ECHO LVOT AV VTI INDEX: 0.69
ECHO LVOT DIAM: 2.1 CM
ECHO LVOT MEAN GRADIENT: 4 MMHG
ECHO LVOT PEAK GRADIENT: 7 MMHG
ECHO LVOT PEAK VELOCITY: 1.3 M/S
ECHO LVOT SV: 98 ML
ECHO LVOT VTI: 28.3 CM
ECHO MV A VELOCITY: 0.78 M/S
ECHO MV E DECELERATION TIME (DT): 180 MS
ECHO MV E VELOCITY: 0.72 M/S
ECHO MV E/A RATIO: 0.92
ECHO MV E/E' LATERAL: 7.37
ECHO MV E/E' RATIO (AVERAGED): 9.16
ECHO MV E/E' SEPTAL: 10.96
ECHO PV MAX VELOCITY: 1.2 M/S
ECHO PV MEAN GRADIENT: 3 MMHG
ECHO PV MEAN VELOCITY: 0.8 M/S
ECHO PV PEAK GRADIENT: 5 MMHG
ECHO PV VTI: 25.9 CM
ECHO RA AREA 4C: 17.2 CM2
ECHO RA VOLUME: 51 ML
ECHO RV BASAL DIMENSION: 4 CM
ECHO RV FREE WALL PEAK S': 15.7 CM/S
ECHO RV MID DIMENSION: 3.1 CM
ECHO RV TAPSE: 2.5 CM (ref 1.7–?)
ECHO TV E WAVE: 0.7 M/S

## 2025-04-22 PROCEDURE — 93356 MYOCRD STRAIN IMG SPCKL TRCK: CPT

## 2025-06-14 NOTE — PROGRESS NOTES
Patient admitted to rehab with left hip metastatic lytic bone lesion s/p REINA. A/Ox4. Transfers with walker x 1 staff assist with gait belt. Mobility restrictions: WBAT. On general diet, tolerating well but experiencing some nausea at times. Medications taken whole with thin liquids. On Lovenox for DVT prophylaxis starts tomorrow, 3/31. Skin: biopsy site RUQ with dressing in place; incision left hip with dressing in place. No Oxygen needed at this time. Has been continent of bowel and bladder. LBM 3/28. Chair/bed alarms in use and call light in reach. Will monitor for safety. normal

## 2025-06-24 NOTE — PROGRESS NOTES
A&Ox4, up in chair with legs elevated. Pt tolerating intake and PO medications whole. C/o nausea, does not want any medication at this time. C/o pain in L hip, will administer medication when able to. Pt to be discharged to our rehab today. Able to make needs known. Call light within reach. Fall precautions in place. Will monitor.  Electronically signed by Odalys Mayer RN on 3/22/2022 at 12:00 PM none

## 2025-07-09 ENCOUNTER — HOSPITAL ENCOUNTER (OUTPATIENT)
Dept: CT IMAGING | Age: 65
Discharge: HOME | End: 2025-07-09
Attending: INTERNAL MEDICINE
Payer: COMMERCIAL

## 2025-07-09 ENCOUNTER — HOSPITAL ENCOUNTER (OUTPATIENT)
Dept: NUCLEAR MEDICINE | Age: 65
Discharge: HOME | End: 2025-07-09
Attending: INTERNAL MEDICINE
Payer: COMMERCIAL

## 2025-07-09 DIAGNOSIS — C50.919 MALIGNANT NEOPLASM OF FEMALE BREAST, UNSPECIFIED ESTROGEN RECEPTOR STATUS, UNSPECIFIED LATERALITY, UNSPECIFIED SITE OF BREAST (HCC): ICD-10-CM

## 2025-07-09 LAB
PERFORMED ON: NORMAL
POC CREATININE: 0.6 MG/DL (ref 0.6–1.2)
POC SAMPLE TYPE: NORMAL

## 2025-07-09 PROCEDURE — 6360000004 HC RX CONTRAST MEDICATION: Performed by: INTERNAL MEDICINE

## 2025-07-09 PROCEDURE — 71260 CT THORAX DX C+: CPT

## 2025-07-09 PROCEDURE — A9503 TC99M MEDRONATE: HCPCS | Performed by: INTERNAL MEDICINE

## 2025-07-09 PROCEDURE — 78306 BONE IMAGING WHOLE BODY: CPT | Performed by: INTERNAL MEDICINE

## 2025-07-09 PROCEDURE — 82565 ASSAY OF CREATININE: CPT

## 2025-07-09 PROCEDURE — 3430000000 HC RX DIAGNOSTIC RADIOPHARMACEUTICAL: Performed by: INTERNAL MEDICINE

## 2025-07-09 RX ORDER — IOPAMIDOL 612 MG/ML
50 INJECTION, SOLUTION INTRAVASCULAR
Status: COMPLETED | OUTPATIENT
Start: 2025-07-09 | End: 2025-07-09

## 2025-07-09 RX ORDER — IOPAMIDOL 755 MG/ML
75 INJECTION, SOLUTION INTRAVASCULAR
Status: COMPLETED | OUTPATIENT
Start: 2025-07-09 | End: 2025-07-09

## 2025-07-09 RX ORDER — TC 99M MEDRONATE 20 MG/10ML
25 INJECTION, POWDER, LYOPHILIZED, FOR SOLUTION INTRAVENOUS
Status: COMPLETED | OUTPATIENT
Start: 2025-07-09 | End: 2025-07-09

## 2025-07-09 RX ADMIN — IOPAMIDOL 50 ML: 612 INJECTION, SOLUTION INTRAVENOUS at 09:55

## 2025-07-09 RX ADMIN — TC 99M MEDRONATE 25 MILLICURIE: 20 INJECTION, POWDER, LYOPHILIZED, FOR SOLUTION INTRAVENOUS at 10:04

## 2025-07-09 RX ADMIN — IOPAMIDOL 75 ML: 755 INJECTION, SOLUTION INTRAVENOUS at 09:55

## (undated) DEVICE — PADDING UNDERCAST W4INXL4YD 100% COT CRIMPED FINISH WBRL II

## (undated) DEVICE — SOLUTION IV IRRIG POUR BRL 0.9% SODIUM CHL 2F7124

## (undated) DEVICE — DRESSING,GAUZE,XEROFORM,CURAD,1"X8",ST: Brand: CURAD

## (undated) DEVICE — MERCY HEALTH WEST TURNOVER: Brand: MEDLINE INDUSTRIES, INC.

## (undated) DEVICE — DRESSING PETRO W3XL3IN OIL EMUL N ADH GZ KNIT IMPREG CELOS

## (undated) DEVICE — RESERVOIR,SUCTION,100CC,SILICONE: Brand: MEDLINE

## (undated) DEVICE — GLOVE,SURG,SENSICARE SLT,LF,PF,8.5: Brand: MEDLINE

## (undated) DEVICE — HYPODERMIC SAFETY NEEDLE: Brand: MAGELLAN

## (undated) DEVICE — SYRINGE MED 30ML STD CLR PLAS LUERLOCK TIP N CTRL DISP

## (undated) DEVICE — DRAPE,U/SHT,SPLIT,FILM,60X84,STERILE: Brand: MEDLINE

## (undated) DEVICE — MATTRESS MAXI AIR TRNSF SGL PT USE DCS 37 45 48 52 75

## (undated) DEVICE — SUTURE VCRL + SZ 2-0 L27IN ABSRB CLR CT-1 1/2 CIR TAPERCUT VCP259H

## (undated) DEVICE — DRAIN SURG 15FR SIL RND CHN W/ TRCR FULL FLUT DBL WRP TRAD

## (undated) DEVICE — SPONGE LAP W18XL18IN WHT COT 4 PLY FLD STRUNG RADPQ DISP ST

## (undated) DEVICE — GLOVE SURG SZ 8 L12IN FNGR THK79MIL GRN LTX FREE

## (undated) DEVICE — SUTURE VCRL + 1 L27IN ABSRB UD CT-1 L36MM 1/2 CIR TAPR PNT VCP261H

## (undated) DEVICE — KIT POS FOAM HANA TBL

## (undated) DEVICE — SUTURE NONABSORBABLE MONOFILAMENT 4-0 FS-2 18 IN ETHILON 662H

## (undated) DEVICE — SPONGE GZ W4XL4IN COT 12 PLY TYP VII WVN C FLD DSGN

## (undated) DEVICE — SUTURE VCRL + SZ 2-0 L18IN ABSRB UD CT1 L36MM 1/2 CIR VCP839D

## (undated) DEVICE — STAPLER SKIN H3.9MM WIRE DIA0.58MM CRWN 6.9MM 35 STPL FIX

## (undated) DEVICE — 3M™ COBAN™ NL STERILE NON-LATEX SELF-ADHERENT WRAP, 2084S, 4 IN X 5 YD (10 CM X 4,5 M), 18 ROLLS/CASE: Brand: 3M™ COBAN™

## (undated) DEVICE — PAD,ABDOMINAL,8"X7.5",STERILE,LF,1/PK: Brand: MEDLINE

## (undated) DEVICE — NEEDLE HYPO 23GA L1.5IN TURQ POLYPR HUB S STL THN WALL IM

## (undated) DEVICE — GLOVE,SURG,SENSICARE SLT,LF,PF,8: Brand: MEDLINE

## (undated) DEVICE — HANDPIECE SET WITH HIGH FLOW TIP AND SUCTION TUBE: Brand: INTERPULSE

## (undated) DEVICE — COVER LT HNDL BLU PLAS

## (undated) DEVICE — SUTURE VCRL + SZ 2-0 L18IN ABSRB VLT L26MM SH 1/2 CIR VCP775D

## (undated) DEVICE — SCREW BNE L48MM DIA5MM NONSTERILE TIB LT GRN TI ST CANN LOK: Type: IMPLANTABLE DEVICE | Site: HIP | Status: NON-FUNCTIONAL

## (undated) DEVICE — GLOVE ORTHO 8   MSG9480

## (undated) DEVICE — C-ARM PACK: Brand: C-ARM COVER

## (undated) DEVICE — GUIDEWIRE ORTH L400MM DIA3.2MM FOR TFN

## (undated) DEVICE — 6619 2 PTNT ISO SYS INCISE AREA&LT;(&GT;&&LT;)&GT;P: Brand: STERI-DRAPE™ IOBAN™ 2

## (undated) DEVICE — SYRINGE MED 10ML LUERLOCK TIP W/O SFTY DISP

## (undated) DEVICE — BIT DRL L145MM DIA4.2MM NONSTERILE 3 FLUT NDL PNT QUIK CPL

## (undated) DEVICE — Z DISCONTINUED USE 2716304 SUTURE STRATAFIX SPRL SZ 3-0 L12IN ABSRB UD FS-1 L24MM 3/8

## (undated) DEVICE — MAJOR SET UP: Brand: MEDLINE INDUSTRIES, INC.

## (undated) DEVICE — T-DRAPE,EXTREMITY,STERILE: Brand: MEDLINE

## (undated) DEVICE — BANDAGE COMPR W6INXL12FT SMOOTH FOR LIMB EXSANG ESMARCH

## (undated) DEVICE — BASIC SINGLE BASIN 1-LF: Brand: MEDLINE INDUSTRIES, INC.

## (undated) DEVICE — BIT DRL L300MM DIA10MM CANN TAPR L QUIK CPL FOR DH DC TFN

## (undated) DEVICE — PACK PROCEDURE SURG TOT HIP DRP

## (undated) DEVICE — PICO 7 10CM X 20CM: Brand: PICO™ 7

## (undated) DEVICE — SUTURE VCRL + SZ 1 L18IN ABSRB UD L36MM CT-1 1/2 CIR VCP841D

## (undated) DEVICE — INTENDED FOR TISSUE SEPARATION, AND OTHER PROCEDURES THAT REQUIRE A SHARP SURGICAL BLADE TO PUNCTURE OR CUT.: Brand: BARD-PARKER ® STAINLESS STEEL BLADES